# Patient Record
Sex: FEMALE | Race: WHITE | NOT HISPANIC OR LATINO | Employment: OTHER | URBAN - METROPOLITAN AREA
[De-identification: names, ages, dates, MRNs, and addresses within clinical notes are randomized per-mention and may not be internally consistent; named-entity substitution may affect disease eponyms.]

---

## 2022-08-12 ENCOUNTER — TELEPHONE (OUTPATIENT)
Dept: SCHEDULING | Facility: IMAGING CENTER | Age: 47
End: 2022-08-12

## 2022-08-12 ENCOUNTER — HOSPITAL ENCOUNTER (OUTPATIENT)
Facility: MEDICAL CENTER | Age: 47
End: 2022-08-12
Attending: COLON & RECTAL SURGERY | Admitting: COLON & RECTAL SURGERY
Payer: MEDICARE

## 2022-10-03 ENCOUNTER — OFFICE VISIT (OUTPATIENT)
Dept: MEDICAL GROUP | Facility: PHYSICIAN GROUP | Age: 47
End: 2022-10-03
Payer: MEDICARE

## 2022-10-03 VITALS
RESPIRATION RATE: 20 BRPM | BODY MASS INDEX: 34.85 KG/M2 | HEIGHT: 69 IN | DIASTOLIC BLOOD PRESSURE: 76 MMHG | HEART RATE: 90 BPM | OXYGEN SATURATION: 96 % | TEMPERATURE: 97.2 F | SYSTOLIC BLOOD PRESSURE: 124 MMHG | WEIGHT: 235.3 LBS

## 2022-10-03 DIAGNOSIS — M54.42 CHRONIC BILATERAL LOW BACK PAIN WITH BILATERAL SCIATICA: ICD-10-CM

## 2022-10-03 DIAGNOSIS — E11.29 TYPE 2 DIABETES MELLITUS WITH OTHER DIABETIC KIDNEY COMPLICATION, WITH LONG-TERM CURRENT USE OF INSULIN (HCC): ICD-10-CM

## 2022-10-03 DIAGNOSIS — M54.41 CHRONIC BILATERAL LOW BACK PAIN WITH BILATERAL SCIATICA: ICD-10-CM

## 2022-10-03 DIAGNOSIS — G89.29 CHRONIC BILATERAL LOW BACK PAIN WITH BILATERAL SCIATICA: ICD-10-CM

## 2022-10-03 DIAGNOSIS — Z12.31 ENCOUNTER FOR SCREENING MAMMOGRAM FOR MALIGNANT NEOPLASM OF BREAST: ICD-10-CM

## 2022-10-03 DIAGNOSIS — Z98.41 HISTORY OF RIGHT CATARACT EXTRACTION: ICD-10-CM

## 2022-10-03 DIAGNOSIS — Z79.4 TYPE 2 DIABETES MELLITUS WITH OTHER DIABETIC KIDNEY COMPLICATION, WITH LONG-TERM CURRENT USE OF INSULIN (HCC): ICD-10-CM

## 2022-10-03 DIAGNOSIS — Z85.820 HISTORY OF MELANOMA: ICD-10-CM

## 2022-10-03 DIAGNOSIS — M62.830 MUSCLE SPASM OF BACK: ICD-10-CM

## 2022-10-03 DIAGNOSIS — Z12.31 ENCOUNTER FOR SCREENING MAMMOGRAM FOR BREAST CANCER: ICD-10-CM

## 2022-10-03 DIAGNOSIS — F11.90 OPIOID USE: ICD-10-CM

## 2022-10-03 DIAGNOSIS — D22.9 ATYPICAL MOLE: ICD-10-CM

## 2022-10-03 PROBLEM — E11.9 TYPE 2 DIABETES MELLITUS (HCC): Status: ACTIVE | Noted: 2022-10-03

## 2022-10-03 PROBLEM — M54.50 CHRONIC BILATERAL LOW BACK PAIN: Status: ACTIVE | Noted: 2022-10-03

## 2022-10-03 PROCEDURE — 99214 OFFICE O/P EST MOD 30 MIN: CPT | Performed by: NURSE PRACTITIONER

## 2022-10-03 RX ORDER — METHOCARBAMOL 750 MG/1
TABLET, FILM COATED ORAL
Qty: 120 TABLET | Refills: 1 | Status: SHIPPED
Start: 2022-10-03 | End: 2023-06-05

## 2022-10-03 RX ORDER — INSULIN LISPRO 100 [IU]/ML
90 INJECTION, SOLUTION INTRAVENOUS; SUBCUTANEOUS
COMMUNITY
End: 2022-11-21

## 2022-10-03 RX ORDER — HYDROCODONE BITARTRATE AND ACETAMINOPHEN 10; 325 MG/1; MG/1
1 TABLET ORAL EVERY 6 HOURS PRN
COMMUNITY
Start: 2022-09-14

## 2022-10-03 RX ORDER — SYRINGE AND NEEDLE,INSULIN,1ML 28GX1/2"
SYRINGE, EMPTY DISPOSABLE MISCELLANEOUS
COMMUNITY
Start: 2022-09-23 | End: 2023-06-05 | Stop reason: SDUPTHER

## 2022-10-03 RX ORDER — HYDROCODONE BITARTRATE AND ACETAMINOPHEN 10; 325 MG/1; MG/1
1 TABLET ORAL EVERY 8 HOURS PRN
Qty: 90 TABLET | Refills: 0 | Status: SHIPPED | OUTPATIENT
Start: 2022-10-03 | End: 2022-11-02

## 2022-10-03 RX ORDER — GABAPENTIN 300 MG/1
CAPSULE ORAL
COMMUNITY
Start: 2022-08-16

## 2022-10-03 RX ORDER — BROMFENAC 0.76 MG/ML
SOLUTION/ DROPS OPHTHALMIC
COMMUNITY
Start: 2022-09-20 | End: 2023-06-05

## 2022-10-03 RX ORDER — PEN NEEDLE, DIABETIC 32 GX 1/4"
NEEDLE, DISPOSABLE MISCELLANEOUS
Qty: 100 EACH | Refills: 3 | Status: SHIPPED | OUTPATIENT
Start: 2022-10-03 | End: 2024-03-21 | Stop reason: SDUPTHER

## 2022-10-03 RX ORDER — HYDROCODONE BITARTRATE AND ACETAMINOPHEN 10; 325 MG/1; MG/1
1 TABLET ORAL
COMMUNITY
End: 2022-10-03 | Stop reason: SDUPTHER

## 2022-10-03 RX ORDER — CIPROFLOXACIN 500 MG/1
TABLET, FILM COATED ORAL
COMMUNITY
Start: 2022-09-06 | End: 2022-10-03

## 2022-10-03 RX ORDER — OMEPRAZOLE 40 MG/1
CAPSULE, DELAYED RELEASE ORAL
COMMUNITY
Start: 2022-08-11

## 2022-10-03 RX ORDER — CYCLOBENZAPRINE HCL 10 MG
10 TABLET ORAL
COMMUNITY
End: 2022-10-03

## 2022-10-03 RX ORDER — GABAPENTIN 300 MG/1
300 CAPSULE ORAL
COMMUNITY
End: 2022-10-03

## 2022-10-03 RX ORDER — MOXIFLOXACIN 5 MG/ML
SOLUTION/ DROPS OPHTHALMIC
COMMUNITY
Start: 2022-09-21 | End: 2023-06-05

## 2022-10-03 RX ORDER — INSULIN GLARGINE 100 [IU]/ML
INJECTION, SOLUTION SUBCUTANEOUS
COMMUNITY
Start: 2022-08-17 | End: 2022-10-03

## 2022-10-03 RX ORDER — CYCLOBENZAPRINE HCL 10 MG
TABLET ORAL
COMMUNITY
Start: 2022-09-13

## 2022-10-03 RX ORDER — LISINOPRIL 10 MG/1
TABLET ORAL
COMMUNITY
Start: 2022-08-11 | End: 2022-10-03

## 2022-10-03 ASSESSMENT — PATIENT HEALTH QUESTIONNAIRE - PHQ9: CLINICAL INTERPRETATION OF PHQ2 SCORE: 0

## 2022-10-03 NOTE — LETTER
[facility]  Cheryl M. Demucha, A.P.R.N.  [address] [place] [address]  Fax: [phone]   Authorization for Release/Disclosure of   Protected Health Information   Name: CALLIE FOWLER : 1975 SSN: [id number]   Address: [address]  [facility] [address] Phone:    There are no phone numbers on file.   I authorize the entity listed below to release/disclose the PHI below to:  [facility]/Cheryl M. Demucha, A.P.R.N. and Cheryl M. Demucha, A.P.R.N.   Provider or Entity Name:     Address   City, State, Zip   Phone:      Fax:     Reason for request: continuity of care   Information to be released:    [  ] LAST COLONOSCOPY,  including any PATH REPORT and follow-up  [  ] LAST FIT/COLOGUARD RESULT [  ] LAST DEXA  [  ] LAST MAMMOGRAM  [  ] LAST PAP  [  ] LAST LABS [  ] RETINA EXAM REPORT  [  ] IMMUNIZATION RECORDS  [  ] Release all info      [  ] Check here and initial the line next to each item to release ALL health information INCLUDING  _____ Care and treatment for drug and / or alcohol abuse  _____ HIV testing, infection status, or AIDS  _____ Genetic Testing    DATES OF SERVICE OR TIME PERIOD TO BE DISCLOSED: _____________  I understand and acknowledge that:  * This Authorization may be revoked at any time by you in writing, except if your health information has already been used or disclosed.  * Your health information that will be used or disclosed as a result of you signing this authorization could be re-disclosed by the recipient. If this occurs, your re-disclosed health information may no longer be protected by State or Federal laws.  * You may refuse to sign this Authorization. Your refusal will not affect your ability to obtain treatment.  * This Authorization becomes effective upon signing and will  on (date) __________.      If no date is indicated, this Authorization will  one (1) year from the signature date.    Name: Callie Fowler    Signature:   Date:     10/3/2022        PLEASE FAX REQUESTED RECORDS BACK TO: [phone]

## 2022-10-03 NOTE — ASSESSMENT & PLAN NOTE
Just had surgery done; is scheduled for left eye as well  Reports vision much better in right eye

## 2022-10-03 NOTE — PROGRESS NOTES
"Subjective:     CC:   Chief Complaint   Patient presents with    Establish Care    Referral Needed     Dermo Left arm mole        HPI:   Aviva presents today with    History of right cataract extraction  Just had surgery done; is scheduled for left eye as well  Reports vision much better in right eye     Atypical mole  Has a few moles she needs to have checked out  Hx of melanoma    Type 2 diabetes mellitus (HCC)  Was in the hospital from 8/31-9/4; was told she was having issues w/her kidneys  Not sure what her A1c  Most recent fasting bs was 215; forgot to take insulin w/it     Chronic bilateral low back pain  Was seeing provider in [place] before she moved here; is open to seeing pain mgmt  Has been taking opioids for quite a while; does help pt be able to perform her ADLs and improve her quality of life in general               ROS per HPI    Objective:     Exam:  /76   Pulse 90   Temp 36.2 °C (97.2 °F) (Temporal)   Resp 20   Ht 1.753 m (5' 9\")   Wt 107 kg (235 lb 4.8 oz)   SpO2 96%   BMI 34.75 kg/m²  Body mass index is 34.75 kg/m².    Physical Exam:  Constitutional: Well-developed and well-nourished female. Not diaphoretic. No distress.   Skin: warm, dry, intact, several moles on her left arm; no open wounds, erythema, papules or pustules   Head: Atraumatic without lesions.  Eyes: Conjunctivae are normal. Pupils are equal, round. No scleral icterus.   Ears:  External ears unremarkable.   Neck: Supple, trachea midline. No thyromegaly present. No cervical or supraclavicular lymphadenopathy.  Cardiovascular: Regular rate and rhythm without murmur.   Pulmonary: Clear to ausculation. Normal effort. No rales, ronchi, or wheezing.  Extremities: No cyanosis, clubbing, erythema, nor edema.   Neurological: Alert and oriented x 3.   Psychiatric:  Behavior, mood, and affect are appropriate.        Assessment & Plan:     47 y.o. female with the following -     1. Encounter for screening mammogram for breast " "cancer    2. Chronic bilateral low back pain with bilateral sciatica  - HYDROcodone/acetaminophen (NORCO)  MG Tab; Take 1 Tablet by mouth every 8 hours as needed for Severe Pain for up to 30 days.  Dispense: 90 Tablet; Refill: 0  - Referral to Pain Management    3. Muscle spasm of back  - methocarbamol (ROBAXIN) 750 MG Tab; 1 tab every 8-12 hours; prn muscle spasms; may increase to 2 tabs if needed  Dispense: 120 Tablet; Refill: 1  - Referral to Pain Management    4. Opioid use  - Referral to Pain Management    5. Type 2 diabetes mellitus with other diabetic kidney complication, with long-term current use of insulin (HCC)    6. Atypical mole  - Referral to Dermatology    7. History of melanoma  - Referral to Dermatology    8. History of right cataract extraction    9. Encounter for screening mammogram for malignant neoplasm of breast  - MA-SCREENING MAMMO BILAT W/TOMOSYNTHESIS W/CAD; Future    Other orders  - BROMSITE 0.075 % Solution  - HYDROcodone/acetaminophen (NORCO)  MG Tab  - cyclobenzaprine (FLEXERIL) 10 mg Tab; 3 times daily  - gabapentin (NEURONTIN) 300 MG Cap  - insulin glargine (LANTUS) 100 UNIT/ML Solution Pen-injector injection; Inject 25 Units under the skin.  - insulin lispro (HUMALOG,ADMELOG) 100 UNIT/ML Solution Pen-injector injection PEN; Inject 90 Units under the skin.  - BD INSULIN SYRINGE U/F 1/2UNIT 31G X 5/16\" 0.3 ML Misc  - moxifloxacin (VIGAMOX) 0.5 % Solution  - omeprazole (PRILOSEC) 40 MG delayed-release capsule  - Insulin Pen Needle (NOVOFINE PEN NEEDLE) 32G X 6 MM Misc; Use with insulin pen as directed before bed  Dispense: 100 Each; Refill: 3  - Consent for Opiate Prescription         Return in about 4 weeks (around 10/31/2022) for gen check in, chronic pain.    Please note that this dictation was created using voice recognition software. I have made every reasonable attempt to correct obvious errors, but I expect that there are errors of grammar and possibly content that I " did not discover before finalizing the note.

## 2022-10-03 NOTE — ASSESSMENT & PLAN NOTE
Was in the hospital from 8/31-9/4; was told she was having issues w/her kidneys  Not sure what her A1c  Most recent fasting bs was 215; forgot to take insulin w/it

## 2022-10-04 NOTE — ASSESSMENT & PLAN NOTE
Was seeing provider in [place] before she moved here; is open to seeing pain mgmt  Has been taking opioids for quite a while; does help pt be able to perform her ADLs and improve her quality of life in general

## 2022-10-11 ENCOUNTER — TELEPHONE (OUTPATIENT)
Dept: MEDICAL GROUP | Facility: PHYSICIAN GROUP | Age: 47
End: 2022-10-11
Payer: MEDICARE

## 2022-10-11 NOTE — TELEPHONE ENCOUNTER
MEDICATION PRIOR AUTHORIZATION NEEDED:    1. Name of Medication: METHOCARBAMOL     2. Requested By (Name of Pharmacy): [facility]     3. Is insurance on file current? YES    4. What is the name & phone number of the 3rd party payor?   [employer]  [id number]

## 2022-11-16 ENCOUNTER — HOSPITAL ENCOUNTER (OUTPATIENT)
Dept: LAB | Facility: MEDICAL CENTER | Age: 47
End: 2022-11-16
Attending: NURSE PRACTITIONER
Payer: MEDICARE

## 2022-11-16 ENCOUNTER — APPOINTMENT (OUTPATIENT)
Dept: RADIOLOGY | Facility: IMAGING CENTER | Age: 47
End: 2022-11-16
Attending: NURSE PRACTITIONER
Payer: MEDICARE

## 2022-11-16 ENCOUNTER — OFFICE VISIT (OUTPATIENT)
Dept: MEDICAL GROUP | Facility: PHYSICIAN GROUP | Age: 47
End: 2022-11-16
Payer: MEDICARE

## 2022-11-16 VITALS
RESPIRATION RATE: 16 BRPM | OXYGEN SATURATION: 98 % | WEIGHT: 245.8 LBS | DIASTOLIC BLOOD PRESSURE: 88 MMHG | BODY MASS INDEX: 36.4 KG/M2 | HEIGHT: 69 IN | SYSTOLIC BLOOD PRESSURE: 130 MMHG | HEART RATE: 92 BPM | TEMPERATURE: 96.8 F

## 2022-11-16 DIAGNOSIS — E66.01 MORBID OBESITY (HCC): ICD-10-CM

## 2022-11-16 DIAGNOSIS — E11.29 TYPE 2 DIABETES MELLITUS WITH OTHER DIABETIC KIDNEY COMPLICATION, WITH LONG-TERM CURRENT USE OF INSULIN (HCC): ICD-10-CM

## 2022-11-16 DIAGNOSIS — Z01.818 PREOP EXAMINATION: ICD-10-CM

## 2022-11-16 DIAGNOSIS — E87.5 HYPERKALEMIA: ICD-10-CM

## 2022-11-16 DIAGNOSIS — Z11.59 NEED FOR HEPATITIS C SCREENING TEST: ICD-10-CM

## 2022-11-16 DIAGNOSIS — R74.8 ABNORMAL LIVER ENZYMES: ICD-10-CM

## 2022-11-16 DIAGNOSIS — E08.9 DIABETES MELLITUS DUE TO UNDERLYING CONDITION WITHOUT COMPLICATION, WITHOUT LONG-TERM CURRENT USE OF INSULIN (HCC): ICD-10-CM

## 2022-11-16 DIAGNOSIS — R53.83 OTHER FATIGUE: ICD-10-CM

## 2022-11-16 DIAGNOSIS — E55.9 VITAMIN D DEFICIENCY: ICD-10-CM

## 2022-11-16 DIAGNOSIS — Z13.220 SCREENING FOR LIPID DISORDERS: ICD-10-CM

## 2022-11-16 DIAGNOSIS — Z79.4 TYPE 2 DIABETES MELLITUS WITH OTHER DIABETIC KIDNEY COMPLICATION, WITH LONG-TERM CURRENT USE OF INSULIN (HCC): ICD-10-CM

## 2022-11-16 DIAGNOSIS — Z86.16 HISTORY OF COVID-19: ICD-10-CM

## 2022-11-16 PROBLEM — N12 PYELONEPHRITIS: Status: ACTIVE | Noted: 2022-09-01

## 2022-11-16 PROBLEM — N17.9 AKI (ACUTE KIDNEY INJURY) (HCC): Status: ACTIVE | Noted: 2022-09-01

## 2022-11-16 LAB
25(OH)D3 SERPL-MCNC: 22 NG/ML (ref 30–100)
ALBUMIN SERPL BCP-MCNC: 3.9 G/DL (ref 3.2–4.9)
ALBUMIN/GLOB SERPL: 1.2 G/DL
ALP SERPL-CCNC: 238 U/L (ref 30–99)
ALT SERPL-CCNC: 37 U/L (ref 2–50)
ANION GAP SERPL CALC-SCNC: 10 MMOL/L (ref 7–16)
AST SERPL-CCNC: 37 U/L (ref 12–45)
BASOPHILS # BLD AUTO: 0.3 % (ref 0–1.8)
BASOPHILS # BLD: 0.02 K/UL (ref 0–0.12)
BILIRUB SERPL-MCNC: 0.4 MG/DL (ref 0.1–1.5)
BUN SERPL-MCNC: 18 MG/DL (ref 8–22)
CALCIUM SERPL-MCNC: 9.9 MG/DL (ref 8.5–10.5)
CHLORIDE SERPL-SCNC: 104 MMOL/L (ref 96–112)
CHOLEST SERPL-MCNC: 170 MG/DL (ref 100–199)
CO2 SERPL-SCNC: 28 MMOL/L (ref 20–33)
CREAT SERPL-MCNC: 1.06 MG/DL (ref 0.5–1.4)
EOSINOPHIL # BLD AUTO: 0.3 K/UL (ref 0–0.51)
EOSINOPHIL NFR BLD: 4.7 % (ref 0–6.9)
ERYTHROCYTE [DISTWIDTH] IN BLOOD BY AUTOMATED COUNT: 41.1 FL (ref 35.9–50)
EST. AVERAGE GLUCOSE BLD GHB EST-MCNC: 232 MG/DL
FASTING STATUS PATIENT QL REPORTED: NORMAL
GFR SERPLBLD CREATININE-BSD FMLA CKD-EPI: 66 ML/MIN/1.73 M 2
GLOBULIN SER CALC-MCNC: 3.3 G/DL (ref 1.9–3.5)
GLUCOSE SERPL-MCNC: 89 MG/DL (ref 65–99)
HBA1C MFR BLD: 9.7 % (ref 4–5.6)
HCT VFR BLD AUTO: 39.1 % (ref 37–47)
HCV AB SER QL: NORMAL
HDLC SERPL-MCNC: 39 MG/DL
HGB BLD-MCNC: 13.6 G/DL (ref 12–16)
IMM GRANULOCYTES # BLD AUTO: 0.02 K/UL (ref 0–0.11)
IMM GRANULOCYTES NFR BLD AUTO: 0.3 % (ref 0–0.9)
LDLC SERPL CALC-MCNC: 96 MG/DL
LYMPHOCYTES # BLD AUTO: 3.39 K/UL (ref 1–4.8)
LYMPHOCYTES NFR BLD: 53.3 % (ref 22–41)
MCH RBC QN AUTO: 31.2 PG (ref 27–33)
MCHC RBC AUTO-ENTMCNC: 34.8 G/DL (ref 33.6–35)
MCV RBC AUTO: 89.7 FL (ref 81.4–97.8)
MONOCYTES # BLD AUTO: 0.47 K/UL (ref 0–0.85)
MONOCYTES NFR BLD AUTO: 7.4 % (ref 0–13.4)
NEUTROPHILS # BLD AUTO: 2.16 K/UL (ref 2–7.15)
NEUTROPHILS NFR BLD: 34 % (ref 44–72)
NRBC # BLD AUTO: 0 K/UL
NRBC BLD-RTO: 0 /100 WBC
PLATELET # BLD AUTO: 318 K/UL (ref 164–446)
PMV BLD AUTO: 11.4 FL (ref 9–12.9)
POTASSIUM SERPL-SCNC: 4.2 MMOL/L (ref 3.6–5.5)
PROT SERPL-MCNC: 7.2 G/DL (ref 6–8.2)
RBC # BLD AUTO: 4.36 M/UL (ref 4.2–5.4)
SODIUM SERPL-SCNC: 142 MMOL/L (ref 135–145)
TRIGL SERPL-MCNC: 175 MG/DL (ref 0–149)
TSH SERPL DL<=0.005 MIU/L-ACNC: 2.14 UIU/ML (ref 0.38–5.33)
WBC # BLD AUTO: 6.4 K/UL (ref 4.8–10.8)

## 2022-11-16 PROCEDURE — 82570 ASSAY OF URINE CREATININE: CPT

## 2022-11-16 PROCEDURE — 80061 LIPID PANEL: CPT

## 2022-11-16 PROCEDURE — 36415 COLL VENOUS BLD VENIPUNCTURE: CPT | Mod: GA

## 2022-11-16 PROCEDURE — 80053 COMPREHEN METABOLIC PANEL: CPT

## 2022-11-16 PROCEDURE — 71046 X-RAY EXAM CHEST 2 VIEWS: CPT | Mod: TC,FY | Performed by: FAMILY MEDICINE

## 2022-11-16 PROCEDURE — 93000 ELECTROCARDIOGRAM COMPLETE: CPT | Performed by: NURSE PRACTITIONER

## 2022-11-16 PROCEDURE — 99214 OFFICE O/P EST MOD 30 MIN: CPT | Performed by: NURSE PRACTITIONER

## 2022-11-16 PROCEDURE — 84443 ASSAY THYROID STIM HORMONE: CPT

## 2022-11-16 PROCEDURE — 83036 HEMOGLOBIN GLYCOSYLATED A1C: CPT | Mod: GA

## 2022-11-16 PROCEDURE — 82306 VITAMIN D 25 HYDROXY: CPT

## 2022-11-16 PROCEDURE — G0472 HEP C SCREEN HIGH RISK/OTHER: HCPCS | Mod: GA

## 2022-11-16 PROCEDURE — 82043 UR ALBUMIN QUANTITATIVE: CPT

## 2022-11-16 PROCEDURE — 85025 COMPLETE CBC W/AUTO DIFF WBC: CPT

## 2022-11-16 RX ORDER — TIZANIDINE 4 MG/1
TABLET ORAL
COMMUNITY
Start: 2022-11-09 | End: 2022-12-13

## 2022-11-16 NOTE — ASSESSMENT & PLAN NOTE
Pt had a gastric sleeve done in 7/2018  Started at 290 and got down to 229  Patient has been referred to [facility] for bariatric surgery-will do a revision/bypass  She is here today to get started on the medical clearance for this procedure    She needs to have a scope done; once these are done, she'll be scheduled in the near future

## 2022-11-16 NOTE — PROGRESS NOTES
"Subjective:     CC:   Chief Complaint   Patient presents with    Paperwork       For gastric bypass    Requesting Labs       HPI:   Aviva presents today with    Morbid obesity (HCC)  Pt had a gastric sleeve done in 7/2018  Started at 290 and got down to 229  Patient has been referred to [facility] for bariatric surgery-will do a revision/bypass  She is here today to get started on the medical clearance for this procedure    She needs to have a scope done; once these are done, she'll be scheduled in the near future     History of COVID-19  Pos 10/16; doing much better               ROS per HPI    Objective:     Exam:  /88   Pulse 92   Temp 36 °C (96.8 °F) (Temporal)   Resp 16   Ht 1.753 m (5' 9\")   Wt 111 kg (245 lb 12.8 oz)   SpO2 98%   BMI 36.30 kg/m²  Body mass index is 36.3 kg/m².    Physical Exam:  Constitutional: Well-developed and well-nourished female  Not diaphoretic. No distress.   Skin: warm, dry, intact, no evidence of rash or concerning lesions  Head: Atraumatic without lesions.  Eyes: Conjunctivae are normal. Pupils are equal, round. No scleral icterus.   Ears:  External ears unremarkable.   Neck: Supple, trachea midline. No thyromegaly present. No cervical or supraclavicular lymphadenopathy.  Cardiovascular: Regular rate and rhythm without murmur.   Pulmonary: Clear to ausculation. Normal effort. No rales, ronchi, or wheezing.  Extremities: No cyanosis, clubbing, erythema, nor edema.   Neurological: Alert and oriented x 3.   Psychiatric:  Behavior, mood, and affect are appropriate.        Assessment & Plan:     47 y.o. female with the following -     1. Morbid obesity (HCC)  - TSH WITH REFLEX TO FT4; Future    2. Type 2 diabetes mellitus with other diabetic kidney complication, with long-term current use of insulin (HCC)  - HEMOGLOBIN A1C; Future    3. Abnormal liver enzymes  - Comp Metabolic Panel; Future    4. Hyperkalemia  - Comp Metabolic Panel; Future    5. Other " fatigue  - CBC WITH DIFFERENTIAL; Future  - TSH WITH REFLEX TO FT4; Future    6. Screening for lipid disorders  - Lipid Profile; Future    7. Vitamin D deficiency  - VITAMIN D,25 HYDROXY (DEFICIENCY); Future    8. Preop examination  - DX-CHEST-2 VIEWS; Future  - EKG - Clinic Performed wnl     9. Diabetes mellitus due to underlying condition without complication, without long-term current use of insulin (HCC)  - DX-CHEST-2 VIEWS; Future  - Diabetic Monofilament LE Exam wnl          Return in about 2 weeks (around 11/30/2022). For lab results and chest xray review     Please note that this dictation was created using voice recognition software. I have made every reasonable attempt to correct obvious errors, but I expect that there are errors of grammar and possibly content that I did not discover before finalizing the note.

## 2022-11-17 LAB
CREAT UR-MCNC: 112.61 MG/DL
MICROALBUMIN UR-MCNC: 85.7 MG/DL
MICROALBUMIN/CREAT UR: 761 MG/G (ref 0–30)

## 2022-11-21 ENCOUNTER — TELEMEDICINE (OUTPATIENT)
Dept: MEDICAL GROUP | Facility: PHYSICIAN GROUP | Age: 47
End: 2022-11-21
Payer: MEDICARE

## 2022-11-21 VITALS — HEIGHT: 69 IN | WEIGHT: 240 LBS | BODY MASS INDEX: 35.55 KG/M2

## 2022-11-21 DIAGNOSIS — E11.29 TYPE 2 DIABETES MELLITUS WITH OTHER DIABETIC KIDNEY COMPLICATION, WITH LONG-TERM CURRENT USE OF INSULIN (HCC): ICD-10-CM

## 2022-11-21 DIAGNOSIS — Z79.4 TYPE 2 DIABETES MELLITUS WITH OTHER DIABETIC KIDNEY COMPLICATION, WITH LONG-TERM CURRENT USE OF INSULIN (HCC): ICD-10-CM

## 2022-11-21 DIAGNOSIS — R80.9 POSITIVE FOR MICROALBUMINURIA: ICD-10-CM

## 2022-11-21 DIAGNOSIS — R74.8 ABNORMAL LIVER ENZYMES: ICD-10-CM

## 2022-11-21 DIAGNOSIS — E87.5 HYPERKALEMIA: ICD-10-CM

## 2022-11-21 PROCEDURE — 99214 OFFICE O/P EST MOD 30 MIN: CPT | Mod: 95 | Performed by: NURSE PRACTITIONER

## 2022-11-21 RX ORDER — INSULIN LISPRO 100 [IU]/ML
10 INJECTION, SOLUTION INTRAVENOUS; SUBCUTANEOUS
COMMUNITY
Start: 2022-11-21 | End: 2023-09-11 | Stop reason: SDUPTHER

## 2022-11-21 ASSESSMENT — FIBROSIS 4 INDEX: FIB4 SCORE: 0.86

## 2022-11-22 NOTE — ASSESSMENT & PLAN NOTE
Recent labs show A1c of 9.7, down from >14.1 two months ago  Pt taking 25 units lantus qhs and 4-10 units sliding scale tid premeals   fbs have been in the high 100s to low 200s   Hasn't been eating that well but does improve when she does    Advised pt to keep her sliding scale the same  Increase lantus by 3 units every 2 units until she gets to fbs of 140 (feels poorly lower than this)

## 2022-11-22 NOTE — PROGRESS NOTES
"Virtual Visit: Established Patient   This visit was conducted via Zoom using secure and encrypted videoconferencing technology.   The patient was in their home in the state of [place].    The patient's identity was confirmed and verbal consent was obtained for this virtual visit.    Subjective:   CC:   Chief Complaint   Patient presents with    Follow-Up     Labs      Aviva Kenney is a 47 y.o. female presenting for evaluation and management of:    Type 2 diabetes mellitus (HCC)  Recent labs show A1c of 9.7, down from >14.1 two months ago  Pt taking 25 units lantus qhs and 4-10 units sliding scale tid premeals   fbs have been in the high 100s to low 200s   Hasn't been eating that well but does improve when she does    Advised pt to keep her sliding scale the same  Increase lantus by 3 units every 2 units until she gets to fbs of 140 (feels poorly lower than this)    Positive for microalbuminuria  Creat/micro high  Pt was taking lisinopril at one time but d/c d/t elevated potassium       Hyperkalemia  Improved; most recent value 4.1     Abnormal liver enzymes  2/3 labs have normalized since covid  Alk phos 238; redo in 3 mos   ROS       Current medicines (including changes today)  Current Outpatient Medications   Medication Sig Dispense Refill    insulin lispro 100 UNIT/ML SC SOPN injection PEN Inject 10 Units under the skin 3 times a day before meals.      insulin glargine 100 UNIT/ML SC SOPN injection Inject 25 units SQ into abd qhs; increase by 3 units every 2 nights until raz=579; max 40 units per night 12 Each 3    tizanidine (ZANAFLEX) 4 MG Tab       BROMSITE 0.075 % Solution       HYDROcodone/acetaminophen (NORCO)  MG Tab       cyclobenzaprine (FLEXERIL) 10 mg Tab 3 times daily      gabapentin (NEURONTIN) 300 MG Cap       BD INSULIN SYRINGE U/F 1/2UNIT 31G X 5/16\" 0.3 ML Misc       moxifloxacin (VIGAMOX) 0.5 % Solution       omeprazole (PRILOSEC) 40 MG delayed-release capsule       " "methocarbamol (ROBAXIN) 750 MG Tab 1 tab every 8-12 hours; prn muscle spasms; may increase to 2 tabs if needed 120 Tablet 1    Insulin Pen Needle (NOVOFINE PEN NEEDLE) 32G X 6 MM Misc Use with insulin pen as directed before bed 100 Each 3     No current facility-administered medications for this visit.       Patient Active Problem List    Diagnosis Date Noted    Positive for microalbuminuria 11/21/2022    History of COVID-19 11/16/2022    Chronic bilateral low back pain 10/03/2022    Type 2 diabetes mellitus (HCC) 10/03/2022    Atypical mole 10/03/2022    History of melanoma 10/03/2022    History of right cataract extraction 10/03/2022    Abnormal liver enzymes 09/01/2022    BUTCH (acute kidney injury) ([facility]) 09/01/2022    Hyperkalemia 09/01/2022    Morbid obesity (HCC) 09/01/2022    Pyelonephritis 09/01/2022        Objective:   Ht 1.753 m (5' 9\") Comment: pt stated  Wt 109 kg (240 lb) Comment: pt stated  BMI 35.44 kg/m²     Physical Exam:  Constitutional: Alert, no distress, well-groomed.  Skin: No rashes in visible areas.  Eye: Round. Conjunctiva clear, lids normal. No icterus.   ENMT: Lips pink without lesions, good dentition, moist mucous membranes. Phonation normal.  Neck: No masses, no thyromegaly. Moves freely without pain.  Respiratory: Unlabored respiratory effort, no cough or audible wheeze  Psych: Alert and oriented x3, normal affect and mood.     Assessment and Plan:   The following treatment plan was discussed:     1. Type 2 diabetes mellitus with other diabetic kidney complication, with long-term current use of insulin ([facility])  - insulin glargine 100 UNIT/ML SC SOPN injection; Inject 25 units SQ into abd qhs; increase by 3 units every 2 nights until xly=968; max 40 units per night  Dispense: 12 Each; Refill: 3    2. Positive for microalbuminuria  Monitor-should improve w/better glycemic control    3. Hyperkalemia  Resolved w/recent labs    4. Abnormal liver enzymes  improving    Other orders  - insulin " lispro 100 UNIT/ML SC SOPN injection PEN; Inject 10 Units under the skin 3 times a day before meals.      Follow-up: Return in about 2 weeks (around 12/5/2022) for for fbs .

## 2022-11-23 ENCOUNTER — PRE-ADMISSION TESTING (OUTPATIENT)
Dept: ADMISSIONS | Facility: MEDICAL CENTER | Age: 47
End: 2022-11-23
Attending: COLON & RECTAL SURGERY
Payer: MEDICARE

## 2022-12-06 ENCOUNTER — APPOINTMENT (OUTPATIENT)
Dept: MEDICAL GROUP | Facility: PHYSICIAN GROUP | Age: 47
End: 2022-12-06
Payer: MEDICARE

## 2022-12-07 ENCOUNTER — ANESTHESIA EVENT (OUTPATIENT)
Dept: SURGERY | Facility: MEDICAL CENTER | Age: 47
End: 2022-12-07
Payer: MEDICARE

## 2022-12-07 ENCOUNTER — HOSPITAL ENCOUNTER (OUTPATIENT)
Facility: MEDICAL CENTER | Age: 47
End: 2022-12-07
Attending: COLON & RECTAL SURGERY | Admitting: COLON & RECTAL SURGERY
Payer: MEDICARE

## 2022-12-07 ENCOUNTER — ANESTHESIA (OUTPATIENT)
Dept: SURGERY | Facility: MEDICAL CENTER | Age: 47
End: 2022-12-07
Payer: MEDICARE

## 2022-12-07 VITALS
WEIGHT: 246.91 LBS | RESPIRATION RATE: 14 BRPM | OXYGEN SATURATION: 100 % | BODY MASS INDEX: 36.57 KG/M2 | TEMPERATURE: 97.4 F | HEART RATE: 69 BPM | DIASTOLIC BLOOD PRESSURE: 96 MMHG | HEIGHT: 69 IN | SYSTOLIC BLOOD PRESSURE: 156 MMHG

## 2022-12-07 LAB — GLUCOSE BLD STRIP.AUTO-MCNC: 151 MG/DL (ref 65–99)

## 2022-12-07 PROCEDURE — 160025 RECOVERY II MINUTES (STATS): Performed by: COLON & RECTAL SURGERY

## 2022-12-07 PROCEDURE — 700101 HCHG RX REV CODE 250: Performed by: ANESTHESIOLOGY

## 2022-12-07 PROCEDURE — 160048 HCHG OR STATISTICAL LEVEL 1-5: Performed by: COLON & RECTAL SURGERY

## 2022-12-07 PROCEDURE — 160009 HCHG ANES TIME/MIN: Performed by: COLON & RECTAL SURGERY

## 2022-12-07 PROCEDURE — 160036 HCHG PACU - EA ADDL 30 MINS PHASE I: Performed by: COLON & RECTAL SURGERY

## 2022-12-07 PROCEDURE — 700111 HCHG RX REV CODE 636 W/ 250 OVERRIDE (IP): Performed by: COLON & RECTAL SURGERY

## 2022-12-07 PROCEDURE — 160046 HCHG PACU - 1ST 60 MINS PHASE II: Performed by: COLON & RECTAL SURGERY

## 2022-12-07 PROCEDURE — 82962 GLUCOSE BLOOD TEST: CPT

## 2022-12-07 PROCEDURE — 160202 HCHG ENDO MINUTES - 1ST 30 MINS LEVEL 3: Performed by: COLON & RECTAL SURGERY

## 2022-12-07 PROCEDURE — 502240 HCHG MISC OR SUPPLY RC 0272: Performed by: COLON & RECTAL SURGERY

## 2022-12-07 PROCEDURE — 160002 HCHG RECOVERY MINUTES (STAT): Performed by: COLON & RECTAL SURGERY

## 2022-12-07 PROCEDURE — C1889 IMPLANT/INSERT DEVICE, NOC: HCPCS | Performed by: COLON & RECTAL SURGERY

## 2022-12-07 PROCEDURE — 160035 HCHG PACU - 1ST 60 MINS PHASE I: Performed by: COLON & RECTAL SURGERY

## 2022-12-07 PROCEDURE — C1726 CATH, BAL DIL, NON-VASCULAR: HCPCS | Performed by: COLON & RECTAL SURGERY

## 2022-12-07 PROCEDURE — 700111 HCHG RX REV CODE 636 W/ 250 OVERRIDE (IP): Performed by: ANESTHESIOLOGY

## 2022-12-07 PROCEDURE — 00731 ANES UPR GI NDSC PX NOS: CPT | Performed by: ANESTHESIOLOGY

## 2022-12-07 PROCEDURE — 160207 HCHG ENDO MINUTES - EA ADDL 1 MIN LEVEL 3: Performed by: COLON & RECTAL SURGERY

## 2022-12-07 DEVICE — CLIP SYSTEM SET 12/6T 220 CM: Type: IMPLANTABLE DEVICE | Site: ESOPHAGUS | Status: FUNCTIONAL

## 2022-12-07 RX ORDER — ROCURONIUM BROMIDE 10 MG/ML
INJECTION, SOLUTION INTRAVENOUS PRN
Status: DISCONTINUED | OUTPATIENT
Start: 2022-12-07 | End: 2022-12-07 | Stop reason: SURG

## 2022-12-07 RX ORDER — DIPHENHYDRAMINE HYDROCHLORIDE 50 MG/ML
12.5 INJECTION INTRAMUSCULAR; INTRAVENOUS
Status: DISCONTINUED | OUTPATIENT
Start: 2022-12-07 | End: 2022-12-07 | Stop reason: HOSPADM

## 2022-12-07 RX ORDER — ONDANSETRON 2 MG/ML
INJECTION INTRAMUSCULAR; INTRAVENOUS PRN
Status: DISCONTINUED | OUTPATIENT
Start: 2022-12-07 | End: 2022-12-07 | Stop reason: SURG

## 2022-12-07 RX ORDER — SODIUM CHLORIDE, SODIUM LACTATE, POTASSIUM CHLORIDE, CALCIUM CHLORIDE 600; 310; 30; 20 MG/100ML; MG/100ML; MG/100ML; MG/100ML
INJECTION, SOLUTION INTRAVENOUS CONTINUOUS
Status: DISCONTINUED | OUTPATIENT
Start: 2022-12-07 | End: 2022-12-07 | Stop reason: HOSPADM

## 2022-12-07 RX ORDER — LABETALOL HYDROCHLORIDE 5 MG/ML
5 INJECTION, SOLUTION INTRAVENOUS
Status: DISCONTINUED | OUTPATIENT
Start: 2022-12-07 | End: 2022-12-07 | Stop reason: HOSPADM

## 2022-12-07 RX ORDER — LIDOCAINE HYDROCHLORIDE 20 MG/ML
INJECTION, SOLUTION EPIDURAL; INFILTRATION; INTRACAUDAL; PERINEURAL PRN
Status: DISCONTINUED | OUTPATIENT
Start: 2022-12-07 | End: 2022-12-07 | Stop reason: SURG

## 2022-12-07 RX ORDER — HALOPERIDOL 5 MG/ML
1 INJECTION INTRAMUSCULAR
Status: DISCONTINUED | OUTPATIENT
Start: 2022-12-07 | End: 2022-12-07 | Stop reason: HOSPADM

## 2022-12-07 RX ORDER — METOPROLOL TARTRATE 1 MG/ML
INJECTION, SOLUTION INTRAVENOUS PRN
Status: DISCONTINUED | OUTPATIENT
Start: 2022-12-07 | End: 2022-12-07 | Stop reason: SURG

## 2022-12-07 RX ORDER — DEXAMETHASONE SODIUM PHOSPHATE 4 MG/ML
INJECTION, SOLUTION INTRA-ARTICULAR; INTRALESIONAL; INTRAMUSCULAR; INTRAVENOUS; SOFT TISSUE PRN
Status: DISCONTINUED | OUTPATIENT
Start: 2022-12-07 | End: 2022-12-07 | Stop reason: SURG

## 2022-12-07 RX ORDER — IPRATROPIUM BROMIDE AND ALBUTEROL SULFATE 2.5; .5 MG/3ML; MG/3ML
3 SOLUTION RESPIRATORY (INHALATION)
Status: DISCONTINUED | OUTPATIENT
Start: 2022-12-07 | End: 2022-12-07 | Stop reason: HOSPADM

## 2022-12-07 RX ORDER — TRIAMCINOLONE ACETONIDE 40 MG/ML
INJECTION, SUSPENSION INTRA-ARTICULAR; INTRAMUSCULAR
Status: DISCONTINUED | OUTPATIENT
Start: 2022-12-07 | End: 2022-12-07 | Stop reason: HOSPADM

## 2022-12-07 RX ORDER — ONDANSETRON 2 MG/ML
4 INJECTION INTRAMUSCULAR; INTRAVENOUS
Status: DISCONTINUED | OUTPATIENT
Start: 2022-12-07 | End: 2022-12-07 | Stop reason: HOSPADM

## 2022-12-07 RX ORDER — OXYCODONE HCL 5 MG/5 ML
10 SOLUTION, ORAL ORAL
Status: DISCONTINUED | OUTPATIENT
Start: 2022-12-07 | End: 2022-12-07 | Stop reason: HOSPADM

## 2022-12-07 RX ORDER — OXYCODONE HCL 5 MG/5 ML
5 SOLUTION, ORAL ORAL
Status: DISCONTINUED | OUTPATIENT
Start: 2022-12-07 | End: 2022-12-07 | Stop reason: HOSPADM

## 2022-12-07 RX ORDER — MIDAZOLAM HYDROCHLORIDE 1 MG/ML
1 INJECTION INTRAMUSCULAR; INTRAVENOUS
Status: DISCONTINUED | OUTPATIENT
Start: 2022-12-07 | End: 2022-12-07 | Stop reason: HOSPADM

## 2022-12-07 RX ADMIN — DEXAMETHASONE SODIUM PHOSPHATE 4 MG: 4 INJECTION, SOLUTION INTRA-ARTICULAR; INTRALESIONAL; INTRAMUSCULAR; INTRAVENOUS; SOFT TISSUE at 07:00

## 2022-12-07 RX ADMIN — PROPOFOL 120 MG: 10 INJECTION, EMULSION INTRAVENOUS at 07:00

## 2022-12-07 RX ADMIN — FENTANYL CITRATE 50 MCG: 50 INJECTION, SOLUTION INTRAMUSCULAR; INTRAVENOUS at 07:05

## 2022-12-07 RX ADMIN — LIDOCAINE HYDROCHLORIDE 100 MG: 20 INJECTION, SOLUTION EPIDURAL; INFILTRATION; INTRACAUDAL at 07:00

## 2022-12-07 RX ADMIN — FENTANYL CITRATE 50 MCG: 50 INJECTION, SOLUTION INTRAMUSCULAR; INTRAVENOUS at 07:00

## 2022-12-07 RX ADMIN — SUGAMMADEX 200 MG: 100 INJECTION, SOLUTION INTRAVENOUS at 07:26

## 2022-12-07 RX ADMIN — METOPROLOL TARTRATE 2.5 MG: 5 INJECTION, SOLUTION INTRAVENOUS at 07:05

## 2022-12-07 RX ADMIN — ROCURONIUM BROMIDE 50 MG: 10 INJECTION, SOLUTION INTRAVENOUS at 07:00

## 2022-12-07 RX ADMIN — ONDANSETRON 4 MG: 2 INJECTION INTRAMUSCULAR; INTRAVENOUS at 07:00

## 2022-12-07 ASSESSMENT — PAIN SCALES - GENERAL: PAIN_LEVEL: 0

## 2022-12-07 ASSESSMENT — FIBROSIS 4 INDEX: FIB4 SCORE: 0.86

## 2022-12-07 NOTE — ANESTHESIA PROCEDURE NOTES
Airway    Date/Time: 12/7/2022 7:01 AM  Performed by: Narciso Figueredo M.D.  Authorized by: Narciso Figueredo M.D.     Location:  OR  Urgency:  Elective  Indications for Airway Management:  Anesthesia      Spontaneous Ventilation: absent    Sedation Level:  Deep  Preoxygenated: Yes    Patient Position:  Sniffing  Mask Difficulty Assessment:  1 - vent by mask  Final Airway Type:  Endotracheal airway  Final Endotracheal Airway:  ETT  Cuffed: Yes    Technique Used for Successful ETT Placement:  Direct laryngoscopy    Insertion Site:  Oral  Blade Type:  Dunn  Laryngoscope Blade/Videolaryngoscope Blade Size:  2  ETT Size (mm):  7.0  Measured from:  Lips  ETT to Lips (cm):  21  Placement Verified by: capnometry    Cormack-Lehane Classification:  Grade IIa - partial view of glottis  Number of Attempts at Approach:  1

## 2022-12-07 NOTE — OP REPORT
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 female with a history of sleeve gastrectomy and GERD, dysphagia. She is taken to the operating room today for Esophagogastroduodenoscopy and dilatation and septotomy for suspected stenosis.       DETAILS OF PROCEDURE: After an extensive informed consent discussion and   process, the patient was brought to the operating room and was placed in a   supine position on the endoscopy table. The patient was then given general anesthesia and endotracheal tube intubation. During the course of the procedure, the patient was monitored continuously with pulse oximetry, telemetry, and intermittent blood pressure readings.     After placement of the oral bite block to protect the teeth, gums, and gastroscope, the gastroscope was slowly introduced and advanced into the esophagus. It was slowly advanced down to the gastroesophageal junction region. The GE junction at 37 cm exhibited mild esophagitis. 10mm tongues of salmon-colored mucosa noted. WATS brushings were obtained. The gastroscope passed without difficulty into the proximal body of the stomach, which appeared normal and consistent with sleeve resection.     A stenosis was present at the proximal sleeve and included a definitive web-like septum which also occurred at a site of slight angulation. The gastroscope was then advanced into the  duodenum without difficulty. The first, second, and third portions of the duodenum appeared normal with no evidence of duodenitis or ulcers. Slowly, the gastroscope was withdrawn, and the duodenum appeared normal. The antrum appeared normal. A retroflexion view was not possible to be safely performed.      The 35mm achalasia balloon was chosen The savory wire was advanced down into the duodenum and the scope withdrawn. Next the achalasia balloon was gently advanced over the guidewire and the gastroscope carefully reintroduced. The balloon was positioned so as to straddle the stenosis. It was inflated to 35mm or 1.4atm and held for 3 minutes, with visible blanching observed through the balloon. The balloon was then deflated and removed. Trace blood but no untoward events were observed. The gastroscope was withdrawn and the 12mm over the scope Ovesco clip was prepared. The gastroscope was carefully reintroduced and maneuvered to optimize purchase of the stenosis tissue. Using the tined grasper the stenosis tissue was brought into the device and the clip deployed. Reinspection demonstrated a very nice deployment which should serve as an effective septotomy over the coming weeks.    40mg Kenelog were injected into the stenosis in four 1 cc aliquots and quadrants.    The gastroscope was slowly withdrawn as air was aspirated and the area of the proximal pouch and gastroesophageal junction region were again carefully inspected, which all appeared normal. The gastroesophageal junction was carefully inspected as was the esophagus as we withdrew the gastroscope and these areas appeared normal and were photographed for documentation purposes. The gastroscope was then withdrawn.    IMPRESSION/PLAN: Web like septum proximal stenosis, treated with septotomy.  May need further endoscopic septotomy and dilation in coming months based upon symptoms. Continue PPI.    The patient tolerated the procedure well and there were no apparent  complications.  She was awakened and transferred to the recovery area in satisfactory condition.       ____________________________________   Bridger Gongora MD  DD: 11/25/2020  7:56 AM    CC:  [facility]

## 2022-12-07 NOTE — OR NURSING
Patient AAOx4, calm, denies pain and nausea post op. Tolerating water and juice. VSS, afebrile, room air 95%.  updated on status and plan of care.

## 2022-12-07 NOTE — OR NURSING
0846-Pt transferred to Phase 2 via rVilonia from Phase 1.  Assisted up to chair.  VS stable.  Denies pain or nausea.  States she has a slight sore throat.  0910-IV dc'd-catheter inatact.  0915-Pt discharged to  via wheelchair escort to car.  Discharge instructions given to pt.  Pt verbalized understanding.

## 2022-12-07 NOTE — ANESTHESIA PREPROCEDURE EVALUATION
Case: [id number] Date/Time: 12/07/22 0645    Procedures:       ESOPHAGOGASTRODUODENOSCOPY (EGD) WITH BRUSHINGS AND POSSIBLE DILATATION VS OVER THE SCOPE CLIP PLACEMENT      EGD, WITH CLIP PLACEMENT    Pre-op diagnosis: DYSPHAGIA    Location: [facility] OR 10 / SURGERY [facility]    Surgeons: Bridger Gongora M.D.        47yoF with PMHx of DMII, HTN,     Hx of morbid obesity s.p sleeve 2018    NKDA  NPO  No AC    Relevant Problems      (positive) BUTCH (acute kidney injury) (HCC)      ENDO   (positive) Type 2 diabetes mellitus (HCC)       Physical Exam    Airway   Mallampati: II       Cardiovascular - normal exam     Dental - normal exam           Pulmonary - normal exam     Abdominal   (+) obese     Neurological - normal exam                 Anesthesia Plan    ASA 2       Plan - general       Airway plan will be ETT          Induction: intravenous    Postoperative Plan: Postoperative administration of opioids is intended.    Pertinent diagnostic labs and testing reviewed    Informed Consent:    Anesthetic plan and risks discussed with patient.

## 2022-12-07 NOTE — DISCHARGE INSTRUCTIONS
HOME CARE INSTRUCTIONS    ACTIVITY: Rest and take it easy for the first 24 hours.  A responsible adult is recommended to remain with you during that time.  It is normal to feel sleepy.  We encourage you to not do anything that requires balance, judgment or coordination.    FOR 24 HOURS DO NOT:  Drive, operate machinery or run household appliances.  Drink beer or alcoholic beverages.  Make important decisions or sign legal documents.    Upper Endoscopy, Adult, Care After  This sheet gives you information about how to care for yourself after your procedure. Your health care provider may also give you more specific instructions. If you have problems or questions, contact your health care provider.  What can I expect after the procedure?  After the procedure, it is common to have:  A sore throat.  Mild stomach pain or discomfort.  Bloating.  Nausea.  Follow these instructions at home:    Follow instructions from your health care provider about what to eat or drink after your procedure.  Return to your normal activities as told by your health care provider. Ask your health care provider what activities are safe for you.  Take over-the-counter and prescription medicines only as told by your health care provider.  Do not drive for 24 hours if you were given a sedative during your procedure.  Keep all follow-up visits as told by your health care provider. This is important.  Contact a health care provider if you have:  A sore throat that lasts longer than one day.  Trouble swallowing.  Get help right away if:  You vomit blood or your vomit looks like coffee grounds.  You have:  A fever.  Bloody, black, or tarry stools.  A severe sore throat or you cannot swallow.  Difficulty breathing.  Severe pain in your chest or abdomen.  Summary  After the procedure, it is common to have a sore throat, mild stomach discomfort, bloating, and nausea.  Do not drive for 24 hours if you were given a sedative during the procedure.  Follow  instructions from your health care provider about what to eat or drink after your procedure.  Return to your normal activities as told by your health care provider.  This information is not intended to replace advice given to you by your health care provider. Make sure you discuss any questions you have with your health care provider.  Document Released: 06/18/2013 Document Revised: 06/11/2019 Document Reviewed: 05/20/2019  [employer] Patient Education © 2020 [employer]      DIET: To avoid nausea, slowly advance diet as tolerated, avoiding spicy or greasy foods for the first day.  Add more substantial food to your diet according to your physician's instructions.  Babies can be fed formula or breast milk as soon as they are hungry.  INCREASE FLUIDS AND FIBER TO AVOID CONSTIPATION.    MEDICATIONS: Resume taking daily medication.  Take prescribed pain medication with food.  If no medication is prescribed, you may take non-aspirin pain medication if needed.  PAIN MEDICATION CAN BE VERY CONSTIPATING.  Take a stool softener or laxative such as senokot, pericolace, or milk of magnesia if needed.    A follow-up appointment should be arranged with your doctor in 1-2 weeks; call to schedule.    You should CALL YOUR PHYSICIAN if you develop:  Fever greater than 101 degrees F.  Pain not relieved by medication, or persistent nausea or vomiting.  Excessive bleeding (blood soaking through dressing) or unexpected drainage from the wound.  Extreme redness or swelling around the incision site, drainage of pus or foul smelling drainage.  Inability to urinate or empty your bladder within 8 hours.  Problems with breathing or chest pain.    You should call 911 if you develop problems with breathing or chest pain.  If you are unable to contact your doctor or surgical center, you should go to the nearest emergency room or urgent care center.  Physician's telephone #: Dr. Gongora [phone]    MILD FLU-LIKE SYMPTOMS ARE NORMAL.  YOU MAY  EXPERIENCE GENERALIZED MUSCLE ACHES, THROAT IRRITATION, HEADACHE AND/OR SOME NAUSEA.    If any questions arise, call your doctor.  If your doctor is not available, please feel free to call the Surgical Center at [phone].  The Center is open Monday through Friday from 7AM to 7PM.      A registered nurse may call you a few days after your surgery to see how you are doing after your procedure.    You may also receive a survey in the mail within the next two weeks and we ask that you take a few moments to complete the survey and return it to us.  Our goal is to provide you with very good care and we value your comments.     Depression / Suicide Risk    As you are discharged from this [facility] facility, it is important to learn how to keep safe from harming yourself.    Recognize the warning signs:  Abrupt changes in personality, positive or negative- including increase in energy   Giving away possessions  Change in eating patterns- significant weight changes-  positive or negative  Change in sleeping patterns- unable to sleep or sleeping all the time   Unwillingness or inability to communicate  Depression  Unusual sadness, discouragement and loneliness  Talk of wanting to die  Neglect of personal appearance   Rebelliousness- reckless behavior  Withdrawal from people/activities they love  Confusion- inability to concentrate     If you or a loved one observes any of these behaviors or has concerns about self-harm, here's what you can do:  Talk about it- your feelings and reasons for harming yourself  Remove any means that you might use to hurt yourself (examples: pills, rope, extension cords, firearm)  Get professional help from the community (Mental Health, Substance Abuse, psychological counseling)  Do not be alone:Call your Safe Contact- someone whom you trust who will be there for you.  Call your local CRISIS HOTLINE 780-0197 or 536-102-2664  Call your local Children's Mobile Crisis Response Team [place] (673) 481-4503 or www.[employer]  Call the toll free National Suicide Prevention Hotlines   National Suicide Prevention Lifeline 997-381-JUNF (8537)  [facility] 800-SUICIDE (706-0721)    I acknowledge receipt and understanding of these Home Care instructions.

## 2022-12-07 NOTE — ANESTHESIA TIME REPORT
Anesthesia Start and Stop Event Times     Date Time Event    12/7/2022 0651 Ready for Procedure     0657 Anesthesia Start     0732 Anesthesia Stop        Responsible Staff  12/07/22    Name Role Begin End    Narciso Figueredo M.D. Anesth 0657 0732        Overtime Reason:  overtime    Comments:

## 2022-12-07 NOTE — ANESTHESIA POSTPROCEDURE EVALUATION
Patient: Aviva Kenney    Procedure Summary     Date: 12/07/22 Room / Location: [place] [address] / SURGERY [facility]    Anesthesia Start: 0657 Anesthesia Stop: 0732    Procedures:       ESOPHAGOGASTRODUODENOSCOPY (EGD) WITH BRUSHINGS AND POSSIBLE DILATATION VS OVER THE SCOPE CLIP PLACEMENT (Esophagus)      EGD, WITH CLIP PLACEMENT (Esophagus) Diagnosis: (DYSPHAGIA)    Surgeons: Bridger Gongora M.D. Responsible Provider: Narciso Figueredo M.D.    Anesthesia Type: general ASA Status: 2          Final Anesthesia Type: general  Last vitals  BP   Blood Pressure: (!) 167/92    Temp   36.1 °C (97 °F)    Pulse   63   Resp   15    SpO2   98 %      Anesthesia Post Evaluation    Patient location during evaluation: PACU  Patient participation: complete - patient participated  Level of consciousness: awake and alert  Pain score: 0    Airway patency: patent  Anesthetic complications: no  Cardiovascular status: adequate and hemodynamically stable  Respiratory status: acceptable  Hydration status: acceptable    PONV: none          No notable events documented.

## 2022-12-13 ENCOUNTER — OFFICE VISIT (OUTPATIENT)
Dept: MEDICAL GROUP | Facility: PHYSICIAN GROUP | Age: 47
End: 2022-12-13
Payer: MEDICARE

## 2022-12-13 VITALS
RESPIRATION RATE: 14 BRPM | WEIGHT: 244 LBS | HEIGHT: 69 IN | DIASTOLIC BLOOD PRESSURE: 90 MMHG | HEART RATE: 89 BPM | SYSTOLIC BLOOD PRESSURE: 138 MMHG | TEMPERATURE: 97.5 F | OXYGEN SATURATION: 95 % | BODY MASS INDEX: 36.14 KG/M2

## 2022-12-13 DIAGNOSIS — Z01.818 PRE-OP EVALUATION: ICD-10-CM

## 2022-12-13 PROCEDURE — 99213 OFFICE O/P EST LOW 20 MIN: CPT | Performed by: NURSE PRACTITIONER

## 2022-12-13 ASSESSMENT — FIBROSIS 4 INDEX: FIB4 SCORE: 0.86

## 2022-12-13 NOTE — ASSESSMENT & PLAN NOTE
EKG was performed in office at last visit   essentially normal   labs were discussed with patient as last visit   she continues to work on glucose control   I have cleared her from primary care standpoint for bariatric surgery with Dr. Gongora

## 2022-12-13 NOTE — LETTER
December 13, 2022        Aviva Kenney was seen in my office today. Her recent EKG and chest xray showed no abnormalities. Labs were discussed.     She is medically cleared for bariatric surgery at this time.       Sincerely,      Cheryl M. Demucha, A.P.R.N.

## 2022-12-13 NOTE — PROGRESS NOTES
"Subjective:     CC:   Chief Complaint   Patient presents with    Paperwork     Surgical clearance        HPI:   Aviva presents today with    Pre-op evaluation  EKG was performed in office at last visit   essentially normal   labs were discussed with patient as last visit   she continues to work on glucose control   I have cleared her from primary care standpoint for bariatric surgery with Dr. Gongora              ROS per HPI    Objective:     Exam:  BP (!) 138/90 (BP Location: Left arm, Patient Position: Sitting, BP Cuff Size: Adult long)   Pulse 89   Temp 36.4 °C (97.5 °F) (Temporal)   Resp 14   Ht 1.753 m (5' 9\")   Wt 111 kg (244 lb)   SpO2 95%   BMI 36.03 kg/m²  Body mass index is 36.03 kg/m².    Physical Exam:  Constitutional: Well-developed and well-nourished female Not diaphoretic. No distress.   Skin: warm, dry, intact, no evidence of rash or concerning lesions  Head: Atraumatic without lesions.  Eyes: Conjunctivae are normal. Pupils are equal, round. No scleral icterus.   Ears:  External ears unremarkable.   Neck: Supple, trachea midline. No thyromegaly present. No cervical or supraclavicular lymphadenopathy.  Cardiovascular: Regular rate and rhythm without murmur.   Pulmonary: Clear to ausculation. Normal effort. No rales, ronchi, or wheezing.  Extremities: No cyanosis, clubbing, erythema, nor edema.   Neurological: Alert and oriented x 3.   Psychiatric:  Behavior, mood, and affect are appropriate.        Assessment & Plan:     47 y.o. female with the following -     1. Pre-op evaluation   Medical clearance given for bariatric surgery; letter and progress notes faxed to Dr Gongora's office     Return in about 3 months (around 3/13/2023) for DM-A1c, gen check in.    Please note that this dictation was created using voice recognition software. I have made every reasonable attempt to correct obvious errors, but I expect that there are errors of grammar and possibly content that I did not discover before " finalizing the note.

## 2022-12-14 ENCOUNTER — NON-PROVIDER VISIT (OUTPATIENT)
Dept: MEDICAL GROUP | Facility: PHYSICIAN GROUP | Age: 47
End: 2022-12-14
Payer: MEDICARE

## 2022-12-14 DIAGNOSIS — Z23 NEED FOR VACCINATION: ICD-10-CM

## 2022-12-14 PROCEDURE — 90686 IIV4 VACC NO PRSV 0.5 ML IM: CPT | Performed by: NURSE PRACTITIONER

## 2022-12-14 PROCEDURE — G0008 ADMIN INFLUENZA VIRUS VAC: HCPCS | Performed by: NURSE PRACTITIONER

## 2022-12-14 NOTE — PROGRESS NOTES
"Aviva Kenney is a 47 y.o. female here for a non-provider visit for:   FLU    Reason for immunization: Annual Flu Vaccine  Immunization records indicate need for vaccine: Yes, confirmed with Epic  Minimum interval has been met for this vaccine: Yes  ABN completed: Not Indicated    VIS Dated  08/06/2021 was given to patient: Yes  All IAC Questionnaire questions were answered \"No.\"    Patient tolerated injection and no adverse effects were observed or reported: Yes    Pt scheduled for next dose in series: Not Indicated    "

## 2023-01-17 DIAGNOSIS — R60.0 LEG EDEMA: ICD-10-CM

## 2023-01-17 RX ORDER — HYDROCHLOROTHIAZIDE 25 MG/1
TABLET ORAL
Qty: 90 TABLET | Refills: 3 | Status: SHIPPED
Start: 2023-01-17 | End: 2023-06-05

## 2023-02-06 ENCOUNTER — OFFICE VISIT (OUTPATIENT)
Dept: MEDICAL GROUP | Facility: PHYSICIAN GROUP | Age: 48
End: 2023-02-06
Payer: MEDICARE

## 2023-02-06 VITALS
WEIGHT: 254 LBS | HEIGHT: 69 IN | SYSTOLIC BLOOD PRESSURE: 136 MMHG | BODY MASS INDEX: 37.62 KG/M2 | TEMPERATURE: 97 F | HEART RATE: 92 BPM | RESPIRATION RATE: 16 BRPM | DIASTOLIC BLOOD PRESSURE: 80 MMHG | OXYGEN SATURATION: 95 %

## 2023-02-06 DIAGNOSIS — I10 PRIMARY HYPERTENSION: ICD-10-CM

## 2023-02-06 DIAGNOSIS — M25.512 BILATERAL SHOULDER PAIN, UNSPECIFIED CHRONICITY: ICD-10-CM

## 2023-02-06 DIAGNOSIS — E11.29 TYPE 2 DIABETES MELLITUS WITH OTHER DIABETIC KIDNEY COMPLICATION, WITH LONG-TERM CURRENT USE OF INSULIN (HCC): ICD-10-CM

## 2023-02-06 DIAGNOSIS — Z79.4 TYPE 2 DIABETES MELLITUS WITH OTHER DIABETIC KIDNEY COMPLICATION, WITH LONG-TERM CURRENT USE OF INSULIN (HCC): ICD-10-CM

## 2023-02-06 DIAGNOSIS — M25.511 BILATERAL SHOULDER PAIN, UNSPECIFIED CHRONICITY: ICD-10-CM

## 2023-02-06 PROCEDURE — 99214 OFFICE O/P EST MOD 30 MIN: CPT | Performed by: NURSE PRACTITIONER

## 2023-02-06 RX ORDER — LISINOPRIL 10 MG/1
10 TABLET ORAL DAILY
Qty: 90 TABLET | Refills: 3 | Status: SHIPPED | OUTPATIENT
Start: 2023-02-06 | End: 2024-02-08

## 2023-02-06 ASSESSMENT — PATIENT HEALTH QUESTIONNAIRE - PHQ9
CLINICAL INTERPRETATION OF PHQ2 SCORE: 1
SUM OF ALL RESPONSES TO PHQ QUESTIONS 1-9: 6
5. POOR APPETITE OR OVEREATING: 1 - SEVERAL DAYS

## 2023-02-06 ASSESSMENT — FIBROSIS 4 INDEX: FIB4 SCORE: 0.86

## 2023-02-06 NOTE — ASSESSMENT & PLAN NOTE
At her last visit in November patient's A1c had decreased from low 14s to 9.7 she was taking 25 units Lantus at the time and 4 to 10 units novolog tid before meals  A1c due 2/16/2023

## 2023-02-06 NOTE — PROGRESS NOTES
"Subjective:     CC:   Chief Complaint   Patient presents with    Follow-Up     Gen check in        HPI:   Aviva presents today with    Type 2 diabetes mellitus (HCC)  At her last visit in November patient's A1c had decreased from low 14s to 9.7 she was taking 25 units Lantus at the time and 4 to 10 units novolog tid before meals  A1c due 2/16/2023, so too early to do     Bilateral shoulder pain  xrays ordered as requested per pain mgmt              ROS per HPI    Objective:     Exam:  /80   Pulse 92   Temp 36.1 °C (97 °F) (Temporal)   Resp 16   Ht 1.753 m (5' 9\")   Wt 115 kg (254 lb)   SpO2 95%   BMI 37.51 kg/m²  Body mass index is 37.51 kg/m².    Physical Exam:  Constitutional: Well-developed and well-nourished female . Not diaphoretic. No distress.   Skin: warm, dry, intact, no evidence of rash or concerning lesions  Head: Atraumatic without lesions.  Eyes: Conjunctivae are normal. Pupils are equal, round. No scleral icterus.   Ears:  External ears unremarkable.   Neck: Supple, trachea midline. No thyromegaly present. No cervical or supraclavicular lymphadenopathy.  Cardiovascular: Regular rate and rhythm without murmur.   Pulmonary: Clear to ausculation. Normal effort. No rales, ronchi, or wheezing.  Extremities: No cyanosis, clubbing, erythema, nor edema.   Neurological: Alert and oriented x 3.   Psychiatric:  Behavior, mood, and affect are appropriate.        Assessment & Plan:     47 y.o. female with the following -     1. Type 2 diabetes mellitus with other diabetic kidney complication, with long-term current use of insulin (HCC)  Redo A1c in near future; pt to cont to measure blood sugars daily as advised     2. Primary hypertension  - lisinopril (PRINIVIL) 10 MG Tab; Take 1 Tablet by mouth every day.  Dispense: 90 Tablet; Refill: 3    3. Bilateral shoulder pain, unspecified chronicity  - DX-SHOULDER 2+ LEFT; Future  - DX-SHOULDER 2+ RIGHT; Future         Return in about 3 weeks (around " 2/27/2023) for DM-A1c.    Please note that this dictation was created using voice recognition software. I have made every reasonable attempt to correct obvious errors, but I expect that there are errors of grammar and possibly content that I did not discover before finalizing the note.

## 2023-02-15 ENCOUNTER — APPOINTMENT (OUTPATIENT)
Dept: RADIOLOGY | Facility: IMAGING CENTER | Age: 48
End: 2023-02-15
Attending: NURSE PRACTITIONER
Payer: MEDICARE

## 2023-02-15 ENCOUNTER — NON-PROVIDER VISIT (OUTPATIENT)
Dept: URGENT CARE | Facility: PHYSICIAN GROUP | Age: 48
End: 2023-02-15
Payer: MEDICARE

## 2023-02-15 DIAGNOSIS — M25.511 BILATERAL SHOULDER PAIN, UNSPECIFIED CHRONICITY: ICD-10-CM

## 2023-02-15 DIAGNOSIS — M25.512 BILATERAL SHOULDER PAIN, UNSPECIFIED CHRONICITY: ICD-10-CM

## 2023-02-15 PROCEDURE — 73030 X-RAY EXAM OF SHOULDER: CPT | Mod: TC,FY,RT | Performed by: PHYSICIAN ASSISTANT

## 2023-02-15 PROCEDURE — 73030 X-RAY EXAM OF SHOULDER: CPT | Mod: TC,FY,LT | Performed by: PHYSICIAN ASSISTANT

## 2023-03-06 ENCOUNTER — OFFICE VISIT (OUTPATIENT)
Dept: MEDICAL GROUP | Facility: PHYSICIAN GROUP | Age: 48
End: 2023-03-06
Payer: MEDICARE

## 2023-03-06 VITALS
HEIGHT: 70 IN | WEIGHT: 255.6 LBS | SYSTOLIC BLOOD PRESSURE: 128 MMHG | OXYGEN SATURATION: 96 % | RESPIRATION RATE: 18 BRPM | TEMPERATURE: 97.6 F | HEART RATE: 84 BPM | BODY MASS INDEX: 36.59 KG/M2 | DIASTOLIC BLOOD PRESSURE: 80 MMHG

## 2023-03-06 DIAGNOSIS — N89.8 VAGINAL DRYNESS: ICD-10-CM

## 2023-03-06 DIAGNOSIS — Z79.4 TYPE 2 DIABETES MELLITUS WITH OTHER DIABETIC KIDNEY COMPLICATION, WITH LONG-TERM CURRENT USE OF INSULIN (HCC): ICD-10-CM

## 2023-03-06 DIAGNOSIS — E11.29 TYPE 2 DIABETES MELLITUS WITH OTHER DIABETIC KIDNEY COMPLICATION, WITH LONG-TERM CURRENT USE OF INSULIN (HCC): ICD-10-CM

## 2023-03-06 DIAGNOSIS — F43.9 STRESS AT HOME: ICD-10-CM

## 2023-03-06 DIAGNOSIS — E66.01 MORBID OBESITY (HCC): ICD-10-CM

## 2023-03-06 LAB
HBA1C MFR BLD: 11.1 % (ref ?–5.8)
POCT INT CON NEG: NEGATIVE
POCT INT CON POS: POSITIVE

## 2023-03-06 PROCEDURE — 99214 OFFICE O/P EST MOD 30 MIN: CPT | Performed by: NURSE PRACTITIONER

## 2023-03-06 PROCEDURE — 83036 HEMOGLOBIN GLYCOSYLATED A1C: CPT | Performed by: NURSE PRACTITIONER

## 2023-03-06 RX ORDER — ESTRADIOL 0.1 MG/G
CREAM VAGINAL
Qty: 42.5 G | Refills: 3 | Status: SHIPPED | OUTPATIENT
Start: 2023-03-06

## 2023-03-06 ASSESSMENT — FIBROSIS 4 INDEX: FIB4 SCORE: 0.88

## 2023-03-06 NOTE — ASSESSMENT & PLAN NOTE
"Pt is scheduled w/Dr Gongora for a bariatric revision/bypass 3/27/23  Weight has remained stable since her last visit   Goal weight is 175 for her at 5'9\" which is healthy  She hasn't been below 219  "

## 2023-03-06 NOTE — ASSESSMENT & PLAN NOTE
In Nov, pt's A1c had decreased from the 14s to 9.7  Today in office  11.1  Pt currently taking lispro 10 u tid before meals and glargine at night 25 units before bed   Pt reports fbs high 100s-200s  She likes starchy carbs and sweets  Pt can increase her insulin by 3 units every 2 nights until her yzf=536  Scheduled for bariatric surgery on 3/27  Advised her to call Dr Gongora's office to see what the cutoff is for elevated fbs prior to surgery to make sure the newer higher level won't cause them to r/s

## 2023-03-06 NOTE — PROGRESS NOTES
"Subjective:     CC:   Chief Complaint   Patient presents with    Follow-Up     a1c       HPI:   Aviva presents today with    Type 2 diabetes mellitus (HCC)  In Nov, pt's A1c had decreased from the 14s to 9.7  Today in office  11.1  Pt currently taking lispro 10 u tid before meals and glargine at night 25 units before bed   Pt reports fbs high 100s-200s  She likes starchy carbs and sweets  Pt can increase her insulin by 3 units every 2 nights until her wes=524  Scheduled for bariatric surgery on 3/27  Advised her to call Dr Gongora's office to see what the cutoff is for elevated fbs prior to surgery to make sure the newer higher level won't cause them to r/s        Morbid obesity (HCC)  Pt is scheduled w/Dr Gongora for a bariatric revision/bypass 3/27/23  Weight has remained stable since her last visit   Goal weight is 175 for her at 5'9\" which is healthy  She hasn't been below 219    Vaginal dryness  Pt reports vaginal dryness; would like to try estrogen cream    Stress at home  Pt's  is having some health issues; he has trouble being motivated at times and has low libido, which has been affecting her lately  She would like to see a counselor              ROS per HPI    Objective:     Exam:  /80   Pulse 84   Temp 36.4 °C (97.6 °F) (Temporal)   Resp 18   Ht 1.765 m (5' 9.5\") Comment: pt stated  Wt 116 kg (255 lb 9.6 oz)   SpO2 96%   BMI 37.20 kg/m²  Body mass index is 37.2 kg/m².    Physical Exam:  Constitutional: Well-developed and well-nourished female . Not diaphoretic. No distress.   Skin: warm, dry, intact, no evidence of rash or concerning lesions  Head: Atraumatic without lesions.  Eyes: Conjunctivae are normal. Pupils are equal, round. No scleral icterus.   Ears:  External ears unremarkable.   Neck: Supple, trachea midline. No thyromegaly present. No cervical or supraclavicular lymphadenopathy.  Cardiovascular: Regular rate   Pulmonary: . Normal effort.   Extremities: No cyanosis, clubbing, " erythema, nor edema.   Neurological: Alert and oriented x 3.   Psychiatric:  Behavior, mood, and affect are appropriate.        Assessment & Plan:     48 y.o. female with the following -     1. Type 2 diabetes mellitus with other diabetic kidney complication, with long-term current use of insulin ([facility])  - POCT Hemoglobin A1C    2. Morbid obesity ([facility])    3. Vaginal dryness  - estradiol (ESTRACE) 0.1 MG/GM vaginal cream; Apply applicatorful into vagina M, W and Friday  Dispense: 42.5 g; Refill: 3    4. Stress at home  - Referral to Psychology         Return in about 3 months (around 6/6/2023) for gen check in.    Please note that this dictation was created using voice recognition software. I have made every reasonable attempt to correct obvious errors, but I expect that there are errors of grammar and possibly content that I did not discover before finalizing the note.

## 2023-03-07 NOTE — ASSESSMENT & PLAN NOTE
Pt's  is having some health issues; he has trouble being motivated at times and has low libido, which has been affecting her lately  She would like to see a counselor

## 2023-06-05 ENCOUNTER — OFFICE VISIT (OUTPATIENT)
Dept: MEDICAL GROUP | Facility: PHYSICIAN GROUP | Age: 48
End: 2023-06-05
Payer: MEDICARE

## 2023-06-05 VITALS
WEIGHT: 244 LBS | BODY MASS INDEX: 34.93 KG/M2 | TEMPERATURE: 96.8 F | HEIGHT: 70 IN | DIASTOLIC BLOOD PRESSURE: 94 MMHG | RESPIRATION RATE: 15 BRPM | OXYGEN SATURATION: 97 % | HEART RATE: 83 BPM | SYSTOLIC BLOOD PRESSURE: 142 MMHG

## 2023-06-05 DIAGNOSIS — L29.9 ITCHY SKIN: ICD-10-CM

## 2023-06-05 DIAGNOSIS — R25.2 LEG CRAMPS: ICD-10-CM

## 2023-06-05 DIAGNOSIS — Z98.84 S/P BARIATRIC SURGERY: ICD-10-CM

## 2023-06-05 DIAGNOSIS — R60.0 BILATERAL LOWER EXTREMITY EDEMA: ICD-10-CM

## 2023-06-05 DIAGNOSIS — Z82.69 FAMILY HISTORY OF GOUT: ICD-10-CM

## 2023-06-05 DIAGNOSIS — Z79.4 TYPE 2 DIABETES MELLITUS WITH OTHER DIABETIC KIDNEY COMPLICATION, WITH LONG-TERM CURRENT USE OF INSULIN (HCC): ICD-10-CM

## 2023-06-05 DIAGNOSIS — E11.29 TYPE 2 DIABETES MELLITUS WITH OTHER DIABETIC KIDNEY COMPLICATION, WITH LONG-TERM CURRENT USE OF INSULIN (HCC): ICD-10-CM

## 2023-06-05 PROBLEM — Z01.818 PRE-OP EVALUATION: Status: RESOLVED | Noted: 2022-12-13 | Resolved: 2023-06-05

## 2023-06-05 PROCEDURE — 99214 OFFICE O/P EST MOD 30 MIN: CPT | Performed by: NURSE PRACTITIONER

## 2023-06-05 PROCEDURE — 1125F AMNT PAIN NOTED PAIN PRSNT: CPT | Performed by: NURSE PRACTITIONER

## 2023-06-05 PROCEDURE — 3080F DIAST BP >= 90 MM HG: CPT | Performed by: NURSE PRACTITIONER

## 2023-06-05 PROCEDURE — 3077F SYST BP >= 140 MM HG: CPT | Performed by: NURSE PRACTITIONER

## 2023-06-05 RX ORDER — ENOXAPARIN SODIUM 100 MG/ML
INJECTION SUBCUTANEOUS
COMMUNITY
Start: 2023-03-21 | End: 2023-06-05

## 2023-06-05 RX ORDER — POTASSIUM CHLORIDE 750 MG/1
TABLET, FILM COATED, EXTENDED RELEASE ORAL
Qty: 90 TABLET | Refills: 3 | Status: SHIPPED | OUTPATIENT
Start: 2023-06-05

## 2023-06-05 RX ORDER — KETOCONAZOLE 20 MG/ML
SHAMPOO TOPICAL
Qty: 120 ML | Refills: 6 | Status: SHIPPED | OUTPATIENT
Start: 2023-06-05

## 2023-06-05 RX ORDER — SYRINGE AND NEEDLE,INSULIN,1ML 28GX1/2"
SYRINGE, EMPTY DISPOSABLE MISCELLANEOUS
Qty: 300 EACH | Refills: 3 | Status: SHIPPED | OUTPATIENT
Start: 2023-06-05

## 2023-06-05 RX ORDER — SCOLOPAMINE TRANSDERMAL SYSTEM 1 MG/1
PATCH, EXTENDED RELEASE TRANSDERMAL
COMMUNITY
Start: 2023-03-21 | End: 2023-06-05

## 2023-06-05 RX ORDER — HYDROXYZINE HYDROCHLORIDE 25 MG/1
TABLET, FILM COATED ORAL
Qty: 90 TABLET | Refills: 3 | Status: SHIPPED | OUTPATIENT
Start: 2023-06-05 | End: 2024-01-12 | Stop reason: SDUPTHER

## 2023-06-05 RX ORDER — ONDANSETRON 4 MG/1
TABLET, ORALLY DISINTEGRATING ORAL
COMMUNITY
Start: 2023-03-21 | End: 2023-06-05

## 2023-06-05 RX ORDER — FUROSEMIDE 20 MG/1
TABLET ORAL
Qty: 60 TABLET | Refills: 6 | Status: SHIPPED | OUTPATIENT
Start: 2023-06-05 | End: 2024-01-03 | Stop reason: SDUPTHER

## 2023-06-05 RX ORDER — OMEPRAZOLE 20 MG/1
CAPSULE, DELAYED RELEASE ORAL
COMMUNITY
Start: 2023-05-16

## 2023-06-05 ASSESSMENT — PAIN SCALES - GENERAL: PAINLEVEL: 7=MODERATE-SEVERE PAIN

## 2023-06-05 ASSESSMENT — FIBROSIS 4 INDEX: FIB4 SCORE: 0.88

## 2023-06-05 NOTE — PROGRESS NOTES
"Subjective:     CC:   Chief Complaint   Patient presents with    Leg Swelling     Both legs painful by the end day        HPI:   Aviva presents today with    S/P bariatric surgery  Underwent bypass revision surgery w/Dr Gongora 3/27  She is a little frustrated because she has only lost 11 pounds  Felt energetic the 1st week but then more fatigue  She has more energy and knows she needs to exercise   Drinks quite a bit of water-at least 5-6 16 oz water bottles  No salt added to meals  Was eased back into foods slowly (liquid, soft)  Watching portion sizes  Has f/up in near future      Bilateral lower extremity edema  Reports swelling of legs bilat over the last 5-6 weeks; has hctz on hand but not helpful  Not being active currently  Just bought some compression socks; seemed to help a bit   Doesn't add salt to foods   Swollen quite a bit by end of the day after being on her feet    Type 2 diabetes mellitus (HCC)  Long acting 20 units per night  4-8 units sliding scale before meals  Most recent A1c was 11.1 on 2.5.2023  Next can be done tomorrow               ROS per HPI    Objective:     Exam:  BP (!) 142/94   Pulse 83   Temp 36 °C (96.8 °F) (Temporal)   Resp 15   Ht 1.765 m (5' 9.5\")   Wt 111 kg (244 lb)   SpO2 97%   BMI 35.52 kg/m²  Body mass index is 35.52 kg/m².    Physical Exam:  Constitutional: Well-developed and well-nourished female Not diaphoretic. No distress.   Skin: warm, dry, intact, no evidence of rash or concerning lesions; mild redness at occiput; some scabbing noted at top of head, no open wounds, scalp looks fairly healthy  Head: Atraumatic without lesions.  Eyes: Conjunctivae are normal. Pupils are equal, round. No scleral icterus.   Ears:  External ears unremarkable.   Neck: Supple, trachea midline. No thyromegaly present. No cervical or supraclavicular lymphadenopathy.  Cardiovascular: Regular rate and rhythm without murmur.   Pulmonary: Clear to ausculation. Normal effort. No rales, " "ronchi, or wheezing.  Extremities: No cyanosis, clubbing, erythema, mild edema bilat  Neurological: Alert and oriented x 3.   Psychiatric:  Behavior, mood, and affect are appropriate.        Assessment & Plan:     48 y.o. female with the following -     1. Bilateral lower extremity edema  - Referral to Vascular Surgery  - URIC ACID; Future  - Comp Metabolic Panel; Future  - furosemide (LASIX) 20 MG Tab; 1 tab PO once daily prn edema; may increase to 2 tabs if needed to manage symptoms  Dispense: 60 Tablet; Refill: 6  - potassium chloride ER (KLOR-CON) 10 MEQ tablet; 1 tab PO once daily only on the days you take 2  tabs lasix  Dispense: 90 Tablet; Refill: 3    2. S/P bariatric surgery    3. Type 2 diabetes mellitus with other diabetic kidney complication, with long-term current use of insulin (HCC)  - HEMOGLOBIN A1C; Future  - BD INSULIN SYRINGE U/F 1/2UNIT 31G X 5/16\" 0.3 ML Misc; Use to inject insulin tid before meals as directed  Dispense: 300 Each; Refill: 3    4. Family history of gout    5. Leg cramps  - Referral to Vascular Surgery  - URIC ACID; Future  - Comp Metabolic Panel; Future    6. Itchy skin  - hydrOXYzine HCl (ATARAX) 25 MG Tab; 1/2-1 tab up to tid prn itching  Dispense: 90 Tablet; Refill: 3  - ketoconazole (NIZORAL) 2 % shampoo; Apply to scalp, lather, wait 3 to 5 minutes; use 3 times weekly for up to 8 weeks  Dispense: 120 mL; Refill: 6  Consider derm referral if not effective          Return in about 4 weeks (around 7/3/2023) for gen check in, lab results.    Please note that this dictation was created using voice recognition software. I have made every reasonable attempt to correct obvious errors, but I expect that there are errors of grammar and possibly content that I did not discover before finalizing the note.        "

## 2023-06-05 NOTE — ASSESSMENT & PLAN NOTE
Reports swelling of legs bilat over the last 5-6 weeks; has hctz on hand but not helpful  Not being active currently  Just bought some compression socks; seemed to help a bit   Doesn't add salt to foods   Swollen quite a bit by end of the day after being on her feet

## 2023-06-05 NOTE — ASSESSMENT & PLAN NOTE
Underwent bypass revision surgery w/Dr Gongora 3/27  [facility] energetic the 1st week but then more fatigue  She has more energy and knows she needs to exercise   Drinks quite a bit of water-at least 5-6 16 oz water bottles  No salt added to meals  Was eased back into foods slowly (liquid, soft)  Watching portion sizes

## 2023-06-05 NOTE — ASSESSMENT & PLAN NOTE
Long acting 20 units per night  4-8 units sliding scale before meals  Most recent A1c was 11.1 on 2.5.2023

## 2023-08-11 NOTE — TELEPHONE ENCOUNTER
Received request via: Pharmacy     Was the patient seen in the last year in this department? Yes    Does the patient have an active prescription (recently filled or refills available) for medication(s) requested? No    Does the patient have [facility] and need 100 day supply (blood pressure, diabetes and cholesterol meds only)? Patient does not have [facility]      [id number]  last OV

## 2023-08-14 RX ORDER — GABAPENTIN 300 MG/1
CAPSULE ORAL
Qty: 90 CAPSULE | Status: CANCELLED | OUTPATIENT
Start: 2023-08-14

## 2023-09-11 DIAGNOSIS — Z79.4 TYPE 2 DIABETES MELLITUS WITH OTHER DIABETIC KIDNEY COMPLICATION, WITH LONG-TERM CURRENT USE OF INSULIN (HCC): ICD-10-CM

## 2023-09-11 DIAGNOSIS — E11.29 TYPE 2 DIABETES MELLITUS WITH OTHER DIABETIC KIDNEY COMPLICATION, WITH LONG-TERM CURRENT USE OF INSULIN (HCC): ICD-10-CM

## 2023-09-11 RX ORDER — INSULIN LISPRO 100 [IU]/ML
10 INJECTION, SOLUTION INTRAVENOUS; SUBCUTANEOUS
Qty: 9 EACH | Refills: 3 | Status: SHIPPED
Start: 2023-09-11 | End: 2024-02-07

## 2024-01-03 ENCOUNTER — HOSPITAL ENCOUNTER (OUTPATIENT)
Facility: MEDICAL CENTER | Age: 49
End: 2024-01-03
Attending: NURSE PRACTITIONER
Payer: MEDICARE

## 2024-01-03 ENCOUNTER — OFFICE VISIT (OUTPATIENT)
Dept: MEDICAL GROUP | Facility: PHYSICIAN GROUP | Age: 49
End: 2024-01-03
Payer: MEDICARE

## 2024-01-03 DIAGNOSIS — R60.0 BILATERAL LOWER EXTREMITY EDEMA: ICD-10-CM

## 2024-01-03 DIAGNOSIS — E87.5 HYPERKALEMIA: ICD-10-CM

## 2024-01-03 DIAGNOSIS — M79.662 PAIN OF LEFT CALF: ICD-10-CM

## 2024-01-03 DIAGNOSIS — R74.8 ABNORMAL LIVER ENZYMES: ICD-10-CM

## 2024-01-03 DIAGNOSIS — Z13.29 SCREENING FOR ENDOCRINE DISORDER: ICD-10-CM

## 2024-01-03 DIAGNOSIS — E78.5 HYPERLIPIDEMIA, UNSPECIFIED HYPERLIPIDEMIA TYPE: ICD-10-CM

## 2024-01-03 DIAGNOSIS — Z12.11 SCREENING FOR COLORECTAL CANCER: ICD-10-CM

## 2024-01-03 DIAGNOSIS — Z12.12 SCREENING FOR COLORECTAL CANCER: ICD-10-CM

## 2024-01-03 DIAGNOSIS — Z12.31 ENCOUNTER FOR SCREENING MAMMOGRAM FOR BREAST CANCER: ICD-10-CM

## 2024-01-03 DIAGNOSIS — E55.9 VITAMIN D DEFICIENCY: ICD-10-CM

## 2024-01-03 DIAGNOSIS — R53.83 OTHER FATIGUE: ICD-10-CM

## 2024-01-03 DIAGNOSIS — Z23 NEED FOR VACCINATION: ICD-10-CM

## 2024-01-03 DIAGNOSIS — E11.9 TYPE 2 DIABETES MELLITUS (HCC): ICD-10-CM

## 2024-01-03 LAB
HBA1C MFR BLD: 8.7 % (ref ?–5.8)
POCT INT CON NEG: NEGATIVE
POCT INT CON POS: POSITIVE

## 2024-01-03 PROCEDURE — G0008 ADMIN INFLUENZA VIRUS VAC: HCPCS | Performed by: NURSE PRACTITIONER

## 2024-01-03 PROCEDURE — 82570 ASSAY OF URINE CREATININE: CPT

## 2024-01-03 PROCEDURE — 99214 OFFICE O/P EST MOD 30 MIN: CPT | Mod: 25 | Performed by: NURSE PRACTITIONER

## 2024-01-03 PROCEDURE — 83036 HEMOGLOBIN GLYCOSYLATED A1C: CPT | Performed by: NURSE PRACTITIONER

## 2024-01-03 PROCEDURE — 90686 IIV4 VACC NO PRSV 0.5 ML IM: CPT | Performed by: NURSE PRACTITIONER

## 2024-01-03 PROCEDURE — 82043 UR ALBUMIN QUANTITATIVE: CPT

## 2024-01-03 RX ORDER — SEMAGLUTIDE 0.68 MG/ML
0.5 INJECTION, SOLUTION SUBCUTANEOUS
Qty: 6 ML | Refills: 3 | Status: SHIPPED | OUTPATIENT
Start: 2024-01-03

## 2024-01-03 RX ORDER — FUROSEMIDE 20 MG/1
TABLET ORAL
Qty: 90 TABLET | Refills: 3 | Status: SHIPPED | OUTPATIENT
Start: 2024-01-03

## 2024-01-03 ASSESSMENT — PATIENT HEALTH QUESTIONNAIRE - PHQ9: CLINICAL INTERPRETATION OF PHQ2 SCORE: 0

## 2024-01-03 NOTE — ASSESSMENT & PLAN NOTE
Underwent gastric bypass in March 2023  Swelling started soon after  She was started on Lasix 10 mg a day but reports it's not helpful  Still has mild pitting edema moreso on the left side   She reports pain in the left upper calf, no erythema

## 2024-01-04 ENCOUNTER — HOSPITAL ENCOUNTER (OUTPATIENT)
Dept: RADIOLOGY | Facility: MEDICAL CENTER | Age: 49
End: 2024-01-04
Attending: NURSE PRACTITIONER
Payer: MEDICARE

## 2024-01-04 DIAGNOSIS — E11.9 TYPE 2 DIABETES MELLITUS (HCC): ICD-10-CM

## 2024-01-04 DIAGNOSIS — Z12.31 ENCOUNTER FOR SCREENING MAMMOGRAM FOR BREAST CANCER: ICD-10-CM

## 2024-01-04 DIAGNOSIS — M79.662 PAIN OF LEFT CALF: ICD-10-CM

## 2024-01-04 PROCEDURE — 93971 EXTREMITY STUDY: CPT | Mod: LT

## 2024-01-04 PROCEDURE — 77067 SCR MAMMO BI INCL CAD: CPT

## 2024-01-04 NOTE — PROGRESS NOTES
Subjective:         HPI:   Aviva presents today with    Bilateral lower extremity edema  Underwent gastric bypass in March 2023  Swelling started soon after  She was started on Lasix 10 mg a day but reports it's not helpful  Still has mild pitting edema moreso on the left side   She reports pain in the left upper calf, no erythema               ROS per HPI    Objective:     Exam:  There were no vitals taken for this visit. There is no height or weight on file to calculate BMI.    Physical Exam:  Constitutional: Well-developed and well-nourished female. Not diaphoretic. No distress.   Skin: warm, dry, intact, no evidence of rash or concerning lesions  Head: Atraumatic without lesions.  Eyes: Conjunctivae are normal. Pupils are equal, round. No scleral icterus.   Ears:  External ears unremarkable.   Neck: Supple, trachea midline. No thyromegaly present. No cervical or supraclavicular lymphadenopathy.  Cardiovascular: Regular rate and rhythm without murmur.   Pulmonary: Clear to ausculation. Normal effort. No rales, ronchi, or wheezing.  Extremities: No cyanosis, clubbing, erythema, mod 1+ pitting edema on LLE; mild nonpitting on RLE   Neurological: Alert and oriented x 3.   Psychiatric:  Behavior, mood, and affect are appropriate.        Assessment & Plan:     48 y.o. female with the following -     1. Bilateral lower extremity edema  - Referral to Vascular Medicine  - furosemide (LASIX) 20 MG Tab; 2 tabs PO every am; add 1 tab PO at dinner time if needed to manage symptoms  Dispense: 90 Tablet; Refill: 3  - D-DIMER; Future    2. Pain of left calf  - Referral to Vascular Medicine  - -EXTREMITY VENOUS LOWER UNILAT LEFT; Future  - D-DIMER; Future    3. Type 2 diabetes mellitus (HCC)  - POCT  A1C  - Comp Metabolic Panel; Future  - MICROALBUMIN CREAT RATIO URINE; Future  - Semaglutide,0.25 or 0.5MG/DOS, (OZEMPIC, 0.25 OR 0.5 MG/DOSE,) 2 MG/3ML Solution Pen-injector; Inject 0.5 mg under the skin every 7 days.  Dispense:  6 mL; Refill: 3    4. Need for vaccination  - INFLUENZA VACCINE QUAD INJ (PF)    5. Screening for colorectal cancer    6. Encounter for screening mammogram for breast cancer  - MA-SCREENING MAMMO BILAT W/TOMOSYNTHESIS W/CAD; Future    7. Other fatigue  - CBC WITH DIFFERENTIAL; Future    8. Screening for endocrine disorder  - TSH WITH REFLEX TO FT4; Future    9. Vitamin D deficiency  - VITAMIN D,25 HYDROXY (DEFICIENCY); Future    10. Hyperkalemia    11. Abnormal liver enzymes    12. Hyperlipidemia, unspecified hyperlipidemia type  - Lipid Profile; Future         Return in about 3 weeks (around 1/24/2024) for lab results.  Message pt w/u/s results-ordered STAT d/t pain in calf and more edema on LLE than RLE    Please note that this dictation was created using voice recognition software. I have made every reasonable attempt to correct obvious errors, but I expect that there are errors of grammar and possibly content that I did not discover before finalizing the note.

## 2024-01-05 ENCOUNTER — HOSPITAL ENCOUNTER (OUTPATIENT)
Dept: LAB | Facility: MEDICAL CENTER | Age: 49
End: 2024-01-05
Attending: NURSE PRACTITIONER
Payer: MEDICARE

## 2024-01-05 DIAGNOSIS — Z13.29 SCREENING FOR ENDOCRINE DISORDER: ICD-10-CM

## 2024-01-05 DIAGNOSIS — M79.662 PAIN OF LEFT CALF: ICD-10-CM

## 2024-01-05 DIAGNOSIS — R60.0 BILATERAL LOWER EXTREMITY EDEMA: ICD-10-CM

## 2024-01-05 DIAGNOSIS — E78.5 HYPERLIPIDEMIA, UNSPECIFIED HYPERLIPIDEMIA TYPE: ICD-10-CM

## 2024-01-05 DIAGNOSIS — E11.9 TYPE 2 DIABETES MELLITUS (HCC): ICD-10-CM

## 2024-01-05 DIAGNOSIS — R25.2 LEG CRAMPS: ICD-10-CM

## 2024-01-05 DIAGNOSIS — R53.83 OTHER FATIGUE: ICD-10-CM

## 2024-01-05 DIAGNOSIS — E55.9 VITAMIN D DEFICIENCY: ICD-10-CM

## 2024-01-05 PROBLEM — Z12.31 ENCOUNTER FOR SCREENING MAMMOGRAM FOR BREAST CANCER: Status: ACTIVE | Noted: 2024-01-05

## 2024-01-05 PROBLEM — Z12.11 SCREENING FOR COLORECTAL CANCER: Status: ACTIVE | Noted: 2024-01-05

## 2024-01-05 PROBLEM — Z12.12 SCREENING FOR COLORECTAL CANCER: Status: ACTIVE | Noted: 2024-01-05

## 2024-01-05 LAB
ALBUMIN SERPL BCP-MCNC: 3 G/DL (ref 3.2–4.9)
ALBUMIN/GLOB SERPL: 0.9 G/DL
ALP SERPL-CCNC: 579 U/L (ref 30–99)
ALT SERPL-CCNC: 27 U/L (ref 2–50)
ANION GAP SERPL CALC-SCNC: 10 MMOL/L (ref 7–16)
AST SERPL-CCNC: 33 U/L (ref 12–45)
BASOPHILS # BLD AUTO: 0.6 % (ref 0–1.8)
BASOPHILS # BLD: 0.03 K/UL (ref 0–0.12)
BILIRUB SERPL-MCNC: 0.3 MG/DL (ref 0.1–1.5)
BUN SERPL-MCNC: 29 MG/DL (ref 8–22)
CALCIUM ALBUM COR SERPL-MCNC: 9.4 MG/DL (ref 8.5–10.5)
CALCIUM SERPL-MCNC: 8.6 MG/DL (ref 8.5–10.5)
CHLORIDE SERPL-SCNC: 107 MMOL/L (ref 96–112)
CHOLEST SERPL-MCNC: 155 MG/DL (ref 100–199)
CO2 SERPL-SCNC: 24 MMOL/L (ref 20–33)
CREAT SERPL-MCNC: 1.69 MG/DL (ref 0.5–1.4)
CREAT UR-MCNC: 60.2 MG/DL
D DIMER PPP IA.FEU-MCNC: 0.55 UG/ML (FEU) (ref 0–0.5)
EOSINOPHIL # BLD AUTO: 0.25 K/UL (ref 0–0.51)
EOSINOPHIL NFR BLD: 4.7 % (ref 0–6.9)
ERYTHROCYTE [DISTWIDTH] IN BLOOD BY AUTOMATED COUNT: 41.1 FL (ref 35.9–50)
FASTING STATUS PATIENT QL REPORTED: NORMAL
GFR SERPLBLD CREATININE-BSD FMLA CKD-EPI: 37 ML/MIN/1.73 M 2
GLOBULIN SER CALC-MCNC: 3.3 G/DL (ref 1.9–3.5)
GLUCOSE SERPL-MCNC: 152 MG/DL (ref 65–99)
HCT VFR BLD AUTO: 35.8 % (ref 37–47)
HDLC SERPL-MCNC: 38 MG/DL
HGB BLD-MCNC: 12.2 G/DL (ref 12–16)
IMM GRANULOCYTES # BLD AUTO: 0.02 K/UL (ref 0–0.11)
IMM GRANULOCYTES NFR BLD AUTO: 0.4 % (ref 0–0.9)
LDLC SERPL CALC-MCNC: 91 MG/DL
LYMPHOCYTES # BLD AUTO: 2.33 K/UL (ref 1–4.8)
LYMPHOCYTES NFR BLD: 43.6 % (ref 22–41)
MCH RBC QN AUTO: 30.7 PG (ref 27–33)
MCHC RBC AUTO-ENTMCNC: 34.1 G/DL (ref 32.2–35.5)
MCV RBC AUTO: 89.9 FL (ref 81.4–97.8)
MICROALBUMIN UR-MCNC: 273 MG/DL
MICROALBUMIN/CREAT UR: 4535 MG/G (ref 0–30)
MONOCYTES # BLD AUTO: 0.38 K/UL (ref 0–0.85)
MONOCYTES NFR BLD AUTO: 7.1 % (ref 0–13.4)
NEUTROPHILS # BLD AUTO: 2.33 K/UL (ref 1.82–7.42)
NEUTROPHILS NFR BLD: 43.6 % (ref 44–72)
NRBC # BLD AUTO: 0 K/UL
NRBC BLD-RTO: 0 /100 WBC (ref 0–0.2)
PLATELET # BLD AUTO: 284 K/UL (ref 164–446)
PMV BLD AUTO: 11.6 FL (ref 9–12.9)
POTASSIUM SERPL-SCNC: 4.8 MMOL/L (ref 3.6–5.5)
PROT SERPL-MCNC: 6.3 G/DL (ref 6–8.2)
RBC # BLD AUTO: 3.98 M/UL (ref 4.2–5.4)
SODIUM SERPL-SCNC: 141 MMOL/L (ref 135–145)
TRIGL SERPL-MCNC: 128 MG/DL (ref 0–149)
URATE SERPL-MCNC: 7.2 MG/DL (ref 1.9–8.2)
WBC # BLD AUTO: 5.3 K/UL (ref 4.8–10.8)

## 2024-01-05 PROCEDURE — 80053 COMPREHEN METABOLIC PANEL: CPT

## 2024-01-05 PROCEDURE — 85025 COMPLETE CBC W/AUTO DIFF WBC: CPT

## 2024-01-05 PROCEDURE — 82306 VITAMIN D 25 HYDROXY: CPT

## 2024-01-05 PROCEDURE — 84443 ASSAY THYROID STIM HORMONE: CPT

## 2024-01-05 PROCEDURE — 36415 COLL VENOUS BLD VENIPUNCTURE: CPT

## 2024-01-05 PROCEDURE — 85379 FIBRIN DEGRADATION QUANT: CPT

## 2024-01-05 PROCEDURE — 84550 ASSAY OF BLOOD/URIC ACID: CPT

## 2024-01-05 PROCEDURE — 80061 LIPID PANEL: CPT

## 2024-01-06 LAB
25(OH)D3 SERPL-MCNC: 7 NG/ML (ref 30–100)
TSH SERPL DL<=0.005 MIU/L-ACNC: 1.43 UIU/ML (ref 0.38–5.33)

## 2024-01-10 ENCOUNTER — HOSPITAL ENCOUNTER (OUTPATIENT)
Dept: RADIOLOGY | Facility: MEDICAL CENTER | Age: 49
End: 2024-01-10
Payer: MEDICARE

## 2024-01-12 DIAGNOSIS — L29.9 ITCHY SKIN: ICD-10-CM

## 2024-01-12 NOTE — TELEPHONE ENCOUNTER
Requested Prescriptions     Pending Prescriptions Disp Refills    hydrOXYzine HCl (ATARAX) 25 MG Tab 90 Tablet 3     Si/2-1 tab up to tid prn itching     Last office visit: 1/3/24  Last lab: 24

## 2024-01-15 RX ORDER — HYDROXYZINE HYDROCHLORIDE 25 MG/1
TABLET, FILM COATED ORAL
Qty: 90 TABLET | Refills: 3 | Status: SHIPPED | OUTPATIENT
Start: 2024-01-15

## 2024-01-31 DIAGNOSIS — R74.8 ELEVATED ALKALINE PHOSPHATASE LEVEL: ICD-10-CM

## 2024-02-01 DIAGNOSIS — R94.4 ABNORMAL KIDNEY FUNCTION STUDY: ICD-10-CM

## 2024-02-07 ENCOUNTER — TELEPHONE (OUTPATIENT)
Dept: HEALTH INFORMATION MANAGEMENT | Facility: OTHER | Age: 49
End: 2024-02-07
Payer: MEDICARE

## 2024-02-07 DIAGNOSIS — E11.29 TYPE 2 DIABETES MELLITUS WITH OTHER DIABETIC KIDNEY COMPLICATION, WITH LONG-TERM CURRENT USE OF INSULIN (HCC): ICD-10-CM

## 2024-02-07 DIAGNOSIS — Z79.4 TYPE 2 DIABETES MELLITUS WITH OTHER DIABETIC KIDNEY COMPLICATION, WITH LONG-TERM CURRENT USE OF INSULIN (HCC): ICD-10-CM

## 2024-02-07 DIAGNOSIS — I10 PRIMARY HYPERTENSION: ICD-10-CM

## 2024-02-07 RX ORDER — INSULIN LISPRO 100 [IU]/ML
INJECTION, SOLUTION INTRAVENOUS; SUBCUTANEOUS
Qty: 30 ML | Refills: 3 | Status: SHIPPED | OUTPATIENT
Start: 2024-02-07

## 2024-02-08 ENCOUNTER — OFFICE VISIT (OUTPATIENT)
Dept: NEPHROLOGY | Facility: MEDICAL CENTER | Age: 49
End: 2024-02-08
Payer: MEDICARE

## 2024-02-08 VITALS
HEART RATE: 86 BPM | BODY MASS INDEX: 34.51 KG/M2 | SYSTOLIC BLOOD PRESSURE: 122 MMHG | RESPIRATION RATE: 18 BRPM | HEIGHT: 69 IN | OXYGEN SATURATION: 96 % | TEMPERATURE: 97 F | WEIGHT: 233 LBS | DIASTOLIC BLOOD PRESSURE: 72 MMHG

## 2024-02-08 DIAGNOSIS — I10 PRIMARY HYPERTENSION: ICD-10-CM

## 2024-02-08 DIAGNOSIS — N17.9 AKI (ACUTE KIDNEY INJURY) (HCC): ICD-10-CM

## 2024-02-08 DIAGNOSIS — E55.9 VITAMIN D DEFICIENCY: ICD-10-CM

## 2024-02-08 DIAGNOSIS — E11.21 DIABETIC NEPHROPATHY ASSOCIATED WITH TYPE 2 DIABETES MELLITUS (HCC): ICD-10-CM

## 2024-02-08 DIAGNOSIS — D64.9 ANEMIA, UNSPECIFIED TYPE: ICD-10-CM

## 2024-02-08 PROCEDURE — 99204 OFFICE O/P NEW MOD 45 MIN: CPT | Performed by: INTERNAL MEDICINE

## 2024-02-08 PROCEDURE — 3078F DIAST BP <80 MM HG: CPT | Performed by: INTERNAL MEDICINE

## 2024-02-08 PROCEDURE — 3074F SYST BP LT 130 MM HG: CPT | Performed by: INTERNAL MEDICINE

## 2024-02-08 RX ORDER — LISINOPRIL 10 MG/1
10 TABLET ORAL
Qty: 90 TABLET | Refills: 3 | Status: SHIPPED | OUTPATIENT
Start: 2024-02-08

## 2024-02-08 ASSESSMENT — ENCOUNTER SYMPTOMS
HEMOPTYSIS: 0
FLANK PAIN: 0
WHEEZING: 0
ABDOMINAL PAIN: 0
FEVER: 0
COUGH: 0
ORTHOPNEA: 0
NAUSEA: 0
DIARRHEA: 0
WEIGHT LOSS: 0
CHILLS: 0
EYES NEGATIVE: 1
PALPITATIONS: 0
SINUS PAIN: 0
SHORTNESS OF BREATH: 0
VOMITING: 0

## 2024-02-08 ASSESSMENT — FIBROSIS 4 INDEX: FIB4 SCORE: 1.03

## 2024-02-08 NOTE — TELEPHONE ENCOUNTER
Last ov 1/3/24    Received request via: Pharmacy    Was the patient seen in the last year in this department? Yes    Does the patient have an active prescription (recently filled or refills available) for medication(s) requested? No    [facility]    Does the patient have [facility] and need 100 day supply (blood pressure, diabetes and cholesterol meds only)? Patient does not have SCP

## 2024-02-09 ENCOUNTER — APPOINTMENT (OUTPATIENT)
Dept: VASCULAR LAB | Facility: MEDICAL CENTER | Age: 49
End: 2024-02-09
Payer: MEDICARE

## 2024-02-09 NOTE — PATIENT INSTRUCTIONS
Continue current treatment  Monitor BP and call clinic if BP > 135/85  Keep well hydrated  Low salt diet  Avoid NSAID's

## 2024-02-09 NOTE — PROGRESS NOTES
Subjective     Aviva Kenney is a 48 y.o. female who presents with New Patient and Chronic Kidney Disease (Abnormal kidney function studies )            HPI  Aviva is coming today for initial evaluation of BUTCH  Has long term hx.of HTN, DM II  In Aug/Sept 2022 hospitalized with BUTCH, dehydration -recovered from creat 3.3 to 1.06  Has not checked lab tests till Jan 2024 -noticed significantly worsen creat level to 1.69 -  GFR 37  DMN -albuminuria in Nov 2022 at 761 -now at nephrotic range 4535  HTN;BP well controlled at goal  Doing better, no new complaints  Pedal edema improved  No dysuria/hematuria/flank pain  Anemia: low Hb level improved -stable    Review of Systems   Constitutional:  Negative for chills, fever, malaise/fatigue and weight loss.   HENT:  Negative for congestion, hearing loss and sinus pain.    Eyes: Negative.    Respiratory:  Negative for cough, hemoptysis, shortness of breath and wheezing.    Cardiovascular:  Negative for chest pain, palpitations, orthopnea and leg swelling.   Gastrointestinal:  Negative for abdominal pain, diarrhea, nausea and vomiting.   Genitourinary:  Negative for dysuria, flank pain, frequency, hematuria and urgency.   Skin: Negative.    All other systems reviewed and are negative.         Past Medical History:   Diagnosis Date    Cataract     Diabetes (HCC)     Type 2    Infectious disease 10/2022    Covid    Pre-op evaluation 12/13/2022       Family History   Problem Relation Age of Onset    Cancer Mother     Diabetes Mother     Heart Disease Mother     Hypertension Mother     Hypertension Father     Heart Disease Father     Diabetes Father     Diabetes Brother        Social History     Socioeconomic History    Marital status:    Tobacco Use    Smoking status: Never    Smokeless tobacco: Never   Vaping Use    Vaping Use: Never used   Substance and Sexual Activity    Alcohol use: Not Currently     Comment: occ    Drug use: Yes     Types: Marijuana, Oral  "    Comment: occ         Objective     /72 (BP Location: Right arm, Patient Position: Sitting, BP Cuff Size: Adult)   Pulse 86   Temp 36.1 °C (97 °F) (Temporal)   Resp 18   Ht 1.753 m (5' 9\")   Wt 106 kg (233 lb)   SpO2 96%   BMI 34.41 kg/m²      Physical Exam  Vitals reviewed.   Constitutional:       General: She is not in acute distress.     Appearance: Normal appearance. She is well-developed. She is obese. She is not diaphoretic.   HENT:      Head: Normocephalic and atraumatic.      Nose: Nose normal.      Mouth/Throat:      Mouth: Mucous membranes are moist.      Pharynx: Oropharynx is clear.   Eyes:      Extraocular Movements: Extraocular movements intact.      Conjunctiva/sclera: Conjunctivae normal.      Pupils: Pupils are equal, round, and reactive to light.   Cardiovascular:      Rate and Rhythm: Normal rate and regular rhythm.      Pulses: Normal pulses.      Heart sounds: Normal heart sounds.   Pulmonary:      Effort: Pulmonary effort is normal. No respiratory distress.      Breath sounds: Normal breath sounds. No wheezing or rales.   Abdominal:      General: Bowel sounds are normal. There is no distension.      Palpations: Abdomen is soft. There is no mass.      Tenderness: There is no abdominal tenderness. There is no right CVA tenderness or left CVA tenderness.   Musculoskeletal:      Cervical back: Normal range of motion and neck supple.      Right lower leg: No edema.      Left lower leg: No edema.   Skin:     General: Skin is warm.      Coloration: Skin is not pale.      Findings: No erythema or rash.   Neurological:      General: No focal deficit present.      Mental Status: She is alert and oriented to person, place, and time.      Cranial Nerves: No cranial nerve deficit.      Coordination: Coordination normal.   Psychiatric:         Mood and Affect: Mood normal.         Behavior: Behavior normal.         Thought Content: Thought content normal.         Judgment: Judgment normal. "               Laboratory results reviewed: d/w Pt     Latest Reference Range & Units 11/16/22 10:58 12/07/22 06:33 03/06/23 15:39 01/03/24 12:20 01/03/24 12:25 01/05/24 11:31   WBC 4.8 - 10.8 K/uL 6.4     5.3   RBC 4.20 - 5.40 M/uL 4.36     3.98 (L)   Hemoglobin 12.0 - 16.0 g/dL 13.6     12.2   Hematocrit 37.0 - 47.0 % 39.1     35.8 (L)   MCV 81.4 - 97.8 fL 89.7     89.9   MCH 27.0 - 33.0 pg 31.2     30.7   MCHC 32.2 - 35.5 g/dL 34.8     34.1   RDW 35.9 - 50.0 fL 41.1     41.1   Platelet Count 164 - 446 K/uL 318     284   MPV 9.0 - 12.9 fL 11.4     11.6   Neutrophils-Polys 44.00 - 72.00 % 34.00 (L)     43.60 (L)   Neutrophils (Absolute) 1.82 - 7.42 K/uL 2.16     2.33   Lymphocytes 22.00 - 41.00 % 53.30 (H)     43.60 (H)   Lymphs (Absolute) 1.00 - 4.80 K/uL 3.39     2.33   Monocytes 0.00 - 13.40 % 7.40     7.10   Monos (Absolute) 0.00 - 0.85 K/uL 0.47     0.38   Eosinophils 0.00 - 6.90 % 4.70     4.70   Eos (Absolute) 0.00 - 0.51 K/uL 0.30     0.25   Basophils 0.00 - 1.80 % 0.30     0.60   Baso (Absolute) 0.00 - 0.12 K/uL 0.02     0.03   Immature Granulocytes 0.00 - 0.90 % 0.30     0.40   Immature Granulocytes (abs) 0.00 - 0.11 K/uL 0.02     0.02   Nucleated RBC 0.00 - 0.20 /100 WBC 0.00     0.00   NRBC (Absolute) K/uL 0.00     0.00   Sodium 135 - 145 mmol/L 142     141   Potassium 3.6 - 5.5 mmol/L 4.2     4.8   Chloride 96 - 112 mmol/L 104     107   Co2 20 - 33 mmol/L 28     24   Anion Gap 7.0 - 16.0  10.0     10.0   Glucose 65 - 99 mg/dL 89     152 (H)   Bun 8 - 22 mg/dL 18     29 (H)   Creatinine 0.50 - 1.40 mg/dL 1.06     1.69 (H)   GFR (CKD-EPI) >60 mL/min/1.73 m 2 66     37 !   Calcium 8.5 - 10.5 mg/dL 9.9     8.6   Correct Calcium 8.5 - 10.5 mg/dL      9.4   AST(SGOT) 12 - 45 U/L 37     33   ALT(SGPT) 2 - 50 U/L 37     27   Alkaline Phosphatase 30 - 99 U/L 238 (H)     579 (H)   Total Bilirubin 0.1 - 1.5 mg/dL 0.4     0.3   Albumin 3.2 - 4.9 g/dL 3.9     3.0 (L)   Total Protein 6.0 - 8.2 g/dL 7.2     6.3    Globulin 1.9 - 3.5 g/dL 3.3     3.3   A-G Ratio g/dL 1.2     0.9   Uric Acid 1.9 - 8.2 mg/dL      7.2   Glycohemoglobin 5.8 % 9.7 (H)  11.1 ! 8.7 !     Estim. Avg Glu mg/dL 232        Fasting Status       Fasting   Cholesterol,Tot 100 - 199 mg/dL 170     155   Triglycerides 0 - 149 mg/dL 175 (H)     128   HDL >=40 mg/dL 39 !     38 !   LDL <100 mg/dL 96     91   Micro Alb Creat Ratio 0 - 30 mg/g 761 (H)    4535 (H)    Creatinine, Urine mg/dL 112.61    60.20    Microalbumin, Urine Random mg/dL 85.7    273.0    POC Glucose, Blood 65 - 99 mg/dL  151 (H)       D-Dimer 0.00 - 0.50 ug/mL (FEU)      0.55 (H)   25-Hydroxy   Vitamin D 25 30 - 100 ng/mL 22 (L)     7 (L)   TSH 0.380 - 5.330 uIU/mL 2.140     1.430   Hepatitis C Antibody Non-Reactive  Non-Reactive        (L): Data is abnormally low  (H): Data is abnormally high  !: Data is abnormal           Assessment & Plan        1. BUTCH (acute kidney injury) (HCC)      Of unclear etiology -to monitor closely      Repeat renal panel      Complete renal US  - Basic Metabolic Panel; Future  - PHOSPHORUS; Future  - URINALYSIS; Future    2. Primary hypertension      BP well controlled at goal -to monitor at home    3. Diabetic nephropathy associated with type 2 diabetes mellitus (HCC)      With nephrotic range albuminuria -on ACEi  - URINE CREATININE RANDOM; Future  - URINE PROTEIN; Future    4. Anemia, unspecified type      Hb stable -to monitor    5. Vitamin D deficiency      To monitor  - PTH INTACT (PTH ONLY); Future  - VITAMIN D 25-HYDROXY    Recs:      Continue current treatment  Monitor BP and call clinic if BP > 135/85  Keep well hydrated  Low salt diet  Avoid NSAID's  F/u in 2 weeks  Thank you for the consult

## 2024-02-14 ENCOUNTER — HOSPITAL ENCOUNTER (OUTPATIENT)
Dept: RADIOLOGY | Facility: MEDICAL CENTER | Age: 49
End: 2024-02-14
Attending: NURSE PRACTITIONER
Payer: MEDICARE

## 2024-02-14 DIAGNOSIS — R74.8 ELEVATED ALKALINE PHOSPHATASE LEVEL: ICD-10-CM

## 2024-02-14 PROCEDURE — 76700 US EXAM ABDOM COMPLETE: CPT

## 2024-02-16 ENCOUNTER — PATIENT MESSAGE (OUTPATIENT)
Dept: HEALTH INFORMATION MANAGEMENT | Facility: OTHER | Age: 49
End: 2024-02-16

## 2024-02-20 ENCOUNTER — HOSPITAL ENCOUNTER (OUTPATIENT)
Dept: LAB | Facility: MEDICAL CENTER | Age: 49
End: 2024-02-20
Attending: INTERNAL MEDICINE
Payer: MEDICARE

## 2024-02-20 DIAGNOSIS — E11.21 DIABETIC NEPHROPATHY ASSOCIATED WITH TYPE 2 DIABETES MELLITUS (HCC): ICD-10-CM

## 2024-02-20 DIAGNOSIS — N17.9 AKI (ACUTE KIDNEY INJURY) (HCC): ICD-10-CM

## 2024-02-20 DIAGNOSIS — E55.9 VITAMIN D DEFICIENCY: ICD-10-CM

## 2024-02-20 LAB
25(OH)D3 SERPL-MCNC: 8 NG/ML (ref 30–100)
ANION GAP SERPL CALC-SCNC: 10 MMOL/L (ref 7–16)
APPEARANCE UR: ABNORMAL
BACTERIA #/AREA URNS HPF: NEGATIVE /HPF
BILIRUB UR QL STRIP.AUTO: NEGATIVE
BUN SERPL-MCNC: 27 MG/DL (ref 8–22)
CALCIUM SERPL-MCNC: 9 MG/DL (ref 8.5–10.5)
CHLORIDE SERPL-SCNC: 105 MMOL/L (ref 96–112)
CO2 SERPL-SCNC: 23 MMOL/L (ref 20–33)
COLOR UR: YELLOW
CREAT SERPL-MCNC: 1.61 MG/DL (ref 0.5–1.4)
CREAT UR-MCNC: 120.49 MG/DL
EPI CELLS #/AREA URNS HPF: ABNORMAL /HPF
GFR SERPLBLD CREATININE-BSD FMLA CKD-EPI: 39 ML/MIN/1.73 M 2
GLUCOSE SERPL-MCNC: 162 MG/DL (ref 65–99)
GLUCOSE UR STRIP.AUTO-MCNC: NEGATIVE MG/DL
HYALINE CASTS #/AREA URNS LPF: ABNORMAL /LPF
KETONES UR STRIP.AUTO-MCNC: NEGATIVE MG/DL
LEUKOCYTE ESTERASE UR QL STRIP.AUTO: ABNORMAL
MICRO URNS: ABNORMAL
NITRITE UR QL STRIP.AUTO: NEGATIVE
PH UR STRIP.AUTO: 5.5 [PH] (ref 5–8)
PHOSPHATE SERPL-MCNC: 3.4 MG/DL (ref 2.5–4.5)
POTASSIUM SERPL-SCNC: 5.1 MMOL/L (ref 3.6–5.5)
PROT UR QL STRIP: 100 MG/DL
PROT UR-MCNC: 50 MG/DL (ref 0–15)
PTH-INTACT SERPL-MCNC: 117 PG/ML (ref 14–72)
RBC # URNS HPF: ABNORMAL /HPF
RBC UR QL AUTO: NEGATIVE
SODIUM SERPL-SCNC: 138 MMOL/L (ref 135–145)
SP GR UR STRIP.AUTO: 1.01
UROBILINOGEN UR STRIP.AUTO-MCNC: 1 MG/DL
WBC #/AREA URNS HPF: ABNORMAL /HPF

## 2024-02-20 PROCEDURE — 84100 ASSAY OF PHOSPHORUS: CPT

## 2024-02-20 PROCEDURE — 82570 ASSAY OF URINE CREATININE: CPT

## 2024-02-20 PROCEDURE — 80048 BASIC METABOLIC PNL TOTAL CA: CPT

## 2024-02-20 PROCEDURE — 36415 COLL VENOUS BLD VENIPUNCTURE: CPT

## 2024-02-20 PROCEDURE — 81001 URINALYSIS AUTO W/SCOPE: CPT

## 2024-02-20 PROCEDURE — 84156 ASSAY OF PROTEIN URINE: CPT

## 2024-02-20 PROCEDURE — 83970 ASSAY OF PARATHORMONE: CPT

## 2024-02-20 PROCEDURE — 82306 VITAMIN D 25 HYDROXY: CPT

## 2024-02-22 ENCOUNTER — OFFICE VISIT (OUTPATIENT)
Dept: NEPHROLOGY | Facility: MEDICAL CENTER | Age: 49
End: 2024-02-22
Payer: MEDICARE

## 2024-02-22 VITALS
HEART RATE: 82 BPM | DIASTOLIC BLOOD PRESSURE: 72 MMHG | SYSTOLIC BLOOD PRESSURE: 122 MMHG | BODY MASS INDEX: 33.77 KG/M2 | RESPIRATION RATE: 18 BRPM | OXYGEN SATURATION: 94 % | TEMPERATURE: 97 F | WEIGHT: 228 LBS | HEIGHT: 69 IN

## 2024-02-22 DIAGNOSIS — I10 PRIMARY HYPERTENSION: ICD-10-CM

## 2024-02-22 DIAGNOSIS — N18.32 STAGE 3B CHRONIC KIDNEY DISEASE: ICD-10-CM

## 2024-02-22 DIAGNOSIS — E21.1 HYPERPARATHYROIDISM DUE TO VITAMIN D DEFICIENCY (HCC): ICD-10-CM

## 2024-02-22 DIAGNOSIS — E11.21 DIABETIC NEPHROPATHY ASSOCIATED WITH TYPE 2 DIABETES MELLITUS (HCC): ICD-10-CM

## 2024-02-22 DIAGNOSIS — E55.9 VITAMIN D DEFICIENCY: ICD-10-CM

## 2024-02-22 DIAGNOSIS — D64.9 ANEMIA, UNSPECIFIED TYPE: ICD-10-CM

## 2024-02-22 PROCEDURE — 3078F DIAST BP <80 MM HG: CPT | Performed by: INTERNAL MEDICINE

## 2024-02-22 PROCEDURE — 3074F SYST BP LT 130 MM HG: CPT | Performed by: INTERNAL MEDICINE

## 2024-02-22 PROCEDURE — 99214 OFFICE O/P EST MOD 30 MIN: CPT | Performed by: INTERNAL MEDICINE

## 2024-02-22 ASSESSMENT — ENCOUNTER SYMPTOMS
FEVER: 0
PALPITATIONS: 0
EYES NEGATIVE: 1
HEMOPTYSIS: 0
VOMITING: 0
FLANK PAIN: 0
SHORTNESS OF BREATH: 0
NAUSEA: 0
WEIGHT LOSS: 1
WHEEZING: 0
ORTHOPNEA: 0
SINUS PAIN: 0
ABDOMINAL PAIN: 0
CHILLS: 0
COUGH: 0

## 2024-02-22 ASSESSMENT — FIBROSIS 4 INDEX: FIB4 SCORE: 1.05

## 2024-02-22 NOTE — PROGRESS NOTES
Subjective     Aviva Kenney is a 49 y.o. female who presents with Follow-Up and Chronic Kidney Disease            HPI  Aviva is coming today for f/u of CKD IIIb  Has long term hx.of HTN, DM II  In Aug/Sept 2022 hospitalized with BUTCH, dehydration -recovered from creat 3.3 to 1.06  Has not checked lab tests till Jan 2024 -noticed significantly worsen creat level to 1.69 -  GFR 37 -improved to 1.61 -likely baseline  DMN -albuminuria in Nov 2022 at 761 -now at nephrotic range 4535 - improving -continue monitor  HTN;BP well controlled at goal  Doing better, no new complaints  Pedal edema improved  No dysuria/hematuria/flank pain  Anemia: low Hb level improved -stable    Review of Systems   Constitutional:  Positive for weight loss. Negative for chills, fever and malaise/fatigue.        Intentional   HENT:  Negative for congestion, hearing loss and sinus pain.    Eyes: Negative.    Respiratory:  Negative for cough, hemoptysis, shortness of breath and wheezing.    Cardiovascular:  Negative for chest pain, palpitations, orthopnea and leg swelling.   Gastrointestinal:  Negative for abdominal pain, nausea and vomiting.   Genitourinary:  Negative for dysuria, flank pain, frequency, hematuria and urgency.   Skin: Negative.    All other systems reviewed and are negative.         Past Medical History:   Diagnosis Date    Cataract     Diabetes (HCC)     Type 2    Infectious disease 10/2022    Covid    Pre-op evaluation 12/13/2022       Family History   Problem Relation Age of Onset    Cancer Mother     Diabetes Mother     Heart Disease Mother     Hypertension Mother     Hypertension Father     Heart Disease Father     Diabetes Father     Diabetes Brother        Social History     Socioeconomic History    Marital status:    Tobacco Use    Smoking status: Never    Smokeless tobacco: Never   Vaping Use    Vaping Use: Never used   Substance and Sexual Activity    Alcohol use: Not Currently     Comment: occ     "Drug use: Yes     Types: Marijuana, Oral     Comment: occ         Objective     /72 (BP Location: Right arm, Patient Position: Sitting, BP Cuff Size: Adult)   Pulse 82   Temp 36.1 °C (97 °F) (Temporal)   Resp 18   Ht 1.753 m (5' 9\")   Wt 103 kg (228 lb)   SpO2 94%   BMI 33.67 kg/m²      Physical Exam  Vitals reviewed.   Constitutional:       General: She is not in acute distress.     Appearance: Normal appearance. She is well-developed. She is not diaphoretic.   HENT:      Head: Normocephalic and atraumatic.      Nose: Nose normal.      Mouth/Throat:      Mouth: Mucous membranes are moist.      Pharynx: Oropharynx is clear.   Eyes:      Extraocular Movements: Extraocular movements intact.      Conjunctiva/sclera: Conjunctivae normal.      Pupils: Pupils are equal, round, and reactive to light.   Cardiovascular:      Rate and Rhythm: Normal rate and regular rhythm.      Pulses: Normal pulses.      Heart sounds: Normal heart sounds.   Pulmonary:      Effort: Pulmonary effort is normal. No respiratory distress.      Breath sounds: Normal breath sounds. No wheezing or rales.   Abdominal:      General: Bowel sounds are normal. There is no distension.      Palpations: Abdomen is soft. There is no mass.      Tenderness: There is no abdominal tenderness. There is no right CVA tenderness or left CVA tenderness.   Musculoskeletal:      Cervical back: Normal range of motion and neck supple.      Right lower leg: No edema.      Left lower leg: No edema.   Skin:     General: Skin is warm.      Coloration: Skin is not pale.      Findings: No erythema or rash.   Neurological:      General: No focal deficit present.      Mental Status: She is alert and oriented to person, place, and time.      Cranial Nerves: No cranial nerve deficit.      Coordination: Coordination normal.   Psychiatric:         Mood and Affect: Mood normal.         Behavior: Behavior normal.         Thought Content: Thought content normal.         " Judgment: Judgment normal.               Laboratory results reviewed: d/w Pt  Hb 12.2  Creat 1.61  Phos 3.1   UPC 0.4  Vit D 8    Renal US unremarkable    Assessment & Plan        1. CKD IIIb      Creat stable likely at baseline      To monitor        2. Primary hypertension      BP well controlled at goal -to monitor at home    3. Diabetic nephropathy associated with type 2 diabetes mellitus (HCC)      Improving -to monitor    4. Anemia, unspecified type      Hb stable -to monitor    5. Vitamin D deficiency     Low vit D levl -continue supplementation     Recs:   Continue current treatment  Low salt diet  Vit D 5000 units daily  Keep well hydrated  Avoid NSAID's   F/u in 4 months

## 2024-02-22 NOTE — PATIENT INSTRUCTIONS
Vit D 5000 units daily  Low salt diet  Continue current treatment  Monitor BP and call clinic if BP > 135/85  Keep well hydrated

## 2024-03-11 ENCOUNTER — TELEPHONE (OUTPATIENT)
Dept: HEALTH INFORMATION MANAGEMENT | Facility: OTHER | Age: 49
End: 2024-03-11
Payer: MEDICARE

## 2024-03-13 ENCOUNTER — APPOINTMENT (OUTPATIENT)
Dept: VASCULAR LAB | Facility: MEDICAL CENTER | Age: 49
End: 2024-03-13
Payer: MEDICARE

## 2024-03-22 NOTE — TELEPHONE ENCOUNTER
[id number]  last OV      Received request via: Pharmacy    Was the patient seen in the last year in this department? Yes    Does the patient have an active prescription (recently filled or refills available) for medication(s) requested? No    [facility]     Does the patient have [facility] home [facility] and need 100 day supply (blood pressure, diabetes and cholesterol meds only)? Patient does not have SCP

## 2024-03-24 RX ORDER — PEN NEEDLE, DIABETIC 32 GX 1/4"
NEEDLE, DISPOSABLE MISCELLANEOUS
Qty: 100 EACH | Refills: 3 | Status: SHIPPED | OUTPATIENT
Start: 2024-03-24

## 2024-04-24 ENCOUNTER — APPOINTMENT (OUTPATIENT)
Dept: MEDICAL GROUP | Facility: PHYSICIAN GROUP | Age: 49
End: 2024-04-24
Payer: MEDICARE

## 2024-05-22 ENCOUNTER — APPOINTMENT (OUTPATIENT)
Dept: MEDICAL GROUP | Facility: PHYSICIAN GROUP | Age: 49
End: 2024-05-22
Payer: MEDICARE

## 2024-05-22 DIAGNOSIS — Z79.4 TYPE 2 DIABETES MELLITUS WITH OTHER DIABETIC KIDNEY COMPLICATION, WITH LONG-TERM CURRENT USE OF INSULIN (HCC): ICD-10-CM

## 2024-05-22 DIAGNOSIS — E11.29 TYPE 2 DIABETES MELLITUS WITH OTHER DIABETIC KIDNEY COMPLICATION, WITH LONG-TERM CURRENT USE OF INSULIN (HCC): ICD-10-CM

## 2024-05-22 DIAGNOSIS — R21 RASH: ICD-10-CM

## 2024-05-22 PROCEDURE — 99214 OFFICE O/P EST MOD 30 MIN: CPT | Mod: 95 | Performed by: NURSE PRACTITIONER

## 2024-05-22 NOTE — PROGRESS NOTES
"Virtual Visit: Established Patient   This visit was conducted via Zoom using secure and encrypted videoconferencing technology.   The patient was in their home in the state of [place].    The patient's identity was confirmed and verbal consent was obtained for this virtual visit.    Subjective:   CC:   Chief Complaint   Patient presents with    Diabetes     Aviva Kenney is a 49 y.o. female presenting for evaluation and management of:    Type 2 diabetes mellitus (HCC)  Last A1c in Jan was 8.7; pt reports since taking Ozempic she's lost 40 lbs; her fbs this am was 110  Needs another A1c  Will do w/labs for nephrology Dr Malik Peralta  Reports itchy bumps all over her body; she scratches and they scab up  Would like to see derm     ROS       Current medicines (including changes today)  Current Outpatient Medications   Medication Sig Dispense Refill    Insulin Pen Needle (NOVOFINE PEN NEEDLE) 32G X 6 MM Misc Use with insulin pen as directed before bed 100 Each 3    lisinopril (PRINIVIL) 10 MG Tab TAKE ONE TABLET BY MOUTH EVERY DAY 90 Tablet 3    insulin lispro (HUMALOG) 100 UNIT/ML INJ Inject 10 units under skin 3 times a day before meals 30 mL 3    hydrOXYzine HCl (ATARAX) 25 MG Tab 1/2-1 tab up to tid prn itching 90 Tablet 3    furosemide (LASIX) 20 MG Tab 2 tabs PO every am; add 1 tab PO at dinner time if needed to manage symptoms 90 Tablet 3    Semaglutide,0.25 or 0.5MG/DOS, (OZEMPIC, 0.25 OR 0.5 MG/DOSE,) 2 MG/3ML Solution Pen-injector Inject 0.5 mg under the skin every 7 days. 6 mL 3    omeprazole (PRILOSEC) 20 MG delayed-release capsule       potassium chloride ER (KLOR-CON) 10 MEQ tablet 1 tab PO once daily only on the days you take 2  tabs lasix 90 Tablet 3    ketoconazole (NIZORAL) 2 % shampoo Apply to scalp, lather, wait 3 to 5 minutes; use 3 times weekly for up to 8 weeks 120 mL 6    BD INSULIN SYRINGE U/F 1/2UNIT 31G X 5/16\" 0.3 ML Misc Use to inject insulin tid before meals as directed 300 Each " 3    estradiol (ESTRACE) 0.1 MG/GM vaginal cream Apply applicatorful into vagina M, W and Friday 42.5 g 3    insulin glargine 100 UNIT/ML SC SOPN injection Inject 25 units SQ into abd qhs; increase by 3 units every 2 nights until mrg=162; max 40 units per night 12 Each 3    HYDROcodone/acetaminophen (NORCO)  MG Tab 1 Tablet every 6 hours as needed.      cyclobenzaprine (FLEXERIL) 10 mg Tab 3 times daily      gabapentin (NEURONTIN) 300 MG Cap       omeprazole (PRILOSEC) 40 MG delayed-release capsule        No current facility-administered medications for this visit.       Patient Active Problem List    Diagnosis Date Noted    Rash 05/22/2024    Hyperlipidemia 01/05/2024    Vitamin D deficiency 01/05/2024    Screening for colorectal cancer 01/05/2024    Other fatigue 01/05/2024    Encounter for screening mammogram for breast cancer 01/05/2024    Pain of left calf 01/05/2024    Bilateral lower extremity edema 06/05/2023    S/P bariatric surgery 06/05/2023    Itchy skin 06/05/2023    Family history of gout 06/05/2023    Leg cramps 06/05/2023    Vaginal dryness 03/06/2023    Stress at home 03/06/2023    Primary hypertension 02/06/2023    Bilateral shoulder pain 02/06/2023    Positive for microalbuminuria 11/21/2022    History of COVID-19 11/16/2022    Chronic bilateral low back pain 10/03/2022    Type 2 diabetes mellitus (HCC) 10/03/2022    Atypical mole 10/03/2022    History of melanoma 10/03/2022    History of right cataract extraction 10/03/2022    Abnormal liver enzymes 09/01/2022    BUTCH (acute kidney injury) (HCC) 09/01/2022    Hyperkalemia 09/01/2022    Morbid obesity (HCC) 09/01/2022    Pyelonephritis 09/01/2022        Objective:   There were no vitals taken for this visit.    Physical Exam:  Constitutional: Alert, no distress, well-groomed.  Skin: No rashes in visible areas.  Eye: Round. Conjunctiva clear, lids normal. No icterus.   ENMT: Lips pink without lesions, good dentition, moist mucous membranes.  Phonation normal.  Neck: No masses, no thyromegaly. Moves freely without pain.  Respiratory: Unlabored respiratory effort, no cough or audible wheeze  Psych: Alert and oriented x3, normal affect and mood.     Assessment and Plan:   The following treatment plan was discussed:     1. Type 2 diabetes mellitus with other diabetic kidney complication, with long-term current use of insulin (HCC)  - HEMOGLOBIN A1C; Future    2. Rash  - Referral to Dermatology      Follow-up: Return in about 6 months (around 11/22/2024) for labs and est care w/new pcp as my last day will be 5/30.

## 2024-05-22 NOTE — ASSESSMENT & PLAN NOTE
Last A1c in Jan was 8.7; pt reports since taking Ozempic she's lost 40 lbs; her fbs this am was 110  Needs another A1c  Will do w/labs for nephrology Dr Gan

## 2024-06-04 DIAGNOSIS — L29.9 ITCHY SKIN: ICD-10-CM

## 2024-06-04 RX ORDER — HYDROXYZINE HYDROCHLORIDE 25 MG/1
TABLET, FILM COATED ORAL
Qty: 90 TABLET | Refills: 3 | Status: SHIPPED | OUTPATIENT
Start: 2024-06-04

## 2024-06-04 NOTE — TELEPHONE ENCOUNTER
Requested Prescriptions     Pending Prescriptions Disp Refills    hydrOXYzine HCl (ATARAX) 25 MG Tab 90 Tablet 3     Si/2-1 tab up to tid prn itching      Last office visit: 24  Last lab: 24

## 2024-06-14 ENCOUNTER — DOCUMENTATION (OUTPATIENT)
Dept: HEALTH INFORMATION MANAGEMENT | Facility: OTHER | Age: 49
End: 2024-06-14
Payer: MEDICARE

## 2024-06-18 ENCOUNTER — HOSPITAL ENCOUNTER (OUTPATIENT)
Dept: LAB | Facility: MEDICAL CENTER | Age: 49
End: 2024-06-18
Attending: INTERNAL MEDICINE
Payer: MEDICARE

## 2024-06-18 DIAGNOSIS — D64.9 ANEMIA, UNSPECIFIED TYPE: ICD-10-CM

## 2024-06-18 DIAGNOSIS — N18.32 STAGE 3B CHRONIC KIDNEY DISEASE: ICD-10-CM

## 2024-06-18 DIAGNOSIS — E11.21 DIABETIC NEPHROPATHY ASSOCIATED WITH TYPE 2 DIABETES MELLITUS (HCC): ICD-10-CM

## 2024-06-18 DIAGNOSIS — E21.1 HYPERPARATHYROIDISM DUE TO VITAMIN D DEFICIENCY (HCC): ICD-10-CM

## 2024-06-18 LAB
ANION GAP SERPL CALC-SCNC: 8 MMOL/L (ref 7–16)
APPEARANCE UR: CLEAR
BACTERIA #/AREA URNS HPF: ABNORMAL /HPF
BILIRUB UR QL STRIP.AUTO: NEGATIVE
BUN SERPL-MCNC: 29 MG/DL (ref 8–22)
CALCIUM SERPL-MCNC: 9.2 MG/DL (ref 8.5–10.5)
CHLORIDE SERPL-SCNC: 106 MMOL/L (ref 96–112)
CO2 SERPL-SCNC: 22 MMOL/L (ref 20–33)
COLOR UR: YELLOW
CREAT SERPL-MCNC: 1.37 MG/DL (ref 0.5–1.4)
CREAT UR-MCNC: 103.72 MG/DL
EPI CELLS #/AREA URNS HPF: ABNORMAL /HPF
ERYTHROCYTE [DISTWIDTH] IN BLOOD BY AUTOMATED COUNT: 46.4 FL (ref 35.9–50)
FASTING STATUS PATIENT QL REPORTED: NORMAL
GFR SERPLBLD CREATININE-BSD FMLA CKD-EPI: 48 ML/MIN/1.73 M 2
GLUCOSE SERPL-MCNC: 192 MG/DL (ref 65–99)
GLUCOSE UR STRIP.AUTO-MCNC: NEGATIVE MG/DL
HCT VFR BLD AUTO: 36.8 % (ref 37–47)
HGB BLD-MCNC: 11.8 G/DL (ref 12–16)
HYALINE CASTS #/AREA URNS LPF: ABNORMAL /LPF
KETONES UR STRIP.AUTO-MCNC: NEGATIVE MG/DL
LEUKOCYTE ESTERASE UR QL STRIP.AUTO: NEGATIVE
MCH RBC QN AUTO: 30.6 PG (ref 27–33)
MCHC RBC AUTO-ENTMCNC: 32.1 G/DL (ref 32.2–35.5)
MCV RBC AUTO: 95.3 FL (ref 81.4–97.8)
MICRO URNS: ABNORMAL
MICROALBUMIN UR-MCNC: 47.3 MG/DL
MICROALBUMIN/CREAT UR: 456 MG/G (ref 0–30)
NITRITE UR QL STRIP.AUTO: NEGATIVE
PH UR STRIP.AUTO: 5.5 [PH] (ref 5–8)
PLATELET # BLD AUTO: 254 K/UL (ref 164–446)
PMV BLD AUTO: 12 FL (ref 9–12.9)
POTASSIUM SERPL-SCNC: 5.4 MMOL/L (ref 3.6–5.5)
PROT UR QL STRIP: 100 MG/DL
PTH-INTACT SERPL-MCNC: 88.4 PG/ML (ref 14–72)
RBC # BLD AUTO: 3.86 M/UL (ref 4.2–5.4)
RBC # URNS HPF: ABNORMAL /HPF
RBC UR QL AUTO: NEGATIVE
SODIUM SERPL-SCNC: 136 MMOL/L (ref 135–145)
SP GR UR STRIP.AUTO: 1.02
UROBILINOGEN UR STRIP.AUTO-MCNC: 1 MG/DL
WBC # BLD AUTO: 4.6 K/UL (ref 4.8–10.8)
WBC #/AREA URNS HPF: ABNORMAL /HPF

## 2024-06-18 PROCEDURE — 82043 UR ALBUMIN QUANTITATIVE: CPT

## 2024-06-18 PROCEDURE — 80048 BASIC METABOLIC PNL TOTAL CA: CPT

## 2024-06-18 PROCEDURE — 81001 URINALYSIS AUTO W/SCOPE: CPT

## 2024-06-18 PROCEDURE — 36415 COLL VENOUS BLD VENIPUNCTURE: CPT

## 2024-06-18 PROCEDURE — 85027 COMPLETE CBC AUTOMATED: CPT

## 2024-06-18 PROCEDURE — 82570 ASSAY OF URINE CREATININE: CPT

## 2024-06-18 PROCEDURE — 83970 ASSAY OF PARATHORMONE: CPT

## 2024-06-20 ENCOUNTER — TELEMEDICINE (OUTPATIENT)
Dept: NEPHROLOGY | Facility: MEDICAL CENTER | Age: 49
End: 2024-06-20
Payer: MEDICARE

## 2024-06-20 VITALS
BODY MASS INDEX: 31.55 KG/M2 | WEIGHT: 213 LBS | SYSTOLIC BLOOD PRESSURE: 127 MMHG | DIASTOLIC BLOOD PRESSURE: 80 MMHG | HEIGHT: 69 IN

## 2024-06-20 DIAGNOSIS — D64.9 ANEMIA, UNSPECIFIED TYPE: ICD-10-CM

## 2024-06-20 DIAGNOSIS — I10 PRIMARY HYPERTENSION: ICD-10-CM

## 2024-06-20 DIAGNOSIS — N18.31 STAGE 3A CHRONIC KIDNEY DISEASE: ICD-10-CM

## 2024-06-20 DIAGNOSIS — E21.1 HYPERPARATHYROIDISM DUE TO VITAMIN D DEFICIENCY (HCC): ICD-10-CM

## 2024-06-20 DIAGNOSIS — E11.21 DIABETIC NEPHROPATHY ASSOCIATED WITH TYPE 2 DIABETES MELLITUS (HCC): ICD-10-CM

## 2024-06-20 DIAGNOSIS — E55.9 VITAMIN D DEFICIENCY: ICD-10-CM

## 2024-06-20 PROCEDURE — 99214 OFFICE O/P EST MOD 30 MIN: CPT | Mod: 95 | Performed by: INTERNAL MEDICINE

## 2024-06-20 PROCEDURE — G2211 COMPLEX E/M VISIT ADD ON: HCPCS | Mod: 95 | Performed by: INTERNAL MEDICINE

## 2024-06-20 ASSESSMENT — ENCOUNTER SYMPTOMS
WEIGHT LOSS: 0
VOMITING: 0
ORTHOPNEA: 0
DIARRHEA: 0
COUGH: 0
EYES NEGATIVE: 1
SINUS PAIN: 0
NAUSEA: 0
ABDOMINAL PAIN: 0
HEMOPTYSIS: 0
FEVER: 0
FLANK PAIN: 0
SHORTNESS OF BREATH: 0
PALPITATIONS: 0
WHEEZING: 0
CHILLS: 0

## 2024-06-20 ASSESSMENT — FIBROSIS 4 INDEX: FIB4 SCORE: 1.18

## 2024-06-20 NOTE — PATIENT INSTRUCTIONS
Continue current treatment  Start iron tablets -FeSO4 325 mg daily  Low salt diet  Keep well hydrated  Monitor BP

## 2024-06-20 NOTE — PROGRESS NOTES
Telemedicine: Established Patient   This evaluation was conducted via Zoom using secure and encrypted videoconferencing technology. The patient was in their home in the [facility].    The patient's identity was confirmed and verbal consent was obtained for this virtual visit.    Subjective:   CC:   Chief Complaint   Patient presents with    Chronic Kidney Disease       Aviva Kenney is a 49 y.o. female presenting for evaluation and management of:  CKD IIIb  Has long term hx.of HTN, DM II  In Aug/Sept 2022 hospitalized with BUTCH, dehydration -recovered from creat 3.3 to 1.06  Has not checked lab tests till Jan 2024 -noticed significantly worsen creat level to 1.69 -  GFR 37 -improved to 1.61 - now  better to 1.37 -GFR 48 -CKD IIIa  DMN -albuminuria in Nov 2022 at 761 - worse to 4535 - improved to 450 -continue monitor  HTN;BP well controlled at goal  Doing better, no new complaints  Pedal edema improved  Significant weight loss since started Ozempic  No dysuria/hematuria/flank pain  Anemia:drop in Hb level  Low vit D level continue ergocalciferol 50 000 units weekly -  Elevated PTH level improving  Review of Systems   Constitutional:  Negative for chills, fever, malaise/fatigue and weight loss.   HENT:  Negative for congestion, hearing loss and sinus pain.    Eyes: Negative.    Respiratory:  Negative for cough, hemoptysis, shortness of breath and wheezing.    Cardiovascular:  Negative for chest pain, palpitations, orthopnea and leg swelling.   Gastrointestinal:  Negative for abdominal pain, diarrhea, nausea and vomiting.   Genitourinary:  Negative for dysuria, flank pain, frequency, hematuria and urgency.   Skin: Negative.    All other systems reviewed and are negative.        No Known Allergies    Current medicines (including changes today)  Current Outpatient Medications   Medication Sig Dispense Refill    hydrOXYzine HCl (ATARAX) 25 MG Tab 1/2-1 tab up to tid prn itching 90 Tablet 3    Insulin Pen  "Needle (NOVOFINE PEN NEEDLE) 32G X 6 MM Misc Use with insulin pen as directed before bed 100 Each 3    lisinopril (PRINIVIL) 10 MG Tab TAKE ONE TABLET BY MOUTH EVERY DAY 90 Tablet 3    insulin lispro (HUMALOG) 100 UNIT/ML INJ Inject 10 units under skin 3 times a day before meals 30 mL 3    furosemide (LASIX) 20 MG Tab 2 tabs PO every am; add 1 tab PO at dinner time if needed to manage symptoms 90 Tablet 3    Semaglutide,0.25 or 0.5MG/DOS, (OZEMPIC, 0.25 OR 0.5 MG/DOSE,) 2 MG/3ML Solution Pen-injector Inject 0.5 mg under the skin every 7 days. 6 mL 3    omeprazole (PRILOSEC) 20 MG delayed-release capsule       potassium chloride ER (KLOR-CON) 10 MEQ tablet 1 tab PO once daily only on the days you take 2  tabs lasix 90 Tablet 3    ketoconazole (NIZORAL) 2 % shampoo Apply to scalp, lather, wait 3 to 5 minutes; use 3 times weekly for up to 8 weeks 120 mL 6    BD INSULIN SYRINGE U/F 1/2UNIT 31G X 5/16\" 0.3 ML Misc Use to inject insulin tid before meals as directed 300 Each 3    estradiol (ESTRACE) 0.1 MG/GM vaginal cream Apply applicatorful into vagina M, W and Friday 42.5 g 3    insulin glargine 100 UNIT/ML SC SOPN injection Inject 25 units SQ into abd qhs; increase by 3 units every 2 nights until cko=818; max 40 units per night 12 Each 3    HYDROcodone/acetaminophen (NORCO)  MG Tab 1 Tablet every 6 hours as needed.      cyclobenzaprine (FLEXERIL) 10 mg Tab 3 times daily      gabapentin (NEURONTIN) 300 MG Cap       omeprazole (PRILOSEC) 40 MG delayed-release capsule        No current facility-administered medications for this visit.       Patient Active Problem List    Diagnosis Date Noted    Rash 05/22/2024    Hyperlipidemia 01/05/2024    Vitamin D deficiency 01/05/2024    Screening for colorectal cancer 01/05/2024    Other fatigue 01/05/2024    Encounter for screening mammogram for breast cancer 01/05/2024    Pain of left calf 01/05/2024    Bilateral lower extremity edema 06/05/2023    S/P bariatric surgery " "06/05/2023    Itchy skin 06/05/2023    Family history of gout 06/05/2023    Leg cramps 06/05/2023    Vaginal dryness 03/06/2023    Stress at home 03/06/2023    Primary hypertension 02/06/2023    Bilateral shoulder pain 02/06/2023    Positive for microalbuminuria 11/21/2022    History of COVID-19 11/16/2022    Chronic bilateral low back pain 10/03/2022    Type 2 diabetes mellitus (HCC) 10/03/2022    Atypical mole 10/03/2022    History of melanoma 10/03/2022    History of right cataract extraction 10/03/2022    Abnormal liver enzymes 09/01/2022    BUTCH (acute kidney injury) (HCC) 09/01/2022    Hyperkalemia 09/01/2022    Morbid obesity (HCC) 09/01/2022    Pyelonephritis 09/01/2022       Family History   Problem Relation Age of Onset    Cancer Mother     Diabetes Mother     Heart Disease Mother     Hypertension Mother     Hypertension Father     Heart Disease Father     Diabetes Father     Diabetes Brother        She  has a past medical history of Cataract, Diabetes (HCC), Infectious disease (10/2022), and Pre-op evaluation (12/13/2022).  She  has a past surgical history that includes cataract extraction with iol (Left); tonsillectomy; hysterectomy, total abdominal; gastric resection; cholecystectomy; primary c section; ovarian cystectomy (Bilateral); pr upper gi endoscopy,diagnosis (N/A, 12/7/2022); and pr upper gi endoscopy,ctrl bleed (N/A, 12/7/2022).       Objective:   /80 (BP Location: Left arm, Patient Position: Sitting, BP Cuff Size: Adult)   Ht 1.753 m (5' 9\")   Wt 96.6 kg (213 lb)   BMI 31.45 kg/m²     Physical Exam  Vitals reviewed.   Constitutional:       Appearance: Normal appearance.   HENT:      Nose: Nose normal.   Pulmonary:      Effort: Pulmonary effort is normal. No respiratory distress.   Musculoskeletal:      Cervical back: Normal range of motion and neck supple.   Neurological:      Mental Status: She is alert and oriented to person, place, and time.   Psychiatric:         Mood and Affect: " Mood normal.         Behavior: Behavior normal.         Thought Content: Thought content normal.         Judgment: Judgment normal.     Laboratory results reviewed: d/w Pt   Latest Reference Range & Units 03/06/23 15:39 01/03/24 12:20 01/03/24 12:25 01/05/24 11:31 02/20/24 11:37 02/20/24 11:38 06/18/24 13:06   WBC 4.8 - 10.8 K/uL    5.3   4.6 (L)   RBC 4.20 - 5.40 M/uL    3.98 (L)   3.86 (L)   Hemoglobin 12.0 - 16.0 g/dL    12.2   11.8 (L)   Hematocrit 37.0 - 47.0 %    35.8 (L)   36.8 (L)   MCV 81.4 - 97.8 fL    89.9   95.3   MCH 27.0 - 33.0 pg    30.7   30.6   MCHC 32.2 - 35.5 g/dL    34.1   32.1 (L)   RDW 35.9 - 50.0 fL    41.1   46.4   Platelet Count 164 - 446 K/uL    284   254   MPV 9.0 - 12.9 fL    11.6   12.0   Neutrophils-Polys 44.00 - 72.00 %    43.60 (L)      Neutrophils (Absolute) 1.82 - 7.42 K/uL    2.33      Lymphocytes 22.00 - 41.00 %    43.60 (H)      Lymphs (Absolute) 1.00 - 4.80 K/uL    2.33      Monocytes 0.00 - 13.40 %    7.10      Monos (Absolute) 0.00 - 0.85 K/uL    0.38      Eosinophils 0.00 - 6.90 %    4.70      Eos (Absolute) 0.00 - 0.51 K/uL    0.25      Basophils 0.00 - 1.80 %    0.60      Baso (Absolute) 0.00 - 0.12 K/uL    0.03      Immature Granulocytes 0.00 - 0.90 %    0.40      Immature Granulocytes (abs) 0.00 - 0.11 K/uL    0.02      Nucleated RBC 0.00 - 0.20 /100 WBC    0.00      NRBC (Absolute) K/uL    0.00      Sodium 135 - 145 mmol/L    141  138 136   Potassium 3.6 - 5.5 mmol/L    4.8  5.1 5.4   Chloride 96 - 112 mmol/L    107  105 106   Co2 20 - 33 mmol/L    24  23 22   Anion Gap 7.0 - 16.0     10.0  10.0 8.0   Glucose 65 - 99 mg/dL    152 (H)  162 (H) 192 (H)   Bun 8 - 22 mg/dL    29 (H)  27 (H) 29 (H)   Creatinine 0.50 - 1.40 mg/dL    1.69 (H)  1.61 (H) 1.37   GFR (CKD-EPI) >60 mL/min/1.73 m 2    37 !  39 ! 48 !   Calcium 8.5 - 10.5 mg/dL    8.6  9.0 9.2   Correct Calcium 8.5 - 10.5 mg/dL    9.4      AST(SGOT) 12 - 45 U/L    33      ALT(SGPT) 2 - 50 U/L    27      Alkaline  Phosphatase 30 - 99 U/L    579 (H)      Total Bilirubin 0.1 - 1.5 mg/dL    0.3      Albumin 3.2 - 4.9 g/dL    3.0 (L)      Total Protein 6.0 - 8.2 g/dL    6.3      Globulin 1.9 - 3.5 g/dL    3.3      A-G Ratio g/dL    0.9      Phosphorus 2.5 - 4.5 mg/dL      3.4    Uric Acid 1.9 - 8.2 mg/dL    7.2      Glycohemoglobin 5.8 % 11.1 ! 8.7 !        Fasting Status     Fasting   Non-Fasting   Cholesterol,Tot 100 - 199 mg/dL    155      Triglycerides 0 - 149 mg/dL    128      HDL >=40 mg/dL    38 !      LDL <100 mg/dL    91      Creatinine, Random Urine mg/dL     120.49     Total Protein, Urine 0.0 - 15.0 mg/dL     50.0 (H)     Micro Alb Creat Ratio 0 - 30 mg/g   4535 (H)    456 (H)   Creatinine, Urine mg/dL   60.20    103.72   Microalbumin, Urine Random mg/dL   273.0    47.3   Urobilinogen, Urine Negative      1.0  1.0   D-Dimer 0.00 - 0.50 ug/mL (FEU)    0.55 (H)      Color      Yellow  Yellow   Character      Cloudy !  Clear   Specific Gravity <1.035      1.014  1.016   Ph 5.0 - 8.0      5.5  5.5   Glucose Negative mg/dL     Negative  Negative   Ketones Negative mg/dL     Negative  Negative   Bilirubin Negative      Negative  Negative   Occult Blood Negative      Negative  Negative   Protein Negative mg/dL     100 !  100 !   Nitrite Negative      Negative  Negative   Leukocyte Esterase Negative      Trace !  Negative   Micro Urine Req      Microscopic  Microscopic   WBC /hpf     2-5  0-2   RBC /hpf     Rare  0-2   Epithelial Cells /hpf     Moderate !  Few   Bacteria None /hpf     Negative  Few !   Hyaline Cast /lpf     3-5 !  6-10 !   25-Hydroxy   Vitamin D 25 30 - 100 ng/mL    7 (L)  8 (L)    TSH 0.380 - 5.330 uIU/mL    1.430      Pth, Intact 14.0 - 72.0 pg/mL      117.0 (H) 88.4 (H)   (L): Data is abnormally low  (H): Data is abnormally high  !: Data is abnormal    Assessment and Plan:   The following treatment plan was discussed:     1. Stage 3a chronic kidney disease  - Basic Metabolic Panel; Future    2. Primary  hypertension    3. Diabetic nephropathy associated with type 2 diabetes mellitus (HCC)  - MICROALBUMIN CREAT RATIO URINE; Future    4. Anemia, unspecified type  - CBC WITHOUT DIFFERENTIAL; Future  - IRON/TOTAL IRON BIND; Future    5. Hyperparathyroidism due to vitamin D deficiency (HCC)  - PTH INTACT (PTH ONLY); Future    6. Vitamin D deficiency  - VITAMIN D 25-HYDROXY  - PTH INTACT (PTH ONLY); Future    Recs:  Continue current treatment  Start iron tablets -FeSO4 325 mg daily  Low salt diet  Keep well hydrated  Monitor BP    Follow-up: Return in about 4 months (around 10/20/2024).

## 2024-06-23 SDOH — HEALTH STABILITY: PHYSICAL HEALTH: ON AVERAGE, HOW MANY DAYS PER WEEK DO YOU ENGAGE IN MODERATE TO STRENUOUS EXERCISE (LIKE A BRISK WALK)?: 3 DAYS

## 2024-06-23 SDOH — ECONOMIC STABILITY: FOOD INSECURITY: WITHIN THE PAST 12 MONTHS, YOU WORRIED THAT YOUR FOOD WOULD RUN OUT BEFORE YOU GOT MONEY TO BUY MORE.: SOMETIMES TRUE

## 2024-06-23 SDOH — ECONOMIC STABILITY: TRANSPORTATION INSECURITY
IN THE PAST 12 MONTHS, HAS THE LACK OF TRANSPORTATION KEPT YOU FROM MEDICAL APPOINTMENTS OR FROM GETTING MEDICATIONS?: NO

## 2024-06-23 SDOH — ECONOMIC STABILITY: INCOME INSECURITY: IN THE LAST 12 MONTHS, WAS THERE A TIME WHEN YOU WERE NOT ABLE TO PAY THE MORTGAGE OR RENT ON TIME?: NO

## 2024-06-23 SDOH — ECONOMIC STABILITY: INCOME INSECURITY: HOW HARD IS IT FOR YOU TO PAY FOR THE VERY BASICS LIKE FOOD, HOUSING, MEDICAL CARE, AND HEATING?: SOMEWHAT HARD

## 2024-06-23 SDOH — ECONOMIC STABILITY: HOUSING INSECURITY: IN THE LAST 12 MONTHS, HOW MANY PLACES HAVE YOU LIVED?: 1

## 2024-06-23 SDOH — ECONOMIC STABILITY: HOUSING INSECURITY
IN THE LAST 12 MONTHS, WAS THERE A TIME WHEN YOU DID NOT HAVE A STEADY PLACE TO SLEEP OR SLEPT IN A SHELTER (INCLUDING NOW)?: NO

## 2024-06-23 SDOH — ECONOMIC STABILITY: FOOD INSECURITY: WITHIN THE PAST 12 MONTHS, THE FOOD YOU BOUGHT JUST DIDN'T LAST AND YOU DIDN'T HAVE MONEY TO GET MORE.: NEVER TRUE

## 2024-06-23 SDOH — HEALTH STABILITY: PHYSICAL HEALTH: ON AVERAGE, HOW MANY MINUTES DO YOU ENGAGE IN EXERCISE AT THIS LEVEL?: 50 MIN

## 2024-06-23 SDOH — HEALTH STABILITY: MENTAL HEALTH
STRESS IS WHEN SOMEONE FEELS TENSE, NERVOUS, ANXIOUS, OR CAN'T SLEEP AT NIGHT BECAUSE THEIR MIND IS TROUBLED. HOW STRESSED ARE YOU?: TO SOME EXTENT

## 2024-06-23 SDOH — ECONOMIC STABILITY: TRANSPORTATION INSECURITY
IN THE PAST 12 MONTHS, HAS LACK OF RELIABLE TRANSPORTATION KEPT YOU FROM MEDICAL APPOINTMENTS, MEETINGS, WORK OR FROM GETTING THINGS NEEDED FOR DAILY LIVING?: NO

## 2024-06-23 SDOH — ECONOMIC STABILITY: TRANSPORTATION INSECURITY
IN THE PAST 12 MONTHS, HAS LACK OF TRANSPORTATION KEPT YOU FROM MEETINGS, WORK, OR FROM GETTING THINGS NEEDED FOR DAILY LIVING?: NO

## 2024-06-23 ASSESSMENT — SOCIAL DETERMINANTS OF HEALTH (SDOH)
IN THE PAST 12 MONTHS, HAS THE ELECTRIC, GAS, OIL, OR WATER COMPANY THREATENED TO SHUT OFF SERVICE IN YOUR HOME?: YES
DO YOU BELONG TO ANY CLUBS OR ORGANIZATIONS SUCH AS CHURCH GROUPS UNIONS, FRATERNAL OR ATHLETIC GROUPS, OR SCHOOL GROUPS?: NO
WITHIN THE PAST 12 MONTHS, YOU WORRIED THAT YOUR FOOD WOULD RUN OUT BEFORE YOU GOT THE MONEY TO BUY MORE: SOMETIMES TRUE
HOW OFTEN DO YOU HAVE A DRINK CONTAINING ALCOHOL: MONTHLY OR LESS
IN A TYPICAL WEEK, HOW MANY TIMES DO YOU TALK ON THE PHONE WITH FAMILY, FRIENDS, OR NEIGHBORS?: MORE THAN THREE TIMES A WEEK
IN A TYPICAL WEEK, HOW MANY TIMES DO YOU TALK ON THE PHONE WITH FAMILY, FRIENDS, OR NEIGHBORS?: MORE THAN THREE TIMES A WEEK
HOW OFTEN DO YOU HAVE SIX OR MORE DRINKS ON ONE OCCASION: NEVER
HOW OFTEN DO YOU ATTEND CHURCH OR RELIGIOUS SERVICES?: NEVER
HOW MANY DRINKS CONTAINING ALCOHOL DO YOU HAVE ON A TYPICAL DAY WHEN YOU ARE DRINKING: 1 OR 2
HOW OFTEN DO YOU ATTEND CHURCH OR RELIGIOUS SERVICES?: NEVER
DO YOU BELONG TO ANY CLUBS OR ORGANIZATIONS SUCH AS CHURCH GROUPS UNIONS, FRATERNAL OR ATHLETIC GROUPS, OR SCHOOL GROUPS?: NO
HOW OFTEN DO YOU GET TOGETHER WITH FRIENDS OR RELATIVES?: MORE THAN THREE TIMES A WEEK
HOW OFTEN DO YOU ATTENT MEETINGS OF THE CLUB OR ORGANIZATION YOU BELONG TO?: PATIENT DECLINED
HOW OFTEN DO YOU ATTENT MEETINGS OF THE CLUB OR ORGANIZATION YOU BELONG TO?: PATIENT DECLINED
HOW HARD IS IT FOR YOU TO PAY FOR THE VERY BASICS LIKE FOOD, HOUSING, MEDICAL CARE, AND HEATING?: SOMEWHAT HARD
HOW OFTEN DO YOU GET TOGETHER WITH FRIENDS OR RELATIVES?: MORE THAN THREE TIMES A WEEK

## 2024-06-23 ASSESSMENT — LIFESTYLE VARIABLES
HOW OFTEN DO YOU HAVE SIX OR MORE DRINKS ON ONE OCCASION: NEVER
SKIP TO QUESTIONS 9-10: 1
HOW OFTEN DO YOU HAVE A DRINK CONTAINING ALCOHOL: MONTHLY OR LESS
HOW MANY STANDARD DRINKS CONTAINING ALCOHOL DO YOU HAVE ON A TYPICAL DAY: 1 OR 2
AUDIT-C TOTAL SCORE: 1

## 2024-06-24 ENCOUNTER — OFFICE VISIT (OUTPATIENT)
Dept: MEDICAL GROUP | Facility: PHYSICIAN GROUP | Age: 49
End: 2024-06-24
Payer: MEDICARE

## 2024-06-24 VITALS
TEMPERATURE: 97 F | WEIGHT: 211 LBS | RESPIRATION RATE: 12 BRPM | HEIGHT: 69 IN | SYSTOLIC BLOOD PRESSURE: 124 MMHG | HEART RATE: 94 BPM | BODY MASS INDEX: 31.25 KG/M2 | OXYGEN SATURATION: 99 % | DIASTOLIC BLOOD PRESSURE: 88 MMHG

## 2024-06-24 DIAGNOSIS — Z12.11 SCREENING FOR COLON CANCER: ICD-10-CM

## 2024-06-24 DIAGNOSIS — Z79.4 TYPE 2 DIABETES MELLITUS WITH OTHER DIABETIC KIDNEY COMPLICATION, WITH LONG-TERM CURRENT USE OF INSULIN (HCC): ICD-10-CM

## 2024-06-24 DIAGNOSIS — E11.29 TYPE 2 DIABETES MELLITUS WITH OTHER DIABETIC KIDNEY COMPLICATION, WITH LONG-TERM CURRENT USE OF INSULIN (HCC): ICD-10-CM

## 2024-06-24 LAB
HBA1C MFR BLD: 6.8 % (ref ?–5.8)
POCT INT CON NEG: NEGATIVE
POCT INT CON POS: POSITIVE

## 2024-06-24 PROCEDURE — 3074F SYST BP LT 130 MM HG: CPT | Performed by: PHYSICIAN ASSISTANT

## 2024-06-24 PROCEDURE — 3079F DIAST BP 80-89 MM HG: CPT | Performed by: PHYSICIAN ASSISTANT

## 2024-06-24 PROCEDURE — 83036 HEMOGLOBIN GLYCOSYLATED A1C: CPT | Performed by: PHYSICIAN ASSISTANT

## 2024-06-24 PROCEDURE — 99214 OFFICE O/P EST MOD 30 MIN: CPT | Performed by: PHYSICIAN ASSISTANT

## 2024-06-24 RX ORDER — SEMAGLUTIDE 0.68 MG/ML
0.5 INJECTION, SOLUTION SUBCUTANEOUS
Qty: 6 ML | Refills: 3 | Status: SHIPPED | OUTPATIENT
Start: 2024-06-24

## 2024-06-24 ASSESSMENT — FIBROSIS 4 INDEX: FIB4 SCORE: 1.18

## 2024-06-25 NOTE — PROGRESS NOTES
CC:    Chief Complaint   Patient presents with    Establish Care    Medication Refill     Semaglutide,0.25 or 0.5MG/DOS,       HISTORY OF THE PRESENT ILLNESS: Patient is a 49 y.o. female presenting to establish primary care     Pt's A1C today at 6.8%. Taking Lantus 20U every evening, humalog sliding scale, and ozempic 0.5mg.   Taking flexeril, gabapentin, and Norco for lower back pain and neck pain.   Taking hydroxyzine for chronic itching. Has upcoming appt with dermatology.   Take prilosec PRN. Has hx of gastric sleeve and revision. Has lost 50 lbs in last year.   Pt taking Lasix PRN for lower extremity edema. Usually takes about once per day.   Pt has hx of CKD 3B. Followed by nephrologist Dr Gan.   Pt's mother had breast CA at age 61.      No problem-specific Assessment & Plan notes found for this encounter.    Allergies: Patient has no known allergies.    Current Outpatient Medications Ordered in Epic   Medication Sig Dispense Refill    hydrOXYzine HCl (ATARAX) 25 MG Tab 1/2-1 tab up to tid prn itching 90 Tablet 3    Insulin Pen Needle (NOVOFINE PEN NEEDLE) 32G X 6 MM Misc Use with insulin pen as directed before bed 100 Each 3    lisinopril (PRINIVIL) 10 MG Tab TAKE ONE TABLET BY MOUTH EVERY DAY 90 Tablet 3    insulin lispro (HUMALOG) 100 UNIT/ML INJ Inject 10 units under skin 3 times a day before meals 30 mL 3    furosemide (LASIX) 20 MG Tab 2 tabs PO every am; add 1 tab PO at dinner time if needed to manage symptoms 90 Tablet 3    Semaglutide,0.25 or 0.5MG/DOS, (OZEMPIC, 0.25 OR 0.5 MG/DOSE,) 2 MG/3ML Solution Pen-injector Inject 0.5 mg under the skin every 7 days. 6 mL 3    omeprazole (PRILOSEC) 20 MG delayed-release capsule       potassium chloride ER (KLOR-CON) 10 MEQ tablet 1 tab PO once daily only on the days you take 2  tabs lasix 90 Tablet 3    ketoconazole (NIZORAL) 2 % shampoo Apply to scalp, lather, wait 3 to 5 minutes; use 3 times weekly for up to 8 weeks 120 mL 6    BD INSULIN SYRINGE U/F 1/2UNIT  "31G X 5/16\" 0.3 ML Misc Use to inject insulin tid before meals as directed 300 Each 3    estradiol (ESTRACE) 0.1 MG/GM vaginal cream Apply applicatorful into vagina M, W and Friday 42.5 g 3    insulin glargine 100 UNIT/ML SC SOPN injection Inject 25 units SQ into abd qhs; increase by 3 units every 2 nights until ptd=688; max 40 units per night 12 Each 3    cyclobenzaprine (FLEXERIL) 10 mg Tab 3 times daily      gabapentin (NEURONTIN) 300 MG Cap       HYDROcodone/acetaminophen (NORCO)  MG Tab 1 Tablet every 6 hours as needed.      omeprazole (PRILOSEC) 40 MG delayed-release capsule  (Patient not taking: Reported on 6/24/2024)       No current [facility]-ordered facility-administered medications on file.       Past Medical History:   Diagnosis Date    Cataract     Diabetes (HCC)     Type 2    Infectious disease 10/2022    Covid    Pre-op evaluation 12/13/2022       Past Surgical History:   Procedure Laterality Date    VT UPPER GI ENDOSCOPY,DIAGNOSIS N/A 12/7/2022    Procedure: ESOPHAGOGASTRODUODENOSCOPY (EGD) WITH BRUSHINGS AND DILATATION VS OVER THE SCOPE CLIP PLACEMENT;  Surgeon: Bridger Gongora M.D.;  Location: SURGERY [facility];  Service: General    VT UPPER GI ENDOSCOPY,CTRL BLEED N/A 12/7/2022    Procedure: EGD, WITH CLIP PLACEMENT;  Surgeon: Bridger Gongora M.D.;  Location: SURGERY [facility];  Service: General    CATARACT EXTRACTION WITH IOL Left     CHOLECYSTECTOMY      GASTRIC RESECTION      HYSTERECTOMY, TOTAL ABDOMINAL      OVARIAN CYSTECTOMY Bilateral     PRIMARY C SECTION      x 2    TONSILLECTOMY         Social History     Tobacco Use    Smoking status: Never    Smokeless tobacco: Never   Vaping Use    Vaping status: Never Used   Substance Use Topics    Alcohol use: Not Currently     Comment: occ    Drug use: Yes     Types: Marijuana, Oral     Comment: occ       Social History     Social History Narrative    Not on file       Family History   Problem Relation Age of Onset    Cancer Mother     Diabetes " "Mother     Heart Disease Mother     Hypertension Mother     Hypertension Father     Heart Disease Father     Diabetes Father     Diabetes Brother        ROS:     - Constitutional: Negative for fever, chills, unexpected weight change, and fatigue/generalized weakness.     - HEENT: Negative for headaches, vision changes, hearing changes, ear pain, ear discharge, rhinorrhea, sinus congestion, sore throat, and neck pain.      - Respiratory: Negative for cough, sputum production, chest congestion, dyspnea, wheezing, and crackles.      - Cardiovascular: Negative for chest pain, palpitations, orthopnea, and bilateral lower extremity edema.     - Gastrointestinal: Negative for heartburn, nausea, vomiting, abdominal pain, hematochezia, melena, diarrhea, constipation, and greasy/foul-smelling stools.     - Genitourinary: Negative for dysuria, polyuria, hematuria, pyuria, urinary urgency, and urinary incontinence.     - Musculoskeletal:Positive for lower back pain.  Negative for myalgias,  and joint pain.     - Skin:Positive for itching.  Negative for itching, cyanotic skin color change.     - Neurological: Positive for numbness on dorsal feet. Negative for dizziness, tingling, tremors, focal weakness and headaches.     - Endo/Heme/Allergies: Does not bruise/bleed easily.     - Psychiatric/Behavioral: Positive for anxiety. Negative for depression, suicidal/homicidal ideation and memory loss.            .      Exam: /88   Pulse 94   Temp 36.1 °C (97 °F) (Temporal)   Resp 12   Ht 1.753 m (5' 9\")   Wt 95.7 kg (211 lb)   SpO2 99%  Body mass index is 31.16 kg/m².    General: Normal appearing. No acute distress.  Skin: Warm and dry.  No obvious lesions.  HEENT: Normocephalic. Eyes conjunctiva clear lids without ptosis, ears normal shape and contour  Cardiovascular: Regular rate and rhythm without murmur.   Respiratory: Clear to auscultation bilaterally, no rhonchi wheezing or rales.  Neurologic: Grossly nonfocal, A&O x3, " gait normal,  Musculoskeletal: No deformity or swelling.   Extremities: No extremity cyanosis, clubbing, or edema.  Psych: Normal mood and affect. Judgment and insight is normal.    Please note that this dictation was created using voice recognition software. I have made every reasonable attempt to correct obvious errors, but I expect that there are errors of grammar and possibly content that I did not discover before finalizing the note.      Assessment/Plan    1. Type 2 diabetes mellitus with other diabetic kidney complication, with long-term current use of insulin (HCC)  Diabetes is well-controlled.  Continue current medications.  - POCT Hemoglobin A1C  - Diabetic Monofilament LE Exam    2. Screening for colon cancer  Referral to GI placed  - Referral to GI for Colonoscopy    3. Type 2 diabetes mellitus (HCC)    - Semaglutide,0.25 or 0.5MG/DOS, (OZEMPIC, 0.25 OR 0.5 MG/DOSE,) 2 MG/3ML Solution Pen-injector; Inject 0.5 mg under the skin every 7 days.  Dispense: 6 mL; Refill: 3

## 2024-07-25 ENCOUNTER — APPOINTMENT (RX ONLY)
Dept: URBAN - NONMETROPOLITAN AREA CLINIC 15 | Facility: CLINIC | Age: 49
Setting detail: DERMATOLOGY
End: 2024-07-25

## 2024-07-25 DIAGNOSIS — L28.1 PRURIGO NODULARIS: ICD-10-CM

## 2024-07-25 DIAGNOSIS — L29.89 OTHER PRURITUS: ICD-10-CM

## 2024-07-25 DIAGNOSIS — L65.0 TELOGEN EFFLUVIUM: ICD-10-CM

## 2024-07-25 DIAGNOSIS — L29.8 OTHER PRURITUS: ICD-10-CM

## 2024-07-25 DIAGNOSIS — R60.0 BILATERAL LOWER EXTREMITY EDEMA: ICD-10-CM

## 2024-07-25 DIAGNOSIS — L65.8 OTHER SPECIFIED NONSCARRING HAIR LOSS: ICD-10-CM

## 2024-07-25 PROCEDURE — ? PRESCRIPTION

## 2024-07-25 PROCEDURE — ? COUNSELING

## 2024-07-25 PROCEDURE — 99203 OFFICE O/P NEW LOW 30 MIN: CPT

## 2024-07-25 RX ORDER — MUPIROCIN 20 MG/G
OINTMENT TOPICAL DAILY
Qty: 22 | Refills: 3 | Status: ERX | COMMUNITY
Start: 2024-07-25

## 2024-07-25 RX ORDER — TRIAMCINOLONE ACETONIDE 1 MG/G
CREAM TOPICAL BID
Qty: 80 | Refills: 2 | Status: ERX | COMMUNITY
Start: 2024-07-25

## 2024-07-25 RX ORDER — FUROSEMIDE 20 MG/1
TABLET ORAL
Qty: 90 TABLET | Refills: 3 | Status: SHIPPED | OUTPATIENT
Start: 2024-07-25

## 2024-07-25 RX ADMIN — MUPIROCIN: 20 OINTMENT TOPICAL at 00:00

## 2024-07-25 RX ADMIN — TRIAMCINOLONE ACETONIDE: 1 CREAM TOPICAL at 00:00

## 2024-07-25 ASSESSMENT — LOCATION DETAILED DESCRIPTION DERM
LOCATION DETAILED: LEFT SUPERIOR MEDIAL UPPER BACK
LOCATION DETAILED: LEFT PROXIMAL DORSAL FOREARM
LOCATION DETAILED: LEFT PROXIMAL POSTERIOR UPPER ARM
LOCATION DETAILED: RIGHT DISTAL POSTERIOR UPPER ARM
LOCATION DETAILED: RIGHT PROXIMAL CALF
LOCATION DETAILED: LEFT MEDIAL FRONTAL SCALP
LOCATION DETAILED: RIGHT MEDIAL SUPERIOR CHEST
LOCATION DETAILED: RIGHT PROXIMAL PRETIBIAL REGION
LOCATION DETAILED: LEFT DISTAL CALF
LOCATION DETAILED: RIGHT PROXIMAL RADIAL DORSAL FOREARM
LOCATION DETAILED: RIGHT MEDIAL FRONTAL SCALP
LOCATION DETAILED: LEFT PROXIMAL PRETIBIAL REGION

## 2024-07-25 ASSESSMENT — LOCATION SIMPLE DESCRIPTION DERM
LOCATION SIMPLE: LEFT POSTERIOR UPPER ARM
LOCATION SIMPLE: LEFT CALF
LOCATION SIMPLE: RIGHT POSTERIOR UPPER ARM
LOCATION SIMPLE: RIGHT CALF
LOCATION SIMPLE: LEFT PRETIBIAL REGION
LOCATION SIMPLE: RIGHT FOREARM
LOCATION SIMPLE: LEFT SCALP
LOCATION SIMPLE: CHEST
LOCATION SIMPLE: RIGHT PRETIBIAL REGION
LOCATION SIMPLE: RIGHT SCALP
LOCATION SIMPLE: LEFT FOREARM
LOCATION SIMPLE: LEFT UPPER BACK

## 2024-07-25 ASSESSMENT — LOCATION ZONE DERM
LOCATION ZONE: SCALP
LOCATION ZONE: LEG
LOCATION ZONE: ARM
LOCATION ZONE: TRUNK

## 2024-10-28 ENCOUNTER — HOSPITAL ENCOUNTER (OUTPATIENT)
Dept: LAB | Facility: MEDICAL CENTER | Age: 49
End: 2024-10-28
Attending: INTERNAL MEDICINE
Payer: MEDICARE

## 2024-10-28 DIAGNOSIS — E21.1 HYPERPARATHYROIDISM DUE TO VITAMIN D DEFICIENCY (HCC): ICD-10-CM

## 2024-10-28 DIAGNOSIS — E11.21 DIABETIC NEPHROPATHY ASSOCIATED WITH TYPE 2 DIABETES MELLITUS (HCC): ICD-10-CM

## 2024-10-28 DIAGNOSIS — N18.31 STAGE 3A CHRONIC KIDNEY DISEASE: ICD-10-CM

## 2024-10-28 DIAGNOSIS — E55.9 VITAMIN D DEFICIENCY: ICD-10-CM

## 2024-10-28 DIAGNOSIS — D64.9 ANEMIA, UNSPECIFIED TYPE: ICD-10-CM

## 2024-10-28 LAB
ANION GAP SERPL CALC-SCNC: 8 MMOL/L (ref 7–16)
BUN SERPL-MCNC: 27 MG/DL (ref 8–22)
CALCIUM SERPL-MCNC: 9.7 MG/DL (ref 8.5–10.5)
CHLORIDE SERPL-SCNC: 108 MMOL/L (ref 96–112)
CO2 SERPL-SCNC: 23 MMOL/L (ref 20–33)
CREAT SERPL-MCNC: 1.49 MG/DL (ref 0.5–1.4)
CREAT UR-MCNC: 131 MG/DL
ERYTHROCYTE [DISTWIDTH] IN BLOOD BY AUTOMATED COUNT: 43.8 FL (ref 35.9–50)
GFR SERPLBLD CREATININE-BSD FMLA CKD-EPI: 43 ML/MIN/1.73 M 2
GLUCOSE SERPL-MCNC: 194 MG/DL (ref 65–99)
HCT VFR BLD AUTO: 35.3 % (ref 37–47)
HGB BLD-MCNC: 11.7 G/DL (ref 12–16)
IRON SATN MFR SERPL: 62 % (ref 15–55)
IRON SERPL-MCNC: 120 UG/DL (ref 40–170)
MCH RBC QN AUTO: 31 PG (ref 27–33)
MCHC RBC AUTO-ENTMCNC: 33.1 G/DL (ref 32.2–35.5)
MCV RBC AUTO: 93.6 FL (ref 81.4–97.8)
MICROALBUMIN UR-MCNC: 60.6 MG/DL
MICROALBUMIN/CREAT UR: 463 MG/G (ref 0–30)
PLATELET # BLD AUTO: 260 K/UL (ref 164–446)
PMV BLD AUTO: 11.2 FL (ref 9–12.9)
POTASSIUM SERPL-SCNC: 5.7 MMOL/L (ref 3.6–5.5)
PTH-INTACT SERPL-MCNC: 72.5 PG/ML (ref 14–72)
RBC # BLD AUTO: 3.77 M/UL (ref 4.2–5.4)
SODIUM SERPL-SCNC: 139 MMOL/L (ref 135–145)
TIBC SERPL-MCNC: 193 UG/DL (ref 250–450)
UIBC SERPL-MCNC: 73 UG/DL (ref 110–370)
WBC # BLD AUTO: 7.8 K/UL (ref 4.8–10.8)

## 2024-10-28 PROCEDURE — 36415 COLL VENOUS BLD VENIPUNCTURE: CPT

## 2024-10-28 PROCEDURE — 82570 ASSAY OF URINE CREATININE: CPT

## 2024-10-28 PROCEDURE — 80048 BASIC METABOLIC PNL TOTAL CA: CPT

## 2024-10-28 PROCEDURE — 82043 UR ALBUMIN QUANTITATIVE: CPT

## 2024-10-28 PROCEDURE — 85027 COMPLETE CBC AUTOMATED: CPT

## 2024-10-28 PROCEDURE — 83970 ASSAY OF PARATHORMONE: CPT

## 2024-10-28 PROCEDURE — 83550 IRON BINDING TEST: CPT

## 2024-10-28 PROCEDURE — 83540 ASSAY OF IRON: CPT

## 2024-10-30 ENCOUNTER — TELEMEDICINE (OUTPATIENT)
Dept: NEPHROLOGY | Facility: MEDICAL CENTER | Age: 49
End: 2024-10-30
Payer: MEDICARE

## 2024-10-30 VITALS — WEIGHT: 200 LBS | BODY MASS INDEX: 29.62 KG/M2 | HEIGHT: 69 IN

## 2024-10-30 DIAGNOSIS — D64.9 ANEMIA, UNSPECIFIED TYPE: ICD-10-CM

## 2024-10-30 DIAGNOSIS — E11.21 DIABETIC NEPHROPATHY ASSOCIATED WITH TYPE 2 DIABETES MELLITUS (HCC): ICD-10-CM

## 2024-10-30 DIAGNOSIS — N18.32 STAGE 3B CHRONIC KIDNEY DISEASE: ICD-10-CM

## 2024-10-30 DIAGNOSIS — E55.9 VITAMIN D DEFICIENCY: ICD-10-CM

## 2024-10-30 DIAGNOSIS — E87.5 HYPERKALEMIA: ICD-10-CM

## 2024-10-30 DIAGNOSIS — I10 PRIMARY HYPERTENSION: ICD-10-CM

## 2024-10-30 ASSESSMENT — ENCOUNTER SYMPTOMS
NAUSEA: 0
ABDOMINAL PAIN: 0
COUGH: 0
EYES NEGATIVE: 1
CHILLS: 0
WHEEZING: 0
ORTHOPNEA: 0
FLANK PAIN: 0
SINUS PAIN: 0
HEMOPTYSIS: 0
SHORTNESS OF BREATH: 0
WEIGHT LOSS: 0
FEVER: 0
VOMITING: 0
PALPITATIONS: 0
DIARRHEA: 0

## 2024-10-30 ASSESSMENT — FIBROSIS 4 INDEX: FIB4 SCORE: 1.15

## 2024-11-27 ENCOUNTER — APPOINTMENT (RX ONLY)
Dept: URBAN - NONMETROPOLITAN AREA CLINIC 15 | Facility: CLINIC | Age: 49
Setting detail: DERMATOLOGY
End: 2024-11-27

## 2024-11-27 DIAGNOSIS — L28.1 PRURIGO NODULARIS: ICD-10-CM | Status: IMPROVED

## 2024-11-27 DIAGNOSIS — L29.89 OTHER PRURITUS: ICD-10-CM

## 2024-11-27 DIAGNOSIS — L65.0 TELOGEN EFFLUVIUM: ICD-10-CM

## 2024-11-27 PROCEDURE — ? ADDITIONAL NOTES

## 2024-11-27 PROCEDURE — 99213 OFFICE O/P EST LOW 20 MIN: CPT

## 2024-11-27 PROCEDURE — ? PRESCRIPTION

## 2024-11-27 PROCEDURE — ? COUNSELING

## 2024-11-27 RX ORDER — CLOBETASOL PROPIONATE 0.5 MG/ML
SOLUTION TOPICAL BID
Qty: 50 | Refills: 3 | Status: ERX | COMMUNITY
Start: 2024-11-27

## 2024-11-27 RX ADMIN — CLOBETASOL PROPIONATE: 0.5 SOLUTION TOPICAL at 00:00

## 2024-11-27 ASSESSMENT — LOCATION DETAILED DESCRIPTION DERM
LOCATION DETAILED: LEFT MEDIAL FRONTAL SCALP
LOCATION DETAILED: LEFT DISTAL CALF
LOCATION DETAILED: LEFT PROXIMAL PRETIBIAL REGION
LOCATION DETAILED: LEFT PROXIMAL POSTERIOR UPPER ARM
LOCATION DETAILED: RIGHT MEDIAL SUPERIOR CHEST
LOCATION DETAILED: RIGHT PROXIMAL CALF
LOCATION DETAILED: LEFT SUPERIOR MEDIAL UPPER BACK
LOCATION DETAILED: RIGHT PROXIMAL PRETIBIAL REGION
LOCATION DETAILED: RIGHT PROXIMAL RADIAL DORSAL FOREARM
LOCATION DETAILED: LEFT PROXIMAL DORSAL FOREARM
LOCATION DETAILED: LEFT SUPERIOR PARIETAL SCALP
LOCATION DETAILED: RIGHT DISTAL POSTERIOR UPPER ARM

## 2024-11-27 ASSESSMENT — LOCATION SIMPLE DESCRIPTION DERM
LOCATION SIMPLE: LEFT POSTERIOR UPPER ARM
LOCATION SIMPLE: RIGHT PRETIBIAL REGION
LOCATION SIMPLE: LEFT SCALP
LOCATION SIMPLE: LEFT FOREARM
LOCATION SIMPLE: LEFT UPPER BACK
LOCATION SIMPLE: RIGHT FOREARM
LOCATION SIMPLE: RIGHT CALF
LOCATION SIMPLE: RIGHT POSTERIOR UPPER ARM
LOCATION SIMPLE: LEFT CALF
LOCATION SIMPLE: CHEST
LOCATION SIMPLE: SCALP
LOCATION SIMPLE: LEFT PRETIBIAL REGION

## 2024-11-27 ASSESSMENT — LOCATION ZONE DERM
LOCATION ZONE: SCALP
LOCATION ZONE: LEG
LOCATION ZONE: ARM
LOCATION ZONE: TRUNK

## 2024-11-27 NOTE — PROCEDURE: ADDITIONAL NOTES
Additional Notes: Pt states she had surgery on eyes, cataract surgery where she went temporarily blind, diabetic since she was 22, and had COVID 5 times. Discussed that any stressors can be contributing factors.\\nRecommended to apply Rogaine in one or two areas in hairline, patient is to call office if foam is too irritating.
Render Risk Assessment In Note?: no
Detail Level: Zone
Additional Notes: Recommended ice packs for itching.
Additional Notes: Discussed if she stops responding to tx, biopsy is needed.

## 2024-11-27 NOTE — PROCEDURE: MIPS QUALITY
Quality 486: Dermatitis - Improvement In Patient-Reported Itch Severity: Itch severity assessment score is reduced by 3 or more points from the initial (index) assessment score to the follow-up visit score
Detail Level: Detailed
Quality 130: Documentation Of Current Medications In The Medical Record: Current Medications Documented
Quality 397: Melanoma: Reporting: Pathology report includes the pT Category, thickness, ulceration and mitotic rate, peripheral and deep margin status and presence or absence of microsatellitosis for invasive tumors.
Quality 431: Preventive Care And Screening: Unhealthy Alcohol Use - Screening: Patient not identified as an unhealthy alcohol user when screened for unhealthy alcohol use using a systematic screening method
Quality 226: Preventive Care And Screening: Tobacco Use: Screening And Cessation Intervention: Patient screened for tobacco use and is an ex/non-smoker
Quality 137: Melanoma: Continuity Of Care - Recall System: Patient information entered into a recall system that includes: target date for the next exam specified AND a process to follow up with patients regarding missed or unscheduled appointments

## 2025-01-04 DIAGNOSIS — Z79.4 TYPE 2 DIABETES MELLITUS WITH OTHER DIABETIC KIDNEY COMPLICATION, WITH LONG-TERM CURRENT USE OF INSULIN (HCC): ICD-10-CM

## 2025-01-04 DIAGNOSIS — E11.29 TYPE 2 DIABETES MELLITUS WITH OTHER DIABETIC KIDNEY COMPLICATION, WITH LONG-TERM CURRENT USE OF INSULIN (HCC): ICD-10-CM

## 2025-01-06 RX ORDER — SEMAGLUTIDE 0.68 MG/ML
0.5 INJECTION, SOLUTION SUBCUTANEOUS
Qty: 6 ML | Refills: 3 | Status: SHIPPED | OUTPATIENT
Start: 2025-01-06 | End: 2025-01-09 | Stop reason: SDUPTHER

## 2025-01-06 NOTE — TELEPHONE ENCOUNTER
Received request via: Patient    Was the patient seen in the last year in this department? Yes    Does the patient have an active prescription (recently filled or refills available) for medication(s) requested? No    [facility]    Does the patient have group home Plus and need 100-day supply? (This applies to ALL medications) Patient does not have SCP

## 2025-01-09 ENCOUNTER — TELEMEDICINE (OUTPATIENT)
Dept: MEDICAL GROUP | Facility: PHYSICIAN GROUP | Age: 50
End: 2025-01-09
Payer: MEDICARE

## 2025-01-09 VITALS
BODY MASS INDEX: 29.92 KG/M2 | SYSTOLIC BLOOD PRESSURE: 132 MMHG | HEIGHT: 69 IN | DIASTOLIC BLOOD PRESSURE: 78 MMHG | WEIGHT: 202 LBS

## 2025-01-09 DIAGNOSIS — I10 PRIMARY HYPERTENSION: ICD-10-CM

## 2025-01-09 DIAGNOSIS — E11.29 TYPE 2 DIABETES MELLITUS WITH OTHER DIABETIC KIDNEY COMPLICATION, WITH LONG-TERM CURRENT USE OF INSULIN (HCC): ICD-10-CM

## 2025-01-09 DIAGNOSIS — R60.0 BILATERAL LOWER EXTREMITY EDEMA: ICD-10-CM

## 2025-01-09 DIAGNOSIS — Z79.4 TYPE 2 DIABETES MELLITUS WITH OTHER DIABETIC KIDNEY COMPLICATION, WITH LONG-TERM CURRENT USE OF INSULIN (HCC): ICD-10-CM

## 2025-01-09 PROCEDURE — 99214 OFFICE O/P EST MOD 30 MIN: CPT | Mod: 95 | Performed by: PHYSICIAN ASSISTANT

## 2025-01-09 RX ORDER — SEMAGLUTIDE 0.68 MG/ML
0.5 INJECTION, SOLUTION SUBCUTANEOUS
Qty: 6 ML | Refills: 3 | Status: SHIPPED
Start: 2025-01-09 | End: 2025-01-22

## 2025-01-09 RX ORDER — CLOBETASOL PROPIONATE 0.5 MG/ML
SOLUTION TOPICAL
COMMUNITY
Start: 2024-11-27

## 2025-01-09 RX ORDER — FUROSEMIDE 20 MG/1
20 TABLET ORAL 2 TIMES DAILY
Qty: 180 TABLET | Refills: 3 | Status: SHIPPED | OUTPATIENT
Start: 2025-01-09

## 2025-01-09 RX ORDER — LISINOPRIL 10 MG/1
10 TABLET ORAL
Qty: 90 TABLET | Refills: 3 | Status: SHIPPED | OUTPATIENT
Start: 2025-01-09

## 2025-01-09 ASSESSMENT — FIBROSIS 4 INDEX: FIB4 SCORE: 1.15

## 2025-01-09 NOTE — PROGRESS NOTES
Telemedicine Visit: Established Patient     This encounter was conducted via Zoom using encrypted video.  Verbal consent was obtained. Patient's identity was verified. The patient was home in [place].     CC:  Chief Complaint   Patient presents with    Medication Refill     Lisinopril, furosemide, (OZEMPIC    Referral Needed     Podiatrist        HISTORY OF PRESENT ILLNESS: Patient is a 49 y.o. female established patient presenting for recheck  Pt requesting podiatry referral. Started getting a sore from rubbing between her toes.   Pt has not had A1C drawn in about six months.     Patient Active Problem List    Diagnosis Date Noted    Rash 05/22/2024    Hyperlipidemia 01/05/2024    Vitamin D deficiency 01/05/2024    Screening for colorectal cancer 01/05/2024    Other fatigue 01/05/2024    Encounter for screening mammogram for breast cancer 01/05/2024    Pain of left calf 01/05/2024    Bilateral lower extremity edema 06/05/2023    S/P bariatric surgery 06/05/2023    Itchy skin 06/05/2023    Family history of gout 06/05/2023    Leg cramps 06/05/2023    Vaginal dryness 03/06/2023    Stress at home 03/06/2023    Primary hypertension 02/06/2023    Bilateral shoulder pain 02/06/2023    Positive for microalbuminuria 11/21/2022    History of COVID-19 11/16/2022    Chronic bilateral low back pain 10/03/2022    Type 2 diabetes mellitus (HCC) 10/03/2022    Atypical mole 10/03/2022    History of melanoma 10/03/2022    History of right cataract extraction 10/03/2022    Abnormal liver enzymes 09/01/2022    BUTCH (acute kidney injury) (HCC) 09/01/2022    Hyperkalemia 09/01/2022    Morbid obesity (HCC) 09/01/2022    Pyelonephritis 09/01/2022      Allergies:Patient has no known allergies.    Current Outpatient Medications   Medication Sig Dispense Refill    clobetasol (TEMOVATE) 0.05 % external solution       Semaglutide,0.25 or 0.5MG/DOS, (OZEMPIC, 0.25 OR 0.5 MG/DOSE,) 2 MG/3ML Solution Pen-injector Inject 0.5 mg under the skin every  "7 days. 6 mL 3    furosemide (LASIX) 20 MG Tab 2 tabs PO every am; add 1 tab PO at dinner time if needed to manage symptoms 90 Tablet 3    hydrOXYzine HCl (ATARAX) 25 MG Tab 1/2-1 tab up to tid prn itching 90 Tablet 3    Insulin Pen Needle (NOVOFINE PEN NEEDLE) 32G X 6 MM Misc Use with insulin pen as directed before bed 100 Each 3    lisinopril (PRINIVIL) 10 MG Tab TAKE ONE TABLET BY MOUTH EVERY DAY 90 Tablet 3    insulin lispro (HUMALOG) 100 UNIT/ML INJ Inject 10 units under skin 3 times a day before meals 30 mL 3    omeprazole (PRILOSEC) 20 MG delayed-release capsule       potassium chloride ER (KLOR-CON) 10 MEQ tablet 1 tab PO once daily only on the days you take 2  tabs lasix 90 Tablet 3    ketoconazole (NIZORAL) 2 % shampoo Apply to scalp, lather, wait 3 to 5 minutes; use 3 times weekly for up to 8 weeks 120 mL 6    BD INSULIN SYRINGE U/F 1/2UNIT 31G X 5/16\" 0.3 ML Misc Use to inject insulin tid before meals as directed 300 Each 3    estradiol (ESTRACE) 0.1 MG/GM vaginal cream Apply applicatorful into vagina M, W and Friday 42.5 g 3    insulin glargine 100 UNIT/ML SC SOPN injection Inject 25 units SQ into abd qhs; increase by 3 units every 2 nights until vhp=996; max 40 units per night 12 Each 3    HYDROcodone/acetaminophen (NORCO)  MG Tab 1 Tablet every 6 hours as needed.      cyclobenzaprine (FLEXERIL) 10 mg Tab 3 times daily      gabapentin (NEURONTIN) 300 MG Cap       omeprazole (PRILOSEC) 40 MG delayed-release capsule  (Patient not taking: Reported on 1/9/2025)       No current facility-administered medications for this visit.       Social History     Tobacco Use    Smoking status: Never    Smokeless tobacco: Never   Vaping Use    Vaping status: Never Used   Substance Use Topics    Alcohol use: Not Currently     Comment: occ    Drug use: Yes     Types: Marijuana, Oral     Comment: occ     Social History     Social History Narrative    Not on file       Family History   Problem Relation Age of Onset " "   Breast Cancer Mother     Diabetes Mother     Heart Disease Mother     Hypertension Mother     Hypertension Father     Heart Disease Father     Diabetes Father     Diabetes Brother        ROS    Exam:    /78   Ht 1.753 m (5' 9\")   Wt 91.6 kg (202 lb)  Body mass index is 29.83 kg/m².    General:  Well nourished, well developed female in NAD  Head is grossly normal.  Neck: Supple.   Pulmonary: No accessory muscle use  Skin: No apparent lesions  Neuro: Alert and oriented  Psych: normal mood and affect    Please note that this dictation was created using voice recognition software. I have made every reasonable attempt to correct obvious errors, but I expect that there are errors of grammar and possibly content that I did not discover before finalizing the note.      Assessment/Plan:    1. Primary hypertension  Refill lisinopril 10 mg sent in  - lisinopril (PRINIVIL) 10 MG Tab; Take 1 Tablet by mouth every day.  Dispense: 90 Tablet; Refill: 3    2. Bilateral lower extremity edema  Continue Lasix 20 mg  - furosemide (LASIX) 20 MG Tab; Take 1 Tablet by mouth 2 times a day.  Dispense: 180 Tablet; Refill: 3    3. Type 2 diabetes mellitus with other diabetic kidney complication, with long-term current use of insulin ([facility])  Will get updated A1c as well as lipid profile.  Referral to podiatry sent   - Semaglutide,0.25 or 0.5MG/DOS, (OZEMPIC, 0.25 OR 0.5 MG/DOSE,) 2 MG/3ML Solution Pen-injector; Inject 0.5 mg under the skin every 7 days.  Dispense: 6 mL; Refill: 3  - HEMOGLOBIN A1C; Future  - Lipid Profile; Future  - Referral to Podiatry    "

## 2025-01-18 ENCOUNTER — OFFICE VISIT (OUTPATIENT)
Dept: URGENT CARE | Facility: PHYSICIAN GROUP | Age: 50
End: 2025-01-18
Payer: MEDICARE

## 2025-01-18 VITALS
WEIGHT: 203.8 LBS | HEIGHT: 70 IN | DIASTOLIC BLOOD PRESSURE: 78 MMHG | TEMPERATURE: 96.3 F | OXYGEN SATURATION: 98 % | RESPIRATION RATE: 20 BRPM | SYSTOLIC BLOOD PRESSURE: 136 MMHG | HEART RATE: 80 BPM | BODY MASS INDEX: 29.18 KG/M2

## 2025-01-18 DIAGNOSIS — S81.802A WOUND OF LEFT LOWER EXTREMITY, INITIAL ENCOUNTER: ICD-10-CM

## 2025-01-18 PROCEDURE — 99213 OFFICE O/P EST LOW 20 MIN: CPT

## 2025-01-18 PROCEDURE — 3075F SYST BP GE 130 - 139MM HG: CPT

## 2025-01-18 PROCEDURE — 3078F DIAST BP <80 MM HG: CPT

## 2025-01-18 RX ORDER — CEPHALEXIN 500 MG/1
500 CAPSULE ORAL 4 TIMES DAILY
Qty: 20 CAPSULE | Refills: 0 | Status: SHIPPED | OUTPATIENT
Start: 2025-01-18 | End: 2025-01-23

## 2025-01-18 RX ORDER — SULFAMETHOXAZOLE AND TRIMETHOPRIM 800; 160 MG/1; MG/1
1 TABLET ORAL 2 TIMES DAILY
Qty: 10 TABLET | Refills: 0 | Status: SHIPPED | OUTPATIENT
Start: 2025-01-18 | End: 2025-01-23

## 2025-01-18 ASSESSMENT — PAIN SCALES - GENERAL: PAINLEVEL_OUTOF10: 7=MODERATE-SEVERE PAIN

## 2025-01-18 ASSESSMENT — FIBROSIS 4 INDEX: FIB4 SCORE: 1.15

## 2025-01-18 ASSESSMENT — ENCOUNTER SYMPTOMS
TINGLING: 0
NUMBNESS: 0
MUSCLE WEAKNESS: 0

## 2025-01-19 ASSESSMENT — ENCOUNTER SYMPTOMS
HEADACHES: 0
WEAKNESS: 0
CHILLS: 0
SHORTNESS OF BREATH: 0
DIZZINESS: 0
ABDOMINAL PAIN: 0
NAUSEA: 0
DIARRHEA: 0
FEVER: 0
VOMITING: 0
WHEEZING: 0
COUGH: 0
PALPITATIONS: 0

## 2025-01-19 NOTE — PROGRESS NOTES
"Subjective     Aviva Kenney is a 49 y.o. female who presents with Leg Injury (Per patient, Left Leg, Sores, redness, discharging for 1.5 weeks and moved to both left shoulder and left eye the last 3-days.)            Leg Injury   The incident occurred more than 1 week ago (1.5 weeks ago). The incident occurred at work. The injury mechanism is unknown (possible animal scratch or patient hit leg on something). The pain is present in the left leg. The quality of the pain is described as aching. The pain is moderate. Pertinent negatives include no muscle weakness, numbness or tingling. She reports no foreign bodies present. She has tried nothing for the symptoms.       Review of Systems   Constitutional:  Negative for chills and fever.   Respiratory:  Negative for cough, shortness of breath and wheezing.    Cardiovascular:  Negative for chest pain and palpitations.   Gastrointestinal:  Negative for abdominal pain, diarrhea, nausea and vomiting.   Neurological:  Negative for dizziness, tingling, weakness, numbness and headaches.              Objective     /78 (BP Location: Right arm, Patient Position: Sitting, BP Cuff Size: Large adult)   Pulse 80   Temp (!) 35.7 °C (96.3 °F) (Temporal)   Resp 20   Ht 1.765 m (5' 9.5\")   Wt 92.4 kg (203 lb 12.8 oz)   SpO2 98%   BMI 29.66 kg/m²      Physical Exam  Constitutional:       General: She is not in acute distress.     Appearance: Normal appearance. She is not ill-appearing.   HENT:      Head: Normocephalic and atraumatic.      Right Ear: Tympanic membrane is not erythematous.      Left Ear: Tympanic membrane is not erythematous.      Nose: No congestion.      Mouth/Throat:      Mouth: Mucous membranes are moist.   Eyes:      Extraocular Movements: Extraocular movements intact.      Conjunctiva/sclera: Conjunctivae normal.      Pupils: Pupils are equal, round, and reactive to light.   Cardiovascular:      Rate and Rhythm: Normal rate and regular rhythm. "   Pulmonary:      Effort: Pulmonary effort is normal. No respiratory distress.      Breath sounds: No stridor. No wheezing.   Musculoskeletal:         General: Normal range of motion.   Skin:     General: Skin is warm and dry.      Findings: Erythema and wound present.             Comments: Left medial lower leg wound, open 2.5 cm x1 cm wound, 2 sites serous drainage with purulent wound bed.  (See media)   Neurological:      General: No focal deficit present.      Mental Status: She is alert and oriented to person, place, and time. Mental status is at baseline.      Motor: No weakness.                             Assessment & Plan        Assessment & Plan  Wound of left lower extremity, initial encounter    Orders:    cephALEXin (KEFLEX) 500 MG Cap; Take 1 Capsule by mouth 4 times a day for 5 days.    sulfamethoxazole-trimethoprim (BACTRIM DS) 800-160 MG tablet; Take 1 Tablet by mouth 2 times a day for 5 days.       This patient presents with initial presentation of localized LLE wound.  Wound is open, draining serous fluid, with surround erythema, warmth, swelling concerning for cellulitis.  Denies mammal/insect bite, trauma, or obvious injury.  Denies recreational drug use.    Sensitivity/pain to light touch around the erythematous area.  No lymphangitic spread visible and no fluid pockets or fluctuance c/f abscess noted.  Low c/f osteomyelitis or DVT.     No evidence of serious bacterial illness requiring admission for higher level of care. Nontoxic appearing, VSS. Low risk for treatment failure based on history.   Given history of DMII and draining wound, will prescribe dual coverage of Cephalexin and Bactrim.   Wound cleaned with Hibiclens, and covered with polysporin and bandaid.   Patient to keep wound covered and clean while open.   Close monitoring for worsening pain, redness, swelling, and drainage.  Return to urgent care within 3-5 days for wound check/follow up.  Strict ER precautions for severe  symptoms.  Patient understands and is agreeable to treatment plan.  Denies further questions.

## 2025-01-21 ENCOUNTER — HOSPITAL ENCOUNTER (OUTPATIENT)
Dept: LAB | Facility: MEDICAL CENTER | Age: 50
End: 2025-01-21
Attending: PHYSICIAN ASSISTANT
Payer: MEDICARE

## 2025-01-21 ENCOUNTER — HOSPITAL ENCOUNTER (OUTPATIENT)
Dept: LAB | Facility: MEDICAL CENTER | Age: 50
End: 2025-01-21
Attending: INTERNAL MEDICINE
Payer: MEDICARE

## 2025-01-21 DIAGNOSIS — D64.9 ANEMIA, UNSPECIFIED TYPE: ICD-10-CM

## 2025-01-21 DIAGNOSIS — E11.29 TYPE 2 DIABETES MELLITUS WITH OTHER DIABETIC KIDNEY COMPLICATION, WITH LONG-TERM CURRENT USE OF INSULIN (HCC): ICD-10-CM

## 2025-01-21 DIAGNOSIS — Z79.4 TYPE 2 DIABETES MELLITUS WITH OTHER DIABETIC KIDNEY COMPLICATION, WITH LONG-TERM CURRENT USE OF INSULIN (HCC): ICD-10-CM

## 2025-01-21 DIAGNOSIS — N18.32 STAGE 3B CHRONIC KIDNEY DISEASE: ICD-10-CM

## 2025-01-21 DIAGNOSIS — I10 PRIMARY HYPERTENSION: ICD-10-CM

## 2025-01-21 LAB
25(OH)D3 SERPL-MCNC: 33 NG/ML (ref 30–100)
ANION GAP SERPL CALC-SCNC: 9 MMOL/L (ref 7–16)
BUN SERPL-MCNC: 30 MG/DL (ref 8–22)
CALCIUM SERPL-MCNC: 9.2 MG/DL (ref 8.5–10.5)
CHLORIDE SERPL-SCNC: 108 MMOL/L (ref 96–112)
CHOLEST SERPL-MCNC: 126 MG/DL (ref 100–199)
CO2 SERPL-SCNC: 22 MMOL/L (ref 20–33)
CREAT SERPL-MCNC: 1.73 MG/DL (ref 0.5–1.4)
ERYTHROCYTE [DISTWIDTH] IN BLOOD BY AUTOMATED COUNT: 45.1 FL (ref 35.9–50)
EST. AVERAGE GLUCOSE BLD GHB EST-MCNC: 200 MG/DL
FASTING STATUS PATIENT QL REPORTED: NORMAL
FASTING STATUS PATIENT QL REPORTED: NORMAL
GFR SERPLBLD CREATININE-BSD FMLA CKD-EPI: 36 ML/MIN/1.73 M 2
GLUCOSE SERPL-MCNC: 149 MG/DL (ref 65–99)
HBA1C MFR BLD: 8.6 % (ref 4–5.6)
HCT VFR BLD AUTO: 32 % (ref 37–47)
HDLC SERPL-MCNC: 35 MG/DL
HGB BLD-MCNC: 10.4 G/DL (ref 12–16)
LDLC SERPL CALC-MCNC: 70 MG/DL
MCH RBC QN AUTO: 31.6 PG (ref 27–33)
MCHC RBC AUTO-ENTMCNC: 32.5 G/DL (ref 32.2–35.5)
MCV RBC AUTO: 97.3 FL (ref 81.4–97.8)
PLATELET # BLD AUTO: 245 K/UL (ref 164–446)
PMV BLD AUTO: 11.9 FL (ref 9–12.9)
POTASSIUM SERPL-SCNC: 5.8 MMOL/L (ref 3.6–5.5)
RBC # BLD AUTO: 3.29 M/UL (ref 4.2–5.4)
SODIUM SERPL-SCNC: 139 MMOL/L (ref 135–145)
TRIGL SERPL-MCNC: 106 MG/DL (ref 0–149)
WBC # BLD AUTO: 4.2 K/UL (ref 4.8–10.8)

## 2025-01-21 PROCEDURE — 80061 LIPID PANEL: CPT

## 2025-01-21 PROCEDURE — 80048 BASIC METABOLIC PNL TOTAL CA: CPT

## 2025-01-21 PROCEDURE — 83036 HEMOGLOBIN GLYCOSYLATED A1C: CPT | Mod: GA

## 2025-01-21 PROCEDURE — 36415 COLL VENOUS BLD VENIPUNCTURE: CPT | Mod: GA

## 2025-01-21 PROCEDURE — 85027 COMPLETE CBC AUTOMATED: CPT

## 2025-01-21 PROCEDURE — 82306 VITAMIN D 25 HYDROXY: CPT

## 2025-01-22 DIAGNOSIS — E11.29 TYPE 2 DIABETES MELLITUS WITH OTHER DIABETIC KIDNEY COMPLICATION, WITH LONG-TERM CURRENT USE OF INSULIN (HCC): ICD-10-CM

## 2025-01-22 DIAGNOSIS — Z79.4 TYPE 2 DIABETES MELLITUS WITH OTHER DIABETIC KIDNEY COMPLICATION, WITH LONG-TERM CURRENT USE OF INSULIN (HCC): ICD-10-CM

## 2025-01-22 RX ORDER — SEMAGLUTIDE 1.34 MG/ML
1 INJECTION, SOLUTION SUBCUTANEOUS
Qty: 3 ML | Refills: 2 | Status: SHIPPED | OUTPATIENT
Start: 2025-01-22

## 2025-01-29 ENCOUNTER — APPOINTMENT (OUTPATIENT)
Dept: NEPHROLOGY | Facility: MEDICAL CENTER | Age: 50
End: 2025-01-29
Payer: MEDICARE

## 2025-01-30 ENCOUNTER — TELEMEDICINE (OUTPATIENT)
Dept: NEPHROLOGY | Facility: MEDICAL CENTER | Age: 50
End: 2025-01-30
Payer: MEDICARE

## 2025-01-30 VITALS
SYSTOLIC BLOOD PRESSURE: 128 MMHG | HEIGHT: 69 IN | DIASTOLIC BLOOD PRESSURE: 85 MMHG | WEIGHT: 198 LBS | HEART RATE: 85 BPM | BODY MASS INDEX: 29.33 KG/M2

## 2025-01-30 DIAGNOSIS — I10 PRIMARY HYPERTENSION: ICD-10-CM

## 2025-01-30 DIAGNOSIS — N18.32 STAGE 3B CHRONIC KIDNEY DISEASE: ICD-10-CM

## 2025-01-30 DIAGNOSIS — D64.9 ANEMIA, UNSPECIFIED TYPE: ICD-10-CM

## 2025-01-30 DIAGNOSIS — E87.5 HYPERKALEMIA: ICD-10-CM

## 2025-01-30 DIAGNOSIS — N17.9 AKI (ACUTE KIDNEY INJURY) (HCC): ICD-10-CM

## 2025-01-30 DIAGNOSIS — E55.9 VITAMIN D DEFICIENCY: ICD-10-CM

## 2025-01-30 DIAGNOSIS — E11.21 DIABETIC NEPHROPATHY ASSOCIATED WITH TYPE 2 DIABETES MELLITUS (HCC): ICD-10-CM

## 2025-01-30 ASSESSMENT — ENCOUNTER SYMPTOMS
SINUS PAIN: 0
COUGH: 0
PALPITATIONS: 0
FEVER: 0
HEMOPTYSIS: 0
DIARRHEA: 0
VOMITING: 0
WHEEZING: 0
FLANK PAIN: 0
SHORTNESS OF BREATH: 0
ORTHOPNEA: 0
EYES NEGATIVE: 1
WEIGHT LOSS: 0
NAUSEA: 0
ABDOMINAL PAIN: 0
CHILLS: 0

## 2025-01-30 ASSESSMENT — FIBROSIS 4 INDEX: FIB4 SCORE: 1.22

## 2025-01-30 NOTE — PROGRESS NOTES
Telemedicine: Established Patient   This evaluation was conducted via Teams using secure and encrypted videoconferencing technology. The patient was in their home in the [facility].    The patient's identity was confirmed and verbal consent was obtained for this virtual visit.    Subjective:   CC:   Chief Complaint   Patient presents with    Chronic Kidney Disease     Last seen 02/22/24    Results     Labs 06/18/24       Aviva Kenney is a 49 y.o. female presenting for evaluation and management of: CKD IIIb  Has long term hx.of HTN, DM II  DMN -albuminuria improved at 0.4  HTN;BP well controlled at goal  Doing better, no new complaints  Pedal edema well controlled with Lasix , low salt diet  No dysuria/hematuria/flank pain  Anemia:drop in Hb level - worse -iron panel at normal range to monitor  Low vit D level continue supplementation  Elevated PTH improving at 72  BUTCH/CKD III -creat level worse 1.37 -to 1.49 - 1.7 -to monitor -replace lisinopril with amlodipine  Hyperkalemia- stop Lisinopril      Review of Systems   Constitutional:  Negative for chills, fever, malaise/fatigue and weight loss.   HENT:  Negative for congestion, hearing loss and sinus pain.    Eyes: Negative.    Respiratory:  Negative for cough, hemoptysis, shortness of breath and wheezing.    Cardiovascular:  Negative for chest pain, palpitations, orthopnea and leg swelling.   Gastrointestinal:  Negative for abdominal pain, diarrhea, nausea and vomiting.   Genitourinary:  Negative for dysuria, flank pain, frequency, hematuria and urgency.   Skin: Negative.    All other systems reviewed and are negative.        No Known Allergies    Current medicines (including changes today)  Current Outpatient Medications   Medication Sig Dispense Refill    Semaglutide, 1 MG/DOSE, (OZEMPIC, 1 MG/DOSE,) 4 MG/3ML Solution Pen-injector Inject 1 mg under the skin every 7 days. 3 mL 2    Cholecalciferol (VITAMIN D-3) 125 MCG (5000 UT) Tab Take  by  "mouth.      clobetasol (TEMOVATE) 0.05 % external solution       lisinopril (PRINIVIL) 10 MG Tab Take 1 Tablet by mouth every day. 90 Tablet 3    furosemide (LASIX) 20 MG Tab Take 1 Tablet by mouth 2 times a day. 180 Tablet 3    hydrOXYzine HCl (ATARAX) 25 MG Tab 1/2-1 tab up to tid prn itching 90 Tablet 3    Insulin Pen Needle (NOVOFINE PEN NEEDLE) 32G X 6 MM Misc Use with insulin pen as directed before bed 100 Each 3    insulin lispro (HUMALOG) 100 UNIT/ML INJ Inject 10 units under skin 3 times a day before meals 30 mL 3    omeprazole (PRILOSEC) 20 MG delayed-release capsule       potassium chloride ER (KLOR-CON) 10 MEQ tablet 1 tab PO once daily only on the days you take 2  tabs lasix 90 Tablet 3    ketoconazole (NIZORAL) 2 % shampoo Apply to scalp, lather, wait 3 to 5 minutes; use 3 times weekly for up to 8 weeks 120 mL 6    BD INSULIN SYRINGE U/F 1/2UNIT 31G X 5/16\" 0.3 ML Misc Use to inject insulin tid before meals as directed 300 Each 3    estradiol (ESTRACE) 0.1 MG/GM vaginal cream Apply applicatorful into vagina M, W and Friday 42.5 g 3    insulin glargine 100 UNIT/ML SC SOPN injection Inject 25 units SQ into abd qhs; increase by 3 units every 2 nights until lmb=446; max 40 units per night 12 Each 3    HYDROcodone/acetaminophen (NORCO)  MG Tab 1 Tablet every 6 hours as needed.      cyclobenzaprine (FLEXERIL) 10 mg Tab 3 times daily      gabapentin (NEURONTIN) 300 MG Cap       omeprazole (PRILOSEC) 40 MG delayed-release capsule        No current facility-administered medications for this visit.       Patient Active Problem List    Diagnosis Date Noted    Rash 05/22/2024    Hyperlipidemia 01/05/2024    Vitamin D deficiency 01/05/2024    Screening for colorectal cancer 01/05/2024    Other fatigue 01/05/2024    Encounter for screening mammogram for breast cancer 01/05/2024    Pain of left calf 01/05/2024    Bilateral lower extremity edema 06/05/2023    S/P bariatric surgery 06/05/2023    Itchy skin " "06/05/2023    Family history of gout 06/05/2023    Leg cramps 06/05/2023    Vaginal dryness 03/06/2023    Stress at home 03/06/2023    Primary hypertension 02/06/2023    Bilateral shoulder pain 02/06/2023    Positive for microalbuminuria 11/21/2022    History of COVID-19 11/16/2022    Chronic bilateral low back pain 10/03/2022    Type 2 diabetes mellitus (HCC) 10/03/2022    Atypical mole 10/03/2022    History of melanoma 10/03/2022    History of right cataract extraction 10/03/2022    Abnormal liver enzymes 09/01/2022    BUTCH (acute kidney injury) (HCC) 09/01/2022    Hyperkalemia 09/01/2022    Morbid obesity (HCC) 09/01/2022    Pyelonephritis 09/01/2022       Family History   Problem Relation Age of Onset    Breast Cancer Mother     Diabetes Mother     Heart Disease Mother     Hypertension Mother     Hypertension Father     Heart Disease Father     Diabetes Father     Diabetes Brother        She  has a past medical history of Cataract, Diabetes (HCC), Infectious disease (10/2022), and Pre-op evaluation (12/13/2022).  She  has a past surgical history that includes cataract extraction with iol (Left); tonsillectomy; hysterectomy, total abdominal; gastric resection; cholecystectomy; primary c section; ovarian cystectomy (Bilateral); pr upper gi endoscopy,diagnosis (N/A, 12/07/2022); pr upper gi endoscopy,ctrl bleed (N/A, 12/07/2022); and abdominal hysterectomy total.       Objective:   /85 (BP Location: Left arm, Patient Position: Sitting, BP Cuff Size: Adult) Comment: per pt  Pulse 85 Comment: Per pt  Ht 1.753 m (5' 9\") Comment: Per pt  Wt 89.8 kg (198 lb) Comment: per pt  BMI 29.24 kg/m²     Physical Exam  Constitutional:       Appearance: Normal appearance.   HENT:      Nose: Nose normal.   Pulmonary:      Effort: Pulmonary effort is normal. No respiratory distress.   Musculoskeletal:      Cervical back: Normal range of motion and neck supple.   Neurological:      Mental Status: She is alert and oriented to " person, place, and time.   Psychiatric:         Mood and Affect: Mood normal.         Behavior: Behavior normal.         Thought Content: Thought content normal.         Judgment: Judgment normal.     Laboratory results reviewed: d/w Pt     Latest Reference Range & Units 06/18/24 13:06 06/24/24 17:26 10/28/24 13:23 01/21/25 13:40   WBC 4.8 - 10.8 K/uL 4.6 (L)  7.8 4.2 (L)   RBC 4.20 - 5.40 M/uL 3.86 (L)  3.77 (L) 3.29 (L)   Hemoglobin 12.0 - 16.0 g/dL 11.8 (L)  11.7 (L) 10.4 (L)   Hematocrit 37.0 - 47.0 % 36.8 (L)  35.3 (L) 32.0 (L)   MCV 81.4 - 97.8 fL 95.3  93.6 97.3   MCH 27.0 - 33.0 pg 30.6  31.0 31.6   MCHC 32.2 - 35.5 g/dL 32.1 (L)  33.1 32.5   RDW 35.9 - 50.0 fL 46.4  43.8 45.1   Platelet Count 164 - 446 K/uL 254  260 245   MPV 9.0 - 12.9 fL 12.0  11.2 11.9   Sodium 135 - 145 mmol/L 136  139 139   Potassium 3.6 - 5.5 mmol/L 5.4  5.7 (H) 5.8 (H)   Chloride 96 - 112 mmol/L 106  108 108   Co2 20 - 33 mmol/L 22  23 22   Anion Gap 7.0 - 16.0  8.0  8.0 9.0   Glucose 65 - 99 mg/dL 192 (H)  194 (H) 149 (H)   Bun 8 - 22 mg/dL 29 (H)  27 (H) 30 (H)   Creatinine 0.50 - 1.40 mg/dL 1.37  1.49 (H) 1.73 (H)   GFR (CKD-EPI) >60 mL/min/1.73 m 2 48 !  43 ! 36 !   Calcium 8.5 - 10.5 mg/dL 9.2  9.7 9.2   Iron 40 - 170 ug/dL   120    Total Iron Binding 250 - 450 ug/dL   193 (L)    % Saturation 15 - 55 %   62 (H)    Unsat Iron Binding 110 - 370 ug/dL   73 (L)    Glycohemoglobin 4.0 - 5.6 %  6.8 !  8.6 (H)   Estim. Avg Glu mg/dL    200   Fasting Status  Non-Fasting   Fasting  Fasting   Cholesterol,Tot 100 - 199 mg/dL    126   Triglycerides 0 - 149 mg/dL    106   HDL >=40 mg/dL    35 !   LDL <100 mg/dL    70   Micro Alb Creat Ratio 0 - 30 mg/g 456 (H)  463 (H)    Creatinine, Urine mg/dL 103.72  131.00    Microalbumin, Urine Random mg/dL 47.3  60.6    Urobilinogen, Urine Negative  1.0      Color  Yellow      Character  Clear      Specific Gravity <1.035  1.016      Ph 5.0 - 8.0  5.5      Glucose Negative mg/dL Negative      Ketones  Negative mg/dL Negative      Bilirubin Negative  Negative      Occult Blood Negative  Negative      Protein Negative mg/dL 100 !      Nitrite Negative  Negative      Leukocyte Esterase Negative  Negative      Micro Urine Req  Microscopic      WBC /hpf 0-2      RBC /hpf 0-2      Epithelial Cells /hpf Few      Bacteria None /hpf Few !      Hyaline Cast /lpf 6-10 !      25-Hydroxy   Vitamin D 25 30 - 100 ng/mL    33   Pth, Intact 14.0 - 72.0 pg/mL 88.4 (H)  72.5 (H)    (L): Data is abnormally low  (H): Data is abnormally high  !: Data is abnormal  Assessment and Plan:   The following treatment plan was discussed:     1. Stage 3b chronic kidney disease  - Basic Metabolic Panel; Future  - URINALYSIS; Future  - Basic Metabolic Panel; Future    2. Primary hypertension    3. Diabetic nephropathy associated with type 2 diabetes mellitus (HCC)  - MICROALBUMIN CREAT RATIO URINE; Future    4. Anemia, unspecified type  - CBC WITHOUT DIFFERENTIAL; Future    5. Vitamin D deficiency    6. Hyperkalemia  - Basic Metabolic Panel; Future  - Basic Metabolic Panel; Future    7. BUTCH (acute kidney injury) (HCC)    Recs:  Stop lisinopril  Replace with amlodipine 5 mg daily  Low salt, low potassium diet  Keep well hydrated  Repeat renal panel in 2 weeks    Follow-up: Return in about 2 months (around 3/30/2025).

## 2025-01-30 NOTE — PATIENT INSTRUCTIONS
Stop lisinopril  Replace with amlodipine 5 mg daily  Low salt, low potassium diet  Keep well hydrated  Repeat renal panel in 2 weeks

## 2025-02-05 RX ORDER — AMLODIPINE BESYLATE 5 MG/1
5 TABLET ORAL DAILY
Qty: 100 TABLET | Refills: 3 | Status: ON HOLD | OUTPATIENT
Start: 2025-02-05 | End: 2026-03-12

## 2025-02-19 ENCOUNTER — HOSPITAL ENCOUNTER (OUTPATIENT)
Dept: LAB | Facility: MEDICAL CENTER | Age: 50
End: 2025-02-19
Attending: INTERNAL MEDICINE
Payer: MEDICARE

## 2025-02-19 ENCOUNTER — APPOINTMENT (OUTPATIENT)
Dept: RADIOLOGY | Facility: MEDICAL CENTER | Age: 50
End: 2025-02-19
Attending: PHYSICIAN ASSISTANT
Payer: MEDICARE

## 2025-02-19 DIAGNOSIS — N18.32 STAGE 3B CHRONIC KIDNEY DISEASE: ICD-10-CM

## 2025-02-19 DIAGNOSIS — E11.21 DIABETIC NEPHROPATHY ASSOCIATED WITH TYPE 2 DIABETES MELLITUS (HCC): ICD-10-CM

## 2025-02-19 DIAGNOSIS — Z12.31 VISIT FOR SCREENING MAMMOGRAM: ICD-10-CM

## 2025-02-19 DIAGNOSIS — E87.5 HYPERKALEMIA: ICD-10-CM

## 2025-02-19 DIAGNOSIS — D64.9 ANEMIA, UNSPECIFIED TYPE: ICD-10-CM

## 2025-02-19 LAB
ANION GAP SERPL CALC-SCNC: 10 MMOL/L (ref 7–16)
APPEARANCE UR: CLEAR
BACTERIA #/AREA URNS HPF: ABNORMAL /HPF
BILIRUB UR QL STRIP.AUTO: NEGATIVE
BUN SERPL-MCNC: 34 MG/DL (ref 8–22)
CALCIUM SERPL-MCNC: 9.5 MG/DL (ref 8.5–10.5)
CASTS URNS QL MICRO: ABNORMAL /LPF (ref 0–2)
CHLORIDE SERPL-SCNC: 108 MMOL/L (ref 96–112)
CO2 SERPL-SCNC: 23 MMOL/L (ref 20–33)
COLOR UR: YELLOW
CREAT SERPL-MCNC: 1.69 MG/DL (ref 0.5–1.4)
CREAT UR-MCNC: 162 MG/DL
EPITHELIAL CELLS 1715: ABNORMAL /HPF (ref 0–5)
ERYTHROCYTE [DISTWIDTH] IN BLOOD BY AUTOMATED COUNT: 45.4 FL (ref 35.9–50)
GFR SERPLBLD CREATININE-BSD FMLA CKD-EPI: 37 ML/MIN/1.73 M 2
GLUCOSE SERPL-MCNC: 133 MG/DL (ref 65–99)
GLUCOSE UR STRIP.AUTO-MCNC: NEGATIVE MG/DL
HCT VFR BLD AUTO: 34.4 % (ref 37–47)
HGB BLD-MCNC: 11.1 G/DL (ref 12–16)
HYALINE CAST   1831: PRESENT /LPF
KETONES UR STRIP.AUTO-MCNC: ABNORMAL MG/DL
LEUKOCYTE ESTERASE UR QL STRIP.AUTO: ABNORMAL
MCH RBC QN AUTO: 30.8 PG (ref 27–33)
MCHC RBC AUTO-ENTMCNC: 32.3 G/DL (ref 32.2–35.5)
MCV RBC AUTO: 95.6 FL (ref 81.4–97.8)
MICRO URNS: ABNORMAL
MICROALBUMIN UR-MCNC: 17 MG/DL
MICROALBUMIN/CREAT UR: 105 MG/G (ref 0–30)
NITRITE UR QL STRIP.AUTO: NEGATIVE
PH UR STRIP.AUTO: 5.5 [PH] (ref 5–8)
PLATELET # BLD AUTO: 253 K/UL (ref 164–446)
PMV BLD AUTO: 11.9 FL (ref 9–12.9)
POTASSIUM SERPL-SCNC: 5 MMOL/L (ref 3.6–5.5)
PROT UR QL STRIP: 30 MG/DL
RBC # BLD AUTO: 3.6 M/UL (ref 4.2–5.4)
RBC # URNS HPF: ABNORMAL /HPF (ref 0–2)
RBC UR QL AUTO: NEGATIVE
SODIUM SERPL-SCNC: 141 MMOL/L (ref 135–145)
SP GR UR STRIP.AUTO: 1.02
UROBILINOGEN UR STRIP.AUTO-MCNC: 1 EU/DL
WBC # BLD AUTO: 5.7 K/UL (ref 4.8–10.8)
WBC #/AREA URNS HPF: ABNORMAL /HPF

## 2025-02-19 PROCEDURE — 82043 UR ALBUMIN QUANTITATIVE: CPT

## 2025-02-19 PROCEDURE — 36415 COLL VENOUS BLD VENIPUNCTURE: CPT

## 2025-02-19 PROCEDURE — 82570 ASSAY OF URINE CREATININE: CPT

## 2025-02-19 PROCEDURE — 80048 BASIC METABOLIC PNL TOTAL CA: CPT

## 2025-02-19 PROCEDURE — 81001 URINALYSIS AUTO W/SCOPE: CPT

## 2025-02-19 PROCEDURE — 85027 COMPLETE CBC AUTOMATED: CPT

## 2025-02-27 ENCOUNTER — HOSPITAL ENCOUNTER (OUTPATIENT)
Dept: RADIOLOGY | Facility: MEDICAL CENTER | Age: 50
End: 2025-02-27

## 2025-02-27 ENCOUNTER — HOSPITAL ENCOUNTER (INPATIENT)
Facility: MEDICAL CENTER | Age: 50
End: 2025-02-27
Attending: HOSPITALIST | Admitting: HOSPITALIST
Payer: MEDICARE

## 2025-02-27 ENCOUNTER — APPOINTMENT (OUTPATIENT)
Dept: RADIOLOGY | Facility: MEDICAL CENTER | Age: 50
DRG: 617 | End: 2025-02-27
Attending: HOSPITALIST
Payer: MEDICARE

## 2025-02-27 DIAGNOSIS — E11.628 DIABETIC FOOT INFECTION (HCC): ICD-10-CM

## 2025-02-27 DIAGNOSIS — L08.9 DIABETIC FOOT INFECTION (HCC): ICD-10-CM

## 2025-02-27 LAB
ANION GAP SERPL CALC-SCNC: 13 MMOL/L (ref 7–16)
BUN SERPL-MCNC: 41 MG/DL (ref 8–22)
CALCIUM SERPL-MCNC: 8.4 MG/DL (ref 8.5–10.5)
CHLORIDE SERPL-SCNC: 97 MMOL/L (ref 96–112)
CO2 SERPL-SCNC: 21 MMOL/L (ref 20–33)
CREAT SERPL-MCNC: 2.46 MG/DL (ref 0.5–1.4)
ERYTHROCYTE [DISTWIDTH] IN BLOOD BY AUTOMATED COUNT: 39.7 FL (ref 35.9–50)
GFR SERPLBLD CREATININE-BSD FMLA CKD-EPI: 24 ML/MIN/1.73 M 2
GLUCOSE BLD STRIP.AUTO-MCNC: 128 MG/DL (ref 65–99)
GLUCOSE BLD STRIP.AUTO-MCNC: 152 MG/DL (ref 65–99)
GLUCOSE BLD STRIP.AUTO-MCNC: 178 MG/DL (ref 65–99)
GLUCOSE SERPL-MCNC: 170 MG/DL (ref 65–99)
GRAM STN SPEC: NORMAL
HCT VFR BLD AUTO: 28.8 % (ref 37–47)
HGB BLD-MCNC: 9.7 G/DL (ref 12–16)
MCH RBC QN AUTO: 31.1 PG (ref 27–33)
MCHC RBC AUTO-ENTMCNC: 33.7 G/DL (ref 32.2–35.5)
MCV RBC AUTO: 92.3 FL (ref 81.4–97.8)
PLATELET # BLD AUTO: 237 K/UL (ref 164–446)
PMV BLD AUTO: 12 FL (ref 9–12.9)
POTASSIUM SERPL-SCNC: 4.9 MMOL/L (ref 3.6–5.5)
RBC # BLD AUTO: 3.12 M/UL (ref 4.2–5.4)
SCCMEC + MECA PNL NOSE NAA+PROBE: NEGATIVE
SIGNIFICANT IND 70042: NORMAL
SITE SITE: NORMAL
SODIUM SERPL-SCNC: 131 MMOL/L (ref 135–145)
SOURCE SOURCE: NORMAL
WBC # BLD AUTO: 15.2 K/UL (ref 4.8–10.8)

## 2025-02-27 PROCEDURE — A9270 NON-COVERED ITEM OR SERVICE: HCPCS | Performed by: HOSPITALIST

## 2025-02-27 PROCEDURE — 87641 MR-STAPH DNA AMP PROBE: CPT

## 2025-02-27 PROCEDURE — 36415 COLL VENOUS BLD VENIPUNCTURE: CPT

## 2025-02-27 PROCEDURE — 99223 1ST HOSP IP/OBS HIGH 75: CPT | Mod: AI | Performed by: HOSPITALIST

## 2025-02-27 PROCEDURE — 700102 HCHG RX REV CODE 250 W/ 637 OVERRIDE(OP): Performed by: HOSPITALIST

## 2025-02-27 PROCEDURE — 87077 CULTURE AEROBIC IDENTIFY: CPT

## 2025-02-27 PROCEDURE — 700111 HCHG RX REV CODE 636 W/ 250 OVERRIDE (IP): Mod: JZ | Performed by: HOSPITALIST

## 2025-02-27 PROCEDURE — 73718 MRI LOWER EXTREMITY W/O DYE: CPT | Mod: LT

## 2025-02-27 PROCEDURE — 80048 BASIC METABOLIC PNL TOTAL CA: CPT

## 2025-02-27 PROCEDURE — 87070 CULTURE OTHR SPECIMN AEROBIC: CPT

## 2025-02-27 PROCEDURE — 700105 HCHG RX REV CODE 258: Performed by: HOSPITALIST

## 2025-02-27 PROCEDURE — 87205 SMEAR GRAM STAIN: CPT

## 2025-02-27 PROCEDURE — 82962 GLUCOSE BLOOD TEST: CPT

## 2025-02-27 PROCEDURE — 770001 HCHG ROOM/CARE - MED/SURG/GYN PRIV*

## 2025-02-27 PROCEDURE — 85027 COMPLETE CBC AUTOMATED: CPT

## 2025-02-27 RX ORDER — INSULIN LISPRO 100 [IU]/ML
2-9 INJECTION, SOLUTION INTRAVENOUS; SUBCUTANEOUS
Status: DISCONTINUED | OUTPATIENT
Start: 2025-02-27 | End: 2025-03-01 | Stop reason: HOSPADM

## 2025-02-27 RX ORDER — OMEPRAZOLE 20 MG/1
20 CAPSULE, DELAYED RELEASE ORAL
Status: DISCONTINUED | OUTPATIENT
Start: 2025-02-27 | End: 2025-03-01 | Stop reason: HOSPADM

## 2025-02-27 RX ORDER — PROCHLORPERAZINE EDISYLATE 5 MG/ML
5-10 INJECTION INTRAMUSCULAR; INTRAVENOUS EVERY 4 HOURS PRN
Status: DISCONTINUED | OUTPATIENT
Start: 2025-02-27 | End: 2025-03-01 | Stop reason: HOSPADM

## 2025-02-27 RX ORDER — PROMETHAZINE HYDROCHLORIDE 25 MG/1
12.5-25 TABLET ORAL EVERY 4 HOURS PRN
Status: DISCONTINUED | OUTPATIENT
Start: 2025-02-27 | End: 2025-03-01 | Stop reason: HOSPADM

## 2025-02-27 RX ORDER — LABETALOL HYDROCHLORIDE 5 MG/ML
10 INJECTION, SOLUTION INTRAVENOUS EVERY 4 HOURS PRN
Status: DISCONTINUED | OUTPATIENT
Start: 2025-02-27 | End: 2025-03-01 | Stop reason: HOSPADM

## 2025-02-27 RX ORDER — DEXTROSE MONOHYDRATE 25 G/50ML
25 INJECTION, SOLUTION INTRAVENOUS
Status: DISCONTINUED | OUTPATIENT
Start: 2025-02-27 | End: 2025-03-01 | Stop reason: HOSPADM

## 2025-02-27 RX ORDER — ACETAMINOPHEN 325 MG/1
650 TABLET ORAL EVERY 6 HOURS PRN
Status: DISCONTINUED | OUTPATIENT
Start: 2025-02-27 | End: 2025-03-01 | Stop reason: HOSPADM

## 2025-02-27 RX ORDER — ONDANSETRON 4 MG/1
4 TABLET, ORALLY DISINTEGRATING ORAL EVERY 4 HOURS PRN
Status: DISCONTINUED | OUTPATIENT
Start: 2025-02-27 | End: 2025-03-01 | Stop reason: HOSPADM

## 2025-02-27 RX ORDER — LINEZOLID 600 MG/1
600 TABLET, FILM COATED ORAL EVERY 12 HOURS
Status: DISCONTINUED | OUTPATIENT
Start: 2025-02-27 | End: 2025-02-28

## 2025-02-27 RX ORDER — ENOXAPARIN SODIUM 100 MG/ML
40 INJECTION SUBCUTANEOUS DAILY
Status: DISCONTINUED | OUTPATIENT
Start: 2025-02-27 | End: 2025-03-01 | Stop reason: HOSPADM

## 2025-02-27 RX ORDER — CYCLOBENZAPRINE HCL 10 MG
10 TABLET ORAL 3 TIMES DAILY PRN
Status: DISCONTINUED | OUTPATIENT
Start: 2025-02-27 | End: 2025-03-01 | Stop reason: HOSPADM

## 2025-02-27 RX ORDER — HYDROCODONE BITARTRATE AND ACETAMINOPHEN 10; 325 MG/1; MG/1
1 TABLET ORAL EVERY 6 HOURS PRN
Refills: 0 | Status: DISCONTINUED | OUTPATIENT
Start: 2025-02-27 | End: 2025-03-01 | Stop reason: HOSPADM

## 2025-02-27 RX ORDER — PROMETHAZINE HYDROCHLORIDE 25 MG/1
12.5-25 SUPPOSITORY RECTAL EVERY 4 HOURS PRN
Status: DISCONTINUED | OUTPATIENT
Start: 2025-02-27 | End: 2025-03-01 | Stop reason: HOSPADM

## 2025-02-27 RX ORDER — AMLODIPINE BESYLATE 5 MG/1
5 TABLET ORAL DAILY
Status: DISCONTINUED | OUTPATIENT
Start: 2025-02-27 | End: 2025-03-01 | Stop reason: HOSPADM

## 2025-02-27 RX ORDER — SODIUM CHLORIDE 9 MG/ML
INJECTION, SOLUTION INTRAVENOUS CONTINUOUS
Status: DISCONTINUED | OUTPATIENT
Start: 2025-02-27 | End: 2025-02-28

## 2025-02-27 RX ORDER — ONDANSETRON 2 MG/ML
4 INJECTION INTRAMUSCULAR; INTRAVENOUS EVERY 4 HOURS PRN
Status: DISCONTINUED | OUTPATIENT
Start: 2025-02-27 | End: 2025-03-01 | Stop reason: HOSPADM

## 2025-02-27 RX ORDER — GABAPENTIN 300 MG/1
300 CAPSULE ORAL EVERY EVENING
Status: DISCONTINUED | OUTPATIENT
Start: 2025-02-27 | End: 2025-03-01

## 2025-02-27 RX ADMIN — HYDROCODONE BITARTRATE AND ACETAMINOPHEN 1 TABLET: 10; 325 TABLET ORAL at 20:26

## 2025-02-27 RX ADMIN — HYDROCODONE BITARTRATE AND ACETAMINOPHEN 1 TABLET: 10; 325 TABLET ORAL at 13:53

## 2025-02-27 RX ADMIN — AMLODIPINE BESYLATE 5 MG: 5 TABLET ORAL at 13:27

## 2025-02-27 RX ADMIN — ENOXAPARIN SODIUM 40 MG: 100 INJECTION SUBCUTANEOUS at 18:15

## 2025-02-27 RX ADMIN — GABAPENTIN 300 MG: 300 CAPSULE ORAL at 18:14

## 2025-02-27 RX ADMIN — SODIUM CHLORIDE: 9 INJECTION, SOLUTION INTRAVENOUS at 13:37

## 2025-02-27 RX ADMIN — AMPICILLIN AND SULBACTAM 3 G: 1; 2 INJECTION, POWDER, FOR SOLUTION INTRAMUSCULAR; INTRAVENOUS at 13:38

## 2025-02-27 RX ADMIN — INSULIN GLARGINE-YFGN 10 UNITS: 100 INJECTION, SOLUTION SUBCUTANEOUS at 18:17

## 2025-02-27 RX ADMIN — AMPICILLIN AND SULBACTAM 3 G: 1; 2 INJECTION, POWDER, FOR SOLUTION INTRAMUSCULAR; INTRAVENOUS at 18:14

## 2025-02-27 RX ADMIN — AMPICILLIN AND SULBACTAM 3 G: 1; 2 INJECTION, POWDER, FOR SOLUTION INTRAMUSCULAR; INTRAVENOUS at 23:40

## 2025-02-27 RX ADMIN — LINEZOLID 600 MG: 600 TABLET, FILM COATED ORAL at 20:21

## 2025-02-27 RX ADMIN — LINEZOLID 600 MG: 600 TABLET, FILM COATED ORAL at 13:27

## 2025-02-27 RX ADMIN — INSULIN LISPRO 2 UNITS: 100 INJECTION, SOLUTION INTRAVENOUS; SUBCUTANEOUS at 13:34

## 2025-02-27 ASSESSMENT — COGNITIVE AND FUNCTIONAL STATUS - GENERAL
SUGGESTED CMS G CODE MODIFIER MOBILITY: CH
DAILY ACTIVITIY SCORE: 24
SUGGESTED CMS G CODE MODIFIER DAILY ACTIVITY: CH
MOBILITY SCORE: 24

## 2025-02-27 ASSESSMENT — LIFESTYLE VARIABLES
HAVE YOU EVER FELT YOU SHOULD CUT DOWN ON YOUR DRINKING: NO
HOW MANY TIMES IN THE PAST YEAR HAVE YOU HAD 5 OR MORE DRINKS IN A DAY: 0
TOTAL SCORE: 0
TOTAL SCORE: 0
ON A TYPICAL DAY WHEN YOU DRINK ALCOHOL HOW MANY DRINKS DO YOU HAVE: 1
DOES PATIENT WANT TO STOP DRINKING: NO
EVER FELT BAD OR GUILTY ABOUT YOUR DRINKING: NO
AVERAGE NUMBER OF DAYS PER WEEK YOU HAVE A DRINK CONTAINING ALCOHOL: 0
EVER HAD A DRINK FIRST THING IN THE MORNING TO STEADY YOUR NERVES TO GET RID OF A HANGOVER: NO
CONSUMPTION TOTAL: NEGATIVE
HAVE PEOPLE ANNOYED YOU BY CRITICIZING YOUR DRINKING: NO
TOTAL SCORE: 0
ALCOHOL_USE: NO

## 2025-02-27 ASSESSMENT — SOCIAL DETERMINANTS OF HEALTH (SDOH)
WITHIN THE PAST 12 MONTHS, YOU WORRIED THAT YOUR FOOD WOULD RUN OUT BEFORE YOU GOT THE MONEY TO BUY MORE: SOMETIMES TRUE
WITHIN THE LAST YEAR, HAVE YOU BEEN HUMILIATED OR EMOTIONALLY ABUSED IN OTHER WAYS BY YOUR PARTNER OR EX-PARTNER?: NO
WITHIN THE LAST YEAR, HAVE YOU BEEN KICKED, HIT, SLAPPED, OR OTHERWISE PHYSICALLY HURT BY YOUR PARTNER OR EX-PARTNER?: NO
IN THE PAST 12 MONTHS, HAS THE ELECTRIC, GAS, OIL, OR WATER COMPANY THREATENED TO SHUT OFF SERVICE IN YOUR HOME?: YES
WITHIN THE LAST YEAR, HAVE TO BEEN RAPED OR FORCED TO HAVE ANY KIND OF SEXUAL ACTIVITY BY YOUR PARTNER OR EX-PARTNER?: NO
WITHIN THE PAST 12 MONTHS, THE FOOD YOU BOUGHT JUST DIDN'T LAST AND YOU DIDN'T HAVE MONEY TO GET MORE: NEVER TRUE
WITHIN THE LAST YEAR, HAVE YOU BEEN AFRAID OF YOUR PARTNER OR EX-PARTNER?: NO

## 2025-02-27 ASSESSMENT — PATIENT HEALTH QUESTIONNAIRE - PHQ9
SUM OF ALL RESPONSES TO PHQ9 QUESTIONS 1 AND 2: 0
2. FEELING DOWN, DEPRESSED, IRRITABLE, OR HOPELESS: NOT AT ALL
1. LITTLE INTEREST OR PLEASURE IN DOING THINGS: NOT AT ALL

## 2025-02-27 ASSESSMENT — FIBROSIS 4 INDEX: FIB4 SCORE: 1.2

## 2025-02-27 ASSESSMENT — ENCOUNTER SYMPTOMS
HEADACHES: 1
BACK PAIN: 1
FEVER: 1

## 2025-02-27 ASSESSMENT — PAIN DESCRIPTION - PAIN TYPE
TYPE: ACUTE PAIN

## 2025-02-27 NOTE — ASSESSMENT & PLAN NOTE
Insetting of chronic kidney disease stage III    Hold Lasix and lisinopril  Gentle hydration with half liter of NS  Avoid nephrotoxic agents  Improved with IV fluids and infection treatment  monitor

## 2025-02-27 NOTE — PROGRESS NOTES
4 Eyes Skin Assessment Completed by ROVERTO Ramirez and ISAAC Sanchez.    Head WDL  Ears scab right  Nose WDL  Mouth WDL - adentulous  Neck Scab R&L  Breast/Chest Scab  Shoulder Blades WDL  Spine WDL  (R) Arm/Elbow/Hand WDL  (L) Arm/Elbow/Hand WDL  Abdomen Scab  Groin WDL  Scrotum/Coccyx/Buttocks Redness and Blanching  (R) Leg Scabs  (L) Leg Scabs  (R) Heel/Foot/Toe Redness, Blanching, and Scab  (L) Heel/Foot/Toe Redness, Blanching, Swelling, and Scab, open left 4th toe wound          Devices In Places Pulse Ox      Interventions In Place Gray Ear Foams and Heels Loaded W/Pillows    Possible Skin Injury Yes    Pictures Uploaded Into Epic Yes  Wound Consult Placed Yes  RN Wound Prevention Protocol Ordered Yes

## 2025-02-27 NOTE — DISCHARGE PLANNING
"RN CM met with patient at bedside to complete admission assessment. Pleasantly A&Ox4 and able to verify information on face sheet.   Lives with her semi-functional/unemployed spouse, Bharath, in manufactured home (6 steps to enter, no handrails), in [facility].   Independent with ADLs and most IADLs at baseline (she can drive if necessary but more relies on her  for transportation).  has difficulty standing or walking for extended periods of time. Her employed/adult son, Nils, lives \"down the road\" from her.   Patient has active Medicare, [place] Medicaid Q[place], and [employer].   [facility] leadership and PFA made aware of situation.   Patient notified of need to cancel her [employer] as she reports having lived in [place] for the last 3 years and does not plan on returning to [place].   She receives approximately $1,100/month from SSDI.    and patient combined make around $2,100/month. She will qualify for full [place] Medicaid.    PCP is Barry Higgins PA-C.   Pending orthopaedic recommendations/procedure.     Case Management Discharge Planning    Admission Date: 2/27/2025  GMLOS:    ALOS: 0    6-Clicks ADL Score:    6-Clicks Mobility Score:      Anticipated Discharge Dispo: Discharge Disposition: D/T to home under [facility] care in anticipation of covered skilled care (06)  Discharge Address: [address] [place] [id number]  Discharge Contact Phone Number: [phone]    DME Needed: No    Action(s) Taken: Updated Provider/Nurse on Discharge Plan, Patient Conference, and DC Assessment Complete (See below)    Escalations Completed: Financial Department ([employer] to Medicaid).     Medically Clear: No    Next Steps: Pending orthopaedic recommendations/procedures.     Barriers to Discharge: Medical clearance, Pending Placement, Pending PT Evaluation, and Pending Procedures    Is the patient up for discharge tomorrow: No.     Care Transition Team Assessment    Information Source  Orientation Level: Oriented " X4  Information Given By: Patient  Informant's Name: Aviva  Who is responsible for making decisions for patient? : Patient    Readmission Evaluation  Is this a readmission?: No    Interdisciplinary Discharge Planning  Does Admitting Nurse Feel This Could be a Complex Discharge?: No  Primary Care Physician: Barry Higgins PA-C  Patient or legal guardian wants to designate a caregiver: No    Discharge Preparedness  What is your plan after discharge?: Home health care  What are your discharge supports?: Child, Spouse  Prior Functional Level: Ambulatory, Independent with Activities of Daily Living, Independent with Medication Management  Difficulity with ADLs: None  Difficulity with IADLs: Driving  Difficulity with IADL Comments:  primarily drives but patient can if necessary.    Functional Assesment  Prior Functional Level: Ambulatory, Independent with Activities of Daily Living, Independent with Medication Management    Finances  Financial Barriers to Discharge: Yes  Average Monthly Income: 1100 $  Source of Income: Social Security Disability  Prescription Coverage: Yes    Vision / Hearing Impairment  Vision Impairment : No (readers)  Hearing Impairment : No    Values / Beliefs / Concerns  Values / Beliefs Concerns : No    Advance Directive  Advance Directive?: None  Advance Directive offered?: AD Booklet refused    Domestic Abuse  Possible Abuse/Neglect Reported to:: Not Applicable    Psychological Assessment  History of Substance Abuse: None  History of Psychiatric Problems: No  Non-compliant with Treatment: No  Newly Diagnosed Illness: Yes    Discharge Risks or Barriers  Discharge risks or barriers?: Complex medical needs  Patient risk factors: Complex medical needs, Noncompliance    Anticipated Discharge Information  Discharge Disposition: D/T to home under [facility] care in anticipation of covered skilled care (06)  Discharge Address: [address] [place] [id number]  Discharge Contact Phone Number:  [phone]

## 2025-02-27 NOTE — ASSESSMENT & PLAN NOTE
With hyperglycemia and diabetic nephropathy and polyneuropathy    Patient has been on Ozempic monotherapy per her report lasting globin A1c is not at goal  Continue ISS, adjust as needed

## 2025-02-27 NOTE — ASSESSMENT & PLAN NOTE
Continue amlodipine  Hold lisinopril for now given BUTCH  Monitor blood pressure adjust accordingly  I ordered as needed labetalol

## 2025-02-27 NOTE — ASSESSMENT & PLAN NOTE
X-ray with changes concerning for possible osteomyelitis  MRI shows findings consistent with osteomyelitis of toe  Ortho consulted, NPO, plan for surgery today, likely amputation  Stop zyvox, mrsa nares negative  Continue unasyn  Follow up cultures

## 2025-02-27 NOTE — PROGRESS NOTES
Patient arrived to T331 and ambulated to bed with steady gait. Patient oriented x4. Report received from EMS (ED report given prior to arrival as well). Patient oriented up unit and fall precautions. Bed in low position, call light within reach.

## 2025-02-27 NOTE — PROGRESS NOTES
[facility] DIRECT ADMISSION REPORT       Chief complaint: toe ulcer  Pertinent history & patient course: 49yo female diabetic with fever left 4th toe ulcer xray concerning for early osteo. Received unasyn vanc   Pertinent imaging & lab results: per above  Consultants called prior to transfer and pertinent input from consultants: none    Reason for Transfer: will likely need cortho      Patient accepted for transfer: Yes  Accepting [facility] Facility: [facility] - Nursing to notify the Triage Coordinator in the RTOC via Voalte or Phone ext. [phone] when patient arrives to the unit. The Triage Coordinator will assign the admitting provider.    Consultants to be called upon arrival: ortho  Admission status: Inpatient.       The admitting provider is the point of contact for questions or concerns regarding patient's care.

## 2025-02-27 NOTE — H&P
Hospital Medicine History & Physical Note    Date of Service  2/27/2025    Primary Care Physician  Barry Higgins P.A.-C.    Consultants       Code Status  Full Code    Chief Complaint  Left toe swelling and redness      History of Presenting Illness  Aviva Kenney is a 50 y.o. female who presented 2/27/2025 with history of type 2 diabetes chronic kidney disease chronic back pain diabetic neuropathy presented to local emergency department for evaluation of pain and swelling in her left fourth toe.  She reports that she had a skin ulcer for several months which improved with wound care however over the past 2 to 3 days she has noted increased redness and swelling associated with fever 103.4 and drainage from her left fourth toe.  She presented to local emergency department where an x-ray revealed mild permeative appearance of the distal lateral aspect of the proximal phalanx concerning for early osteomyelitis.  She received IV Unasyn and vancomycin and was transferred to our facility  Patient has chronic kidney disease she is established with Dr. Gan her most recent chemistry panel revealed GFR of 37 creatinine 1.69 and most recent hemoglobin A1c is 5.6.  At the outlying facility her creatinine was 2.95    I discussed the plan of care with patient.    Review of Systems  Review of Systems   Constitutional:  Positive for fever.   Musculoskeletal:  Positive for back pain and joint pain.   Neurological:  Positive for headaches.       Past Medical History   has a past medical history of Cataract, Diabetes (HCC), Infectious disease (10/2022), and Pre-op evaluation (12/13/2022).    Surgical History   has a past surgical history that includes cataract extraction with iol (Left); tonsillectomy; hysterectomy, total abdominal; gastric resection; cholecystectomy; primary c section; ovarian cystectomy (Bilateral); pr upper gi endoscopy,diagnosis (N/A, 12/07/2022); pr upper gi endoscopy,ctrl bleed (N/A,  "2022); and abdominal hysterectomy total.     Family History  family history includes Breast Cancer in her mother; Diabetes in her brother, father, and mother; Heart Disease in her father and mother; Hypertension in her father and mother.       Social History   reports that she has never smoked. She has never used smokeless tobacco. She reports that she does not currently use alcohol. She reports current drug use. Drugs: Marijuana and Oral.    Allergies  No Known Allergies    Medications  Prior to Admission Medications   Prescriptions Last Dose Informant Patient Reported? Taking?   BD INSULIN SYRINGE U/F UNIT 31G X \" 0.3 ML Misc   No No   Sig: Use to inject insulin tid before meals as directed   Cholecalciferol (VITAMIN D-3) 125 MCG (5000 UT) Tab   Yes No   Sig: Take  by mouth.   HYDROcodone/acetaminophen (NORCO)  MG Tab   Yes No   Si Tablet every 6 hours as needed.   Insulin Pen Needle (NOVOFINE PEN NEEDLE) 32G X 6 MM Misc   No No   Sig: Use with insulin pen as directed before bed   Semaglutide, 1 MG/DOSE, (OZEMPIC, 1 MG/DOSE,) 4 MG/3ML Solution Pen-injector   No No   Sig: Inject 1 mg under the skin every 7 days.   amLODIPine (NORVASC) 5 MG Tab   No No   Sig: Take 1 Tablet by mouth every day.   clobetasol (TEMOVATE) 0.05 % external solution   Yes No   cyclobenzaprine (FLEXERIL) 10 mg Tab   Yes No   Sig: 3 times daily   estradiol (ESTRACE) 0.1 MG/GM vaginal cream   No No   Sig: Apply applicatorful into vagina ,  and Friday   furosemide (LASIX) 20 MG Tab   No No   Sig: Take 1 Tablet by mouth 2 times a day.   gabapentin (NEURONTIN) 300 MG Cap   Yes No   hydrOXYzine HCl (ATARAX) 25 MG Tab   No No   Si/2-1 tab up to tid prn itching   insulin glargine 100 UNIT/ML SC SOPN injection   No No   Sig: Inject 25 units SQ into abd qhs; increase by 3 units every 2 nights until syj=307; max 40 units per night   insulin lispro (HUMALOG) 100 UNIT/ML INJ   No No   Sig: Inject 10 units under skin 3 times a " "day before meals   ketoconazole (NIZORAL) 2 % shampoo   No No   Sig: Apply to scalp, lather, wait 3 to 5 minutes; use 3 times weekly for up to 8 weeks   lisinopril (PRINIVIL) 10 MG Tab   No No   Sig: Take 1 Tablet by mouth every day.   omeprazole (PRILOSEC) 20 MG delayed-release capsule   Yes No   omeprazole (PRILOSEC) 40 MG delayed-release capsule   Yes No   potassium chloride ER (KLOR-CON) 10 MEQ tablet   No No   Si tab PO once daily only on the days you take 2  tabs lasix      Facility-Administered Medications: None       Physical Exam                             Physical Exam  Vitals reviewed.   Constitutional:       Appearance: Normal appearance.   Cardiovascular:      Rate and Rhythm: Normal rate and regular rhythm.      Heart sounds: No murmur heard.  Pulmonary:      Effort: Pulmonary effort is normal.      Breath sounds: No rales.   Chest:      Chest wall: No tenderness.   Abdominal:      Palpations: Abdomen is soft.      Tenderness: There is no abdominal tenderness. There is no guarding.   Musculoskeletal:         General: Swelling and tenderness present.      Comments: Left fourth toe swelling with erythema to the dorsum of the foot and small amount of serosanguineous drainage with superficial wound on dorsum of toe    Foot is warm with palpable pulse and good capillary refill   Skin:     General: Skin is warm and dry.   Neurological:      General: No focal deficit present.      Mental Status: She is alert.   Psychiatric:         Mood and Affect: Mood normal.         Behavior: Behavior normal.         Laboratory:          No results for input(s): \"ALTSGPT\", \"ASTSGOT\", \"ALKPHOSPHAT\", \"TBILIRUBIN\", \"DBILIRUBIN\", \"GAMMAGT\", \"AMYLASE\", \"LIPASE\", \"ALB\", \"PREALBUMIN\", \"GLUCOSE\" in the last 72 hours.      No results for input(s): \"NTPROBNP\" in the last 72 hours.      No results for input(s): \"TROPONINT\" in the last 72 hours.    Imaging:  MR-FOOT-W/O LEFT    (Results Pending)     I reviewed imaging and lab " results from outlying facility as follows    X-ray of the left toe revealed soft tissue swelling of the fourth digit with osseous demineralization atherosclerosis of the foot.  Mild permeative appearance of the distal lateral aspect of the proximal phalanx of the fourth digit findings may represent early bone infection if there is continued suspicion for osteomyelitis further evaluation with MRI is recommended      CBC WBC 12.5 hemoglobin 10.2 platelets 220    Glucose 153 BUN 42 creatinine 2.95 GFR 19 sodium 132 potassium 4.6 chloride 96 bicarb 23 lactic acid 1.0 alk phos 642 AST 72 ALT 51        Procalcitonin 1.53    Assessment/Plan:  Justification for Admission Status  I anticipate this patient will require at least two midnights for appropriate medical management, necessitating inpatient admission because diabetic foot infection requiring IV antibiotics and further workup for possible osteomyelitis    Patient will need a Med/Surg bed on ORTHOPEDICS service .  The need is secondary to diabetic foot infection.    * Diabetic foot infection (HCC)- (present on admission)  Assessment & Plan  X-ray with changes concerning for possible osteomyelitis  Patient will be admitted I ordered IV Unasyn and p.o. Zyvox will avoid vancomycin for now given her BUTCH  I ordered MRI if confirms osteomyelitis we will plan on consulting orthopedic service for evaluation for toe amputation  I ordered wound culture  Reviewed with patient importance of controlling her diabetes long-term to prevent further complications and footcare  Will check TONJA given atherosclerosis noted on x-ray clinically she is perfusing her foot well    Primary hypertension- (present on admission)  Assessment & Plan  Continue amlodipine  Hold lisinopril for now given BUTCH  Monitor blood pressure adjust accordingly  I ordered as needed labetalol    BUTCH (acute kidney injury) (HCC)- (present on admission)  Assessment & Plan  Insetting of chronic kidney disease  stage III    Hold Lasix and lisinopril  Gentle hydration with half liter of NS  Avoid nephrotoxic agents  Monitor renal function electrolytes  If renal function not improved will consult nephrology patient established with Dr. Gan    Type 2 diabetes mellitus (HCC)- (present on admission)  Assessment & Plan  With hyperglycemia and diabetic nephropathy and polyneuropathy    Patient has been on Ozempic monotherapy per her report lasting globin A1c is not at goal  We will start her on Lantus and sliding scale insulin and monitor CBGs    Chronic bilateral low back pain- (present on admission)  Assessment & Plan  Continue home meds with Norco and Flexeril        VTE prophylaxis: enoxaparin ppx

## 2025-02-28 ENCOUNTER — ANESTHESIA EVENT (OUTPATIENT)
Dept: SURGERY | Facility: MEDICAL CENTER | Age: 50
End: 2025-02-28
Payer: MEDICARE

## 2025-02-28 ENCOUNTER — ANESTHESIA (OUTPATIENT)
Dept: SURGERY | Facility: MEDICAL CENTER | Age: 50
End: 2025-02-28
Payer: MEDICARE

## 2025-02-28 VITALS
BODY MASS INDEX: 31.8 KG/M2 | HEIGHT: 69 IN | DIASTOLIC BLOOD PRESSURE: 75 MMHG | SYSTOLIC BLOOD PRESSURE: 135 MMHG | OXYGEN SATURATION: 94 % | WEIGHT: 214.73 LBS | HEART RATE: 80 BPM | TEMPERATURE: 97.1 F | RESPIRATION RATE: 16 BRPM

## 2025-02-28 LAB
ANION GAP SERPL CALC-SCNC: 10 MMOL/L (ref 7–16)
BACTERIA SPEC ANAEROBE CULT: NORMAL
BACTERIA TISS AEROBE CULT: NORMAL
BACTERIA WND AEROBE CULT: ABNORMAL
BACTERIA WND AEROBE CULT: ABNORMAL
BUN SERPL-MCNC: 41 MG/DL (ref 8–22)
CALCIUM SERPL-MCNC: 9.1 MG/DL (ref 8.5–10.5)
CHLORIDE SERPL-SCNC: 99 MMOL/L (ref 96–112)
CO2 SERPL-SCNC: 23 MMOL/L (ref 20–33)
CREAT SERPL-MCNC: 2.13 MG/DL (ref 0.5–1.4)
EKG IMPRESSION: NORMAL
ERYTHROCYTE [DISTWIDTH] IN BLOOD BY AUTOMATED COUNT: 40.2 FL (ref 35.9–50)
GFR SERPLBLD CREATININE-BSD FMLA CKD-EPI: 28 ML/MIN/1.73 M 2
GLUCOSE BLD STRIP.AUTO-MCNC: 111 MG/DL (ref 65–99)
GLUCOSE BLD STRIP.AUTO-MCNC: 126 MG/DL (ref 65–99)
GLUCOSE BLD STRIP.AUTO-MCNC: 136 MG/DL (ref 65–99)
GLUCOSE BLD STRIP.AUTO-MCNC: 216 MG/DL (ref 65–99)
GLUCOSE BLD STRIP.AUTO-MCNC: 80 MG/DL (ref 65–99)
GLUCOSE SERPL-MCNC: 140 MG/DL (ref 65–99)
GRAM STN SPEC: ABNORMAL
GRAM STN SPEC: NORMAL
GRAM STN SPEC: NORMAL
HCT VFR BLD AUTO: 29.1 % (ref 37–47)
HGB BLD-MCNC: 9.8 G/DL (ref 12–16)
MCH RBC QN AUTO: 31.3 PG (ref 27–33)
MCHC RBC AUTO-ENTMCNC: 33.7 G/DL (ref 32.2–35.5)
MCV RBC AUTO: 93 FL (ref 81.4–97.8)
PATHOLOGY CONSULT NOTE: NORMAL
PLATELET # BLD AUTO: 288 K/UL (ref 164–446)
PMV BLD AUTO: 11.4 FL (ref 9–12.9)
POTASSIUM SERPL-SCNC: 4.3 MMOL/L (ref 3.6–5.5)
RBC # BLD AUTO: 3.13 M/UL (ref 4.2–5.4)
SIGNIFICANT IND 70042: ABNORMAL
SIGNIFICANT IND 70042: NORMAL
SITE SITE: ABNORMAL
SITE SITE: NORMAL
SODIUM SERPL-SCNC: 132 MMOL/L (ref 135–145)
SOURCE SOURCE: ABNORMAL
SOURCE SOURCE: NORMAL
WBC # BLD AUTO: 15.6 K/UL (ref 4.8–10.8)

## 2025-02-28 PROCEDURE — 160015 HCHG STAT PREOP MINUTES: Performed by: STUDENT IN AN ORGANIZED HEALTH CARE EDUCATION/TRAINING PROGRAM

## 2025-02-28 PROCEDURE — 87077 CULTURE AEROBIC IDENTIFY: CPT

## 2025-02-28 PROCEDURE — 82962 GLUCOSE BLOOD TEST: CPT | Mod: 91

## 2025-02-28 PROCEDURE — A9270 NON-COVERED ITEM OR SERVICE: HCPCS | Performed by: STUDENT IN AN ORGANIZED HEALTH CARE EDUCATION/TRAINING PROGRAM

## 2025-02-28 PROCEDURE — 160027 HCHG SURGERY MINUTES - 1ST 30 MINS LEVEL 2: Performed by: STUDENT IN AN ORGANIZED HEALTH CARE EDUCATION/TRAINING PROGRAM

## 2025-02-28 PROCEDURE — 87205 SMEAR GRAM STAIN: CPT

## 2025-02-28 PROCEDURE — 160009 HCHG ANES TIME/MIN: Performed by: STUDENT IN AN ORGANIZED HEALTH CARE EDUCATION/TRAINING PROGRAM

## 2025-02-28 PROCEDURE — A9270 NON-COVERED ITEM OR SERVICE: HCPCS | Performed by: HOSPITALIST

## 2025-02-28 PROCEDURE — 88311 DECALCIFY TISSUE: CPT | Performed by: PATHOLOGY

## 2025-02-28 PROCEDURE — 87070 CULTURE OTHR SPECIMN AEROBIC: CPT

## 2025-02-28 PROCEDURE — 700102 HCHG RX REV CODE 250 W/ 637 OVERRIDE(OP)

## 2025-02-28 PROCEDURE — 160048 HCHG OR STATISTICAL LEVEL 1-5: Performed by: STUDENT IN AN ORGANIZED HEALTH CARE EDUCATION/TRAINING PROGRAM

## 2025-02-28 PROCEDURE — 0Y6W0Z0 DETACHMENT AT LEFT 4TH TOE, COMPLETE, OPEN APPROACH: ICD-10-PCS | Performed by: STUDENT IN AN ORGANIZED HEALTH CARE EDUCATION/TRAINING PROGRAM

## 2025-02-28 PROCEDURE — 93005 ELECTROCARDIOGRAM TRACING: CPT | Mod: TC | Performed by: STUDENT IN AN ORGANIZED HEALTH CARE EDUCATION/TRAINING PROGRAM

## 2025-02-28 PROCEDURE — 700102 HCHG RX REV CODE 250 W/ 637 OVERRIDE(OP): Performed by: STUDENT IN AN ORGANIZED HEALTH CARE EDUCATION/TRAINING PROGRAM

## 2025-02-28 PROCEDURE — 700101 HCHG RX REV CODE 250: Performed by: STUDENT IN AN ORGANIZED HEALTH CARE EDUCATION/TRAINING PROGRAM

## 2025-02-28 PROCEDURE — 80048 BASIC METABOLIC PNL TOTAL CA: CPT

## 2025-02-28 PROCEDURE — 99222 1ST HOSP IP/OBS MODERATE 55: CPT | Mod: 25 | Performed by: STUDENT IN AN ORGANIZED HEALTH CARE EDUCATION/TRAINING PROGRAM

## 2025-02-28 PROCEDURE — 93010 ELECTROCARDIOGRAM REPORT: CPT | Performed by: INTERNAL MEDICINE

## 2025-02-28 PROCEDURE — 700111 HCHG RX REV CODE 636 W/ 250 OVERRIDE (IP): Performed by: STUDENT IN AN ORGANIZED HEALTH CARE EDUCATION/TRAINING PROGRAM

## 2025-02-28 PROCEDURE — 97602 WOUND(S) CARE NON-SELECTIVE: CPT

## 2025-02-28 PROCEDURE — 87075 CULTR BACTERIA EXCEPT BLOOD: CPT

## 2025-02-28 PROCEDURE — 28820 AMPUTATION OF TOE: CPT | Mod: T3 | Performed by: STUDENT IN AN ORGANIZED HEALTH CARE EDUCATION/TRAINING PROGRAM

## 2025-02-28 PROCEDURE — 700105 HCHG RX REV CODE 258: Performed by: HOSPITALIST

## 2025-02-28 PROCEDURE — 160035 HCHG PACU - 1ST 60 MINS PHASE I: Performed by: STUDENT IN AN ORGANIZED HEALTH CARE EDUCATION/TRAINING PROGRAM

## 2025-02-28 PROCEDURE — 700111 HCHG RX REV CODE 636 W/ 250 OVERRIDE (IP): Mod: JZ | Performed by: HOSPITALIST

## 2025-02-28 PROCEDURE — 88305 TISSUE EXAM BY PATHOLOGIST: CPT | Performed by: PATHOLOGY

## 2025-02-28 PROCEDURE — 88311 DECALCIFY TISSUE: CPT | Mod: 26 | Performed by: PATHOLOGY

## 2025-02-28 PROCEDURE — 700102 HCHG RX REV CODE 250 W/ 637 OVERRIDE(OP): Performed by: HOSPITALIST

## 2025-02-28 PROCEDURE — 160002 HCHG RECOVERY MINUTES (STAT): Performed by: STUDENT IN AN ORGANIZED HEALTH CARE EDUCATION/TRAINING PROGRAM

## 2025-02-28 PROCEDURE — 8968 PR NO CHARGE - PROCEDURE: Mod: 80ROC,T3 | Performed by: STUDENT IN AN ORGANIZED HEALTH CARE EDUCATION/TRAINING PROGRAM

## 2025-02-28 PROCEDURE — 99232 SBSQ HOSP IP/OBS MODERATE 35: CPT | Performed by: STUDENT IN AN ORGANIZED HEALTH CARE EDUCATION/TRAINING PROGRAM

## 2025-02-28 PROCEDURE — 85027 COMPLETE CBC AUTOMATED: CPT

## 2025-02-28 PROCEDURE — 700111 HCHG RX REV CODE 636 W/ 250 OVERRIDE (IP): Mod: JZ | Performed by: STUDENT IN AN ORGANIZED HEALTH CARE EDUCATION/TRAINING PROGRAM

## 2025-02-28 PROCEDURE — 87015 SPECIMEN INFECT AGNT CONCNTJ: CPT

## 2025-02-28 PROCEDURE — 770001 HCHG ROOM/CARE - MED/SURG/GYN PRIV*

## 2025-02-28 PROCEDURE — 88305 TISSUE EXAM BY PATHOLOGIST: CPT | Mod: 26 | Performed by: PATHOLOGY

## 2025-02-28 PROCEDURE — A9270 NON-COVERED ITEM OR SERVICE: HCPCS

## 2025-02-28 PROCEDURE — 700105 HCHG RX REV CODE 258: Performed by: STUDENT IN AN ORGANIZED HEALTH CARE EDUCATION/TRAINING PROGRAM

## 2025-02-28 RX ORDER — ONDANSETRON 2 MG/ML
4 INJECTION INTRAMUSCULAR; INTRAVENOUS
Status: DISCONTINUED | OUTPATIENT
Start: 2025-02-28 | End: 2025-02-28 | Stop reason: HOSPADM

## 2025-02-28 RX ORDER — LIDOCAINE HYDROCHLORIDE 20 MG/ML
INJECTION, SOLUTION EPIDURAL; INFILTRATION; INTRACAUDAL; PERINEURAL PRN
Status: DISCONTINUED | OUTPATIENT
Start: 2025-02-28 | End: 2025-02-28 | Stop reason: SURG

## 2025-02-28 RX ORDER — SODIUM CHLORIDE, SODIUM LACTATE, POTASSIUM CHLORIDE, CALCIUM CHLORIDE 600; 310; 30; 20 MG/100ML; MG/100ML; MG/100ML; MG/100ML
INJECTION, SOLUTION INTRAVENOUS
Status: DISCONTINUED | OUTPATIENT
Start: 2025-02-28 | End: 2025-02-28 | Stop reason: SURG

## 2025-02-28 RX ORDER — DEXTROSE MONOHYDRATE 25 G/50ML
25 INJECTION, SOLUTION INTRAVENOUS
Status: DISCONTINUED | OUTPATIENT
Start: 2025-02-28 | End: 2025-02-28 | Stop reason: HOSPADM

## 2025-02-28 RX ORDER — LABETALOL HYDROCHLORIDE 5 MG/ML
5 INJECTION, SOLUTION INTRAVENOUS
Status: DISCONTINUED | OUTPATIENT
Start: 2025-02-28 | End: 2025-02-28 | Stop reason: HOSPADM

## 2025-02-28 RX ORDER — OXYCODONE HCL 5 MG/5 ML
10 SOLUTION, ORAL ORAL
Status: COMPLETED | OUTPATIENT
Start: 2025-02-28 | End: 2025-02-28

## 2025-02-28 RX ORDER — AMOXICILLIN 250 MG
2 CAPSULE ORAL EVERY EVENING
Status: DISCONTINUED | OUTPATIENT
Start: 2025-02-28 | End: 2025-03-01 | Stop reason: HOSPADM

## 2025-02-28 RX ORDER — MORPHINE SULFATE 4 MG/ML
3 INJECTION INTRAVENOUS EVERY 4 HOURS PRN
Status: DISCONTINUED | OUTPATIENT
Start: 2025-02-28 | End: 2025-03-01 | Stop reason: HOSPADM

## 2025-02-28 RX ORDER — DEXMEDETOMIDINE HYDROCHLORIDE 100 UG/ML
INJECTION, SOLUTION INTRAVENOUS PRN
Status: DISCONTINUED | OUTPATIENT
Start: 2025-02-28 | End: 2025-02-28 | Stop reason: SURG

## 2025-02-28 RX ORDER — INSULIN LISPRO 100 [IU]/ML
2-9 INJECTION, SOLUTION INTRAVENOUS; SUBCUTANEOUS EVERY 6 HOURS
Status: DISCONTINUED | OUTPATIENT
Start: 2025-02-28 | End: 2025-02-28 | Stop reason: HOSPADM

## 2025-02-28 RX ORDER — POLYETHYLENE GLYCOL 3350 17 G/17G
1 POWDER, FOR SOLUTION ORAL
Status: DISCONTINUED | OUTPATIENT
Start: 2025-02-28 | End: 2025-03-01 | Stop reason: HOSPADM

## 2025-02-28 RX ORDER — ALBUTEROL SULFATE 5 MG/ML
2.5 SOLUTION RESPIRATORY (INHALATION)
Status: DISCONTINUED | OUTPATIENT
Start: 2025-02-28 | End: 2025-02-28 | Stop reason: HOSPADM

## 2025-02-28 RX ORDER — DIPHENHYDRAMINE HYDROCHLORIDE 50 MG/ML
12.5 INJECTION, SOLUTION INTRAMUSCULAR; INTRAVENOUS
Status: DISCONTINUED | OUTPATIENT
Start: 2025-02-28 | End: 2025-02-28 | Stop reason: HOSPADM

## 2025-02-28 RX ORDER — ONDANSETRON 2 MG/ML
INJECTION INTRAMUSCULAR; INTRAVENOUS PRN
Status: DISCONTINUED | OUTPATIENT
Start: 2025-02-28 | End: 2025-02-28 | Stop reason: SURG

## 2025-02-28 RX ORDER — EPHEDRINE SULFATE 50 MG/ML
5 INJECTION, SOLUTION INTRAVENOUS
Status: DISCONTINUED | OUTPATIENT
Start: 2025-02-28 | End: 2025-02-28 | Stop reason: HOSPADM

## 2025-02-28 RX ORDER — HALOPERIDOL 5 MG/ML
1 INJECTION INTRAMUSCULAR
Status: DISCONTINUED | OUTPATIENT
Start: 2025-02-28 | End: 2025-02-28 | Stop reason: HOSPADM

## 2025-02-28 RX ORDER — DEXAMETHASONE SODIUM PHOSPHATE 4 MG/ML
INJECTION, SOLUTION INTRA-ARTICULAR; INTRALESIONAL; INTRAMUSCULAR; INTRAVENOUS; SOFT TISSUE PRN
Status: DISCONTINUED | OUTPATIENT
Start: 2025-02-28 | End: 2025-02-28 | Stop reason: SURG

## 2025-02-28 RX ORDER — OXYCODONE HCL 5 MG/5 ML
5 SOLUTION, ORAL ORAL
Status: COMPLETED | OUTPATIENT
Start: 2025-02-28 | End: 2025-02-28

## 2025-02-28 RX ORDER — HYDRALAZINE HYDROCHLORIDE 20 MG/ML
5 INJECTION INTRAMUSCULAR; INTRAVENOUS
Status: DISCONTINUED | OUTPATIENT
Start: 2025-02-28 | End: 2025-02-28 | Stop reason: HOSPADM

## 2025-02-28 RX ADMIN — INSULIN LISPRO 3 UNITS: 100 INJECTION, SOLUTION INTRAVENOUS; SUBCUTANEOUS at 21:54

## 2025-02-28 RX ADMIN — PROPOFOL 200 MG: 10 INJECTION, EMULSION INTRAVENOUS at 13:38

## 2025-02-28 RX ADMIN — SENNOSIDES AND DOCUSATE SODIUM 2 TABLET: 50; 8.6 TABLET ORAL at 17:40

## 2025-02-28 RX ADMIN — GABAPENTIN 300 MG: 300 CAPSULE ORAL at 17:40

## 2025-02-28 RX ADMIN — AMPICILLIN AND SULBACTAM 3 G: 1; 2 INJECTION, POWDER, FOR SOLUTION INTRAMUSCULAR; INTRAVENOUS at 21:53

## 2025-02-28 RX ADMIN — SODIUM CHLORIDE, POTASSIUM CHLORIDE, SODIUM LACTATE AND CALCIUM CHLORIDE: 600; 310; 30; 20 INJECTION, SOLUTION INTRAVENOUS at 13:37

## 2025-02-28 RX ADMIN — AMLODIPINE BESYLATE 5 MG: 5 TABLET ORAL at 04:18

## 2025-02-28 RX ADMIN — AMPICILLIN AND SULBACTAM 3 G: 1; 2 INJECTION, POWDER, FOR SOLUTION INTRAMUSCULAR; INTRAVENOUS at 04:17

## 2025-02-28 RX ADMIN — DEXMEDETOMIDINE HYDROCHLORIDE 10 MCG: 100 INJECTION, SOLUTION INTRAVENOUS at 13:44

## 2025-02-28 RX ADMIN — HYDROCODONE BITARTRATE AND ACETAMINOPHEN 1 TABLET: 10; 325 TABLET ORAL at 08:39

## 2025-02-28 RX ADMIN — FENTANYL CITRATE 50 MCG: 50 INJECTION, SOLUTION INTRAMUSCULAR; INTRAVENOUS at 14:33

## 2025-02-28 RX ADMIN — OXYCODONE HYDROCHLORIDE 10 MG: 5 SOLUTION ORAL at 14:26

## 2025-02-28 RX ADMIN — HYDROCODONE BITARTRATE AND ACETAMINOPHEN 1 TABLET: 10; 325 TABLET ORAL at 20:07

## 2025-02-28 RX ADMIN — POLYETHYLENE GLYCOL 3350 1 PACKET: 17 POWDER, FOR SOLUTION ORAL at 04:30

## 2025-02-28 RX ADMIN — LINEZOLID 600 MG: 600 TABLET, FILM COATED ORAL at 04:18

## 2025-02-28 RX ADMIN — AMPICILLIN AND SULBACTAM 3 G: 1; 2 INJECTION, POWDER, FOR SOLUTION INTRAMUSCULAR; INTRAVENOUS at 15:50

## 2025-02-28 RX ADMIN — ONDANSETRON 4 MG: 2 INJECTION INTRAMUSCULAR; INTRAVENOUS at 13:52

## 2025-02-28 RX ADMIN — FENTANYL CITRATE 50 MCG: 50 INJECTION, SOLUTION INTRAMUSCULAR; INTRAVENOUS at 14:29

## 2025-02-28 RX ADMIN — DEXAMETHASONE SODIUM PHOSPHATE 4 MG: 4 INJECTION INTRA-ARTICULAR; INTRALESIONAL; INTRAMUSCULAR; INTRAVENOUS; SOFT TISSUE at 13:41

## 2025-02-28 RX ADMIN — CYCLOBENZAPRINE 10 MG: 10 TABLET, FILM COATED ORAL at 04:21

## 2025-02-28 RX ADMIN — LIDOCAINE HYDROCHLORIDE 100 MG: 20 INJECTION, SOLUTION EPIDURAL; INFILTRATION; INTRACAUDAL; PERINEURAL at 13:38

## 2025-02-28 RX ADMIN — FENTANYL CITRATE 100 MCG: 50 INJECTION, SOLUTION INTRAMUSCULAR; INTRAVENOUS at 13:38

## 2025-02-28 ASSESSMENT — PAIN DESCRIPTION - PAIN TYPE
TYPE: ACUTE PAIN
TYPE: ACUTE PAIN;SURGICAL PAIN
TYPE: ACUTE PAIN
TYPE: SURGICAL PAIN
TYPE: SURGICAL PAIN

## 2025-02-28 ASSESSMENT — ENCOUNTER SYMPTOMS
PALPITATIONS: 0
DIZZINESS: 0
HEADACHES: 0
COUGH: 0
BACK PAIN: 0
SENSORY CHANGE: 0
FEVER: 0
EYE PAIN: 0
VOMITING: 0
ABDOMINAL PAIN: 0
NAUSEA: 0
INSOMNIA: 0
SHORTNESS OF BREATH: 0
FOCAL WEAKNESS: 0
CHILLS: 0
BLURRED VISION: 0

## 2025-02-28 ASSESSMENT — LIFESTYLE VARIABLES: SUBSTANCE_ABUSE: 0

## 2025-02-28 NOTE — OP REPORT
Operative Report    Date of Surgery: 2/28/2025    Operating Surgeon:   Primary: Jamar Coronado M.D.  Fellow: Ismael Brown M.D.      Assistant:  Ismael Brown MD - ortho trauma fellow    Preoperative diagnosis:  Left 4th toe osteomyelitis    Postoperative diagnosis:   Left 4th toe osteomyelitis    Procedure:  Left 4th toe amputation through metatarsophalangeal joint    Implants: none    Indications:  Aviva Kenney 50 y.o. female DMII, CKD, and peripheral neuropathy with progressive skin ulceration and wound to left 4th toe. MRI concerning for underlying osteomyelitis to the distal and middle phalanx of the digit.     Discussed clinical, physical, and imaging findings with patient. Considering the significance of the wound and infection, and unreliable outcome with nonoperative management, the patient was indicated for operative intervention. Surgery consists of left 4th toe amputation. I discussed the clinical, physical, and imaging findings with patient and I answered any questions.     Risks, benefits, and alternatives discussed with patient. Risks for surgery may include bleeding, infection, pain, persistent dysfunction, damage to surrounding neurovascular structures, anesthesia complications, operative failure, stroke, DVT, or death. Patient agrees to proceed with surgery. Informed consent obtained.    Description:    Patient was taken to the operating room and placed supine on the operating room table. Prepped and drapped in usual sterile fashion. A surgical timeout was performed to verify the patient, laterality of surgery, planned procedure, and receipt of perioperative antibiotics (ancef).    A fishmouth incision was made at the base of the fourth toe.  Deep dissection down to bone was performed.  The metatarsal phalangeal joint was identified and incised.  Amputation of the digit was then completed.  The wound was then debrided bluntly and sharply for any unhealthy tissue.  It  was irrigated copiously.  One gram of vancomycin powder and 1.2 grams tobramycin powder were dispersed throughout the wound. A layered closure was performed. A soft compressive dressing was applied.    The amputated digit was then incised.  A bone sample from the middle phalanx was then obtained and sent for culture.    The patient was awoken from anesthesia and transferred off the operating table without complication.     Specimens: 4th toe bone for culture    Anesthesia type: laryngeal mask anesthesia    Blood loss: 5 cc    Drains: none    Wound Classification: IV, Dirty or Infected.    Blood Products Administered: none    Postoperative condition: Stable    Postoperative Plan:  - Heel weightbearing until wound heals  - ROM: As Tolerated  - Antibiotics: per ID  - Pain Control: Per protocol  - DVT Prophylaxis: Per protocol until discharge, none at discharge  - Disposition: to floor, RTC 2-3 weeks PAVAN ortho trauma PA for wound check

## 2025-02-28 NOTE — PROGRESS NOTES
[facility] Medicine Daily Progress Note    Date of Service  2/28/2025    Chief Complaint  Aviva Kenney is a 50 y.o. female admitted 2/27/2025 with left toe pain/swelling.    Hospital Course    Aviva Kenney is a 50 y.o. female who presented 2/27/2025 with history of type 2 diabetes chronic kidney disease chronic back pain diabetic neuropathy presented to local emergency department for evaluation of pain and swelling in her left fourth toe.  She reports that she had a skin ulcer for several months which improved with wound care however over the past 2 to 3 days she has noted increased redness and swelling associated with fever 103.4 and drainage from her left fourth toe.  She presented to local emergency department where an x-ray revealed mild permeative appearance of the distal lateral aspect of the proximal phalanx concerning for early osteomyelitis.  She received IV Unasyn and vancomycin and was transferred to our facility  Patient has chronic kidney disease she is established with Dr. Gan her most recent chemistry panel revealed GFR of 37 creatinine 1.69 and most recent hemoglobin A1c is 5.6.  At the outlying facility her creatinine was 2.95    Interval Problem Update  No acute events overnight.  Patient reports ongoing headache and toe pain.  MRI foot reviewed showing osteomyelitis of left fourth toe phalange and surrounding cellulitis.  Orthopedic surgery consulted for likely amputation of toe today.  NPO, patient has not had any breakfast.  Continue antibiotics. Follow up operative cultures. MRSA nares negative, can stop zyvox.  Continue insulin sliding scale for diabetes, monitor.    I have discussed this patient's plan of care and discharge plan at IDT rounds today with Case Management, Nursing, Nursing leadership, and other members of the IDT team.    Consultants/Specialty  orthopedics    Code Status  Full Code    Disposition  The patient is not medically cleared for discharge to  home or a post-acute facility.      I have placed the appropriate orders for post-discharge needs.    Review of Systems  Review of Systems   Constitutional:  Negative for chills and fever.   Eyes:  Negative for blurred vision and pain.   Respiratory:  Negative for cough and shortness of breath.    Cardiovascular:  Negative for chest pain, palpitations and leg swelling.   Gastrointestinal:  Negative for abdominal pain, nausea and vomiting.   Genitourinary:  Negative for dysuria and urgency.   Musculoskeletal:  Negative for back pain.        +toe pain and swelling   Skin:  Negative for itching and rash.   Neurological:  Negative for dizziness, sensory change, focal weakness and headaches.   Psychiatric/Behavioral:  Negative for substance abuse. The patient does not have insomnia.         Physical Exam  Temp:  [36.5 °C (97.7 °F)-36.7 °C (98.1 °F)] 36.7 °C (98.1 °F)  Pulse:  [91-99] 94  Resp:  [15-17] 16  BP: (135-177)/(80-99) 160/84  SpO2:  [90 %-96 %] 93 %    Physical Exam  Vitals reviewed.   Constitutional:       General: She is not in acute distress.     Appearance: She is not diaphoretic.   HENT:      Head: Normocephalic and atraumatic.      Right Ear: External ear normal.      Left Ear: External ear normal.      Nose: Nose normal. No congestion.      Mouth/Throat:      Pharynx: No oropharyngeal exudate or posterior oropharyngeal erythema.   Eyes:      Extraocular Movements: Extraocular movements intact.      Pupils: Pupils are equal, round, and reactive to light.   Cardiovascular:      Rate and Rhythm: Normal rate and regular rhythm.   Pulmonary:      Effort: Pulmonary effort is normal. No respiratory distress.      Breath sounds: Normal breath sounds. No wheezing.   Abdominal:      General: Bowel sounds are normal. There is no distension.      Palpations: Abdomen is soft.      Tenderness: There is no abdominal tenderness.   Musculoskeletal:         General: No swelling. Normal range of motion.      Cervical back:  Normal range of motion and neck supple.      Right lower leg: No edema.      Left lower leg: No edema.      Comments: Left fourth toe dorsal skin wound, dark without drainage; surrounding erythema   Skin:     General: Skin is warm and dry.   Neurological:      General: No focal deficit present.      Mental Status: She is alert and oriented to person, place, and time.      Cranial Nerves: No cranial nerve deficit.      Motor: No weakness.   Psychiatric:         Mood and Affect: Mood normal.         Behavior: Behavior normal.         Fluids  No intake or output data in the 24 hours ending 02/28/25 0950     Laboratory  Recent Labs     02/27/25  1249 02/28/25  0153   WBC 15.2* 15.6*   RBC 3.12* 3.13*   HEMOGLOBIN 9.7* 9.8*   HEMATOCRIT 28.8* 29.1*   MCV 92.3 93.0   MCH 31.1 31.3   MCHC 33.7 33.7   RDW 39.7 40.2   PLATELETCT 237 288   MPV 12.0 11.4     Recent Labs     02/27/25  1249 02/28/25  0153   SODIUM 131* 132*   POTASSIUM 4.9 4.3   CHLORIDE 97 99   CO2 21 23   GLUCOSE 170* 140*   BUN 41* 41*   CREATININE 2.46* 2.13*   CALCIUM 8.4* 9.1                   Imaging  MR-FOOT-W/O LEFT   Final Result      1.  Marrow edema involving the fourth proximal and middle phalanges consistent with osteomyelitis.      2.  Cellulitis in involving the fourth toe extending through the mid and forefoot.      3.  Degenerative change of the first MTP and first IP joints.      US-EXTREMITY ARTERY LOWER BILAT W/TONJA (COMBO)    (Results Pending)        Assessment/Plan  * Diabetic foot infection (HCC)- (present on admission)  Assessment & Plan  X-ray with changes concerning for possible osteomyelitis  MRI shows findings consistent with osteomyelitis of toe  Ortho consulted, NPO, plan for surgery today, likely amputation  Stop zyvox, mrsa nares negative  Continue unasyn  Follow up cultures    Primary hypertension- (present on admission)  Assessment & Plan  Continue amlodipine  Hold lisinopril for now given BUTCH  Monitor blood pressure adjust  accordingly  I ordered as needed labetalol    BUTCH (acute kidney injury) (HCC)- (present on admission)  Assessment & Plan  Insetting of chronic kidney disease stage III    Hold Lasix and lisinopril  Gentle hydration with half liter of NS  Avoid nephrotoxic agents  Improved with IV fluids and infection treatment  monitor    Type 2 diabetes mellitus (HCC)- (present on admission)  Assessment & Plan  With hyperglycemia and diabetic nephropathy and polyneuropathy    Patient has been on Ozempic monotherapy per her report lasting globin A1c is not at goal  Continue ISS, adjust as needed    Chronic bilateral low back pain- (present on admission)  Assessment & Plan  Continue home meds with Norco and Flexeril         VTE prophylaxis: VTE Selection    I have performed a physical exam and reviewed and updated ROS and Plan today (2/28/2025). In review of yesterday's note (2/27/2025), there are no changes except as documented above.

## 2025-02-28 NOTE — CONSULTS
Orthopaedic Surgery Consult    Date: 2/28/2025     History Present Illness    Aviva Kenney 50 y.o. female DMII, CKD, and peripheral neuropathy with progressive skin ulceration and wound to left 4th toe. MRI concerning for underlying osteomyelitis to the distal and middle phalanx of the digit.    Past Medical History:  Active Ambulatory Problems     Diagnosis Date Noted    Chronic bilateral low back pain 10/03/2022    Type 2 diabetes mellitus (HCC) 10/03/2022    Atypical mole 10/03/2022    History of melanoma 10/03/2022    History of right cataract extraction 10/03/2022    Abnormal liver enzymes 09/01/2022    BUTCH (acute kidney injury) (HCC) 09/01/2022    Hyperkalemia 09/01/2022    Morbid obesity (HCC) 09/01/2022    Pyelonephritis 09/01/2022    History of COVID-19 11/16/2022    Positive for microalbuminuria 11/21/2022    Primary hypertension 02/06/2023    Bilateral shoulder pain 02/06/2023    Vaginal dryness 03/06/2023    Stress at home 03/06/2023    Bilateral lower extremity edema 06/05/2023    S/P bariatric surgery 06/05/2023    Itchy skin 06/05/2023    Family history of gout 06/05/2023    Leg cramps 06/05/2023    Hyperlipidemia 01/05/2024    Vitamin D deficiency 01/05/2024    Screening for colorectal cancer 01/05/2024    Other fatigue 01/05/2024    Encounter for screening mammogram for breast cancer 01/05/2024    Pain of left calf 01/05/2024    Rash 05/22/2024     Resolved Ambulatory Problems     Diagnosis Date Noted    Pre-op evaluation 12/13/2022     Past Medical History:   Diagnosis Date    Cataract     Diabetes (HCC)     Infectious disease 10/2022       Past Surgical History:  Past Surgical History:   Procedure Laterality Date    OR UPPER GI ENDOSCOPY,DIAGNOSIS N/A 12/07/2022    Procedure: ESOPHAGOGASTRODUODENOSCOPY (EGD) WITH BRUSHINGS AND DILATATION VS OVER THE SCOPE CLIP PLACEMENT;  Surgeon: Bridger Gongora M.D.;  Location: SURGERY [facility];  Service: General    OR UPPER GI ENDOSCOPY,CTRL  BLEED N/A 12/07/2022    Procedure: EGD, WITH CLIP PLACEMENT;  Surgeon: Bridger Gongora M.D.;  Location: SURGERY [facility];  Service: General    ABDOMINAL HYSTERECTOMY TOTAL      CATARACT EXTRACTION WITH IOL Left     CHOLECYSTECTOMY      GASTRIC RESECTION      HYSTERECTOMY, TOTAL ABDOMINAL      OVARIAN CYSTECTOMY Bilateral     PRIMARY C SECTION      x 2    TONSILLECTOMY         Medications:  Medications Prior to Admission   Medication Sig Dispense Refill Last Dose/Taking    amLODIPine (NORVASC) 5 MG Tab Take 1 Tablet by mouth every day. 100 Tablet 3 2/26/2025 Morning    Cholecalciferol (VITAMIN D-3) 125 MCG (5000 UT) Tab Take  by mouth.   2/26/2025 Morning    furosemide (LASIX) 20 MG Tab Take 1 Tablet by mouth 2 times a day. (Patient taking differently: Take 20 mg by mouth 2 times a day as needed. Indications: Edema, LLE swelling) 180 Tablet 3 2/24/2025    HYDROcodone/acetaminophen (NORCO)  MG Tab 1 Tablet every 6 hours as needed.   2/27/2025 Morning    cyclobenzaprine (FLEXERIL) 10 mg Tab 3 times daily   2/26/2025 at 11:59 PM    gabapentin (NEURONTIN) 300 MG Cap    2/26/2025 Bedtime    Semaglutide, 1 MG/DOSE, (OZEMPIC, 1 MG/DOSE,) 4 MG/3ML Solution Pen-injector Inject 1 mg under the skin every 7 days. 3 mL 2 2/23/2025    clobetasol (TEMOVATE) 0.05 % external solution 1 Application as needed (rash).       hydrOXYzine HCl (ATARAX) 25 MG Tab 1/2-1 tab up to tid prn itching 90 Tablet 3 2/22/2025    Insulin Pen Needle (NOVOFINE PEN NEEDLE) 32G X 6 MM Misc Use with insulin pen as directed before bed 100 Each 3     insulin lispro (HUMALOG) 100 UNIT/ML INJ Inject 10 units under skin 3 times a day before meals (Patient taking differently: as needed for High Blood Sugar. Inject 10 units under skin 3 times a day before meals) 30 mL 3 2/23/2025    omeprazole (PRILOSEC) 20 MG delayed-release capsule as needed (reflux).   1/1/2025    potassium chloride ER (KLOR-CON) 10 MEQ tablet 1 tab PO once daily only on the days you  "take 2  tabs lasix (Patient taking differently: 1 time a day as needed. 1 tab PO once daily only on the days you take 2  tabs lasix) 90 Tablet 3 2/24/2025    BD INSULIN SYRINGE U/F 1/2UNIT 31G X 5/16\" 0.3 ML Misc Use to inject insulin tid before meals as directed 300 Each 3 2/23/2025    estradiol (ESTRACE) 0.1 MG/GM vaginal cream Apply applicatorful into vagina M, W and Friday (Patient taking differently: as needed. Apply applicatorful into vagina M, W and Friday) 42.5 g 3 2/13/2025    insulin glargine 100 UNIT/ML SC SOPN injection Inject 25 units SQ into abd qhs; increase by 3 units every 2 nights until krn=671; max 40 units per night (Patient taking differently: as needed. Inject 25 units SQ into abd qhs; increase by 3 units every 2 nights until vdx=692; max 40 units per night) 12 Each 3 2/24/2025     Current Facility-Administered Medications   Medication Dose Route Frequency Provider Last Rate Last Admin    [MAR Hold] senna-docusate (Pericolace Or Senokot S) 8.6-50 MG per tablet 2 Tablet  2 Tablet Oral Q EVENING Lyle Leavitt P.A.-C.        And    [MAR Hold] polyethylene glycol/lytes (Miralax) Packet 1 Packet  1 Packet Oral QDAY PRN CHANTE Wheeler.A.-C.   1 Packet at 02/28/25 0430    [MAR Hold] morphine 4 MG/ML injection 3 mg  3 mg Intravenous Q4HRS PRN Goldy Gipson M.D.        [MAR Hold] amLODIPine (Norvasc) tablet 5 mg  5 mg Oral DAILY Loy Peguero M.D.   5 mg at 02/28/25 0418    [MAR Hold] cyclobenzaprine (Flexeril) tablet 10 mg  10 mg Oral TID PRN Loy Peguero M.D.   10 mg at 02/28/25 0421    [MAR Hold] gabapentin (Neurontin) capsule 300 mg  300 mg Oral Q EVENING Loy Peguero M.D.   300 mg at 02/27/25 1814    [MAR Hold] HYDROcodone/acetaminophen (Norco)  MG per tablet 1 Tablet  1 Tablet Oral Q6HRS PRN Loy Peguero M.D.   1 Tablet at 02/28/25 0839    [MAR Hold] omeprazole (PriLOSEC) capsule 20 mg  20 mg Oral QDAY PRN Loy Peguero M.D.        [Held by " provider] enoxaparin (Lovenox) inj 40 mg  40 mg Subcutaneous DAILY AT 1800 Loy Peguero M.D.   40 mg at 02/27/25 1815    [MAR Hold] acetaminophen (Tylenol) tablet 650 mg  650 mg Oral Q6HRS PRN Loy Peguero M.D.        [MAR Hold] labetalol (Normodyne/Trandate) injection 10 mg  10 mg Intravenous Q4HRS PRN Loy Peguero M.D.        [MAR Hold] ondansetron (Zofran) syringe/vial injection 4 mg  4 mg Intravenous Q4HRS PRN Loy Peguero M.D.        [MAR Hold] ondansetron (Zofran ODT) dispertab 4 mg  4 mg Oral Q4HRS PRN Loy Peguero M.D.        [MAR Hold] promethazine (Phenergan) tablet 12.5-25 mg  12.5-25 mg Oral Q4HRS PRN Loy Peguero M.D.        [MAR Hold] promethazine (Phenergan) suppository 12.5-25 mg  12.5-25 mg Rectal Q4HRS PRN Loy Peguero M.D.        [MAR Hold] prochlorperazine (Compazine) injection 5-10 mg  5-10 mg Intravenous Q4HRS PRN Loy Peguero M.D.        [MAR Hold] insulin lispro (HumaLOG,AdmeLOG) subcutaneous injection  2-9 Units Subcutaneous 4X/DAY ACHPHILLY Peguero M.D.   2 Units at 02/27/25 1334    And    [MAR Hold] dextrose 50% (D50W) injection 25 g  25 g Intravenous Q15 MIN PRN Loy Peguero M.D.        [MAR Hold] ampicillin/sulbactam (Unasyn) 3 g in  mL IVPB  3 g Intravenous Q6HRS Loy Peguero M.D.   Stopped at 02/28/25 0447       Allergies:  Patient has no known allergies.    Family History:  Family History   Problem Relation Age of Onset    Breast Cancer Mother     Diabetes Mother     Heart Disease Mother     Hypertension Mother     Hypertension Father     Heart Disease Father     Diabetes Father     Diabetes Brother        Social History:  Social History     Socioeconomic History    Marital status:     Highest education level: Some college, no degree   Occupational History    Occupation: disabled for lower back   Tobacco Use    Smoking status: Never    Smokeless tobacco: Never   Vaping Use    Vaping  status: Never Used   Substance and Sexual Activity    Alcohol use: Not Currently     Comment: occ    Drug use: Yes     Types: Marijuana, Oral     Comment: occ     Social Drivers of Health     Financial Resource Strain: Medium Risk (6/23/2024)    Overall Financial Resource Strain (CARDIA)     Difficulty of Paying Living Expenses: Somewhat hard   Food Insecurity: Food Insecurity Present (2/27/2025)    Hunger Vital Sign     Worried About Running Out of Food in the Last Year: Sometimes true     Ran Out of Food in the Last Year: Never true   Transportation Needs: No Transportation Needs (2/27/2025)    PRAPARE - Transportation     Lack of Transportation (Medical): No     Lack of Transportation (Non-Medical): No   Physical Activity: Sufficiently Active (6/23/2024)    Exercise Vital Sign     Days of Exercise per Week: 3 days     Minutes of Exercise per Session: 50 min   Stress: Stress Concern Present (6/23/2024)    [place] [employer] - Occupational Stress Questionnaire     Feeling of Stress : To some extent   Social Connections: Moderately Isolated (6/23/2024)    Social Connection and Isolation Panel [NHANES]     Frequency of Communication with Friends and Family: More than three times a week     Frequency of Social Gatherings with Friends and Family: More than three times a week     Attends Jainism Services: Never     Active Member of Clubs or Organizations: No     Attends Club or Organization Meetings: Patient declined     Marital Status:    Intimate Partner Violence: Not At Risk (2/27/2025)    Humiliation, Afraid, Rape, and Kick questionnaire     Fear of Current or Ex-Partner: No     Emotionally Abused: No     Physically Abused: No     Sexually Abused: No   Housing Stability: Low Risk  (2/27/2025)    Housing Stability Vital Sign     Unable to Pay for Housing in the Last Year: No     Number of Times Moved in the Last Year: 0     Homeless in the Last Year: No       ROS:  Complete ROS  performed. ROS otherwise negative except for pertinent positives, negatives noted above in HPI.    Physical Exam     Vitals:    02/28/25 1217   BP: (!) 143/81   Pulse: 95   Resp: 18   Temp: 36.1 °C (97 °F)   SpO2: 96%       Physical Exam  General: NAD    Lungs: No increased work of breathing  Heart: Regular rate by palpation of peripheral pulse    LLE  4th toe with dorsal ulceration and regional erythema, + drainage  Decreased sensation throughout foot  Motor intact EHL/FHL/TA/GS  Foot WWP    Labs, Imaging   Imaging:   MR-FOOT-W/O LEFT   Final Result      1.  Marrow edema involving the fourth proximal and middle phalanges consistent with osteomyelitis.      2.  Cellulitis in involving the fourth toe extending through the mid and forefoot.      3.  Degenerative change of the first MTP and first IP joints.      US-EXTREMITY ARTERY LOWER BILAT W/TONJA (COMBO)    (Results Pending)       Laboratory Values  Recent Labs     02/27/25  1249 02/28/25  0153   WBC 15.2* 15.6*   RBC 3.12* 3.13*   HEMOGLOBIN 9.7* 9.8*   HEMATOCRIT 28.8* 29.1*   MCV 92.3 93.0   MCH 31.1 31.3   MCHC 33.7 33.7   RDW 39.7 40.2   PLATELETCT 237 288   MPV 12.0 11.4     Recent Labs     02/27/25  1249 02/28/25  0153   SODIUM 131* 132*   POTASSIUM 4.9 4.3   CHLORIDE 97 99   CO2 21 23   GLUCOSE 170* 140*   BUN 41* 41*           Assessment   Aviva Lawson Kenney 50 y.o. female DMII, CKD, and peripheral neuropathy with progressive skin ulceration and wound to left 4th toe. MRI concerning for underlying osteomyelitis to the distal and middle phalanx of the digit.    Plan     Discussed clinical, physical, and imaging findings with patient. Considering the significance of the wound and infection, and unreliable outcome with nonoperative management, the patient was indicated for operative intervention. Surgery consists of left 4th toe amputation. I discussed the clinical, physical, and imaging findings with patient and I answered any questions.    Risks,  benefits, and alternatives discussed with patient. Risks for surgery may include bleeding, infection, pain, persistent dysfunction, damage to surrounding neurovascular structures, anesthesia complications, operative failure, stroke, DVT, or death. Patient agrees to proceed with surgery. Informed consent obtained.      Signed:  Jamar Coronado M.D., MD  2/28/2025 1:10 PM

## 2025-02-28 NOTE — ANESTHESIA PROCEDURE NOTES
Airway    Date/Time: 2/28/2025 1:41 PM    Performed by: Jeffry Lora M.D.  Authorized by: Jeffry Lora M.D.    Location:  OR  Urgency:  Elective  Indications for Airway Management:  Anesthesia      Spontaneous Ventilation: absent    Sedation Level:  Deep  Preoxygenated: Yes    Mask Difficulty Assessment:  0 - not attempted  Final Airway Type:  Supraglottic airway  Final Supraglottic Airway:  Standard LMA    SGA Size:  4  Number of Attempts at Approach:  1

## 2025-02-28 NOTE — DISCHARGE PLANNING
Case Management Discharge Planning    Admission Date: 2/27/2025  GMLOS:    ALOS: 1    6-Clicks ADL Score: 24  6-Clicks Mobility Score: 24      Anticipated Discharge Dispo: Discharge Disposition: D/T to home under HHA care in anticipation of covered skilled care (06)  Discharge Address: [address] [place] [address], [facility]. [address], [facility] [address]  Discharge Contact Phone Number: [phone]    DME Needed: No    Action(s) Taken: Patient was discussed in IDT rounds.  Per MD, patient to OR today for toe amputation.  Anticipate DC to home with PO antibiotics in 1-2 days.      Escalations Completed: None    Medically Clear: No    Next Steps: RN CM to continue to follow for DC planning      Barriers to Discharge: Medical clearance

## 2025-02-28 NOTE — CARE PLAN
The patient is Stable - Low risk of patient condition declining or worsening         Progress made toward(s) clinical / shift goals:    Problem: Knowledge Deficit - Standard  Goal: Patient and family/care givers will demonstrate understanding of plan of care, disease process/condition, diagnostic tests and medications  Outcome: Progressing     Problem: Pain - Standard  Goal: Alleviation of pain or a reduction in pain to the patient’s comfort goal  Outcome: Progressing     Problem: Wound/ / Incision Healing  Goal: Patient's wound/surgical incision will decrease in size and heals properly  Outcome: Progressing       Patient is not progressing towards the following goals:

## 2025-02-28 NOTE — CARE PLAN
The patient is Stable - Low risk of patient condition declining or worsening    Shift Goals  Clinical Goals: IV Abx, U/S Lower Extremities, POC Pending, Wound Consult  Patient Goals: Rest    Progress made toward(s) clinical / shift goals:      Problem: Knowledge Deficit - Standard  Goal: Patient and family/care givers will demonstrate understanding of plan of care, disease process/condition, diagnostic tests and medications  Outcome: Progressing  Education provided on plan of care this shift. Questions answered. Patient verbalizes understanding.      Problem: Pain - Standard  Goal: Alleviation of pain or a reduction in pain to the patient’s comfort goal  Outcome: Progressing     Problem: Wound/ / Incision Healing  Goal: Patient's wound/surgical incision will decrease in size and heals properly  Outcome: Progressing

## 2025-02-28 NOTE — ANESTHESIA PREPROCEDURE EVALUATION
Case: [id number] Date/Time: 02/28/25 1342    Procedure: AMPUTATION, TOE-4TH (Left)    Location: [facility] OR 16 / SURGERY [facility]    Surgeons: Jamar Coronado M.D.            Relevant Problems   CARDIAC   (positive) Primary hypertension         (positive) BUTCH (acute kidney injury) (HCC)   (positive) Pyelonephritis      ENDO   (positive) Type 2 diabetes mellitus (HCC)       Physical Exam    Airway   Mallampati: II  TM distance: >3 FB  Neck ROM: full       Cardiovascular - normal exam  Rhythm: regular  Rate: normal  (-) murmur     Dental - normal exam  (+) upper dentures, lower dentures           Pulmonary - normal exam  Breath sounds clear to auscultation     Abdominal    Neurological - normal exam                   Anesthesia Plan    ASA 3   ASA physical status 3 criteria: diabetes - poorly controlled    Plan - general       Airway plan will be LMA          Induction: intravenous    Postoperative Plan: Postoperative administration of opioids is intended.    Pertinent diagnostic labs and testing reviewed    Informed Consent:    Anesthetic plan and risks discussed with patient.    Use of blood products discussed with: patient whom consented to blood products.

## 2025-02-28 NOTE — ANESTHESIA TIME REPORT
Anesthesia Start and Stop Event Times       Date Time Event    2/28/2025 1328 Ready for Procedure     1337 Anesthesia Start     1408 Anesthesia Stop          Responsible Staff  02/28/25      Name Role Begin Tree Teran Ma, M.D. Anesth 1331 1406          Anesthesia Time Report

## 2025-02-28 NOTE — ANESTHESIA POSTPROCEDURE EVALUATION
Patient: Aviva Kenney    Procedure Summary       Date: 02/28/25 Room / Location: [place] [address] / SURGERY [facility]    Anesthesia Start: 1337 Anesthesia Stop: 1408    Procedure: AMPUTATION, TOE-4TH (Left: Fourth Toe) Diagnosis: (LEFT 4TH TOE OSTEOMYELITIS)    Surgeons: Jamar Coronado M.D. Responsible Provider: Jeffry Lora M.D.    Anesthesia Type: general ASA Status: 3            Final Anesthesia Type: general  Last vitals  BP   Blood Pressure: 138/89    Temp   36.7 °C (98.1 °F)    Pulse   94   Resp   16    SpO2   93 %      Anesthesia Post Evaluation    Patient location during evaluation: PACU  Patient participation: complete - patient participated  Level of consciousness: awake and alert    Airway patency: patent  Anesthetic complications: no  Cardiovascular status: hemodynamically stable  Respiratory status: acceptable  Hydration status: euvolemic    PONV: none          No notable events documented.     Nurse Pain Score: 4 (NPRS)

## 2025-03-01 ENCOUNTER — PHARMACY VISIT (OUTPATIENT)
Dept: PHARMACY | Facility: MEDICAL CENTER | Age: 50
End: 2025-03-01
Payer: COMMERCIAL

## 2025-03-01 VITALS
BODY MASS INDEX: 31.8 KG/M2 | WEIGHT: 214.73 LBS | SYSTOLIC BLOOD PRESSURE: 145 MMHG | DIASTOLIC BLOOD PRESSURE: 80 MMHG | TEMPERATURE: 97.5 F | HEIGHT: 69 IN | OXYGEN SATURATION: 99 % | HEART RATE: 93 BPM | RESPIRATION RATE: 18 BRPM

## 2025-03-01 LAB
ANION GAP SERPL CALC-SCNC: 12 MMOL/L (ref 7–16)
BACTERIA WND AEROBE CULT: ABNORMAL
BACTERIA WND AEROBE CULT: ABNORMAL
BUN SERPL-MCNC: 48 MG/DL (ref 8–22)
CALCIUM SERPL-MCNC: 8.6 MG/DL (ref 8.5–10.5)
CHLORIDE SERPL-SCNC: 100 MMOL/L (ref 96–112)
CO2 SERPL-SCNC: 20 MMOL/L (ref 20–33)
CREAT SERPL-MCNC: 2.04 MG/DL (ref 0.5–1.4)
ERYTHROCYTE [DISTWIDTH] IN BLOOD BY AUTOMATED COUNT: 39.4 FL (ref 35.9–50)
GFR SERPLBLD CREATININE-BSD FMLA CKD-EPI: 30 ML/MIN/1.73 M 2
GLUCOSE BLD STRIP.AUTO-MCNC: 256 MG/DL (ref 65–99)
GLUCOSE SERPL-MCNC: 206 MG/DL (ref 65–99)
GRAM STN SPEC: ABNORMAL
HCT VFR BLD AUTO: 26.9 % (ref 37–47)
HGB BLD-MCNC: 9 G/DL (ref 12–16)
MCH RBC QN AUTO: 31.5 PG (ref 27–33)
MCHC RBC AUTO-ENTMCNC: 33.5 G/DL (ref 32.2–35.5)
MCV RBC AUTO: 94.1 FL (ref 81.4–97.8)
PLATELET # BLD AUTO: 260 K/UL (ref 164–446)
PMV BLD AUTO: 11.6 FL (ref 9–12.9)
POTASSIUM SERPL-SCNC: 5.2 MMOL/L (ref 3.6–5.5)
RBC # BLD AUTO: 2.86 M/UL (ref 4.2–5.4)
SIGNIFICANT IND 70042: ABNORMAL
SITE SITE: ABNORMAL
SODIUM SERPL-SCNC: 132 MMOL/L (ref 135–145)
SOURCE SOURCE: ABNORMAL
WBC # BLD AUTO: 12.8 K/UL (ref 4.8–10.8)

## 2025-03-01 PROCEDURE — 700111 HCHG RX REV CODE 636 W/ 250 OVERRIDE (IP): Mod: JZ | Performed by: HOSPITALIST

## 2025-03-01 PROCEDURE — 700102 HCHG RX REV CODE 250 W/ 637 OVERRIDE(OP): Performed by: STUDENT IN AN ORGANIZED HEALTH CARE EDUCATION/TRAINING PROGRAM

## 2025-03-01 PROCEDURE — 97162 PT EVAL MOD COMPLEX 30 MIN: CPT

## 2025-03-01 PROCEDURE — 82962 GLUCOSE BLOOD TEST: CPT

## 2025-03-01 PROCEDURE — A9270 NON-COVERED ITEM OR SERVICE: HCPCS | Performed by: HOSPITALIST

## 2025-03-01 PROCEDURE — RXMED WILLOW AMBULATORY MEDICATION CHARGE: Performed by: STUDENT IN AN ORGANIZED HEALTH CARE EDUCATION/TRAINING PROGRAM

## 2025-03-01 PROCEDURE — 700102 HCHG RX REV CODE 250 W/ 637 OVERRIDE(OP): Performed by: HOSPITALIST

## 2025-03-01 PROCEDURE — 85027 COMPLETE CBC AUTOMATED: CPT

## 2025-03-01 PROCEDURE — 80048 BASIC METABOLIC PNL TOTAL CA: CPT

## 2025-03-01 PROCEDURE — 700102 HCHG RX REV CODE 250 W/ 637 OVERRIDE(OP)

## 2025-03-01 PROCEDURE — A9270 NON-COVERED ITEM OR SERVICE: HCPCS | Performed by: STUDENT IN AN ORGANIZED HEALTH CARE EDUCATION/TRAINING PROGRAM

## 2025-03-01 PROCEDURE — 700105 HCHG RX REV CODE 258: Performed by: HOSPITALIST

## 2025-03-01 PROCEDURE — 99239 HOSP IP/OBS DSCHRG MGMT >30: CPT | Performed by: STUDENT IN AN ORGANIZED HEALTH CARE EDUCATION/TRAINING PROGRAM

## 2025-03-01 PROCEDURE — A9270 NON-COVERED ITEM OR SERVICE: HCPCS

## 2025-03-01 RX ORDER — GABAPENTIN 300 MG/1
300 CAPSULE ORAL 3 TIMES DAILY
Status: DISCONTINUED | OUTPATIENT
Start: 2025-03-01 | End: 2025-03-01 | Stop reason: HOSPADM

## 2025-03-01 RX ORDER — BISACODYL 10 MG
10 SUPPOSITORY, RECTAL RECTAL DAILY
Status: DISCONTINUED | OUTPATIENT
Start: 2025-03-01 | End: 2025-03-01 | Stop reason: HOSPADM

## 2025-03-01 RX ORDER — HYDROCODONE BITARTRATE AND ACETAMINOPHEN 10; 325 MG/1; MG/1
1 TABLET ORAL EVERY 6 HOURS PRN
Qty: 20 TABLET | Refills: 0 | Status: SHIPPED | OUTPATIENT
Start: 2025-03-01 | End: 2025-03-06

## 2025-03-01 RX ADMIN — AMPICILLIN AND SULBACTAM 3 G: 1; 2 INJECTION, POWDER, FOR SOLUTION INTRAMUSCULAR; INTRAVENOUS at 04:29

## 2025-03-01 RX ADMIN — BISACODYL 10 MG: 10 SUPPOSITORY RECTAL at 06:09

## 2025-03-01 RX ADMIN — INSULIN LISPRO 5 UNITS: 100 INJECTION, SOLUTION INTRAVENOUS; SUBCUTANEOUS at 08:54

## 2025-03-01 RX ADMIN — GABAPENTIN 300 MG: 300 CAPSULE ORAL at 12:43

## 2025-03-01 RX ADMIN — POLYETHYLENE GLYCOL 3350 1 PACKET: 17 POWDER, FOR SOLUTION ORAL at 05:37

## 2025-03-01 RX ADMIN — AMLODIPINE BESYLATE 5 MG: 5 TABLET ORAL at 04:27

## 2025-03-01 RX ADMIN — AMPICILLIN AND SULBACTAM 3 G: 1; 2 INJECTION, POWDER, FOR SOLUTION INTRAMUSCULAR; INTRAVENOUS at 10:04

## 2025-03-01 ASSESSMENT — GAIT ASSESSMENTS
ASSISTIVE DEVICE: FRONT WHEEL WALKER
GAIT LEVEL OF ASSIST: MODIFIED INDEPENDENT
DISTANCE (FEET): 60
DEVIATION: OTHER (COMMENT)

## 2025-03-01 ASSESSMENT — COGNITIVE AND FUNCTIONAL STATUS - GENERAL
MOBILITY SCORE: 24
SUGGESTED CMS G CODE MODIFIER MOBILITY: CH

## 2025-03-01 NOTE — DISCHARGE INSTRUCTIONS
Patient is to follow up with orthopedic surgery in 2 weeks for wound check.   Patient is to be heel weight bearing of left lower extremity until follow up with surgery team.

## 2025-03-01 NOTE — PROGRESS NOTES
Discharge orders received.  Patient arrived to the discharge lounge.  PIV removed by floor RN. Meds to beds medications verified by discharge RN, bag of medications given to patient.  Instructions given, medications reviewed and general discharge education provided to patient.  Follow up appointments discussed.  Patient verbalized understanding of dc instructions and prescriptions.  Patient signed discharge instructions.  Patient verbalized she had all belongings with her, Denied having any home medications locked in our inpatient pharmacy that  she needs back. Patient wheeled from discharge lounge to private vehicle. Patient left via car with daughter to home in stable condition.

## 2025-03-01 NOTE — THERAPY
Physical Therapy Contact Note    Patient Name: Aviva Kenney  Age:  50 y.o., Sex:  female  Medical Record #: [id number]  Today's Date: 3/1/2025    PT eval complete, full note to follow.  Needs a FWW for home discharge, no other PT needs at discharge.

## 2025-03-01 NOTE — DISCHARGE PLANNING
Case Management Discharge Planning    Admission Date: 2/27/2025  GMLOS:    ALOS: 2    6-Clicks ADL Score: 24  6-Clicks Mobility Score: 24      Anticipated Discharge Dispo: Discharge Disposition: D/T to home under A care in anticipation of covered skilled care (06)  Discharge Address: [address] [place] [address], [facility], [facility] [address]  Discharge Contact Phone Number: [phone]    DME Needed: Yes    DME Ordered: Yes    Action(s) Taken: Patient is medically cleared to discharge home. RNCM obtained FWW choice for [facility] and faxed to [facility]. Patient has a ride home.     Escalations Completed: None    Medically Clear: Yes    Next Steps: no other CM needs     Barriers to Discharge: None    Is the patient up for discharge tomorrow: No - today

## 2025-03-01 NOTE — DISCHARGE SUMMARY
Discharge Summary    CHIEF COMPLAINT ON ADMISSION  No chief complaint on file.      Reason for Admission  Orthopedics (Diabetic foot ulcer)     Admission Date  2/27/2025    CODE STATUS  Full Code    HPI & HOSPITAL COURSE  Aviva Kenney is a 50 y.o. female who presented 2/27/2025 with history of type 2 diabetes chronic kidney disease chronic back pain diabetic neuropathy presented to local emergency department for evaluation of pain and swelling in her left fourth toe.  She reports that she had a skin ulcer for several months which improved with wound care however over the past 2 to 3 days she has noted increased redness and swelling associated with fever 103.4 and drainage from her left fourth toe.  She presented to local emergency department where an x-ray revealed mild permeative appearance of the distal lateral aspect of the proximal phalanx concerning for early osteomyelitis.  She received IV Unasyn and vancomycin and was transferred to our facility. Here at [facility], MRI foot confirmed osteomyelitis of left fourth toe middle and proximal phalange with associated cellulitis. Patient also with acute kidney injury related to infection. Orthopedic surgery consulted who performed amputation of left fourth toe. Wound healing well. Patient treated with IV antibiotics during hospitalization, transitioned to oral antibiotics for toe wound and cellulitis. Patient is to follow up with orthopedic surgery in 2 weeks for wound check. Her acute kidney injury has improved by time of discharge. She is to follow up with orthopedic surgery, her PCP and her nephrologist. Physical therapy recommend walker for home, walker is ordered. Patient is to be heel weight bearing of left lower extremity until follow up with surgery team.           Therefore, she is discharged in fair and stable condition to home with close outpatient follow-up.    The patient met 2-midnight criteria for an inpatient stay at the time of  "discharge.    Discharge Date  3/1/2025    FOLLOW UP ITEMS POST DISCHARGE  Take medications as prescribed.  Follow up with orthopedic surgery, PCP, nephrology.    DISCHARGE DIAGNOSES  Principal Problem:    Diabetic foot infection (HCC) (POA: Yes)  Active Problems:    Chronic bilateral low back pain (POA: Yes)    Type 2 diabetes mellitus (HCC) (POA: Yes)    BUTCH (acute kidney injury) (HCC) (POA: Yes)    Primary hypertension (POA: Yes)  Resolved Problems:    * No resolved hospital problems. *      FOLLOW UP  Future Appointments   Date Time Provider Department Center   4/10/2025  1:30 PM Christina Gan M.D. [facility] [address] [place] [address]   6/10/2025  3:20 PM Barry Higgins P.A.-C. [facility] Kavon Coronado M.D.  [address] [facility] [address]  [phone]    Schedule an appointment as soon as possible for a visit in 2 week(s)        MEDICATIONS ON DISCHARGE     Medication List        START taking these medications        Instructions   amoxicillin-clavulanate 875-125 MG Tabs  Commonly known as: Augmentin   Take 1 Tablet by mouth 2 times a day for 5 days.  Dose: 1 Tablet            CHANGE how you take these medications        Instructions   HYDROcodone/acetaminophen  MG Tabs  What changed:   how to take this  reasons to take this  Commonly known as: Norco   Take 1 Tablet by mouth every 6 hours as needed for Severe Pain for up to 5 days.  Dose: 1 Tablet            CONTINUE taking these medications        Instructions   amLODIPine 5 MG Tabs  Commonly known as: Norvasc   Take 1 Tablet by mouth every day.  Dose: 5 mg     BD Insulin Syringe U/F 1/2Unit 31G X 5/16\" 0.3 ML Misc  Generic drug: Insulin Syringe-Needle U-100   Doctor's comments: E11.9  Use to inject insulin tid before meals as directed     clobetasol 0.05 % external solution  Commonly known as: Temovate   1 Application as needed (rash).  Dose: 1 Application     cyclobenzaprine 10 mg Tabs  Commonly known as: Flexeril   3 times daily     estradiol 0.1 " MG/GM vaginal cream  Commonly known as: Estrace   Apply applicatorful into vagina M, W and Friday     furosemide 20 MG Tabs  Commonly known as: Lasix   Take 1 Tablet by mouth 2 times a day.  Dose: 20 mg     gabapentin 300 MG Caps  Commonly known as: Neurontin      hydrOXYzine HCl 25 MG Tabs  Commonly known as: Atarax   1/2-1 tab up to tid prn itching     insulin glargine 100 UNIT/ML Sopn injection  Commonly known as: Lantus   Doctor's comments: Increasing dose for better blood sugar management  Inject 25 units SQ into abd qhs; increase by 3 units every 2 nights until rrx=083; max 40 units per night     insulin lispro 100 UNIT/ML  Commonly known as: HumaLOG   Doctor's comments: Pt indicates her insurance didn't cover the pen ; this is a 3 month supply  Inject 10 units under skin 3 times a day before meals     Novofine Pen Needle 32G X 6 MM Misc  Generic drug: Insulin Pen Needle   Doctor's comments: E11.9  Use with insulin pen as directed before bed     omeprazole 20 MG delayed-release capsule  Commonly known as: PriLOSEC   as needed (reflux).     Ozempic (1 MG/DOSE) 4 MG/3ML Sopn  Generic drug: Semaglutide (1 MG/DOSE)   Inject 1 mg under the skin every 7 days.  Dose: 1 mg     potassium chloride ER 10 MEQ tablet  Commonly known as: Klor-Con   Doctor's comments: D/c hydrochlorothiazide  1 tab PO once daily only on the days you take 2  tabs lasix     Vitamin D-3 125 MCG (5000 UT) Tabs   Take  by mouth.              Allergies  No Known Allergies    DIET  Orders Placed This Encounter   Procedures    Diet Order Diet: Consistent CHO (Diabetic)     Standing Status:   Standing     Number of Occurrences:   1     Diet::   Consistent CHO (Diabetic) [4]       ACTIVITY  As tolerated.  Heel weight bearing left foot    CONSULTATIONS  Orthopedic surgery    PROCEDURES       Operative Report     Date of Surgery: 2/28/2025     Operating Surgeon:   Primary: Jamar Coronado M.D.  Fellow: Ismael Brown M.D.        Assistant:  Ismael Brown MD - ortho trauma fellow     Preoperative diagnosis:  Left 4th toe osteomyelitis     Postoperative diagnosis:   Left 4th toe osteomyelitis     Procedure:  Left 4th toe amputation through metatarsophalangeal joint     Implants: none     Indications:  Aviva Kenney 50 y.o. female DMII, CKD, and peripheral neuropathy with progressive skin ulceration and wound to left 4th toe. MRI concerning for underlying osteomyelitis to the distal and middle phalanx of the digit.      Discussed clinical, physical, and imaging findings with patient. Considering the significance of the wound and infection, and unreliable outcome with nonoperative management, the patient was indicated for operative intervention. Surgery consists of left 4th toe amputation. I discussed the clinical, physical, and imaging findings with patient and I answered any questions.     Risks, benefits, and alternatives discussed with patient. Risks for surgery may include bleeding, infection, pain, persistent dysfunction, damage to surrounding neurovascular structures, anesthesia complications, operative failure, stroke, DVT, or death. Patient agrees to proceed with surgery. Informed consent obtained.     Description:     Patient was taken to the operating room and placed supine on the operating room table. Prepped and drapped in usual sterile fashion. A surgical timeout was performed to verify the patient, laterality of surgery, planned procedure, and receipt of perioperative antibiotics (ancef).     A fishmouth incision was made at the base of the fourth toe.  Deep dissection down to bone was performed.  The metatarsal phalangeal joint was identified and incised.  Amputation of the digit was then completed.  The wound was then debrided bluntly and sharply for any unhealthy tissue.  It was irrigated copiously.  One gram of vancomycin powder and 1.2 grams tobramycin powder were dispersed throughout the wound. A layered  closure was performed. A soft compressive dressing was applied.     The amputated digit was then incised.  A bone sample from the middle phalanx was then obtained and sent for culture.     The patient was awoken from anesthesia and transferred off the operating table without complication.      Specimens: 4th toe bone for culture     Anesthesia type: laryngeal mask anesthesia     Blood loss: 5 cc     Drains: none     Wound Classification: IV, Dirty or Infected.     Blood Products Administered: none     Postoperative condition: Stable     Postoperative Plan:  - Heel weightbearing until wound heals  - ROM: As Tolerated  - Antibiotics: per ID  - Pain Control: Per protocol  - DVT Prophylaxis: Per protocol until discharge, none at discharge  - Disposition: to floor, RTC 2-3 weeks PAVAN ortho trauma PA for wound check          LABORATORY  Lab Results   Component Value Date    SODIUM 132 (L) 03/01/2025    POTASSIUM 5.2 03/01/2025    CHLORIDE 100 03/01/2025    CO2 20 03/01/2025    GLUCOSE 206 (H) 03/01/2025    BUN 48 (H) 03/01/2025    CREATININE 2.04 (H) 03/01/2025        Lab Results   Component Value Date    WBC 12.8 (H) 03/01/2025    HEMOGLOBIN 9.0 (L) 03/01/2025    HEMATOCRIT 26.9 (L) 03/01/2025    PLATELETCT 260 03/01/2025        Total time of the discharge process exceeds 33 minutes.

## 2025-03-01 NOTE — CARE PLAN
The patient is Stable - Low risk of patient condition declining or worsening    Shift Goals  Clinical Goals: pain control, mobility  Patient Goals: bowel movement, pain control  Family Goals: not present    Progress made toward(s) clinical / shift goals:    Problem: Knowledge Deficit - Standard  Goal: Patient and family/care givers will demonstrate understanding of plan of care, disease process/condition, diagnostic tests and medications  Outcome: Met     Problem: Pain - Standard  Goal: Alleviation of pain or a reduction in pain to the patient’s comfort goal  Outcome: Met     Problem: Wound/ / Incision Healing  Goal: Patient's wound/surgical incision will decrease in size and heals properly  Outcome: Met     Problem: Bowel Elimination  Goal: Establish and maintain regular bowel function  Outcome: Met       Patient is not progressing towards the following goals:

## 2025-03-01 NOTE — PROGRESS NOTES
4 Eyes Skin Assessment Completed by ROVERTO Abdul and ROVERTO Callejas.    Head WDL  Ears WDL  Nose WDL  Mouth WDL  Neck WDL  Breast/Chest Scab left breast; left shoulder    Shoulder Blades WDL  Spine WDL  (R) Arm/Elbow/Hand WDL  (L) Arm/Elbow/Hand WDL  Abdomen Scar  Groin WDL  Scrotum/Coccyx/Buttocks Redness and Blanching  (R) Leg Scab lateral thigh  (L) Leg Scab medial calf  (R) Heel/Foot/Toe WDL  (L) Heel/Foot/Toe Redness; incision           Devices In Places Blood Pressure Cuff, Pulse Ox, SCD's, and Nasal Cannula      Interventions In Place Gray Ear Foams and Pressure Redistribution Mattress    Possible Skin Injury No    Pictures Uploaded Into Epic Yes  Wound Consult Placed N/A  RN Wound Prevention Protocol Ordered No

## 2025-03-01 NOTE — CARE PLAN
The patient is Stable - Low risk of patient condition declining or worsening    Shift Goals  Clinical Goals: pain control, wound consult  Patient Goals: rest  Family Goals: not present    Progress made toward(s) clinical / shift goals:    Problem: Knowledge Deficit - Standard  Goal: Patient and family/care givers will demonstrate understanding of plan of care, disease process/condition, diagnostic tests and medications  Outcome: Progressing     Problem: Pain - Standard  Goal: Alleviation of pain or a reduction in pain to the patient’s comfort goal  Outcome: Progressing     Problem: Wound/ / Incision Healing  Goal: Patient's wound/surgical incision will decrease in size and heals properly  Outcome: Progressing       Patient is not progressing towards the following goals:

## 2025-03-01 NOTE — WOUND TEAM
[facility]  Inpatient Services  Wound and Skin Care Brief Evaluation    Admission Date: 2/27/2025     Last order of IP CONSULT TO WOUND CARE was found on 2/27/2025 from Hospital Encounter on 2/27/2025     HPI, PMH, SH: Reviewed    No chief complaint on file.    Diagnosis: Diabetic foot infection (HCC) [E11.628, L08.9]    Unit where seen by Wound Team: T331/02     Wound consult placed regarding BLE and Sacrum . Chart and images reviewed.. This clinician in to assess patient. Patient pleasant and agreeable. . Non-selectively debrided with Wound cleanser.       BLE: Scabs in various stages of healing okay to leave open to air    Sacrum is red, removed offloading adhesive foam, skin is red but quick to zoe.      L. 4th toe with surgical dressing in place.      Bilateral heels are intact                No pressure injuries or advanced wound care needs identified. Wound consult completed. No further follow up unless indicated and consulted.     Wound 02/27/25 Other (comment) Leg Bilateral (Active)   Date First Assessed/Time First Assessed: 02/27/25 1000   Present on Original Admission: Yes  Hand Hygiene Completed: Yes  Primary Wound Type: Other (comment)  Location: Leg  Laterality: Bilateral      Assessments 2/28/2025  6:00 PM   Wound Image       Site Assessment Red;Pink;Scabbed   Periwound Assessment Red;Pink   Margins Attached edges;Defined edges   Closure Open to air   Drainage Amount None   Treatments Cleansed;Nonselective debridement;Site care   Wound Cleansing Approved Wound Cleanser   Periwound Protectant Not Applicable   Dressing Status Open to Air   NEXT Weekly Photo (Inpatient Only) 03/05/25   Wound Team Following Not following       PREVENTATIVE INTERVENTIONS:    Q shift Rj - performed per nursing policy  Q shift pressure point assessments - performed per nursing policy    Patient had no interventions in place.

## 2025-03-01 NOTE — CARE PLAN
The patient is Stable - Low risk of patient condition declining or worsening    Shift Goals  Clinical Goals: PT/OT Eval, Skin Integrity, Pain Management, Bowel Protocol  Patient Goals: Pain Management, Rest  Family Goals: not present    Progress made toward(s) clinical / shift goals:      Problem: Knowledge Deficit - Standard  Goal: Patient and family/care givers will demonstrate understanding of plan of care, disease process/condition, diagnostic tests and medications  Outcome: Progressing  Education provided on plan of care this shift. Questions answered. Patient verbalizes understanding.      Problem: Pain - Standard  Goal: Alleviation of pain or a reduction in pain to the patient’s comfort goal  Outcome: Progressing  Patient educated on pain scale. Encouraged patient to verbalize pain. Pain medicated per MAR. Patient nonpharmacological measures in place such as reposition and pillows for comfort.      Problem: Wound/ / Incision Healing  Goal: Patient's wound/surgical incision will decrease in size and heals properly  Outcome: Progressing     Problem: Bowel Elimination  Goal: Establish and maintain regular bowel function  Outcome: Progressing  Bowel protocol ordered. Administered Miralax PRN.

## 2025-03-02 LAB
BACTERIA TISS AEROBE CULT: ABNORMAL
BACTERIA TISS AEROBE CULT: ABNORMAL
GRAM STN SPEC: ABNORMAL
SIGNIFICANT IND 70042: ABNORMAL
SITE SITE: ABNORMAL
SOURCE SOURCE: ABNORMAL

## 2025-03-02 NOTE — THERAPY
Physical Therapy   Initial Evaluation     Patient Name: Aviva Kenney  Age:  50 y.o., Sex:  female  Medical Record #: [id number]  Today's Date: 3/1/2025     Precautions  Precautions: Heel Weight Bearing Left Lower Extremity    Assessment  Patient is evaluated in the acute setting s/p L 4th tow amp, and is now heel WB L LE until incision heals.  Pt demo'd excellent safety with bed mobility, transfers, gait and stairs (she is able to demo better L LE heel WB during gait with use of walker) and is encouraged to continue use of the walker until incision heals.  No other acute PT needs anticipate at DC.    Plan    Physical Therapy Initial Treatment Plan   Duration: Evaluation only    DC Equipment Recommendations: Front-Wheel Walker  Discharge Recommendations: Anticipate that the patient will have no further physical therapy needs after discharge from the hospital             Objective       03/01/25 1145    Services   Is patient using  services for this encounter? No   Initial Contact Note    Initial Contact Note Order Received and Verified, Physical Therapy Evaluation in Progress with Full Report to Follow.   Precautions   Precautions Heel Weight Bearing Left Lower Extremity   Pain 0 - 10 Group   Therapist Pain Assessment Post Activity Pain Same as Prior to Activity   Prior Living Situation   Prior Services None   Housing / Facility Mobile Home   Steps Into Home 4  (pt describes wider steps that FWW can fit on)   Steps In Home 0   Equipment Owned None   Lives with - Patient's Self Care Capacity Spouse   Prior Level of Functional Mobility   Bed Mobility Independent   Transfer Status Independent   Ambulation Independent   Ambulation Distance community   Assistive Devices Used None   Stairs Independent   History of Falls   History of Falls No   Cognition    Comments Pt is awake, receptive, able to make needs known and provides insight to PLOF   Passive ROM Lower Body   Passive ROM Lower Body  WDL   Active ROM Lower Body    Active ROM Lower Body  WDL   Comments left foot in ace wrap, no strike through drainage noted   Strength Lower Body   Lower Body Strength  WDL   Sensation Lower Body   Lower Extremity Sensation   X   Comments neuropathy   Coordination Lower Body    Coordination Lower Body  WDL   Balance Assessment   Sitting Balance (Static) Normal   Sitting Balance (Dynamic) Normal   Standing Balance (Static) Good   Standing Balance (Dynamic) Fair +   Weight Shift Sitting Good   Weight Shift Standing Fair   Comments with FWW   Bed Mobility    Supine to Sit Independent   Sit to Supine Independent   Gait Analysis   Gait Level Of Assist Modified Independent   Assistive Device Front Wheel Walker   Distance (Feet) 60   # of Times Distance was Traveled 1   Deviation Other (Comment)  (Heel WB on left better performed with use of FWW)   # of Stairs Climbed 5  (Reviewed technique to avoid toe off on left)   Level of Assist with Stairs Modified Independent   Weight Bearing Status Heel WB L LE   Functional Mobility   Sit to Stand Modified Independent   Bed, Chair, Wheelchair Transfer Modified Independent   Transfer Method Stand Step   6 Clicks Assessment - How much HELP from from another person do you currently need... (If the patient hasn't done an activity recently, how much help from another person do you think he/she would need if he/she tried?)   Turning from your back to your side while in a flat bed without using bedrails? 4   Moving from lying on your back to sitting on the side of a flat bed without using bedrails? 4   Moving to and from a bed to a chair (including a wheelchair)? 4   Standing up from a chair using your arms (e.g., wheelchair, or bedside chair)? 4   Walking in hospital room? 4   Climbing 3-5 steps with a railing? 4   6 clicks Mobility Score 24   Activity Tolerance   Standing about 10 mins   Physical Therapy Initial Treatment Plan    Duration Evaluation only   Anticipated Discharge  Equipment and Recommendations   DC Equipment Recommendations Front-Wheel Walker   Discharge Recommendations Anticipate that the patient will have no further physical therapy needs after discharge from the hospital   Interdisciplinary Plan of Care Collaboration   IDT Collaboration with  Nursing   Collaboration Comments RN aware of session   Session Information   Date / Session Number  3/1 - PT eval only

## 2025-03-02 NOTE — PROGRESS NOTES
"      Orthopaedic Progress Note    Interval changes:  Patient doing well post op  L foot dressings are CDI  Cleared for DC to home by ortho pending medicine and ID team clearance    ROS - Patient denies any new issues.  Pain well controlled.    BP (!) 145/80   Pulse 93   Temp 36.4 °C (97.5 °F) (Temporal)   Resp 18   Ht 1.753 m (5' 9\")   Wt 97.4 kg (214 lb 11.7 oz)   SpO2 99%     Patient seen and examined  No acute distress  Breathing non labored  RRR  L foot dressing CDI, DNVI, moves all remaining toes, cap refill <2 sec.     Recent Labs     02/27/25  1249 02/28/25  0153 03/01/25  0225   WBC 15.2* 15.6* 12.8*   RBC 3.12* 3.13* 2.86*   HEMOGLOBIN 9.7* 9.8* 9.0*   HEMATOCRIT 28.8* 29.1* 26.9*   MCV 92.3 93.0 94.1   MCH 31.1 31.3 31.5   MCHC 33.7 33.7 33.5   RDW 39.7 40.2 39.4   PLATELETCT 237 288 260   MPV 12.0 11.4 11.6       Active Hospital Problems    Diagnosis     Diabetic foot infection (HCC) [E11.628, L08.9]     Primary hypertension [I10]     Chronic bilateral low back pain [M54.50, G89.29]     Type 2 diabetes mellitus (HCC) [E11.9]     BUTCH (acute kidney injury) ([facility]) [N17.9]        Assessment/Plan:  Patient doing well post op  L foot dressings are CDI  Cleared for DC to home by ortho pending medicine and ID team clearance  POD#1 S/P Left 4th toe amputation through metatarsophalangeal joint   Wt bearing status - WBAT thru heel  Wound care/Drains - Dressings to be changed every other day by nursing. Or PRN for saturation starting POD#2  Future Procedures - none planned   Lovenox: Start 2/27, Duration-until ambulatory > 150'  Sutures/Staples out- 14-21 days post operatively. Removal will completed by ortho mid levels only.  PT/OT-initiated  Antibiotics: unasyn 3g IV Q6  DVT Prophylaxis- TEDS/SCDs/Foot pumps  Rodriguez-not needed per ortho  Case Coordination for Discharge Planning - Disposition per therapy recs.    "

## 2025-03-03 LAB
BACTERIA SPEC ANAEROBE CULT: NORMAL
SIGNIFICANT IND 70042: NORMAL
SITE SITE: NORMAL
SOURCE SOURCE: NORMAL

## 2025-03-15 ENCOUNTER — APPOINTMENT (OUTPATIENT)
Dept: RADIOLOGY | Facility: MEDICAL CENTER | Age: 50
DRG: 673 | End: 2025-03-15
Attending: STUDENT IN AN ORGANIZED HEALTH CARE EDUCATION/TRAINING PROGRAM
Payer: MEDICARE

## 2025-03-15 ENCOUNTER — HOSPITAL ENCOUNTER (INPATIENT)
Facility: MEDICAL CENTER | Age: 50
LOS: 6 days | DRG: 673 | End: 2025-03-22
Attending: STUDENT IN AN ORGANIZED HEALTH CARE EDUCATION/TRAINING PROGRAM | Admitting: STUDENT IN AN ORGANIZED HEALTH CARE EDUCATION/TRAINING PROGRAM
Payer: MEDICARE

## 2025-03-15 ENCOUNTER — HOSPITAL ENCOUNTER (OUTPATIENT)
Dept: RADIOLOGY | Facility: MEDICAL CENTER | Age: 50
End: 2025-03-15
Payer: MEDICARE

## 2025-03-15 DIAGNOSIS — N18.4 ANEMIA DUE TO STAGE 4 CHRONIC KIDNEY DISEASE (HCC): ICD-10-CM

## 2025-03-15 DIAGNOSIS — E87.20 METABOLIC ACIDOSIS: ICD-10-CM

## 2025-03-15 DIAGNOSIS — N17.9 ACUTE RENAL FAILURE, UNSPECIFIED ACUTE RENAL FAILURE TYPE (HCC): ICD-10-CM

## 2025-03-15 DIAGNOSIS — E87.1 HYPONATREMIA: ICD-10-CM

## 2025-03-15 DIAGNOSIS — G93.40 ACUTE ENCEPHALOPATHY: ICD-10-CM

## 2025-03-15 DIAGNOSIS — Z91.89 AT RISK FOR CONSTIPATION: ICD-10-CM

## 2025-03-15 DIAGNOSIS — E87.5 HYPERKALEMIA: ICD-10-CM

## 2025-03-15 DIAGNOSIS — E11.628 DIABETIC FOOT INFECTION (HCC): ICD-10-CM

## 2025-03-15 DIAGNOSIS — Z98.84 S/P BARIATRIC SURGERY: ICD-10-CM

## 2025-03-15 DIAGNOSIS — L08.9 DIABETIC FOOT INFECTION (HCC): ICD-10-CM

## 2025-03-15 DIAGNOSIS — N18.32 ACUTE RENAL FAILURE SUPERIMPOSED ON STAGE 3B CHRONIC KIDNEY DISEASE, UNSPECIFIED ACUTE RENAL FAILURE TYPE (HCC): ICD-10-CM

## 2025-03-15 DIAGNOSIS — M86.8X7 OTHER OSTEOMYELITIS OF LEFT FOOT (HCC): ICD-10-CM

## 2025-03-15 DIAGNOSIS — I10 PRIMARY HYPERTENSION: ICD-10-CM

## 2025-03-15 DIAGNOSIS — N17.9 ACUTE RENAL FAILURE SUPERIMPOSED ON STAGE 3B CHRONIC KIDNEY DISEASE, UNSPECIFIED ACUTE RENAL FAILURE TYPE (HCC): ICD-10-CM

## 2025-03-15 DIAGNOSIS — D63.1 ANEMIA DUE TO STAGE 4 CHRONIC KIDNEY DISEASE (HCC): ICD-10-CM

## 2025-03-15 LAB
ALBUMIN SERPL BCP-MCNC: 2.8 G/DL (ref 3.2–4.9)
ALBUMIN/GLOB SERPL: 0.6 G/DL
ALP SERPL-CCNC: 517 U/L (ref 30–99)
ALT SERPL-CCNC: 25 U/L (ref 2–50)
ANION GAP SERPL CALC-SCNC: 18 MMOL/L (ref 7–16)
APPEARANCE UR: ABNORMAL
APTT PPP: 44.1 SEC (ref 24.7–36)
AST SERPL-CCNC: 45 U/L (ref 12–45)
B-OH-BUTYR SERPL-MCNC: 0.6 MMOL/L (ref 0.02–0.27)
BACTERIA #/AREA URNS HPF: ABNORMAL /HPF
BASOPHILS # BLD AUTO: 0.1 % (ref 0–1.8)
BASOPHILS # BLD: 0.01 K/UL (ref 0–0.12)
BILIRUB SERPL-MCNC: 0.6 MG/DL (ref 0.1–1.5)
BILIRUB UR QL STRIP.AUTO: NEGATIVE
BUN SERPL-MCNC: 118 MG/DL (ref 8–22)
CALCIUM ALBUM COR SERPL-MCNC: 8.6 MG/DL (ref 8.5–10.5)
CALCIUM SERPL-MCNC: 7.6 MG/DL (ref 8.5–10.5)
CASTS URNS QL MICRO: ABNORMAL /LPF (ref 0–2)
CHLORIDE SERPL-SCNC: 87 MMOL/L (ref 96–112)
CK SERPL-CCNC: 466 U/L (ref 0–154)
CO2 SERPL-SCNC: 12 MMOL/L (ref 20–33)
COLOR UR: ABNORMAL
CREAT SERPL-MCNC: 12.5 MG/DL (ref 0.5–1.4)
CRP SERPL HS-MCNC: 5.58 MG/DL (ref 0–0.75)
EKG IMPRESSION: NORMAL
EOSINOPHIL # BLD AUTO: 0.01 K/UL (ref 0–0.51)
EOSINOPHIL NFR BLD: 0.1 % (ref 0–6.9)
EPITHELIAL CELLS 1715: ABNORMAL /HPF (ref 0–5)
ERYTHROCYTE [DISTWIDTH] IN BLOOD BY AUTOMATED COUNT: 42.7 FL (ref 35.9–50)
GFR SERPLBLD CREATININE-BSD FMLA CKD-EPI: 3 ML/MIN/1.73 M 2
GLOBULIN SER CALC-MCNC: 4.6 G/DL (ref 1.9–3.5)
GLUCOSE SERPL-MCNC: 103 MG/DL (ref 65–99)
GLUCOSE UR STRIP.AUTO-MCNC: NEGATIVE MG/DL
GRANULAR CASTS  1716G: PRESENT /LPF
HCG SERPL QL: NEGATIVE
HCT VFR BLD AUTO: 24.3 % (ref 37–47)
HGB BLD-MCNC: 8.1 G/DL (ref 12–16)
HYALINE CAST   1831: PRESENT /LPF
IMM GRANULOCYTES # BLD AUTO: 0.04 K/UL (ref 0–0.11)
IMM GRANULOCYTES NFR BLD AUTO: 0.5 % (ref 0–0.9)
INR PPP: 1.31 (ref 0.87–1.13)
KETONES UR STRIP.AUTO-MCNC: ABNORMAL MG/DL
LEUKOCYTE ESTERASE UR QL STRIP.AUTO: ABNORMAL
LYMPHOCYTES # BLD AUTO: 0.69 K/UL (ref 1–4.8)
LYMPHOCYTES NFR BLD: 9.4 % (ref 22–41)
MAGNESIUM SERPL-MCNC: 2.3 MG/DL (ref 1.5–2.5)
MCH RBC QN AUTO: 30.2 PG (ref 27–33)
MCHC RBC AUTO-ENTMCNC: 33.3 G/DL (ref 32.2–35.5)
MCV RBC AUTO: 90.7 FL (ref 81.4–97.8)
MICRO URNS: ABNORMAL
MONOCYTES # BLD AUTO: 0.26 K/UL (ref 0–0.85)
MONOCYTES NFR BLD AUTO: 3.5 % (ref 0–13.4)
NEUTROPHILS # BLD AUTO: 6.35 K/UL (ref 1.82–7.42)
NEUTROPHILS NFR BLD: 86.4 % (ref 44–72)
NITRITE UR QL STRIP.AUTO: NEGATIVE
NRBC # BLD AUTO: 0 K/UL
NRBC BLD-RTO: 0 /100 WBC (ref 0–0.2)
NT-PROBNP SERPL IA-MCNC: ABNORMAL PG/ML (ref 0–125)
PH UR STRIP.AUTO: 5 [PH] (ref 5–8)
PHOSPHATE SERPL-MCNC: 10.4 MG/DL (ref 2.5–4.5)
PLATELET # BLD AUTO: 404 K/UL (ref 164–446)
PMV BLD AUTO: 10.2 FL (ref 9–12.9)
POTASSIUM SERPL-SCNC: 6.7 MMOL/L (ref 3.6–5.5)
PROT SERPL-MCNC: 7.4 G/DL (ref 6–8.2)
PROT UR QL STRIP: >=1000 MG/DL
PROTHROMBIN TIME: 16.3 SEC (ref 12–14.6)
RBC # BLD AUTO: 2.68 M/UL (ref 4.2–5.4)
RBC # URNS HPF: ABNORMAL /HPF (ref 0–2)
RBC UR QL AUTO: ABNORMAL
SODIUM SERPL-SCNC: 117 MMOL/L (ref 135–145)
SP GR UR STRIP.AUTO: 1.02
TROPONIN T SERPL-MCNC: 129 NG/L (ref 6–19)
URIC ACID CRYSTAL  1766: PRESENT /HPF
UROBILINOGEN UR STRIP.AUTO-MCNC: 1 EU/DL
WBC # BLD AUTO: 7.4 K/UL (ref 4.8–10.8)
WBC #/AREA URNS HPF: ABNORMAL /HPF

## 2025-03-15 PROCEDURE — 81001 URINALYSIS AUTO W/SCOPE: CPT

## 2025-03-15 PROCEDURE — 99291 CRITICAL CARE FIRST HOUR: CPT

## 2025-03-15 PROCEDURE — 86140 C-REACTIVE PROTEIN: CPT

## 2025-03-15 PROCEDURE — 82550 ASSAY OF CK (CPK): CPT

## 2025-03-15 PROCEDURE — 83735 ASSAY OF MAGNESIUM: CPT

## 2025-03-15 PROCEDURE — 84484 ASSAY OF TROPONIN QUANT: CPT

## 2025-03-15 PROCEDURE — 82010 KETONE BODYS QUAN: CPT

## 2025-03-15 PROCEDURE — 93005 ELECTROCARDIOGRAM TRACING: CPT | Mod: TC | Performed by: STUDENT IN AN ORGANIZED HEALTH CARE EDUCATION/TRAINING PROGRAM

## 2025-03-15 PROCEDURE — 700111 HCHG RX REV CODE 636 W/ 250 OVERRIDE (IP): Performed by: STUDENT IN AN ORGANIZED HEALTH CARE EDUCATION/TRAINING PROGRAM

## 2025-03-15 PROCEDURE — 82962 GLUCOSE BLOOD TEST: CPT

## 2025-03-15 PROCEDURE — 93005 ELECTROCARDIOGRAM TRACING: CPT | Mod: TC

## 2025-03-15 PROCEDURE — 36415 COLL VENOUS BLD VENIPUNCTURE: CPT

## 2025-03-15 PROCEDURE — 80053 COMPREHEN METABOLIC PANEL: CPT

## 2025-03-15 PROCEDURE — 85025 COMPLETE CBC W/AUTO DIFF WBC: CPT

## 2025-03-15 PROCEDURE — 96375 TX/PRO/DX INJ NEW DRUG ADDON: CPT

## 2025-03-15 PROCEDURE — 85652 RBC SED RATE AUTOMATED: CPT

## 2025-03-15 PROCEDURE — 85730 THROMBOPLASTIN TIME PARTIAL: CPT

## 2025-03-15 PROCEDURE — 83880 ASSAY OF NATRIURETIC PEPTIDE: CPT

## 2025-03-15 PROCEDURE — 86900 BLOOD TYPING SEROLOGIC ABO: CPT

## 2025-03-15 PROCEDURE — 87040 BLOOD CULTURE FOR BACTERIA: CPT | Mod: 91

## 2025-03-15 PROCEDURE — 86850 RBC ANTIBODY SCREEN: CPT

## 2025-03-15 PROCEDURE — 700111 HCHG RX REV CODE 636 W/ 250 OVERRIDE (IP): Mod: JZ | Performed by: INTERNAL MEDICINE

## 2025-03-15 PROCEDURE — 84703 CHORIONIC GONADOTROPIN ASSAY: CPT

## 2025-03-15 PROCEDURE — 84100 ASSAY OF PHOSPHORUS: CPT

## 2025-03-15 PROCEDURE — 85610 PROTHROMBIN TIME: CPT

## 2025-03-15 PROCEDURE — 87086 URINE CULTURE/COLONY COUNT: CPT

## 2025-03-15 PROCEDURE — 700101 HCHG RX REV CODE 250: Performed by: STUDENT IN AN ORGANIZED HEALTH CARE EDUCATION/TRAINING PROGRAM

## 2025-03-15 PROCEDURE — 86901 BLOOD TYPING SEROLOGIC RH(D): CPT

## 2025-03-15 RX ORDER — HYDROCODONE BITARTRATE AND ACETAMINOPHEN 10; 325 MG/1; MG/1
1 TABLET ORAL 3 TIMES DAILY PRN
COMMUNITY

## 2025-03-15 RX ORDER — INDOMETHACIN 25 MG/1
50 CAPSULE ORAL ONCE
Status: COMPLETED | OUTPATIENT
Start: 2025-03-16 | End: 2025-03-15

## 2025-03-15 RX ORDER — SODIUM BICARBONATE IN D5W 150/1000ML
PLASTIC BAG, INJECTION (ML) INTRAVENOUS CONTINUOUS
Status: DISCONTINUED | OUTPATIENT
Start: 2025-03-16 | End: 2025-03-16

## 2025-03-15 RX ORDER — FUROSEMIDE 10 MG/ML
100 INJECTION INTRAMUSCULAR; INTRAVENOUS ONCE
Status: COMPLETED | OUTPATIENT
Start: 2025-03-15 | End: 2025-03-15

## 2025-03-15 RX ORDER — OMEPRAZOLE 20 MG/1
20 CAPSULE, DELAYED RELEASE ORAL
COMMUNITY

## 2025-03-15 RX ORDER — DEXTROSE MONOHYDRATE 25 G/50ML
25 INJECTION, SOLUTION INTRAVENOUS ONCE
Status: COMPLETED | OUTPATIENT
Start: 2025-03-16 | End: 2025-03-15

## 2025-03-15 RX ADMIN — SODIUM BICARBONATE 50 MEQ: 84 INJECTION INTRAVENOUS at 23:51

## 2025-03-15 RX ADMIN — INSULIN HUMAN 5 UNITS: 100 INJECTION, SOLUTION PARENTERAL at 23:52

## 2025-03-15 RX ADMIN — FUROSEMIDE 100 MG: 10 INJECTION, SOLUTION INTRAVENOUS at 23:39

## 2025-03-15 RX ADMIN — DEXTROSE MONOHYDRATE 25 G: 25 INJECTION, SOLUTION INTRAVENOUS at 23:51

## 2025-03-15 ASSESSMENT — FIBROSIS 4 INDEX: FIB4 SCORE: 1.17

## 2025-03-15 ASSESSMENT — PAIN DESCRIPTION - PAIN TYPE: TYPE: ACUTE PAIN

## 2025-03-16 ENCOUNTER — APPOINTMENT (OUTPATIENT)
Dept: RADIOLOGY | Facility: MEDICAL CENTER | Age: 50
DRG: 673 | End: 2025-03-16
Attending: STUDENT IN AN ORGANIZED HEALTH CARE EDUCATION/TRAINING PROGRAM
Payer: MEDICARE

## 2025-03-16 ENCOUNTER — APPOINTMENT (OUTPATIENT)
Dept: CARDIOLOGY | Facility: MEDICAL CENTER | Age: 50
DRG: 673 | End: 2025-03-16
Attending: INTERNAL MEDICINE
Payer: MEDICARE

## 2025-03-16 ENCOUNTER — APPOINTMENT (OUTPATIENT)
Dept: RADIOLOGY | Facility: MEDICAL CENTER | Age: 50
DRG: 673 | End: 2025-03-16
Attending: INTERNAL MEDICINE
Payer: MEDICARE

## 2025-03-16 PROBLEM — E87.1 HYPONATREMIA: Status: ACTIVE | Noted: 2025-03-16

## 2025-03-16 PROBLEM — D64.9 ANEMIA: Status: ACTIVE | Noted: 2025-03-16

## 2025-03-16 PROBLEM — R79.89 ELEVATED TROPONIN: Status: ACTIVE | Noted: 2025-03-16

## 2025-03-16 PROBLEM — N18.9 ACUTE-ON-CHRONIC KIDNEY INJURY (HCC): Status: ACTIVE | Noted: 2022-09-01

## 2025-03-16 PROBLEM — N18.32 ACUTE RENAL FAILURE SUPERIMPOSED ON STAGE 3B CHRONIC KIDNEY DISEASE (HCC): Status: ACTIVE | Noted: 2022-09-01

## 2025-03-16 PROBLEM — M86.9 OSTEOMYELITIS OF LEFT FOOT (HCC): Status: ACTIVE | Noted: 2025-03-16

## 2025-03-16 PROBLEM — G93.40 ACUTE ENCEPHALOPATHY: Status: ACTIVE | Noted: 2025-03-16

## 2025-03-16 LAB
ABO + RH BLD: NORMAL
ABO GROUP BLD: NORMAL
ANION GAP SERPL CALC-SCNC: 13 MMOL/L (ref 7–16)
ANION GAP SERPL CALC-SCNC: 14 MMOL/L (ref 7–16)
ANION GAP SERPL CALC-SCNC: 14 MMOL/L (ref 7–16)
ANION GAP SERPL CALC-SCNC: 21 MMOL/L (ref 7–16)
BLD GP AB SCN SERPL QL: NORMAL
BUN SERPL-MCNC: 124 MG/DL (ref 8–22)
BUN SERPL-MCNC: 83 MG/DL (ref 8–22)
BUN SERPL-MCNC: 85 MG/DL (ref 8–22)
BUN SERPL-MCNC: 86 MG/DL (ref 8–22)
CALCIUM SERPL-MCNC: 7.3 MG/DL (ref 8.5–10.5)
CALCIUM SERPL-MCNC: 7.3 MG/DL (ref 8.5–10.5)
CALCIUM SERPL-MCNC: 7.4 MG/DL (ref 8.5–10.5)
CALCIUM SERPL-MCNC: 7.6 MG/DL (ref 8.5–10.5)
CHLORIDE SERPL-SCNC: 86 MMOL/L (ref 96–112)
CHLORIDE SERPL-SCNC: 89 MMOL/L (ref 96–112)
CHLORIDE SERPL-SCNC: 91 MMOL/L (ref 96–112)
CHLORIDE SERPL-SCNC: 94 MMOL/L (ref 96–112)
CO2 SERPL-SCNC: 11 MMOL/L (ref 20–33)
CO2 SERPL-SCNC: 18 MMOL/L (ref 20–33)
CO2 SERPL-SCNC: 20 MMOL/L (ref 20–33)
CO2 SERPL-SCNC: 20 MMOL/L (ref 20–33)
CREAT SERPL-MCNC: 12.7 MG/DL (ref 0.5–1.4)
CREAT SERPL-MCNC: 8.5 MG/DL (ref 0.5–1.4)
CREAT SERPL-MCNC: 9.08 MG/DL (ref 0.5–1.4)
CREAT SERPL-MCNC: 9.63 MG/DL (ref 0.5–1.4)
CREAT UR-MCNC: 203 MG/DL
CREAT UR-MCNC: 206 MG/DL
ERYTHROCYTE [SEDIMENTATION RATE] IN BLOOD BY WESTERGREN METHOD: 100 MM/HOUR (ref 0–25)
GFR SERPLBLD CREATININE-BSD FMLA CKD-EPI: 3 ML/MIN/1.73 M 2
GFR SERPLBLD CREATININE-BSD FMLA CKD-EPI: 5 ML/MIN/1.73 M 2
GLUCOSE BLD STRIP.AUTO-MCNC: 107 MG/DL (ref 65–99)
GLUCOSE BLD STRIP.AUTO-MCNC: 131 MG/DL (ref 65–99)
GLUCOSE BLD STRIP.AUTO-MCNC: 151 MG/DL (ref 65–99)
GLUCOSE BLD STRIP.AUTO-MCNC: 157 MG/DL (ref 65–99)
GLUCOSE BLD STRIP.AUTO-MCNC: 200 MG/DL (ref 65–99)
GLUCOSE BLD STRIP.AUTO-MCNC: 237 MG/DL (ref 65–99)
GLUCOSE BLD STRIP.AUTO-MCNC: 98 MG/DL (ref 65–99)
GLUCOSE BLD STRIP.AUTO-MCNC: 99 MG/DL (ref 65–99)
GLUCOSE SERPL-MCNC: 106 MG/DL (ref 65–99)
GLUCOSE SERPL-MCNC: 114 MG/DL (ref 65–99)
GLUCOSE SERPL-MCNC: 151 MG/DL (ref 65–99)
GLUCOSE SERPL-MCNC: 172 MG/DL (ref 65–99)
HBV CORE AB SERPL QL IA: NONREACTIVE
HBV SURFACE AB SERPL IA-ACNC: <3.5 MIU/ML (ref 0–10)
HBV SURFACE AG SER QL: NORMAL
LACTATE SERPL-SCNC: 2.9 MMOL/L (ref 0.5–2)
MAGNESIUM SERPL-MCNC: 2.3 MG/DL (ref 1.5–2.5)
MICROALBUMIN UR-MCNC: >440 MG/DL
MICROALBUMIN/CREAT UR: NORMAL MG/G (ref 0–30)
OSMOLALITY SERPL: 306 MOSM/KG H2O (ref 278–298)
OSMOLALITY UR: 312 MOSM/KG H2O (ref 300–900)
POTASSIUM SERPL-SCNC: 4.4 MMOL/L (ref 3.6–5.5)
POTASSIUM SERPL-SCNC: 5.1 MMOL/L (ref 3.6–5.5)
POTASSIUM SERPL-SCNC: 5.9 MMOL/L (ref 3.6–5.5)
POTASSIUM SERPL-SCNC: 6.6 MMOL/L (ref 3.6–5.5)
PROT UR-MCNC: >600 MG/DL (ref 0–15)
PROT/CREAT UR: ABNORMAL MG/G (ref 10–107)
RH BLD: NORMAL
SODIUM SERPL-SCNC: 118 MMOL/L (ref 135–145)
SODIUM SERPL-SCNC: 122 MMOL/L (ref 135–145)
SODIUM SERPL-SCNC: 125 MMOL/L (ref 135–145)
SODIUM SERPL-SCNC: 126 MMOL/L (ref 135–145)
SODIUM UR-SCNC: <20 MMOL/L
TROPONIN T SERPL-MCNC: 132 NG/L (ref 6–19)

## 2025-03-16 PROCEDURE — 86704 HEP B CORE ANTIBODY TOTAL: CPT

## 2025-03-16 PROCEDURE — 83605 ASSAY OF LACTIC ACID: CPT

## 2025-03-16 PROCEDURE — 87340 HEPATITIS B SURFACE AG IA: CPT

## 2025-03-16 PROCEDURE — 700102 HCHG RX REV CODE 250 W/ 637 OVERRIDE(OP)

## 2025-03-16 PROCEDURE — 73630 X-RAY EXAM OF FOOT: CPT | Mod: LT

## 2025-03-16 PROCEDURE — 99292 CRITICAL CARE ADDL 30 MIN: CPT | Mod: 25 | Performed by: INTERNAL MEDICINE

## 2025-03-16 PROCEDURE — 770000 HCHG ROOM/CARE - INTERMEDIATE ICU *

## 2025-03-16 PROCEDURE — 80048 BASIC METABOLIC PNL TOTAL CA: CPT

## 2025-03-16 PROCEDURE — 700105 HCHG RX REV CODE 258: Performed by: INTERNAL MEDICINE

## 2025-03-16 PROCEDURE — 96366 THER/PROPH/DIAG IV INF ADDON: CPT

## 2025-03-16 PROCEDURE — 83930 ASSAY OF BLOOD OSMOLALITY: CPT

## 2025-03-16 PROCEDURE — 93306 TTE W/DOPPLER COMPLETE: CPT

## 2025-03-16 PROCEDURE — 90935 HEMODIALYSIS ONE EVALUATION: CPT

## 2025-03-16 PROCEDURE — 700111 HCHG RX REV CODE 636 W/ 250 OVERRIDE (IP)

## 2025-03-16 PROCEDURE — 84156 ASSAY OF PROTEIN URINE: CPT

## 2025-03-16 PROCEDURE — 700102 HCHG RX REV CODE 250 W/ 637 OVERRIDE(OP): Performed by: INTERNAL MEDICINE

## 2025-03-16 PROCEDURE — 700111 HCHG RX REV CODE 636 W/ 250 OVERRIDE (IP): Mod: JZ | Performed by: INTERNAL MEDICINE

## 2025-03-16 PROCEDURE — 82570 ASSAY OF URINE CREATININE: CPT

## 2025-03-16 PROCEDURE — 99291 CRITICAL CARE FIRST HOUR: CPT | Mod: 25 | Performed by: STUDENT IN AN ORGANIZED HEALTH CARE EDUCATION/TRAINING PROGRAM

## 2025-03-16 PROCEDURE — 700111 HCHG RX REV CODE 636 W/ 250 OVERRIDE (IP): Performed by: STUDENT IN AN ORGANIZED HEALTH CARE EDUCATION/TRAINING PROGRAM

## 2025-03-16 PROCEDURE — 700101 HCHG RX REV CODE 250: Performed by: INTERNAL MEDICINE

## 2025-03-16 PROCEDURE — 02HV33Z INSERTION OF INFUSION DEVICE INTO SUPERIOR VENA CAVA, PERCUTANEOUS APPROACH: ICD-10-PCS | Performed by: INTERNAL MEDICINE

## 2025-03-16 PROCEDURE — 700105 HCHG RX REV CODE 258: Performed by: STUDENT IN AN ORGANIZED HEALTH CARE EDUCATION/TRAINING PROGRAM

## 2025-03-16 PROCEDURE — 86706 HEP B SURFACE ANTIBODY: CPT

## 2025-03-16 PROCEDURE — 5A1D70Z PERFORMANCE OF URINARY FILTRATION, INTERMITTENT, LESS THAN 6 HOURS PER DAY: ICD-10-PCS | Performed by: INTERNAL MEDICINE

## 2025-03-16 PROCEDURE — 84300 ASSAY OF URINE SODIUM: CPT

## 2025-03-16 PROCEDURE — 99152 MOD SED SAME PHYS/QHP 5/>YRS: CPT

## 2025-03-16 PROCEDURE — 700101 HCHG RX REV CODE 250

## 2025-03-16 PROCEDURE — 74176 CT ABD & PELVIS W/O CONTRAST: CPT

## 2025-03-16 PROCEDURE — 99223 1ST HOSP IP/OBS HIGH 75: CPT | Performed by: INTERNAL MEDICINE

## 2025-03-16 PROCEDURE — 36556 INSERT NON-TUNNEL CV CATH: CPT | Performed by: INTERNAL MEDICINE

## 2025-03-16 PROCEDURE — 82962 GLUCOSE BLOOD TEST: CPT

## 2025-03-16 PROCEDURE — 96365 THER/PROPH/DIAG IV INF INIT: CPT

## 2025-03-16 PROCEDURE — 700111 HCHG RX REV CODE 636 W/ 250 OVERRIDE (IP): Performed by: INTERNAL MEDICINE

## 2025-03-16 PROCEDURE — 84484 ASSAY OF TROPONIN QUANT: CPT

## 2025-03-16 PROCEDURE — A9270 NON-COVERED ITEM OR SERVICE: HCPCS

## 2025-03-16 PROCEDURE — 82043 UR ALBUMIN QUANTITATIVE: CPT

## 2025-03-16 PROCEDURE — C1752 CATH,HEMODIALYSIS,SHORT-TERM: HCPCS

## 2025-03-16 PROCEDURE — 0JH63XZ INSERTION OF TUNNELED VASCULAR ACCESS DEVICE INTO CHEST SUBCUTANEOUS TISSUE AND FASCIA, PERCUTANEOUS APPROACH: ICD-10-PCS | Performed by: INTERNAL MEDICINE

## 2025-03-16 PROCEDURE — 36556 INSERT NON-TUNNEL CV CATH: CPT

## 2025-03-16 PROCEDURE — A9270 NON-COVERED ITEM OR SERVICE: HCPCS | Performed by: INTERNAL MEDICINE

## 2025-03-16 PROCEDURE — 83735 ASSAY OF MAGNESIUM: CPT

## 2025-03-16 PROCEDURE — 83935 ASSAY OF URINE OSMOLALITY: CPT

## 2025-03-16 PROCEDURE — 700111 HCHG RX REV CODE 636 W/ 250 OVERRIDE (IP): Mod: JZ | Performed by: HOSPITALIST

## 2025-03-16 RX ORDER — AMOXICILLIN 250 MG
2 CAPSULE ORAL EVERY EVENING
Status: DISCONTINUED | OUTPATIENT
Start: 2025-03-16 | End: 2025-03-22 | Stop reason: HOSPADM

## 2025-03-16 RX ORDER — VITAMIN B COMPLEX
5000 TABLET ORAL DAILY
Status: DISCONTINUED | OUTPATIENT
Start: 2025-03-16 | End: 2025-03-22 | Stop reason: HOSPADM

## 2025-03-16 RX ORDER — INDOMETHACIN 25 MG/1
50 CAPSULE ORAL ONCE
Status: COMPLETED | OUTPATIENT
Start: 2025-03-16 | End: 2025-03-16

## 2025-03-16 RX ORDER — ENOXAPARIN SODIUM 100 MG/ML
40 INJECTION SUBCUTANEOUS DAILY
Status: DISCONTINUED | OUTPATIENT
Start: 2025-03-16 | End: 2025-03-16

## 2025-03-16 RX ORDER — OMEPRAZOLE 20 MG/1
20 CAPSULE, DELAYED RELEASE ORAL DAILY
Status: DISCONTINUED | OUTPATIENT
Start: 2025-03-16 | End: 2025-03-16

## 2025-03-16 RX ORDER — HYDRALAZINE HYDROCHLORIDE 20 MG/ML
20 INJECTION INTRAMUSCULAR; INTRAVENOUS EVERY 4 HOURS PRN
Status: DISCONTINUED | OUTPATIENT
Start: 2025-03-16 | End: 2025-03-18

## 2025-03-16 RX ORDER — MIDAZOLAM HYDROCHLORIDE 1 MG/ML
2 INJECTION INTRAMUSCULAR; INTRAVENOUS ONCE
Status: COMPLETED | OUTPATIENT
Start: 2025-03-16 | End: 2025-03-16

## 2025-03-16 RX ORDER — HEPARIN SODIUM 5000 [USP'U]/ML
5000 INJECTION, SOLUTION INTRAVENOUS; SUBCUTANEOUS EVERY 8 HOURS
Status: DISCONTINUED | OUTPATIENT
Start: 2025-03-16 | End: 2025-03-22 | Stop reason: HOSPADM

## 2025-03-16 RX ORDER — ONDANSETRON 2 MG/ML
4 INJECTION INTRAMUSCULAR; INTRAVENOUS EVERY 4 HOURS PRN
Status: DISCONTINUED | OUTPATIENT
Start: 2025-03-16 | End: 2025-03-22 | Stop reason: HOSPADM

## 2025-03-16 RX ORDER — HEPARIN SODIUM 1000 [USP'U]/ML
1200 INJECTION, SOLUTION INTRAVENOUS; SUBCUTANEOUS
Status: DISCONTINUED | OUTPATIENT
Start: 2025-03-16 | End: 2025-03-21

## 2025-03-16 RX ORDER — OMEPRAZOLE 20 MG/1
20 CAPSULE, DELAYED RELEASE ORAL DAILY
Status: DISCONTINUED | OUTPATIENT
Start: 2025-03-17 | End: 2025-03-22 | Stop reason: HOSPADM

## 2025-03-16 RX ORDER — DEXTROSE MONOHYDRATE 25 G/50ML
100 INJECTION, SOLUTION INTRAVENOUS ONCE
Status: COMPLETED | OUTPATIENT
Start: 2025-03-16 | End: 2025-03-16

## 2025-03-16 RX ORDER — CALCIUM GLUCONATE 20 MG/ML
2 INJECTION, SOLUTION INTRAVENOUS ONCE
Status: COMPLETED | OUTPATIENT
Start: 2025-03-16 | End: 2025-03-16

## 2025-03-16 RX ORDER — GABAPENTIN 300 MG/1
300 CAPSULE ORAL EVERY EVENING
Status: DISCONTINUED | OUTPATIENT
Start: 2025-03-16 | End: 2025-03-22 | Stop reason: HOSPADM

## 2025-03-16 RX ORDER — POLYETHYLENE GLYCOL 3350 17 G/17G
1 POWDER, FOR SOLUTION ORAL
Status: DISCONTINUED | OUTPATIENT
Start: 2025-03-16 | End: 2025-03-22 | Stop reason: HOSPADM

## 2025-03-16 RX ORDER — SODIUM CHLORIDE 9 MG/ML
100 INJECTION, SOLUTION INTRAVENOUS
Status: DISCONTINUED | OUTPATIENT
Start: 2025-03-16 | End: 2025-03-22 | Stop reason: HOSPADM

## 2025-03-16 RX ORDER — AMLODIPINE BESYLATE 10 MG/1
5 TABLET ORAL DAILY
Status: DISCONTINUED | OUTPATIENT
Start: 2025-03-16 | End: 2025-03-18

## 2025-03-16 RX ORDER — INSULIN LISPRO 100 [IU]/ML
2-9 INJECTION, SOLUTION INTRAVENOUS; SUBCUTANEOUS
Status: DISCONTINUED | OUTPATIENT
Start: 2025-03-16 | End: 2025-03-22 | Stop reason: HOSPADM

## 2025-03-16 RX ORDER — DEXTROSE MONOHYDRATE 25 G/50ML
25 INJECTION, SOLUTION INTRAVENOUS
Status: DISCONTINUED | OUTPATIENT
Start: 2025-03-16 | End: 2025-03-22 | Stop reason: HOSPADM

## 2025-03-16 RX ADMIN — HEPARIN SODIUM 5000 UNITS: 5000 INJECTION, SOLUTION INTRAVENOUS; SUBCUTANEOUS at 16:09

## 2025-03-16 RX ADMIN — MIDAZOLAM HYDROCHLORIDE 2 MG: 1 INJECTION, SOLUTION INTRAMUSCULAR; INTRAVENOUS at 06:39

## 2025-03-16 RX ADMIN — HYDRALAZINE HYDROCHLORIDE 20 MG: 20 INJECTION, SOLUTION INTRAMUSCULAR; INTRAVENOUS at 10:34

## 2025-03-16 RX ADMIN — HEPARIN SODIUM 5000 UNITS: 5000 INJECTION, SOLUTION INTRAVENOUS; SUBCUTANEOUS at 23:02

## 2025-03-16 RX ADMIN — INSULIN HUMAN 10 UNITS: 100 INJECTION, SOLUTION PARENTERAL at 06:35

## 2025-03-16 RX ADMIN — AMLODIPINE BESYLATE 5 MG: 10 TABLET ORAL at 09:58

## 2025-03-16 RX ADMIN — HYDRALAZINE HYDROCHLORIDE 20 MG: 20 INJECTION, SOLUTION INTRAMUSCULAR; INTRAVENOUS at 16:12

## 2025-03-16 RX ADMIN — SODIUM BICARBONATE 50 MEQ: 84 INJECTION, SOLUTION INTRAVENOUS at 17:15

## 2025-03-16 RX ADMIN — SODIUM BICARBONATE 50 MEQ: 84 INJECTION INTRAVENOUS at 06:29

## 2025-03-16 RX ADMIN — INSULIN LISPRO 2 UNITS: 100 INJECTION, SOLUTION INTRAVENOUS; SUBCUTANEOUS at 22:26

## 2025-03-16 RX ADMIN — CALCIUM GLUCONATE 2 G: 20 INJECTION, SOLUTION INTRAVENOUS at 04:54

## 2025-03-16 RX ADMIN — SODIUM BICARBONATE 50 ML/HR: 84 INJECTION, SOLUTION INTRAVENOUS at 00:50

## 2025-03-16 RX ADMIN — OMEPRAZOLE 20 MG: 20 CAPSULE, DELAYED RELEASE ORAL at 05:39

## 2025-03-16 RX ADMIN — ONDANSETRON 4 MG: 2 INJECTION INTRAMUSCULAR; INTRAVENOUS at 16:42

## 2025-03-16 RX ADMIN — Medication 5000 UNITS: at 09:59

## 2025-03-16 RX ADMIN — POLYETHYLENE GLYCOL 3350 1 PACKET: 17 POWDER, FOR SOLUTION ORAL at 17:13

## 2025-03-16 RX ADMIN — HEPARIN SODIUM 1200 UNITS: 1000 INJECTION, SOLUTION INTRAVENOUS; SUBCUTANEOUS at 09:45

## 2025-03-16 RX ADMIN — KETAMINE HYDROCHLORIDE 80 MG: 50 INJECTION INTRAMUSCULAR; INTRAVENOUS at 06:39

## 2025-03-16 RX ADMIN — SENNOSIDES AND DOCUSATE SODIUM 2 TABLET: 50; 8.6 TABLET ORAL at 17:13

## 2025-03-16 RX ADMIN — SODIUM ZIRCONIUM CYCLOSILICATE 10 G: 10 POWDER, FOR SUSPENSION ORAL at 06:26

## 2025-03-16 RX ADMIN — DEXTROSE MONOHYDRATE 100 ML: 25 INJECTION, SOLUTION INTRAVENOUS at 06:26

## 2025-03-16 ASSESSMENT — ENCOUNTER SYMPTOMS
COUGH: 0
BRUISES/BLEEDS EASILY: 0
WHEEZING: 0
MYALGIAS: 1
COUGH: 1
CLAUDICATION: 0
VOMITING: 0
HEARTBURN: 0
BLOOD IN STOOL: 0
WEAKNESS: 1
CONSTIPATION: 1
HEMOPTYSIS: 0
ABDOMINAL PAIN: 0
DIARRHEA: 0
FEVER: 0
HEADACHES: 0
RESPIRATORY NEGATIVE: 1
CHILLS: 0
SENSORY CHANGE: 0
POLYDIPSIA: 0
NAUSEA: 0
PALPITATIONS: 0
DEPRESSION: 0
SPEECH CHANGE: 0
EYES NEGATIVE: 1
NAUSEA: 1
DIZZINESS: 0
VOMITING: 1
NECK PAIN: 0
CARDIOVASCULAR NEGATIVE: 1
BACK PAIN: 1
ORTHOPNEA: 0
NERVOUS/ANXIOUS: 1
TINGLING: 0
FOCAL WEAKNESS: 0
SHORTNESS OF BREATH: 0
SPUTUM PRODUCTION: 0
GASTROINTESTINAL NEGATIVE: 1
TREMORS: 1

## 2025-03-16 ASSESSMENT — PAIN DESCRIPTION - PAIN TYPE
TYPE: ACUTE PAIN

## 2025-03-16 NOTE — ASSESSMENT & PLAN NOTE
Now resolved on HD  Follow daily  Recent Labs     03/20/25  2107 03/21/25  0456 03/21/25  1158   SODIUM 127* 127* 128*   POTASSIUM 5.3 5.0 4.3   CHLORIDE 90* 91* 93*   CO2 24 23 21   GLUCOSE 188* 178* 170*   BUN 30* 35* 18   CREATININE 5.28* 5.77* 3.38*

## 2025-03-16 NOTE — ASSESSMENT & PLAN NOTE
Possibly secondary to renal disease  We will repeat iron studies, reticulocyte count, currently no evidence of blood loss  Monitor hemoglobin  Transfuse to keep Hb>7  Recent Labs     03/18/25  2302 03/20/25  0703 03/21/25  0456   HEMOGLOBIN 9.1* 8.3* 8.2*   HEMATOCRIT 28.0* 25.7* 24.4*   MCV 92.4 93.8 92.4   MCH 30.0 30.3 31.1   PLATELETCT 381 308 335

## 2025-03-16 NOTE — PROGRESS NOTES
Pulmonary and Critical Care Medicine Progress Note    This lady tolerated her HD/UF well.  Her repeat potassium is 4.4.  Her serum bicarbonate is 20.  Her sodium is 125, which is increased from 118 at 0329 hrs. this morning.  Will continue to check her BMP every 4 hours.  She may be safely transferred to the [facility].    Lyle Arreola MD  Pulmonary and Critical Care Medicine

## 2025-03-16 NOTE — PROGRESS NOTES
[facility] Dialysis Progress Note       STAT HD #1 ordered by Dr. Cox. Treatment started at 0740 and ended at 0940.    Total Net UF: 1500mL.     Pt tolerated treatment well with no issues. CVC placed at bedside, confirmed via X-Ray. Consent signed by 2 MDs prior to treatment initiation due to pt still being sedated. See flow sheet for details.     CVC patent, locked with Heparin 1:1000 units; 1.2 mL to RED port and 1.2 mL to BLUE port post dialysis. No s/s of infection present. Dressing in place, CDI.     Report given to ROVERTO Garcia.

## 2025-03-16 NOTE — CONSULTS
Nephrology Consultation    Date of Service  3/16/2025    Referring Physician  MARICRUZ Joyner*    Consulting Physician  Theo Cox M.D.    Reason for Consultation  BUTCH on CKD 3b-4    History of Presenting Illness  50 y.o. 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    Patient was recently admitted to this hospital from 2/27/2025 through 3/1/2025.  She had osteomyelitis, and had left fourth toe amputation on 2/28/2025.  She was discharged on 5 days of Augmentin.    Patient presented to outside hospital in [facility] yesterday with progressive weakness and fatigue.  Outside labs revealed worsening kidney failure and hyperkalemia, and patient was admitted to our hospital.  Conservative measures with Rodriguez catheter, high-dose IV Lasix, sodium and bicarbonate infusion were attempted, but patient remained hyperkalemic.  Therefore, hemodialysis catheter was placed, and patient received dialysis early this morning with 1.5 L removed.    Patient seen and examined earlier this afternoon after dialysis.  She remains quite somnolent.  She says she has no idea where she is or what the circumstances have been the last few days.  She denies chest pain, shortness of breath.  She has been anuric so far during the day shift today.    Review of Systems  Review of Systems   Constitutional:  Negative for fever.   Respiratory:  Negative for shortness of breath.    Cardiovascular:  Negative for chest pain.   Gastrointestinal:  Negative for abdominal pain.   All other systems reviewed and are negative.      Past Medical History  Past Medical History:   Diagnosis Date    Cataract     Diabetes (HCC)     Type 2    Infectious disease 10/2022    Covid    Pre-op evaluation 12/13/2022       Surgical History  Past Surgical History:   Procedure Laterality Date    TOE AMPUTATION Left 2/28/2025     Procedure: AMPUTATION, TOE-4TH;  Surgeon: Jamar Coronado M.D.;  Location: SURGERY [facility];  Service: Orthopedics    ID UPPER GI ENDOSCOPY,DIAGNOSIS N/A 12/07/2022    Procedure: ESOPHAGOGASTRODUODENOSCOPY (EGD) WITH BRUSHINGS AND DILATATION VS OVER THE SCOPE CLIP PLACEMENT;  Surgeon: Bridger Gongora M.D.;  Location: SURGERY [facility];  Service: General    ID UPPER GI ENDOSCOPY,CTRL BLEED N/A 12/07/2022    Procedure: EGD, WITH CLIP PLACEMENT;  Surgeon: Bridger Gongora M.D.;  Location: SURGERY [facility];  Service: General    ABDOMINAL HYSTERECTOMY TOTAL      CATARACT EXTRACTION WITH IOL Left     CHOLECYSTECTOMY      GASTRIC RESECTION      HYSTERECTOMY, TOTAL ABDOMINAL      OVARIAN CYSTECTOMY Bilateral     PRIMARY C SECTION      x 2    TONSILLECTOMY         Family History  Family History   Problem Relation Age of Onset    Breast Cancer Mother     Diabetes Mother     Heart Disease Mother     Hypertension Mother     Hypertension Father     Heart Disease Father     Diabetes Father     Diabetes Brother        Social History  Social History     Socioeconomic History    Marital status:      Spouse name: Not on file    Number of children: Not on file    Years of education: Not on file    Highest education level: Some college, no degree   Occupational History    Occupation: disabled for lower back   Tobacco Use    Smoking status: Never    Smokeless tobacco: Never   Vaping Use    Vaping status: Never Used   Substance and Sexual Activity    Alcohol use: Not Currently     Comment: occ    Drug use: Yes     Types: Marijuana, Oral     Comment: occ    Sexual activity: Not on file   Other Topics Concern    Not on file   Social History Narrative    Not on file     Social Drivers of Health     Financial Resource Strain: Medium Risk (6/23/2024)    Overall Financial Resource Strain (CARDIA)     Difficulty of Paying Living Expenses: Somewhat hard   Food Insecurity: Food Insecurity Present (2/27/2025)    Hunger Vital Sign      Worried About Running Out of Food in the Last Year: Sometimes true     Ran Out of Food in the Last Year: Never true   Transportation Needs: No Transportation Needs (2/27/2025)    PRAPARE - Transportation     Lack of Transportation (Medical): No     Lack of Transportation (Non-Medical): No   Physical Activity: Sufficiently Active (6/23/2024)    Exercise Vital Sign     Days of Exercise per Week: 3 days     Minutes of Exercise per Session: 50 min   Stress: Stress Concern Present (6/23/2024)    [employer] [place] [employer] - Occupational Stress Questionnaire     Feeling of Stress : To some extent   Social Connections: Moderately Isolated (6/23/2024)    Social Connection and Isolation Panel [NHANES]     Frequency of Communication with Friends and Family: More than three times a week     Frequency of Social Gatherings with Friends and Family: More than three times a week     Attends Gnosticism Services: Never     Active Member of Clubs or Organizations: No     Attends Club or Organization Meetings: Patient declined     Marital Status:    Intimate Partner Violence: Not At Risk (2/27/2025)    Humiliation, Afraid, Rape, and Kick questionnaire     Fear of Current or Ex-Partner: No     Emotionally Abused: No     Physically Abused: No     Sexually Abused: No   Housing Stability: Low Risk  (2/27/2025)    Housing Stability Vital Sign     Unable to Pay for Housing in the Last Year: No     Number of Times Moved in the Last Year: 0     Homeless in the Last Year: No       Medications  Current Facility-Administered Medications   Medication Dose Route Frequency Provider Last Rate Last Admin    heparin injection 5,000 Units  5,000 Units Subcutaneous Q8HRS Alf Virgen M.D.        senna-docusate (Pericolace Or Senokot S) 8.6-50 MG per tablet 2 Tablet  2 Tablet Oral Q EVENING Alf Virgen M.D.        And    polyethylene glycol/lytes (Miralax) Packet 1 Packet  1 Packet Oral QDAY PRN Alf Virgen M.D.         [Held by provider] gabapentin (Neurontin) capsule 300 mg  300 mg Oral Q EVENING Alf Virgen M.D.        [START ON 3/17/2025] epoetin (Retacrit) 5,000 Units injection (Dialysis Use Only)  5,000 Units Intravenous MO, WE + FR Theo Cox M.D.        NS (Bolus) 0.9 % infusion 100 mL  100 mL Intravenous DIALYSIS PRN Theo Cox M.D.        insulin lispro (HumaLOG,AdmeLOG) subcutaneous injection  2-9 Units Subcutaneous 4X/DAY ACHS Lyle Arreola M.D.        And    dextrose 50% (D50W) injection 25 g  25 g Intravenous Q15 MIN PRN Lyle Arreola M.D.        [START ON 3/17/2025] omeprazole (PriLOSEC) capsule 20 mg  20 mg Oral DAILY Lyle Arreola M.D.        amLODIPine (Norvasc) tablet 5 mg  5 mg Oral DAILY Lyle Arreola M.D.   5 mg at 03/16/25 0958    vitamin D3 (Cholecalciferol) tablet 5,000 Units  5,000 Units Oral DAILY Lyle Arreola M.D.   5,000 Units at 03/16/25 0959    hydrALAZINE (Apresoline) injection 20 mg  20 mg Intravenous Q4HRS PRN Lyle Arreola M.D.   20 mg at 03/16/25 1034    heparin intracatheter (for DIALYSIS USE ONLY) 1,200 Units  1,200 Units Intracatheter DIALYSIS PRN Theo Cox M.D.   1,200 Units at 03/16/25 0945    heparin intracatheter (for DIALYSIS USE ONLY) 1,200 Units  1,200 Units Intracatheter DIALYSIS PRN Theo Cox M.D.   1,200 Units at 03/16/25 0945       Allergies  No Known Allergies    Physical Exam  Temp:  [36.1 °C (96.9 °F)-36.9 °C (98.4 °F)] 36.1 °C (96.9 °F)  Pulse:  [72-91] 75  Resp:  [8-34] 19  BP: (138-194)/(65-92) 141/86  SpO2:  [90 %-100 %] 98 %    Physical Exam  Constitutional:       General: She is not in acute distress.     Appearance: She is well-developed. She is obese. She is ill-appearing.   HENT:      Head: Normocephalic and atraumatic.      Mouth/Throat:      Mouth: Mucous membranes are moist.      Pharynx: Oropharynx is clear. No oropharyngeal exudate.   Eyes:      General: No scleral icterus.     Extraocular  Movements: Extraocular movements intact.   Neck:      Trachea: No tracheal deviation.   Cardiovascular:      Rate and Rhythm: Normal rate and regular rhythm.      Heart sounds: Normal heart sounds. No murmur heard.  Pulmonary:      Effort: Pulmonary effort is normal.      Breath sounds: No stridor. No rales.   Abdominal:      Palpations: Abdomen is soft.      Tenderness: There is no abdominal tenderness.   Musculoskeletal:         General: Normal range of motion.      Cervical back: Normal range of motion. No rigidity.      Right lower leg: No edema.      Left lower leg: No edema.   Skin:     General: Skin is warm and dry.   Neurological:      General: No focal deficit present.      Mental Status: She is lethargic and disoriented.   Psychiatric:         Mood and Affect: Mood normal.         Behavior: Behavior normal.     Dialysis access: Right IJ temporary Vas-Cath    Fluids  Date 03/16/25 0700 - 03/17/25 0659   Shift 1367-0777 [id number] 0633-3342 24 Hour Total   INTAKE   I.V. 200   200   Dialysis 500   500   IV Piggyback 94.5   94.5   Shift Total 794.5   794.5   OUTPUT   Urine 55   55   Dialysis 2000 2000   Shift Total 2055 2055   Weight (kg) 95.3 95.3 95.3 95.3       Laboratory  Labs reviewed, pertinent labs below.  Recent Labs     03/15/25  2218   WBC 7.4   RBC 2.68*   HEMOGLOBIN 8.1*   HEMATOCRIT 24.3*   MCV 90.7   MCH 30.2   MCHC 33.3   RDW 42.7   PLATELETCT 404   MPV 10.2     Recent Labs     03/16/25  0329 03/16/25  0950 03/16/25  1429   SODIUM 118* 125* 126*   POTASSIUM 6.6* 4.4 5.1   CHLORIDE 86* 91* 94*   CO2 11* 20 18*   GLUCOSE 151* 114* 106*   * 83* 86*   CREATININE 12.70* 8.50* 9.08*   CALCIUM 7.3* 7.4* 7.6*     Recent Labs     03/15/25  2218   APTT 44.1*   INR 1.31*            URINALYSIS:  Lab Results   Component Value Date/Time    COLORURINE Dark Yellow 03/15/2025 2310    CLARITY Cloudy (A) 03/15/2025 2310    SPECGRAVITY 1.023 03/15/2025 2310    PHURINE 5.0 03/15/2025 2310    KETONES Trace  (A) 03/15/2025 2310    PROTEINURIN >=1000 (A) 03/15/2025 2310    BILIRUBINUR Negative 03/15/2025 2310    UROBILU 1.0 03/15/2025 2310    NITRITE Negative 03/15/2025 2310    LEUKESTERAS Small (A) 03/15/2025 2310    OCCULTBLOOD Large (A) 03/15/2025 2310     [facility]  Lab Results   Component Value Date/Time    TOTPROTUR 50.0 (H) 02/20/2024 1137      Lab Results   Component Value Date/Time    CREATININEU 120.49 02/20/2024 1137       Imaging interpreted by radiologist. Imaging reports reviewed with pertinent findings below  DX-CHEST-FOR LINE PLACEMENT Perform procedure in: Patient's Room   Final Result         1. Right jugular dialysis line in place without complication.      2. Shallow breath. Mild pulmonary edema suggested.                  DX-FOOT-COMPLETE 3+ LEFT   Final Result      1.  Prior amputation of the fourth toe.      2.  No evidence of fracture or bony destructive change.      CT-RENAL COLIC EVALUATION(A/P W/O)   Final Result      1.  No evidence of bowel obstruction or focal inflammatory change.      2.  No evidence of renal or ureteral stone or hydronephrosis.      3.  Small amount of ascites.      4.  Atherosclerotic vascular calcification.      5.  Anasarca.      6.  Bibasal atelectasis and minimal bilateral pleural effusions.      7.  Constipation.      8.  Prior gastric bypass procedure.      DX-OUTSIDE IMAGES-DX CHEST   Final Result      CT-OUTSIDE IMAGES-CT HEAD   Final Result      EC-ECHOCARDIOGRAM COMPLETE W/ CONT    (Results Pending)         Assessment/Plan  50 y.o. 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    1.  BUTCH on CKD 3B to 4.  Anuric.  Unclear etiology of BUTCH, unlikely to be AIN with short course of antibiotics.  More likely due to ATN given granular casts on urinalysis on admission.  She is at high risk for progression of CKD to ESRD due to history of poorly  controlled diabetes, but only had microalbuminuria, so this seems quite rapid for decompensation of diabetic nephropathy.  If patient urinates, recommend checking urinalysis, urine protein quantification.  In the meantime, continue daily dialysis initiation.  Will consider kidney biopsy to elucidate diagnosis and prognosis if patient remains dialysis dependent.  HD#1 - 2 hours, 250/500 BFR/DFR.  HD#2 - 2.5 hours, 250/500 BFR/DFR.   HD#3 - 3 hours, 300/600 BFR/DFR.      2.  Dialysis access: Right IJ temporary Vas-Cath, maintain for now.    3.  Weakness and fatigue, reason for admission to outside hospital.  Likely due to kidney failure.  Continue dialysis as above.    4.  Hyponatremia, unclear trigger.  Improving slightly too rapidly.  Recommend hypotonic fluid administration with 50 mEq of sodium bicarbonate and 1 L of D5W (hypotonic solution) at 125 mL/h for 1 L only.  Continue checking basic metabolic panel every 4 hours.  Goal sodium correction no more than 8 mEq/L in a 24-hour period.    5.  Metabolic acidosis, noted on admission, improved with dialysis, slightly worsening again.  Recommend hypotonic sodium bicarbonate solution as above.  Continue dialysis as above.    6.  Hyperkalemia, noted on admission, improved with dialysis.  Check renal function panel at least once daily.    7.  Hyperphosphatemia, likely due to BUTCH.  No acute need for phosphorus binders.  Check renal function panel daily/phosphorus level at least once daily.    8.  Normocytic anemia.  I will order LULY's 3 times weekly with dialysis.  Check CBC daily.    Discussed with ICU physician Dr. Lyle Dasilva MD  Nephrology  [facility]

## 2025-03-16 NOTE — ED TRIAGE NOTES
"Chief Complaint   Patient presents with    Other     Pt transferred from [facility] for acute renal failure and elevated potassium.  Pt reported she went to hospital coz she was feeling confused and \"out of it \" started 2 weeks back. H/o DM and HTN     Per ems report pt potassium was 7.1, Na 114 mg/dl--received meds as follows : dextrose with insulin, calcium gluconate, lasix 80 mg IV, and ongoing 10 mg albuterol.    On arrival pt is alert and oriented x 4, denies chest pain or sob.  H/o DM, HTN    Pt attached to cardiac monitor, call bell in reach, bed in low position.  "

## 2025-03-16 NOTE — PROCEDURES
Date of service:  3/16/2025    Title:  Hemodialysis central venous catheter placement - internal jugular vein    Indication:  Acute kidney injury superimposed on stage IIIb chronic kidney disease    Narrative:    A time out was performed identifying the correct patient, correct procedure and correct location prior to this procedure.    The right neck was prepped with chlorhexidine and draped in the usual sterile fashion.  1% Xylocaine solution was used for topical anesthesia.  A hemodialysis central venous catheter was placed into the right internal jugular vein under ultrasound guidance using the technique described by Christine without difficulty or apparent complication.  The guidewire was removed intact.  The line was sutured into place and a sterile dressing was placed over the line.  All ports flush and return venous blood easily.  The patient tolerated the procedure quite nicely.  No complications are apparent.  A STAT CXR is ordered to confirm placement.  The catheter may be safely used for infusion, medication administration and hemodialysis.    This was an EMERGENT procedure.  It was medically necessary to perform this procedure emergently to prevent life threatening deterioration.    Lyle Arreola MD  Pulmonary and Critical Care Medicine

## 2025-03-16 NOTE — PROGRESS NOTES
Critical Care Progress Note    Date of admission  3/15/2025    Chief Complaint  50 y.o. female admitted 3/15/2025 with acute encephalopathy, acute on chronic kidney injury and hyperkalemia.  She has a history of DM II, HTN, CKD, osteomyelitis in the foot.    Hospital Course      3/16 -    emergent HD.  Trend electrolytes.      Interval Problem Update  Reviewed last 24 hour events:      SR  98.4      Review of Systems  Review of Systems   Unable to perform ROS: Acuity of condition        Vital Signs for last 24 hours   Temp:  [36.4 °C (97.5 °F)-36.9 °C (98.4 °F)] 36.4 °C (97.5 °F)  Pulse:  [76-91] 76  Resp:  [9-34] 19  BP: (138-194)/(65-88) 194/88  SpO2:  [90 %-100 %] 98 %    Hemodynamic parameters for last 24 hours       Respiratory Information for the last 24 hours       Physical Exam   Physical Exam  Constitutional:       Appearance: She is obese.   Cardiovascular:      Comments: Sinus rhythm  Pulmonary:      Breath sounds: Rales (Few crackles) present. No wheezing.   Abdominal:      General: There is no distension.      Tenderness: There is no abdominal tenderness.   Skin:     General: Skin is warm.   Neurological:      Comments: She is awake and alert.  She is anxious.         Medications  Current Facility-Administered Medications   Medication Dose Route Frequency Provider Last Rate Last Admin    heparin injection 5,000 Units  5,000 Units Subcutaneous Q8HRS Alf Virgen M.D.        senna-docusate (Pericolace Or Senokot S) 8.6-50 MG per tablet 2 Tablet  2 Tablet Oral Q EVENING Alf Virgen M.D.        And    polyethylene glycol/lytes (Miralax) Packet 1 Packet  1 Packet Oral QDAY PRN Alf Virgen M.D.        [Held by provider] gabapentin (Neurontin) capsule 300 mg  300 mg Oral Q EVENING Alf Virgen M.D.        sodium zirconium cyclosilicate (Lokelma) packet 10 g  10 g Oral TID Alf Virgen M.D.   10 g at 03/16/25 0626    Followed by    [START ON 3/18/2025] sodium zirconium cyclosilicate  (Lokelma) packet 10 g  10 g Oral DAILY AT NOON Alf Virgen M.D.        [START ON 3/17/2025] epoetin (Retacrit) 5,000 Units injection (Dialysis Use Only)  5,000 Units Intravenous MO, WE + FR Theo Cox M.D.        NS (Bolus) 0.9 % infusion 100 mL  100 mL Intravenous DIALYSIS PRN Theo Cox M.D.        insulin lispro (HumaLOG,AdmeLOG) subcutaneous injection  2-9 Units Subcutaneous 4X/DAY ACHS Lyle Arreola M.D.        And    dextrose 50% (D50W) injection 25 g  25 g Intravenous Q15 MIN PRN Lyle Arreola M.D.        [START ON 3/17/2025] omeprazole (PriLOSEC) capsule 20 mg  20 mg Oral DAILY Lyle Arreola M.D.        amLODIPine (Norvasc) tablet 5 mg  5 mg Oral DAILY Lyle Arreola M.D.        vitamin D3 (Cholecalciferol) tablet 5,000 Units  5,000 Units Oral DAILY Lyle Arreola M.D.        hydrALAZINE (Apresoline) injection 20 mg  20 mg Intravenous Q4HRS PRN Lyle Arreola M.D.           Fluids    Intake/Output Summary (Last 24 hours) at 3/16/2025 0738  Last data filed at 3/16/2025 0600  Gross per 24 hour   Intake 158.33 ml   Output 300 ml   Net -141.67 ml       Laboratory      Recent Labs     03/15/25  2218   CPKTOTAL 466*     Recent Labs     03/15/25  2218 03/16/25  0329   SODIUM 117* 118*   POTASSIUM 6.7* 6.6*   CHLORIDE 87* 86*   CO2 12* 11*   * 124*   CREATININE 12.50* 12.70*   MAGNESIUM 2.3 2.3   PHOSPHORUS 10.4*  --    CALCIUM 7.6* 7.3*     Recent Labs     03/15/25  2218 03/16/25  0329   ALTSGPT 25  --    ASTSGOT 45  --    ALKPHOSPHAT 517*  --    TBILIRUBIN 0.6  --    GLUCOSE 103* 151*     Recent Labs     03/15/25  2218   WBC 7.4   NEUTSPOLYS 86.40*   LYMPHOCYTES 9.40*   MONOCYTES 3.50   EOSINOPHILS 0.10   BASOPHILS 0.10   ASTSGOT 45   ALTSGPT 25   ALKPHOSPHAT 517*   TBILIRUBIN 0.6     Recent Labs     03/15/25  2218   RBC 2.68*   HEMOGLOBIN 8.1*   HEMATOCRIT 24.3*   PLATELETCT 404   PROTHROMBTM 16.3*   APTT 44.1*   INR 1.31*       Imaging  X-Ray:  I  have personally reviewed the images and compared with prior images. and My impression is: Pulmonary vascular congestion    Assessment/Plan  * Acute renal failure superimposed on stage 3b chronic kidney disease (HCC)- (present on admission)  Assessment & Plan  No hydronephrosis on imaging  Emergent HD/UF    Hyperkalemia- (present on admission)  Assessment & Plan  Emergent HD    Acute encephalopathy  Assessment & Plan  Acute metabolic encephalopathy  Limit sedatives and mind altering medications as much as possible  Follow neuro exam    Elevated troponin  Assessment & Plan  Demand ischemia    Primary hypertension- (present on admission)  Assessment & Plan  Goal SBP less than 160  Continue amlodipine, 5 mg daily    Type 2 diabetes mellitus (HCC)- (present on admission)  Assessment & Plan  Glycohemoglobin 8.6 on 1/25/2025  Sliding scale insulin    Hyponatremia- (present on admission)  Assessment & Plan  Hypervolemic hyponatremia with acute on chronic kidney disease  Trend sodium         VTE:  Heparin  Ulcer: Not Indicated  Lines: Rodriguez Catheter  Ongoing indication addressed    I have performed a physical exam and reviewed and updated ROS and Plan today (3/16/2025). In review of yesterday's note (3/15/2025), there are no changes except as documented above.     I have assessed and reassessed her respiratory status, blood pressure, hemodynamics, cardiovascular status, urine output, serial determinations of her electrolytes and acid-base status.  She is at increased risk for worsening cardiovascular, renal system dysfunction.    Discussed patient condition and risk of morbidity and/or mortality with RN, RT, and Pharmacy    The patient remains critically ill.  Critical care time = 85 minutes in directly providing and coordinating critical care and extensive data review.  No time overlap and excludes procedures.    The time spent is in addition to the 50 minutes spent by Dr. Red earlier today.    Lyle Driver  MD [facility]  Pulmonary and Critical Care Medicine

## 2025-03-16 NOTE — CARE PLAN
The patient is Watcher - Medium risk of patient condition declining or worsening         Progress made toward(s) clinical / shift goals:    Problem: Knowledge Deficit - Standard  Goal: Patient and family/care givers will demonstrate understanding of plan of care, disease process/condition, diagnostic tests and medications  Outcome: Progressing     Problem: Skin Integrity  Goal: Skin integrity is maintained or improved  Outcome: Progressing

## 2025-03-16 NOTE — PROGRESS NOTES
Time of Arrival: 0235  HR: 86  BP: 154/89  SpO2: 94% RA  RR: 18  Temp: 96.6f  Weight: 103.7kg      Does patient consent to inventory of belongings:     Belongings sent home with family (Please include name); No belongings with pt upon arrival to TICU    Belongings at bedside:  None    4 Eyes Skin Assessment Completed by ROVERTO Draper and ROVERTO Michelle.    Head WDL  Ears WDL  Nose WDL  Mouth WDL; missing teeth  Neck Redness; abrasions  Breast/Chest Redness and Bruising; midaxillary; scattered abrasions  Shoulder Blades WDL  Spine Redness; scattered small abrasions  (R) Arm/Elbow/Hand Redness and Abrasion  (L) Arm/Elbow/Hand Redness and Abrasion  Abdomen WDL  Groin WDL  Scrotum/Coccyx/Buttocks WDL  (R) Leg Redness and Scab  (L) Leg Redness and Abrasion; scab  (R) Heel/Foot/Toe Redness and Scab; scattered abrasions  (L) Heel/Foot/Toe Redness; abrasions (scattered); missing 4th toe          Devices In Places ECG, Blood Pressure Cuff, Pulse Ox, Rodriguez, and SCD's      Interventions In Place Sacral Mepilex, TAP System, Pillows, Q2 Turns, Low Air Loss Mattress, and Heels Loaded W/Pillows    Possible Skin Injury Yes    Pictures Uploaded Into Epic Yes  Wound Consult Placed Yes  RN Wound Prevention Protocol Ordered Yes

## 2025-03-16 NOTE — ASSESSMENT & PLAN NOTE
Patient presented with sodium 117  Hypervolemic hypoNa  Volume and electrolyte correction with hemodialysis  Daily BMP  Recent Labs     03/20/25  2107 03/21/25  0456 03/21/25  1158   SODIUM 127* 127* 128*   POTASSIUM 5.3 5.0 4.3   CHLORIDE 90* 91* 93*   CO2 24 23 21   GLUCOSE 188* 178* 170*   BUN 30* 35* 18   CREATININE 5.28* 5.77* 3.38*     OK to salt food  Free water avoidance

## 2025-03-16 NOTE — ASSESSMENT & PLAN NOTE
Patient has chronic kidney secondary to diabetes mellitis  following outpatient with nephrology with baseline creatinine 1.1  Patient presented  with creatinine 12.5 and .  Nephrology consult, hemodialysis catheter placement, currently tolerating hemodialysis  Renal biopsy suggested to further delineate the patient's disease process, prognostication  Daily BMP  Renally dose medications as appropriate

## 2025-03-16 NOTE — PROGRESS NOTES
Pulmonary Critical Care Progress Note    49 y/o with type 2 DM c/b neuropathy, chronic back pain, CKD, recent hospitalization for osteomyelitis of let fourth middle and proximal phalange with associated cellulitis s/p amputation of left 4th toe, course c/b [facility] (-3/1/25) who presents today to [place] with progressive fatigue and weakness.    At outside facility found to have Cr: 13, K: 7's. Was given Bicarb, Ca, 80 IV lasix then transferred here for higher level of care.    CTH: negative for acute intracranial process    On ED arrival here noted to be hypertensive 180/80, HR 90, saturating high 90s to 100% on room air.  Labs notable for Na: 117, K: 6.7, CO: 12, A, Cr: 12.5, BUN: 118, Ca: 7.6, phos: 10.4, CK: 466,CRP: 5.58, osm serum: 306, urine Na: <20, Urine osm 312, UA: protein, ketones, granular casts, trop 129, bnp: 13430, H/H: 8.1/24.3, WBC 7.4 with left shift, ESR: 100    CT renal without contrast: No evidence of bowel obstruction or focal inflammatory change. No evidence of renal or ureteral stone or hydronephrosis. Small amount of ascites. Atherosclerotic vascular calcification. Anasarca. Bibasal atelectasis and minimal bilateral pleural effusions. Constipation. Prior gastric bypass procedure.    XR foot:  Prior amputation of the fourth toe.  No evidence of fracture or bony destructive change.    Rodriguez inserted. CT renal without stone or hydronephrosis. 100 mg lasix given in addition to Insulin, bicarb, albuterol. Started on Bicarb gtt 150 ml/hr.    Gen: NAD  Neuro: alert and oriented, however forgetful intermittently confused. +myoclonic jerks  Cards: RRR  Pulm: CTA   Abd: soft, NT, +fluid wave  Ext: warm and perfused    A/P:  Neuro:  #Encephalopathic  Likely d/t metabolic derangements, quite uremic, head ct neg. Will undergo HD  On opiates and gabapentin at home, hold for now     Cardiac:   #HTN  Hx of htn and increasingly hypertensive here - received lasix 80mg x1, 100mg x1  Anti-htn prns  Hold PTA  amlodipine, lasix    #demand ischemia - elevated trop, no changes in EKG from prior. Has e/o Q waves and TWI in leads III, aVF  Trend troponin  Tte ordered    Pulm: maintaining saturation on RA     Renal:   BUTCH on CKD - unclear etiology. CK mildly elevated. No e/o hydronephrosis. Possibly prerenal in setting of poor po intake, n/v. Sees Dr. Gan outpatient for CKD.   Given 80 x1 earlier and add'l 100mg IV here with 150cc out. Given ongoing hyperkalemia despite medical management will need HD. Appreciate nephrology assistance. EKG stable from high K. Dr. Meng graciously agreed to place Trialysis line.  Continue bicarb gtt    Hyponatremic hypervolemia  Secondary to renal disease process  Trend with q4h BMP    GI: sips/water okay, otherwise no diet until metabolic derangements improved  Constipation - lokelma, laxatives prn    Heme:   Chronic normocytic anemia - slight drop in hgb from prior. No signs of bleeding.  CTM    ID: prior hx of OM - completed clindamycin  No signs of active infxn    Endo: type 2 DM (hgbA1c 8.6)  - ISS    DVT ppx - heparin  GI ppx - PPI  Lines/Tubes/Drains - PIV x2, ramos  Bowel reg - lokelma  Nutrition - NPO except sips  Glucose - ISS    I have performed a physical exam and reviewed and updated ROS and Plan today     Critical care time = 50 minutes in directly providing and coordinating critical care and extensive data review.  No time overlap and excludes procedures.     Lea Red MD  Pulmonary, Sleep, and Critical Care Medicine  [facility]

## 2025-03-16 NOTE — CONSULTS
Hospital Medicine Consultation    Date of Service  3/16/2025    Referring Physician  Dr. Lyle Bro MD  Critical care    Consulting Physician  Andre Ellis M.D.  [facility] medicine has not been involved in the current hospitalization,    Reason for Consultation  Hospital medicine consultation requested the patient admitted with acute encephalopathy, acute on chronic kidney injury and hyperkalemia.    History of Presenting Illness  50 y.o. female who presented 3/15/2025 with a past med history of diabetes type 2, insulin requiring, hypertension, CKD, presenting as a transfer from [facility] for acute on chronic kidney failure with hyperkalemia, the patient reportedly not feeling well over the last several days, nausea vomiting, not being herself since Saturday, patient complains of being foggy, progressive weakness, generalized fatigue over the last week, the patient recently undergoing a fourth left toe amputation on March 1 secondary to diabetic ulcer, on laboratory evaluation patient was found with a white cell count of 7.4 hemoglobin 8.1 hematocrit 24 platelet count 404 sed rate 100 sodium 117 potassium 6.7 creatinine 12.5  phosphorus 10.4 the patient had CT renal performed showing no obstructive process, the patient mated to the ICU level of care for initiation of urgent dialysis, dialysis catheter placed by the intensivist, nephrology consulted for initiation of hemodialysis.  The patient currently afebrile, heart rate in the 70s, respiration unlabored, the patient is saturating in the high 90s on 2.5 L nasal cannula oxygen, blood pressure 140s over 80s,  Laboratory data currently with a sodium of 125 potassium 4.4 chloride 91 bicarb 28 glucose 114 BUN 83 creatinine 8.5  The patient had a session of hemodialysis with fluid removal of 1.5 L  Per nursing report the patient is anuric    Review of Systems  Review of Systems   Constitutional:  Positive for  malaise/fatigue. Negative for chills and fever.   HENT: Negative.     Eyes: Negative.    Respiratory: Negative.  Negative for cough.    Cardiovascular: Negative.  Negative for chest pain and palpitations.   Gastrointestinal: Negative.  Negative for heartburn, nausea and vomiting.   Genitourinary: Negative.  Negative for dysuria and frequency.   Musculoskeletal:  Positive for back pain and myalgias. Negative for neck pain.   Skin: Negative.  Negative for itching and rash.   Neurological:  Positive for weakness. Negative for dizziness, focal weakness and headaches.   Endo/Heme/Allergies: Negative.  Negative for polydipsia. Does not bruise/bleed easily.   Psychiatric/Behavioral:  Negative for depression. The patient is nervous/anxious.        Past Medical History   has a past medical history of Cataract, Diabetes (HCC), Infectious disease (10/2022), and Pre-op evaluation (12/13/2022).    Surgical History   has a past surgical history that includes cataract extraction with iol (Left); tonsillectomy; hysterectomy, total abdominal; gastric resection; cholecystectomy; primary c section; ovarian cystectomy (Bilateral); pr upper gi endoscopy,diagnosis (N/A, 12/07/2022); pr upper gi endoscopy,ctrl bleed (N/A, 12/07/2022); abdominal hysterectomy total; and toe amputation (Left, 2/28/2025).    Family History  family history includes Breast Cancer in her mother; Diabetes in her brother, father, and mother; Heart Disease in her father and mother; Hypertension in her father and mother.    Social History   reports that she has never smoked. She has never used smokeless tobacco. She reports that she does not currently use alcohol. She reports current drug use. Drugs: Marijuana and Oral.    Medications  Prior to Admission Medications   Prescriptions Last Dose Informant Patient Reported? Taking?   Cholecalciferol (VITAMIN D-3) 125 MCG (5000 UT) Tab 3/15/2025 Morning Patient Yes Yes   Sig: Take 5,000 Units by mouth every day.    HYDROcodone/acetaminophen (NORCO)  MG Tab 3/15/2025 Noon Patient Yes Yes   Sig: Take 1-2 Tablets by mouth every 6 hours as needed.   Semaglutide, 1 MG/DOSE, (OZEMPIC, 1 MG/DOSE,) 4 MG/3ML Solution Pen-injector 3/9/2025 Morning Patient No No   Sig: Inject 1 mg under the skin every 7 days.   amLODIPine (NORVASC) 5 MG Tab 3/15/2025 Morning Patient No Yes   Sig: Take 1 Tablet by mouth every day.   clindamycin (CLEOCIN) 300 MG Cap 3/15/2025 Noon Patient No Yes   Sig: Take 2 Capsules by mouth 3 times a day for 10 days.   furosemide (LASIX) 20 MG Tab 3/15/2025 Morning Patient No Yes   Sig: Take 1 Tablet by mouth 2 times a day.   gabapentin (NEURONTIN) 300 MG Cap 3/15/2025 Morning Patient Yes Yes   Si mg 3 times a day.   insulin lispro (HUMALOG) 100 UNIT/ML INJ Unknown Patient No No   Sig: Inject 10 units under skin 3 times a day before meals   Patient taking differently: as needed. Inject 10 units under skin 3 times a day before meals   omeprazole (PRILOSEC) 20 MG delayed-release capsule 3/15/2025 Morning Patient Yes Yes   Sig: Take 20 mg by mouth every day.      Facility-Administered Medications: None       Allergies  No Known Allergies    Physical Exam  Temp:  [36.1 °C (96.9 °F)-36.9 °C (98.4 °F)] 36.1 °C (96.9 °F)  Pulse:  [72-91] 75  Resp:  [8-34] 19  BP: (138-194)/(65-92) 141/86  SpO2:  [90 %-100 %] 98 %    Physical Exam  Vitals and nursing note reviewed.   Constitutional:       Appearance: She is well-developed. She is obese. She is ill-appearing. She is not diaphoretic.   HENT:      Head: Normocephalic and atraumatic.      Nose: Nose normal.   Eyes:      Conjunctiva/sclera: Conjunctivae normal.      Pupils: Pupils are equal, round, and reactive to light.   Neck:      Thyroid: No thyromegaly.      Vascular: No JVD.   Cardiovascular:      Rate and Rhythm: Normal rate and regular rhythm.      Heart sounds: Normal heart sounds.      No friction rub. No gallop.   Pulmonary:      Effort: Pulmonary effort is  normal.      Breath sounds: Rhonchi present. No wheezing or rales.   Abdominal:      General: Bowel sounds are normal. There is no distension.      Palpations: Abdomen is soft. There is no mass.      Tenderness: There is no abdominal tenderness. There is no guarding or rebound.   Musculoskeletal:         General: No tenderness. Normal range of motion.      Cervical back: Normal range of motion and neck supple.      Comments: Left fourth toe amputation   Lymphadenopathy:      Cervical: No cervical adenopathy.   Skin:     General: Skin is warm and dry.      Coloration: Skin is pale.   Neurological:      Mental Status: She is alert and oriented to person, place, and time.      Cranial Nerves: No cranial nerve deficit.   Psychiatric:         Behavior: Behavior normal.         Fluids  Date 03/16/25 0700 - 03/17/25 0659   Shift 8941-2270 3193-4859 6324-8666 24 Hour Total   INTAKE   I.V. 200   200   Dialysis 500   500   IV Piggyback 94.5   94.5   Shift Total 794.5   794.5   OUTPUT   Urine 55   55   Dialysis 2000 2000   Shift Total 2055 2055   Weight (kg) 95.3 95.3 95.3 95.3       Laboratory  Recent Labs     03/15/25  2218   WBC 7.4   RBC 2.68*   HEMOGLOBIN 8.1*   HEMATOCRIT 24.3*   MCV 90.7   MCH 30.2   MCHC 33.3   RDW 42.7   PLATELETCT 404   MPV 10.2     Recent Labs     03/15/25  2218 03/16/25  0329 03/16/25  0950   SODIUM 117* 118* 125*   POTASSIUM 6.7* 6.6* 4.4   CHLORIDE 87* 86* 91*   CO2 12* 11* 20   GLUCOSE 103* 151* 114*   * 124* 83*   CREATININE 12.50* 12.70* 8.50*   CALCIUM 7.6* 7.3* 7.4*     Recent Labs     03/15/25  2218   APTT 44.1*   INR 1.31*                 Imaging  DX-CHEST-FOR LINE PLACEMENT Perform procedure in: Patient's Room   Final Result         1. Right jugular dialysis line in place without complication.      2. Shallow breath. Mild pulmonary edema suggested.                  DX-FOOT-COMPLETE 3+ LEFT   Final Result      1.  Prior amputation of the fourth toe.      2.  No evidence of  fracture or bony destructive change.      CT-RENAL COLIC EVALUATION(A/P W/O)   Final Result      1.  No evidence of bowel obstruction or focal inflammatory change.      2.  No evidence of renal or ureteral stone or hydronephrosis.      3.  Small amount of ascites.      4.  Atherosclerotic vascular calcification.      5.  Anasarca.      6.  Bibasal atelectasis and minimal bilateral pleural effusions.      7.  Constipation.      8.  Prior gastric bypass procedure.      DX-OUTSIDE IMAGES-DX CHEST   Final Result      CT-OUTSIDE IMAGES-CT HEAD   Final Result      EC-ECHOCARDIOGRAM COMPLETE W/ CONT    (Results Pending)       Assessment/Plan  * Acute renal failure superimposed on stage 3b chronic kidney disease (HCC)- (present on admission)  Assessment & Plan  Patient has chronic kidney secondary to diabetes mellitis  following outpatient with nephrology with baseline creatinine 1.1  Patient presented  with creatinine 12.5 and .  Nephrology consult, hemodialysis catheter placement  Hemodialysis initiated, close follow-up on electrolytes, potassium    Acute encephalopathy- (present on admission)  Assessment & Plan  Acute metabolic encephalopathy  Limit sedatives and mind altering medications as much as possible  Follow neuro exam    Osteomyelitis of left foot (HCC)  Assessment & Plan  Patient has recent history of admission for osteomyelitis of her left foot S/P left 4th toe amputated 03/01/2024  On examination the wound is dry and now signs of infection  Will continue monitor    Anemia  Assessment & Plan  hemoglobin 8.1, hematocrit 24.3  Monitor hemoglobin  Transfuse to keep Hb>7    Elevated troponin  Assessment & Plan  Patient has troponin 129, BNP 67570 likely type II injury  Patient denied any chest pain, shortness of breath, palpitations  EKG:  Sinus rhythm, no acute ST-T changes  ECHO, pending      Hyponatremia- (present on admission)  Assessment & Plan  Patient presented with sodium 117  Likely volume  overload, renal failure,  Volume and electrolyte correction with hemodialysis  Close laboratory follow-up    Hyperlipidemia- (present on admission)  Assessment & Plan  Consider future statin therapy    Primary hypertension- (present on admission)  Assessment & Plan  Patient on amlodipine  5 mg   Monitor closely, adjust as indicated, as needed medication available    Hyperkalemia- (present on admission)  Assessment & Plan  Patient presents potassium 6.7  Worsened renal failure, hemodialysis initiated    Type 2 diabetes mellitus (HCC)- (present on admission)  Assessment & Plan  Patient on insulin lispro 100, 10 units per meal and Ozempic  Patient recent hemoglobin A1c 8.6 January 2025  Patient has complication related to diabetes mellitus include kidney disease, neuropathy  Patient has recent amputation of her left fourth toe secondary to diabetic ulcer  Medium dose sliding scale  Diabetic education    Plan  3/16  Initiation of hemodialysis with close laboratory follow-up  Monitor sodium and potassium levels closely, metabolic panel follow-up   blood pressure control  Glycemic control  A.m. labs  Likely will need diabetic teaching  See orders  Close monitoring of ins and outs  Patient is has a high medical complexity, complex decision making and is at high risk for complication, morbidity, and mortality.  I spent 63 minutes, reviewing the chart, obtaining and/or reviewing separately obtained history. Performing a medically appropriate examination and evaluation.  Counseling and educating the patient. Ordering and reviewing medications, tests, or procedures.   Documenting clinical information in EPIC. Independently interpreting results and communicating results to patient. Discussing future disposition of care with patient, RN and case management.    Thank you for consulting with us, we will follow closely while patient is hospitalized      Please note that this dictation was created using voice recognition software. I  have made every reasonable attempt to correct obvious errors, but I expect that there are errors of grammar and possibly context that I did not discover before finalizing the note.

## 2025-03-16 NOTE — ED NOTES
Med rec updated and complete. Allergies reviewed.      Pt confirmed name and date of birth.      Pt is currently on  clindamycin . Start date 03/12/25 for a 10 day course.   Pt recently finished a 5 day course of Augmentin 875-125 mg on 03/06/25.    No anticoagulant or antiplatelet medications.      [facility]  [facility] [phone]

## 2025-03-16 NOTE — H&P
"R ICU GOLD History & Physical Note    Date of Service  3/16/2025    [facility] Team: R ICU Gold Team   Attending: Lyle Arreola M.d.  Senior Resident: Dr. Alf Virgen  Contact Number: [phone]    Primary Care Physician  Barry Higgins P.A.-C.    Consultants  neurology    Code Status  Full Code    Chief Complaint  Chief Complaint   Patient presents with    Other     Pt transferred from [facility] [place] for acute renal failure and elevated potassium.  Pt reported she went to hospital coz she was feeling confused and \"out of it \" started 2 weeks back. H/o DM and HTN       History of Presenting Illness (HPI):  This is a 50 years old female with past medical history of diabetes mellitus type II on insulin, hypertension, chronic kidney disease who presented today to ED as a transfer from [facility] for acute on chronic kidney disease. Patient endorses nausea and vomiting for the last couple days.  Patient and patient  reports that she has not been herself since last Saturday, patient reports she does not remember her conversation with her  and she has foggy brain.  She she endorsed dry cough, feeling warm, streaky blood per nose when sneezing. She says that she has had progressive weakness and generalized fatigue over the last week Patient denies any chest pain, shortness of breath, lower extremity swelling, but she states having constipation.  Patient had 4th  left toe amputated March 1, 2025 secondary to diabetic ulcer.    Lab work showed white blood cell 7.4, hemoglobin 8.1, hematocrit 24.3, platelet 404, , sodium 117, potassium 6.7, creatinine 12.50, , phosphorus 10.4, CPK total 466, troponin 129, BNP 03160    Imaging:  CT renal study shows no obstructive process   EKG  Sinus rhythm  Inferior infarct, old  Consider anterior infarct  Compared to ECG 02/28/2025 10:14:25  Myocardial infarct finding now present    I discussed the plan of care with patient.    Review of " Systems  Review of Systems   Constitutional:  Positive for malaise/fatigue. Negative for chills and fever.   HENT:  Negative for hearing loss and tinnitus.    Respiratory:  Positive for cough. Negative for hemoptysis, sputum production, shortness of breath and wheezing.    Cardiovascular:  Negative for chest pain, palpitations, orthopnea, claudication and leg swelling.   Gastrointestinal:  Positive for constipation, nausea and vomiting. Negative for abdominal pain, blood in stool, diarrhea and heartburn.   Genitourinary:  Negative for dysuria, frequency, hematuria and urgency.   Skin:  Positive for itching and rash.   Neurological:  Positive for tremors. Negative for dizziness, tingling, sensory change, speech change, focal weakness and headaches.   Psychiatric/Behavioral:  Negative for depression.        Past Medical History   has a past medical history of Cataract, Diabetes (HCC), Infectious disease (10/2022), and Pre-op evaluation (12/13/2022).    Surgical History   has a past surgical history that includes cataract extraction with iol (Left); tonsillectomy; hysterectomy, total abdominal; gastric resection; cholecystectomy; primary c section; ovarian cystectomy (Bilateral); pr upper gi endoscopy,diagnosis (N/A, 12/07/2022); pr upper gi endoscopy,ctrl bleed (N/A, 12/07/2022); abdominal hysterectomy total; and toe amputation (Left, 2/28/2025).     Family History  family history includes Breast Cancer in her mother; Diabetes in her brother, father, and mother; Heart Disease in her father and mother; Hypertension in her father and mother.   Family history reviewed with patient.     Social History  Tobacco: Denied  Alcohol: Denied  Primary Care Provider: Reviewed Barry Higgins      Allergies  No Known Allergies    Medications  Prior to Admission Medications   Prescriptions Last Dose Informant Patient Reported? Taking?   Cholecalciferol (VITAMIN D-3) 125 MCG (5000 UT) Tab 3/15/2025 Morning Patient Yes Yes   Sig:  Take 5,000 Units by mouth every day.   HYDROcodone/acetaminophen (NORCO)  MG Tab 3/15/2025 Noon Patient Yes Yes   Sig: Take 1-2 Tablets by mouth every 6 hours as needed.   Semaglutide, 1 MG/DOSE, (OZEMPIC, 1 MG/DOSE,) 4 MG/3ML Solution Pen-injector 3/9/2025 Morning Patient No No   Sig: Inject 1 mg under the skin every 7 days.   amLODIPine (NORVASC) 5 MG Tab 3/15/2025 Morning Patient No Yes   Sig: Take 1 Tablet by mouth every day.   clindamycin (CLEOCIN) 300 MG Cap 3/15/2025 Noon Patient No Yes   Sig: Take 2 Capsules by mouth 3 times a day for 10 days.   furosemide (LASIX) 20 MG Tab 3/15/2025 Morning Patient No Yes   Sig: Take 1 Tablet by mouth 2 times a day.   gabapentin (NEURONTIN) 300 MG Cap 3/15/2025 Morning Patient Yes Yes   Si mg 3 times a day.   insulin lispro (HUMALOG) 100 UNIT/ML INJ Unknown Patient No No   Sig: Inject 10 units under skin 3 times a day before meals   Patient taking differently: as needed. Inject 10 units under skin 3 times a day before meals   omeprazole (PRILOSEC) 20 MG delayed-release capsule 3/15/2025 Morning Patient Yes Yes   Sig: Take 20 mg by mouth every day.      Facility-Administered Medications: None       Physical Exam  Temp:  [36.4 °C (97.6 °F)] 36.4 °C (97.6 °F)  Pulse:  [82-91] 86  Resp:  [10-34] 34  BP: (138-182)/(65-86) 147/77  SpO2:  [90 %-100 %] 93 %  Blood Pressure: (!) 152/79   Temperature: 36.4 °C (97.6 °F)   Pulse: 86   Respiration: 14   Pulse Oximetry: 94 %       Physical Exam  Constitutional:       General: She is in acute distress.      Appearance: She is ill-appearing and toxic-appearing.   HENT:      Head: Normocephalic and atraumatic.      Nose: Nose normal.      Mouth/Throat:      Mouth: Mucous membranes are moist.   Cardiovascular:      Rate and Rhythm: Normal rate and regular rhythm.      Pulses: Normal pulses.      Heart sounds: Normal heart sounds.   Pulmonary:      Effort: Pulmonary effort is normal. No respiratory distress.      Breath sounds:  Normal breath sounds. No wheezing, rhonchi or rales.   Abdominal:      General: Abdomen is flat. Bowel sounds are normal.      Palpations: Abdomen is soft.   Musculoskeletal:         General: Deformity present.      Cervical back: Normal range of motion.   Skin:     General: Skin is warm and dry.      Capillary Refill: Capillary refill takes less than 2 seconds.   Neurological:      General: No focal deficit present.      Mental Status: She is oriented to person, place, and time.   Psychiatric:         Mood and Affect: Mood normal.         Laboratory:  Recent Labs     03/15/25  2218   WBC 7.4   RBC 2.68*   HEMOGLOBIN 8.1*   HEMATOCRIT 24.3*   MCV 90.7   MCH 30.2   MCHC 33.3   RDW 42.7   PLATELETCT 404   MPV 10.2     Recent Labs     03/15/25  2218   SODIUM 117*   POTASSIUM 6.7*   CHLORIDE 87*   CO2 12*   GLUCOSE 103*   *   CREATININE 12.50*   CALCIUM 7.6*     Recent Labs     03/15/25  2218   ALTSGPT 25   ASTSGOT 45   ALKPHOSPHAT 517*   TBILIRUBIN 0.6   GLUCOSE 103*     Recent Labs     03/15/25  2218   APTT 44.1*   INR 1.31*     Recent Labs     03/15/25  2218   NTPROBNP 76305*         Recent Labs     03/15/25  2218   TROPONINT 129*       Imaging:  DX-FOOT-COMPLETE 3+ LEFT   Final Result      1.  Prior amputation of the fourth toe.      2.  No evidence of fracture or bony destructive change.      CT-RENAL COLIC EVALUATION(A/P W/O)   Final Result      1.  No evidence of bowel obstruction or focal inflammatory change.      2.  No evidence of renal or ureteral stone or hydronephrosis.      3.  Small amount of ascites.      4.  Atherosclerotic vascular calcification.      5.  Anasarca.      6.  Bibasal atelectasis and minimal bilateral pleural effusions.      7.  Constipation.      8.  Prior gastric bypass procedure.      DX-OUTSIDE IMAGES-DX CHEST   Final Result      CT-OUTSIDE IMAGES-CT HEAD   Final Result      EC-ECHOCARDIOGRAM COMPLETE W/ CONT    (Results Pending)       X-Ray:  I have personally reviewed the  images and compared with prior images.  EKG:  I have personally reviewed the images and compared with prior images.    Assessment/Plan:  Problem Representation  I anticipate this patient will require at least two midnights for appropriate medical management, necessitating inpatient admission because electrolyte derangements    Patient will need a ICU (Adult and Pediatrics) bed on CARDIOLOGY service .  The need is secondary to BUTCH on CKD.    * Acute renal failure superimposed on stage 3b chronic kidney disease (HCC)- (present on admission)  Assessment & Plan  Patient has chronic kidney secondary to diabetes mellitis  following outpatient with nephrology with baseline creatinine 1.1  Patient presented today with creatinine 12.5 and .  IV fluid replacement  Optimize blood pressure use vasopressor if needed  Avoid nephrotoxic drugs  Renally adjust drugs  Nephrology on board and recommended Lasix 100 mg  Daily serum creatinine, BUN, electrolytes, urine output    Hyponatremia- (present on admission)  Assessment & Plan  Patient presented with sodium 117  Patient reported nausea and vomiting and confusion for the last couple days  No seizure or respiratory distress  There is underlying causes  Continue sodium bicarbonate   Serum osmolality every 4 hours  Correct electrolyte like potassium and magnesium  Daily weight and fluid balance    Osteomyelitis of left foot (HCC)  Assessment & Plan  Patient has recent history of admission for osteomyelitis of her left foot S/P left 4th toe amputated 03/01/2024  On examination the wound is dry and now signs of infection  Will continue monitor    Anemia  Assessment & Plan  hemoglobin 8.1, hematocrit 24.3  Monitor hemoglobin  Transfuse to keep Hb>7    Elevated troponin  Assessment & Plan  Patient has troponin 129, BNP 38050  Patient denied any chest pain, shortness of breath, palpitations  EKG:  Sinus rhythm  Inferior infarct, old  Consider anterior infarct  Compared to ECG  02/28/2025 10:14:25  Myocardial infarct finding now present  Trend troponin  ECHO      Primary hypertension- (present on admission)  Assessment & Plan  Patient on amlodipine  5 mg   Will resume home medications    Hyperkalemia- (present on admission)  Assessment & Plan  Patient presents potassium 6.7  Continue sodium bicarb  Address underlying causes  Reviewed potassium level every 2 hours  Monitor EKG  Assess urine output    Type 2 diabetes mellitus (HCC)- (present on admission)  Assessment & Plan  Patient on insulin lispro 100, 10 units per meal and Ozempic  Patient recent hemoglobin A1c 8.6 January 2025  Patient has complication related to diabetes mellitus include kidney disease, neuropathy  Patient has recent amputation of her left fourth toe secondary to diabetic ulcer  Medium dose sliding scale  Diabetic education        VTE prophylaxis: heparin ppx

## 2025-03-16 NOTE — ASSESSMENT & PLAN NOTE
Acute metabolic encephalopathy  Limit sedatives and mind altering medications as much as possible  Follow neuro exam

## 2025-03-16 NOTE — ASSESSMENT & PLAN NOTE
Patient has troponin 129, BNP 30787 likely type II injury  Patient denied any chest pain or CP equivalent  EKG:  Sinus rhythm, no acute ST-T changes  ECHO, without regional wall motion abnormality, normal EF

## 2025-03-16 NOTE — ASSESSMENT & PLAN NOTE
Patient has recent history of admission for osteomyelitis of her left foot S/P left 4th toe amputated 03/01/2024  On examination the wound is dry and now signs of infection  Will continue monitor

## 2025-03-16 NOTE — ED NOTES
Pt is awake on bed. Rodriguez cathter F 16 inserted aseptically as ordered. (+) urine output, urine sample collected and sent. Pt tolerated.    Blood culture x 2, and COD collected and sent to lab.

## 2025-03-16 NOTE — PROGRESS NOTES
"Dr. Arreola to bedside to place dialysis catheter.   Time out called, patient identifiers verified. Procedure agreed upon.   13 Yi, 15cm catheter kit utilized and verified   Procedure start 0653  Procedure end 0711      BP (!) 194/88   Pulse 76   Temp 36.4 °C (97.5 °F) (Temporal)   Resp 19   Ht 1.753 m (5' 9\")   Wt 95.3 kg (210 lb)   SpO2 98%   BMI 31.01 kg/m²     "

## 2025-03-16 NOTE — CARE PLAN
The patient is Stable - Low risk of patient condition declining or worsening    Shift Goals  Clinical Goals: correct electrolytes, monitor urine, safety  Patient Goals: feel better  Family Goals: NOHEMY    Progress made toward(s) clinical / shift goals:    Problem: Knowledge Deficit - Standard  Goal: Patient and family/care givers will demonstrate understanding of plan of care, disease process/condition, diagnostic tests and medications  3/16/2025 1559 by Chica Bell R.N.  Outcome: Progressing  3/16/2025 1559 by Chica Bell R.N.  Outcome: Progressing     Problem: Pain - Standard  Goal: Alleviation of pain or a reduction in pain to the patient’s comfort goal  Outcome: Progressing       Patient is not progressing towards the following goals:

## 2025-03-16 NOTE — ED NOTES
Rasta  from Lab called with critical result of Phosphorous 10.4, Potassium 6.7, Sodium 117, Troponin 129 at 2320H. Critical lab result read back to Rasta.   Dr. Forte notified of critical lab result at 2323H.  Critical lab result read back by Dr. Forte.

## 2025-03-16 NOTE — WOUND TEAM
Wound team consulted for patient's 4th toe amputation incision.  Patient follows with the [facility], last appointment on 3/12/25 states to leave site [facility] and to follow up in 2 weeks.  Please update [facility] with any concerns.  Consult completed at this time

## 2025-03-16 NOTE — PROGRESS NOTES
Critical labs reported from lab personnel including K of 6.6, Na of 118, and troponin of 132. MD galvan.

## 2025-03-16 NOTE — ED PROVIDER NOTES
"ED Provider Note    CHIEF COMPLAINT  Chief Complaint   Patient presents with    Other     Pt transferred from [facility] for acute renal failure and elevated potassium.  Pt reported she went to hospital coz she was feeling confused and \"out of it \" started 2 weeks back. H/o DM and HTN       EXTERNAL RECORDS REVIEWED  Reviewed inpatient admission discharge summary 3/1/2025, patient was admitted to the hospital for swelling of her left fourth toe, diagnosed with osteomyelitis    Reviewed outpatient nephrology note by , patient has a history of CKD stage IIIb    Reviewed emergency department note from [facility]  Chemistry sodium 114, potassium 7.1, chloride 83, bicarb 12, anion gap 19, creatinine 12.7, glucose 122, lactic normal, total bilirubin 0.5, troponin 138  Urinalysis: 300+ protein, negative nitrates, trace leukocyte esterase, moderate bacteria, 11-20 squamous epithelial cells, 11-20 white blood cells  CT head negative chest x-ray negative CBC white blood cell count 6.4, platelet count 417, hemoglobin 8.2    HPI/ROS  LIMITATION TO HISTORY   Select: : None  OUTSIDE HISTORIAN(S):  EMS providing collateral history    Aviva Kenney is a 50 y.o. female with past medical history of type II diabetes presenting as a transfer from [facility] for acute renal failure on chronic kidney disease.  Patient was recently admitted to the hospital for osteomyelitis of the fourth toe of the left foot.  She was ultimately discharged with a prescription for clindamycin.  Says that she has been taking antibiotics, she denies any fevers chills increased pain in her left foot though she does suffer from neuropathy.  She says that she has had progressive weakness and generalized fatigue over the last week.  She went to [facility] and was ultimately transferred here for higher level care.    PAST MEDICAL HISTORY   has a past medical history of Cataract, Diabetes (HCC), Infectious " "disease (10/2022), and Pre-op evaluation (12/13/2022).    SURGICAL HISTORY   has a past surgical history that includes cataract extraction with iol (Left); tonsillectomy; hysterectomy, total abdominal; gastric resection; cholecystectomy; primary c section; ovarian cystectomy (Bilateral); upper gi endoscopy,diagnosis (N/A, 12/07/2022); upper gi endoscopy,ctrl bleed (N/A, 12/07/2022); abdominal hysterectomy total; and toe amputation (Left, 2/28/2025).    FAMILY HISTORY  Family History   Problem Relation Age of Onset    Breast Cancer Mother     Diabetes Mother     Heart Disease Mother     Hypertension Mother     Hypertension Father     Heart Disease Father     Diabetes Father     Diabetes Brother        SOCIAL HISTORY  Social History     Tobacco Use    Smoking status: Never    Smokeless tobacco: Never   Vaping Use    Vaping status: Never Used   Substance and Sexual Activity    Alcohol use: Not Currently     Comment: occ    Drug use: Yes     Types: Marijuana, Oral     Comment: occ    Sexual activity: Not on file       CURRENT MEDICATIONS  Home Medications       Reviewed by Claritza Brenner R.N. (Registered Nurse) on 03/15/25 at 2208  Med List Status: Partial     Medication Last Dose Status   amLODIPine (NORVASC) 5 MG Tab  Active   BD INSULIN SYRINGE U/F 1/2UNIT 31G X 5/16\" 0.3 ML Misc  Active   Cholecalciferol (VITAMIN D-3) 125 MCG (5000 UT) Tab  Active   clindamycin (CLEOCIN) 300 MG Cap  Active   clobetasol (TEMOVATE) 0.05 % external solution  Active   cyclobenzaprine (FLEXERIL) 10 mg Tab  Active   estradiol (ESTRACE) 0.1 MG/GM vaginal cream  Active   furosemide (LASIX) 20 MG Tab  Active   gabapentin (NEURONTIN) 300 MG Cap  Active   hydrOXYzine HCl (ATARAX) 25 MG Tab  Active   insulin glargine 100 UNIT/ML SC SOPN injection  Active   insulin lispro (HUMALOG) 100 UNIT/ML INJ  Active   Insulin Pen Needle (NOVOFINE PEN NEEDLE) 32G X 6 MM Misc  Active   omeprazole (PRILOSEC) 20 MG delayed-release capsule  Active " "  potassium chloride ER (KLOR-CON) 10 MEQ tablet  Active   Semaglutide, 1 MG/DOSE, (OZEMPIC, 1 MG/DOSE,) 4 MG/3ML Solution Pen-injector  Active                    ALLERGIES  No Known Allergies    PHYSICAL EXAM  VITAL SIGNS: BP (!) 182/86   Pulse 90   Temp 36.4 °C (97.6 °F) (Temporal)   Resp (!) 24   Ht 1.753 m (5' 9\")   Wt 95.3 kg (210 lb)   SpO2 100%   BMI 31.01 kg/m²    General: Chronically ill-appearing, conversational not confused  Neuro: oriented x 3, moving all extremities.   HEENT:   - Head: Normocephalic, atraumatic  - Eyes: PERRL  - Ears/Nose: normal external nose and ears  - Mouth: Dry mucosal membranes  Neck: Jugular venous distention  Resp: clear to auscultation, no increased work of breathing  CV: Regular rate and rhythm  Abd: Soft, non-tender, non-distended  Extremities: Generalized anasarca bilateral lower extremity  Psych: lucid and conversational , cooperative with questioning        DIAGNOSTIC STUDIES / PROCEDURES    LABS and ECG  Results for orders placed or performed during the hospital encounter of 03/15/25   EKG    Collection Time: 03/15/25 10:11 PM   Result Value Ref Range    Report       [facility] Emergency Dept.    Test Date:  2025-03-15  Pt Name:    CALLIE FOWLER                Department: ER  MRN:        [id number]                      Room:        13  Gender:     Female                       Technician: [id number]  :        [id number]                   Requested By:ER TRIAGE PROTOCOL  Order #:    [id number]                    Reading MD:    Measurements  Intervals                                Axis  Rate:       91                           P:          -27  RI:         179                          QRS:        -11  QRSD:       106                          T:          25  QT:         372  QTc:        458    Interpretive Statements  Sinus rhythm  Inferior infarct, old  Consider anterior infarct  Compared to ECG 2025 10:14:25  Myocardial infarct finding now " present     Prothrombin Time    Collection Time: 03/15/25 10:18 PM   Result Value Ref Range    PT 16.3 (H) 12.0 - 14.6 sec    INR 1.31 (H) 0.87 - 1.13   CBC WITH DIFFERENTIAL    Collection Time: 03/15/25 10:18 PM   Result Value Ref Range    WBC 7.4 4.8 - 10.8 K/uL    RBC 2.68 (L) 4.20 - 5.40 M/uL    Hemoglobin 8.1 (L) 12.0 - 16.0 g/dL    Hematocrit 24.3 (L) 37.0 - 47.0 %    MCV 90.7 81.4 - 97.8 fL    MCH 30.2 27.0 - 33.0 pg    MCHC 33.3 32.2 - 35.5 g/dL    RDW 42.7 35.9 - 50.0 fL    Platelet Count 404 164 - 446 K/uL    MPV 10.2 9.0 - 12.9 fL    Neutrophils-Polys 86.40 (H) 44.00 - 72.00 %    Lymphocytes 9.40 (L) 22.00 - 41.00 %    Monocytes 3.50 0.00 - 13.40 %    Eosinophils 0.10 0.00 - 6.90 %    Basophils 0.10 0.00 - 1.80 %    Immature Granulocytes 0.50 0.00 - 0.90 %    Nucleated RBC 0.00 0.00 - 0.20 /100 WBC    Neutrophils (Absolute) 6.35 1.82 - 7.42 K/uL    Lymphs (Absolute) 0.69 (L) 1.00 - 4.80 K/uL    Monos (Absolute) 0.26 0.00 - 0.85 K/uL    Eos (Absolute) 0.01 0.00 - 0.51 K/uL    Baso (Absolute) 0.01 0.00 - 0.12 K/uL    Immature Granulocytes (abs) 0.04 0.00 - 0.11 K/uL    NRBC (Absolute) 0.00 K/uL   ABO Rh Confirm    Collection Time: 03/15/25 10:18 PM   Result Value Ref Range    ABO Rh Confirm O NEG    Comp Metabolic Panel    Collection Time: 03/15/25 10:18 PM   Result Value Ref Range    Sodium 117 (LL) 135 - 145 mmol/L    Potassium 6.7 (HH) 3.6 - 5.5 mmol/L    Chloride 87 (L) 96 - 112 mmol/L    Co2 12 (L) 20 - 33 mmol/L    Anion Gap 18.0 (H) 7.0 - 16.0    Glucose 103 (H) 65 - 99 mg/dL    Bun 118 (H) 8 - 22 mg/dL    Creatinine 12.50 (HH) 0.50 - 1.40 mg/dL    Calcium 7.6 (L) 8.5 - 10.5 mg/dL    Correct Calcium 8.6 8.5 - 10.5 mg/dL    AST(SGOT) 45 12 - 45 U/L    ALT(SGPT) 25 2 - 50 U/L    Alkaline Phosphatase 517 (H) 30 - 99 U/L    Total Bilirubin 0.6 0.1 - 1.5 mg/dL    Albumin 2.8 (L) 3.2 - 4.9 g/dL    Total Protein 7.4 6.0 - 8.2 g/dL    Globulin 4.6 (H) 1.9 - 3.5 g/dL    A-G Ratio 0.6 g/dL   CREATINE KINASE     Collection Time: 03/15/25 10:18 PM   Result Value Ref Range    CPK Total 466 (H) 0 - 154 U/L   MAGNESIUM    Collection Time: 03/15/25 10:18 PM   Result Value Ref Range    Magnesium 2.3 1.5 - 2.5 mg/dL   proBrain Natriuretic Peptide, NT    Collection Time: 03/15/25 10:18 PM   Result Value Ref Range    NT-proBNP 69943 (H) 0 - 125 pg/mL   PHOSPHORUS    Collection Time: 03/15/25 10:18 PM   Result Value Ref Range    Phosphorus 10.4 (HH) 2.5 - 4.5 mg/dL   TROPONIN    Collection Time: 03/15/25 10:18 PM   Result Value Ref Range    Troponin T 129 (H) 6 - 19 ng/L   BETA-HYDROXYBUTYRIC ACID    Collection Time: 03/15/25 10:18 PM   Result Value Ref Range    beta-Hydroxybutyric Acid 0.60 (H) 0.02 - 0.27 mmol/L   APTT    Collection Time: 03/15/25 10:18 PM   Result Value Ref Range    APTT 44.1 (H) 24.7 - 36.0 sec   Sed Rate    Collection Time: 03/15/25 10:18 PM   Result Value Ref Range    Sed Rate Westergren 100 (H) 0 - 25 mm/hour   CRP QUANTITIVE (NON-CARDIAC)    Collection Time: 03/15/25 10:18 PM   Result Value Ref Range    Stat C-Reactive Protein 5.58 (H) 0.00 - 0.75 mg/dL   HCG QUAL SERUM    Collection Time: 03/15/25 10:18 PM   Result Value Ref Range    Beta-Hcg Qualitative Serum Negative Negative   ESTIMATED GFR    Collection Time: 03/15/25 10:18 PM   Result Value Ref Range    GFR (CKD-EPI) 3 (A) >60 mL/min/1.73 m 2   BLOOD CULTURE    Collection Time: 03/15/25 11:00 PM    Specimen: Peripheral; Blood   Result Value Ref Range    Significant Indicator NEG     Source BLD     Site PERIPHERAL     Culture Result       No Growth  Note: Blood cultures are incubated for 5 days and  are monitored continuously.Positive blood cultures  are called to the RN and reported as soon as  they are identified.     BLOOD CULTURE    Collection Time: 03/15/25 11:10 PM    Specimen: Peripheral; Blood   Result Value Ref Range    Significant Indicator POS (POS)     Source BLD     Site PERIPHERAL     Culture Result (A)      A significant status has been  triggered by the BACTEC  instrument due to an increase in CO2 production.  Gram  stain of blood culture bottle shows NO ORGANISMS SEEN.  Further investigation is in progress.  Note: Blood cultures are incubated for 5 days and  are monitored continuously. Positive blood cultures  are called to the RN and reported as soon as  they are identified.     COD (ADULT)    Collection Time: 03/15/25 11:10 PM   Result Value Ref Range    ABO Grouping Only O     Rh Grouping Only NEG     Antibody Screen-Cod NEG    URINALYSIS (UA)    Collection Time: 03/15/25 11:10 PM    Specimen: Blood   Result Value Ref Range    Color Dark Yellow     Character Cloudy (A)     Specific Gravity 1.023 <1.035    Ph 5.0 5.0 - 8.0    Glucose Negative Negative mg/dL    Ketones Trace (A) Negative mg/dL    Protein >=1000 (A) Negative mg/dL    Bilirubin Negative Negative    Urobilinogen, Urine 1.0 <=1.0 EU/dL    Nitrite Negative Negative    Leukocyte Esterase Small (A) Negative    Occult Blood Large (A) Negative    Micro Urine Req Microscopic    URINE MICROSCOPIC (W/UA)    Collection Time: 03/15/25 11:10 PM   Result Value Ref Range    WBC 6-10 (A) /hpf    RBC 21-50 (A) 0 - 2 /hpf    Bacteria Rare (A) None /hpf    Epithelial Cells 6-10 (A) 0 - 5 /hpf    Uric Acid Crystal Present (A) Absent /hpf    Urine Casts 3-5 (A) 0 - 2 /lpf    Hyaline Cast Present /lpf    Granular Casts Present (A) Absent /lpf   POCT glucose device results    Collection Time: 03/15/25 11:49 PM   Result Value Ref Range    POC Glucose, Blood 131 (H) 65 - 99 mg/dL   POCT glucose device results    Collection Time: 03/16/25 12:45 AM   Result Value Ref Range    POC Glucose, Blood 157 (H) 65 - 99 mg/dL   OSMOLALITY SERUM    Collection Time: 03/16/25 12:56 AM   Result Value Ref Range    Osmolality Serum 306 (H) 278 - 298 mOsm/kg H2O   OSMOLALITY URINE    Collection Time: 03/16/25 12:56 AM   Result Value Ref Range    Osmolality Urine 312 300 - 900 mOsm/kg H2O   URINE SODIUM RANDOM    Collection  Time: 03/16/25 12:56 AM   Result Value Ref Range    Sodium, Urine -per volume <20 mmol/L       RADIOLOGY  I have independently interpreted the diagnostic imaging associated with this visit and am waiting the final reading from the radiologist.   My preliminary interpretation is as follows:   - Reviewed CT renal study shows no obvious obstructive process, hydronephrosis, hydroureter.  Radiologist interpretation:   DX-FOOT-COMPLETE 3+ LEFT   Final Result      1.  Prior amputation of the fourth toe.      2.  No evidence of fracture or bony destructive change.      CT-RENAL COLIC EVALUATION(A/P W/O)   Final Result      1.  No evidence of bowel obstruction or focal inflammatory change.      2.  No evidence of renal or ureteral stone or hydronephrosis.      3.  Small amount of ascites.      4.  Atherosclerotic vascular calcification.      5.  Anasarca.      6.  Bibasal atelectasis and minimal bilateral pleural effusions.      7.  Constipation.      8.  Prior gastric bypass procedure.      DX-OUTSIDE IMAGES-DX CHEST   Final Result      CT-OUTSIDE IMAGES-CT HEAD   Final Result      EC-ECHOCARDIOGRAM COMPLETE W/ CONT    (Results Pending)           MEDICAL DECISION MAKING      ED COURSE AND PLAN    50-year-old female presenting to the emergency department as a transfer for acute renal failure, hyperkalemia, metabolic acidosis, hyponatremia.  She has a history of stage IIIb CKD.  Recently treated for osteomyelitis of the left foot, currently on clindamycin after amputation of the fourth toe of the left foot.    On arrival to the emergency department I repeated her lab, she remains hyperkalemic with a potassium of 6.7.  Initial EKG at the outside hospital showed some sinusoidal changes of the QT segment, this has corrected after calcium and shifting with insulin, albuterol, bicarb.    I repeated the insulin and bicarb here for her hyperkalemia.  I discussed case with , nephrologist who at this time did not want  placement of hemodialysis catheter, wanted to try resuscitation with bicarbonate and Lasix.  He ordered a dose of Lasix 100 mg and recommended initiating bicarb infusion at 150 cc/h.  I was bit concerned about overcorrection of her hyponatremia.   I conferred with the ED pharmacist and we started the drip at 100 cc/h.  CT renal study shows no obstructive process.  Rodriguez catheter was placed, she put out about 200 cc of urine.  I discussed case with intensivist Dr. Barrios for admission to the ICU            Procedures:    CRITICAL CARE  The very real possibilty of a deterioration of this patient's condition required the highest level of my preparedness for sudden, emergent intervention.  I provided critical care services, which included medication orders, frequent reevaluations of the patient's condition and response to treatment, ordering and reviewing test results, and discussing the case with various consultants.  The critical care time associated with the care of the patient was 65 minutes. Review chart for interventions. This time is exclusive of any other billable procedures.     ----------------------------------------------------------------------------------  DISCUSSIONS    I have discussed management of the patient with the following physicians and KIARA's:      Discussion of management with other Q or appropriate source(s):     Escalation of care considered, and ultimately not performed:    Barriers to care at this time, including but not limited to:     Decision tools and prescription drugs considered including, but not limited to:     FINAL IMPRESSION    1. Acute renal failure, unspecified acute renal failure type (HCC)    2. Hyperkalemia    3. Hyponatremia    4. Metabolic acidosis        Current Discharge Medication List          No follow-up provider specified.      DISPOSITION    Admission: The patient will be admitted for further evaluation and treatment. Discussed case with consultants and relayed  all necessary information.      This chart was dictated using an electronic voice recognition software. The chart has been reviewed and edited but there is still possibility for dictation errors due to limitation of software.    Hernán Forte,  3/16/2025      No

## 2025-03-16 NOTE — ASSESSMENT & PLAN NOTE
Patient on amlodipine  5 mg: increase dose to 10mg daily  Monitor closely, adjust as indicated, as needed medication available  Vitals:    03/21/25 1535   BP: (!) 156/84   Pulse: 84   Resp: 20   Temp: 35.9 °C (96.7 °F)   SpO2: 95%

## 2025-03-16 NOTE — ASSESSMENT & PLAN NOTE
Patient on insulin lispro 100, 10 units per meal and Ozempic  Patient recent hemoglobin A1c 8.6 January 2025  Patient has complication related to diabetes mellitus include kidney disease, neuropathy  Patient has recent amputation of her left fourth toe secondary to diabetic ulcer  On current regimen BG is running low 100s

## 2025-03-17 ENCOUNTER — APPOINTMENT (OUTPATIENT)
Dept: RADIOLOGY | Facility: MEDICAL CENTER | Age: 50
DRG: 673 | End: 2025-03-17
Attending: HOSPITALIST
Payer: MEDICARE

## 2025-03-17 LAB
ALBUMIN SERPL BCP-MCNC: 2.4 G/DL (ref 3.2–4.9)
ALBUMIN/GLOB SERPL: 0.6 G/DL
ALP SERPL-CCNC: 391 U/L (ref 30–99)
ALT SERPL-CCNC: 18 U/L (ref 2–50)
ANION GAP SERPL CALC-SCNC: 10 MMOL/L (ref 7–16)
ANION GAP SERPL CALC-SCNC: 12 MMOL/L (ref 7–16)
AST SERPL-CCNC: 34 U/L (ref 12–45)
BASOPHILS # BLD AUTO: 0.4 % (ref 0–1.8)
BASOPHILS # BLD: 0.02 K/UL (ref 0–0.12)
BILIRUB SERPL-MCNC: 0.5 MG/DL (ref 0.1–1.5)
BUN SERPL-MCNC: 54 MG/DL (ref 8–22)
BUN SERPL-MCNC: 56 MG/DL (ref 8–22)
BUN SERPL-MCNC: 86 MG/DL (ref 8–22)
BUN SERPL-MCNC: 86 MG/DL (ref 8–22)
CALCIUM ALBUM COR SERPL-MCNC: 8.5 MG/DL (ref 8.5–10.5)
CALCIUM SERPL-MCNC: 7.2 MG/DL (ref 8.5–10.5)
CALCIUM SERPL-MCNC: 7.2 MG/DL (ref 8.5–10.5)
CALCIUM SERPL-MCNC: 7.4 MG/DL (ref 8.5–10.5)
CALCIUM SERPL-MCNC: 7.5 MG/DL (ref 8.5–10.5)
CHLORIDE SERPL-SCNC: 90 MMOL/L (ref 96–112)
CHLORIDE SERPL-SCNC: 90 MMOL/L (ref 96–112)
CHLORIDE SERPL-SCNC: 91 MMOL/L (ref 96–112)
CHLORIDE SERPL-SCNC: 91 MMOL/L (ref 96–112)
CO2 SERPL-SCNC: 20 MMOL/L (ref 20–33)
CO2 SERPL-SCNC: 20 MMOL/L (ref 20–33)
CO2 SERPL-SCNC: 23 MMOL/L (ref 20–33)
CO2 SERPL-SCNC: 24 MMOL/L (ref 20–33)
CREAT SERPL-MCNC: 6.75 MG/DL (ref 0.5–1.4)
CREAT SERPL-MCNC: 7.11 MG/DL (ref 0.5–1.4)
CREAT SERPL-MCNC: 9.12 MG/DL (ref 0.5–1.4)
CREAT SERPL-MCNC: 9.28 MG/DL (ref 0.5–1.4)
EOSINOPHIL # BLD AUTO: 0.08 K/UL (ref 0–0.51)
EOSINOPHIL NFR BLD: 1.6 % (ref 0–6.9)
ERYTHROCYTE [DISTWIDTH] IN BLOOD BY AUTOMATED COUNT: 42.8 FL (ref 35.9–50)
GFR SERPLBLD CREATININE-BSD FMLA CKD-EPI: 5 ML/MIN/1.73 M 2
GFR SERPLBLD CREATININE-BSD FMLA CKD-EPI: 5 ML/MIN/1.73 M 2
GFR SERPLBLD CREATININE-BSD FMLA CKD-EPI: 7 ML/MIN/1.73 M 2
GFR SERPLBLD CREATININE-BSD FMLA CKD-EPI: 7 ML/MIN/1.73 M 2
GLOBULIN SER CALC-MCNC: 3.8 G/DL (ref 1.9–3.5)
GLUCOSE BLD STRIP.AUTO-MCNC: 103 MG/DL (ref 65–99)
GLUCOSE BLD STRIP.AUTO-MCNC: 106 MG/DL (ref 65–99)
GLUCOSE BLD STRIP.AUTO-MCNC: 114 MG/DL (ref 65–99)
GLUCOSE BLD STRIP.AUTO-MCNC: 125 MG/DL (ref 65–99)
GLUCOSE SERPL-MCNC: 103 MG/DL (ref 65–99)
GLUCOSE SERPL-MCNC: 110 MG/DL (ref 65–99)
GLUCOSE SERPL-MCNC: 126 MG/DL (ref 65–99)
GLUCOSE SERPL-MCNC: 135 MG/DL (ref 65–99)
HCT VFR BLD AUTO: 20.2 % (ref 37–47)
HGB BLD-MCNC: 7 G/DL (ref 12–16)
IMM GRANULOCYTES # BLD AUTO: 0.01 K/UL (ref 0–0.11)
IMM GRANULOCYTES NFR BLD AUTO: 0.2 % (ref 0–0.9)
INR PPP: 1.49 (ref 0.87–1.13)
LV EJECT FRACT MOD 2C 99903: 71.86
LV EJECT FRACT MOD 4C 99902: 63.87
LV EJECT FRACT MOD BP 99901: 68.24
LYMPHOCYTES # BLD AUTO: 1.16 K/UL (ref 1–4.8)
LYMPHOCYTES NFR BLD: 23.8 % (ref 22–41)
MAGNESIUM SERPL-MCNC: 2 MG/DL (ref 1.5–2.5)
MCH RBC QN AUTO: 30.7 PG (ref 27–33)
MCHC RBC AUTO-ENTMCNC: 34.7 G/DL (ref 32.2–35.5)
MCV RBC AUTO: 88.6 FL (ref 81.4–97.8)
MONOCYTES # BLD AUTO: 0.52 K/UL (ref 0–0.85)
MONOCYTES NFR BLD AUTO: 10.7 % (ref 0–13.4)
NEUTROPHILS # BLD AUTO: 3.09 K/UL (ref 1.82–7.42)
NEUTROPHILS NFR BLD: 63.3 % (ref 44–72)
NRBC # BLD AUTO: 0 K/UL
NRBC BLD-RTO: 0 /100 WBC (ref 0–0.2)
PHOSPHATE SERPL-MCNC: 8.1 MG/DL (ref 2.5–4.5)
PLATELET # BLD AUTO: 354 K/UL (ref 164–446)
PMV BLD AUTO: 10.3 FL (ref 9–12.9)
POTASSIUM SERPL-SCNC: 4.6 MMOL/L (ref 3.6–5.5)
POTASSIUM SERPL-SCNC: 5.2 MMOL/L (ref 3.6–5.5)
POTASSIUM SERPL-SCNC: 5.8 MMOL/L (ref 3.6–5.5)
POTASSIUM SERPL-SCNC: 6.2 MMOL/L (ref 3.6–5.5)
PROT SERPL-MCNC: 6.2 G/DL (ref 6–8.2)
PROTHROMBIN TIME: 18.1 SEC (ref 12–14.6)
RBC # BLD AUTO: 2.28 M/UL (ref 4.2–5.4)
SODIUM SERPL-SCNC: 123 MMOL/L (ref 135–145)
SODIUM SERPL-SCNC: 123 MMOL/L (ref 135–145)
SODIUM SERPL-SCNC: 124 MMOL/L (ref 135–145)
SODIUM SERPL-SCNC: 125 MMOL/L (ref 135–145)
WBC # BLD AUTO: 4.9 K/UL (ref 4.8–10.8)

## 2025-03-17 PROCEDURE — 84100 ASSAY OF PHOSPHORUS: CPT

## 2025-03-17 PROCEDURE — 90935 HEMODIALYSIS ONE EVALUATION: CPT

## 2025-03-17 PROCEDURE — 700102 HCHG RX REV CODE 250 W/ 637 OVERRIDE(OP)

## 2025-03-17 PROCEDURE — 700111 HCHG RX REV CODE 636 W/ 250 OVERRIDE (IP)

## 2025-03-17 PROCEDURE — 85610 PROTHROMBIN TIME: CPT

## 2025-03-17 PROCEDURE — 700111 HCHG RX REV CODE 636 W/ 250 OVERRIDE (IP): Mod: JZ | Performed by: INTERNAL MEDICINE

## 2025-03-17 PROCEDURE — 82962 GLUCOSE BLOOD TEST: CPT | Mod: 91

## 2025-03-17 PROCEDURE — 85025 COMPLETE CBC W/AUTO DIFF WBC: CPT

## 2025-03-17 PROCEDURE — 99232 SBSQ HOSP IP/OBS MODERATE 35: CPT | Performed by: INTERNAL MEDICINE

## 2025-03-17 PROCEDURE — 700102 HCHG RX REV CODE 250 W/ 637 OVERRIDE(OP): Performed by: HOSPITALIST

## 2025-03-17 PROCEDURE — 80048 BASIC METABOLIC PNL TOTAL CA: CPT

## 2025-03-17 PROCEDURE — 700102 HCHG RX REV CODE 250 W/ 637 OVERRIDE(OP): Performed by: INTERNAL MEDICINE

## 2025-03-17 PROCEDURE — 80053 COMPREHEN METABOLIC PANEL: CPT

## 2025-03-17 PROCEDURE — 700111 HCHG RX REV CODE 636 W/ 250 OVERRIDE (IP): Mod: JZ,TB | Performed by: INTERNAL MEDICINE

## 2025-03-17 PROCEDURE — A9270 NON-COVERED ITEM OR SERVICE: HCPCS | Performed by: INTERNAL MEDICINE

## 2025-03-17 PROCEDURE — A9270 NON-COVERED ITEM OR SERVICE: HCPCS | Performed by: HOSPITALIST

## 2025-03-17 PROCEDURE — 770000 HCHG ROOM/CARE - INTERMEDIATE ICU *

## 2025-03-17 PROCEDURE — 76775 US EXAM ABDO BACK WALL LIM: CPT

## 2025-03-17 PROCEDURE — 83735 ASSAY OF MAGNESIUM: CPT

## 2025-03-17 PROCEDURE — A9270 NON-COVERED ITEM OR SERVICE: HCPCS

## 2025-03-17 RX ORDER — ETHYL ALCOHOL 62 %
1 SWAB, MEDICATED TOPICAL 2 TIMES DAILY
Status: DISCONTINUED | OUTPATIENT
Start: 2025-03-17 | End: 2025-03-19 | Stop reason: ALTCHOICE

## 2025-03-17 RX ORDER — HYDROCODONE BITARTRATE AND ACETAMINOPHEN 10; 325 MG/1; MG/1
1 TABLET ORAL EVERY 6 HOURS PRN
Refills: 0 | Status: DISCONTINUED | OUTPATIENT
Start: 2025-03-17 | End: 2025-03-22 | Stop reason: HOSPADM

## 2025-03-17 RX ADMIN — HEPARIN SODIUM 5000 UNITS: 5000 INJECTION, SOLUTION INTRAVENOUS; SUBCUTANEOUS at 13:06

## 2025-03-17 RX ADMIN — OMEPRAZOLE 20 MG: 20 CAPSULE, DELAYED RELEASE ORAL at 05:13

## 2025-03-17 RX ADMIN — HEPARIN SODIUM 5000 UNITS: 5000 INJECTION, SOLUTION INTRAVENOUS; SUBCUTANEOUS at 05:13

## 2025-03-17 RX ADMIN — HEPARIN SODIUM 5000 UNITS: 5000 INJECTION, SOLUTION INTRAVENOUS; SUBCUTANEOUS at 21:12

## 2025-03-17 RX ADMIN — HYDRALAZINE HYDROCHLORIDE 20 MG: 20 INJECTION, SOLUTION INTRAMUSCULAR; INTRAVENOUS at 13:06

## 2025-03-17 RX ADMIN — Medication 1 APPLICATOR: at 17:31

## 2025-03-17 RX ADMIN — AMLODIPINE BESYLATE 5 MG: 10 TABLET ORAL at 05:13

## 2025-03-17 RX ADMIN — HEPARIN SODIUM 1200 UNITS: 1000 INJECTION, SOLUTION INTRAVENOUS; SUBCUTANEOUS at 09:35

## 2025-03-17 RX ADMIN — Medication 1 APPLICATOR: at 11:55

## 2025-03-17 RX ADMIN — Medication 5000 UNITS: at 05:14

## 2025-03-17 RX ADMIN — EPOETIN ALFA-EPBX 5000 UNITS: 3000 INJECTION, SOLUTION INTRAVENOUS; SUBCUTANEOUS at 08:30

## 2025-03-17 RX ADMIN — HYDROCODONE BITARTRATE AND ACETAMINOPHEN 1 TABLET: 10; 325 TABLET ORAL at 16:55

## 2025-03-17 RX ADMIN — SENNOSIDES AND DOCUSATE SODIUM 2 TABLET: 50; 8.6 TABLET ORAL at 17:29

## 2025-03-17 ASSESSMENT — PAIN DESCRIPTION - PAIN TYPE
TYPE: ACUTE PAIN
TYPE: ACUTE PAIN;CHRONIC PAIN
TYPE: ACUTE PAIN
TYPE: ACUTE PAIN;CHRONIC PAIN
TYPE: ACUTE PAIN;CHRONIC PAIN
TYPE: ACUTE PAIN
TYPE: ACUTE PAIN

## 2025-03-17 ASSESSMENT — ENCOUNTER SYMPTOMS
HEARTBURN: 0
RESPIRATORY NEGATIVE: 1
NERVOUS/ANXIOUS: 1
COUGH: 0
BRUISES/BLEEDS EASILY: 0
MYALGIAS: 1
CHILLS: 0
FOCAL WEAKNESS: 0
PALPITATIONS: 0
VOMITING: 0
EYES NEGATIVE: 1
DIZZINESS: 0
SHORTNESS OF BREATH: 0
ABDOMINAL PAIN: 0
NECK PAIN: 0
WEAKNESS: 1
DEPRESSION: 1
GASTROINTESTINAL NEGATIVE: 1
POLYDIPSIA: 0
HEADACHES: 0
NAUSEA: 0
BACK PAIN: 0
FEVER: 0

## 2025-03-17 ASSESSMENT — FIBROSIS 4 INDEX: FIB4 SCORE: 1.09

## 2025-03-17 NOTE — PROGRESS NOTES
Hospital Medicine Daily Progress Note    Date of Service  3/17/2025    Chief Complaint  Aviva Kenney is a 50 y.o. female admitted 3/15/2025 with acute encephalopathy, acute on chronic kidney injury, hyperkalemia    Hospital Course  50 y.o. female who presented 3/15/2025 with a past med history of diabetes type 2, insulin requiring, hypertension, CKD, presenting as a transfer from [facility] for acute on chronic kidney failure with hyperkalemia, the patient reportedly not feeling well over the last several days, nausea vomiting, not being herself since Saturday, patient complains of being foggy, progressive weakness, generalized fatigue over the last week, the patient recently undergoing a fourth left toe amputation on March 1 secondary to diabetic ulcer, on laboratory evaluation patient was found with a white cell count of 7.4 hemoglobin 8.1 hematocrit 24 platelet count 404 sed rate 100 sodium 117 potassium 6.7 creatinine 12.5  phosphorus 10.4 the patient had CT renal performed showing no obstructive process, the patient mated to the ICU level of care for initiation of urgent dialysis, dialysis catheter placed by the intensivist, nephrology consulted for initiation of hemodialysis.  The patient currently afebrile, heart rate in the 70s, respiration unlabored, the patient is saturating in the high 90s on 2.5 L nasal cannula oxygen, blood pressure 140s over 80s,  Laboratory data currently with a sodium of 125 potassium 4.4 chloride 91 bicarb 28 glucose 114 BUN 83 creatinine 8.5  The patient admitted to the IMCU level of care, aggressive electrolyte correction and hemodialysis needed    Interval Problem Update  Patient seen and examined today.  Data, Medication data reviewed.  Case discussed with nursing as available.  Plan of Care reviewed with patient and notified of changes.  3/17 the patient improved, intermittently hypertensive, currently controlled in the 120s to 130s  systolic range, afebrile, heart rate in 80s to 90s, respiration unlabored, the patient is saturating in mid 90s on room air, the patient undergoing hemodialysis session #2 this morning, 2 L ultrafiltration, mentation has significantly improved since admission, nephrology is planning for a renal biopsy for further diagnosis and prognostication  The patient is currently found with a white count of 4.9 hemoglobin 7, no blood loss, platelet count 354, sodium 123 potassium 6.2 chloride 91 bicarb 28 glucose 110 BUN 86 creatinine 9.28 improved after dialysis to a BUN of 54 creatinine 6.75 potassium 4.6 corrected calcium 8.5 glucose levels in the 100-1 50 range, INR 1.49, echocardiogram normal with a Jek fraction of 55 to 60%,    I have discussed this patient's plan of care and discharge plan at IDT rounds today with Case Management, Nursing, Nursing leadership, and other members of the IDT team.    Consultants/Specialty  critical care and nephrology    Code Status  Full Code    Disposition  The patient is not medically cleared for discharge to home or a post-acute facility.  Anticipate discharge to: home with close outpatient follow-up    I have placed the appropriate orders for post-discharge needs.    Review of Systems  Review of Systems   Constitutional:  Positive for malaise/fatigue. Negative for chills and fever.   HENT: Negative.     Eyes: Negative.    Respiratory: Negative.  Negative for cough.    Cardiovascular:  Positive for leg swelling. Negative for chest pain and palpitations.   Gastrointestinal: Negative.  Negative for heartburn, nausea and vomiting.   Genitourinary: Negative.  Negative for dysuria and frequency.   Musculoskeletal:  Positive for joint pain and myalgias. Negative for back pain and neck pain.   Skin: Negative.  Negative for itching and rash.   Neurological:  Positive for weakness. Negative for dizziness, focal weakness and headaches.   Endo/Heme/Allergies: Negative.  Negative for polydipsia.  Does not bruise/bleed easily.   Psychiatric/Behavioral:  Positive for depression. The patient is nervous/anxious.         Physical Exam  Temp:  [35.8 °C (96.5 °F)-36.2 °C (97.1 °F)] 35.9 °C (96.6 °F)  Pulse:  [72-86] 85  Resp:  [8-19] 18  BP: (124-193)/(58-96) 125/87  SpO2:  [92 %-98 %] 93 %    Physical Exam  Vitals and nursing note reviewed.   Constitutional:       Appearance: She is well-developed. She is obese. She is ill-appearing. She is not diaphoretic.   HENT:      Head: Normocephalic and atraumatic.      Nose: Nose normal.   Eyes:      Conjunctiva/sclera: Conjunctivae normal.      Pupils: Pupils are equal, round, and reactive to light.   Neck:      Thyroid: No thyromegaly.      Vascular: No JVD.   Cardiovascular:      Rate and Rhythm: Normal rate and regular rhythm.      Heart sounds: Normal heart sounds.      No friction rub. No gallop.   Pulmonary:      Effort: Pulmonary effort is normal.      Breath sounds: Normal breath sounds. No wheezing or rales.   Abdominal:      General: Bowel sounds are normal. There is no distension.      Palpations: Abdomen is soft. There is no mass.      Tenderness: There is no abdominal tenderness. There is no guarding or rebound.   Musculoskeletal:         General: Swelling present. No tenderness. Normal range of motion.      Cervical back: Normal range of motion and neck supple.      Right lower leg: Edema present.      Left lower leg: Edema present.   Lymphadenopathy:      Cervical: No cervical adenopathy.   Skin:     General: Skin is warm and dry.      Coloration: Skin is pale.   Neurological:      Mental Status: She is alert and oriented to person, place, and time.      Cranial Nerves: No cranial nerve deficit.   Psychiatric:         Behavior: Behavior normal.         Fluids    Intake/Output Summary (Last 24 hours) at 3/17/2025 0858  Last data filed at 3/17/2025 0600  Gross per 24 hour   Intake 2008.13 ml   Output 2163 ml   Net -154.87 ml        Laboratory  Recent Labs      03/15/25  2218 03/17/25  0351   WBC 7.4 4.9   RBC 2.68* 2.28*   HEMOGLOBIN 8.1* 7.0*   HEMATOCRIT 24.3* 20.2*   MCV 90.7 88.6   MCH 30.2 30.7   MCHC 33.3 34.7   RDW 42.7 42.8   PLATELETCT 404 354   MPV 10.2 10.3     Recent Labs     03/16/25  1917 03/16/25  2346 03/17/25  0351   SODIUM 122* 123* 123*   POTASSIUM 5.9* 5.8* 6.2*   CHLORIDE 89* 91* 91*   CO2 20 20 20   GLUCOSE 172* 135* 110*   BUN 85* 86* 86*   CREATININE 9.63* 9.12* 9.28*   CALCIUM 7.3* 7.2* 7.2*     Recent Labs     03/15/25  2218 03/17/25  0351   APTT 44.1*  --    INR 1.31* 1.49*               Imaging  EC-ECHOCARDIOGRAM COMPLETE W/O CONT   Final Result      DX-CHEST-FOR LINE PLACEMENT Perform procedure in: Patient's Room   Final Result         1. Right jugular dialysis line in place without complication.      2. Shallow breath. Mild pulmonary edema suggested.                  DX-FOOT-COMPLETE 3+ LEFT   Final Result      1.  Prior amputation of the fourth toe.      2.  No evidence of fracture or bony destructive change.      CT-RENAL COLIC EVALUATION(A/P W/O)   Final Result      1.  No evidence of bowel obstruction or focal inflammatory change.      2.  No evidence of renal or ureteral stone or hydronephrosis.      3.  Small amount of ascites.      4.  Atherosclerotic vascular calcification.      5.  Anasarca.      6.  Bibasal atelectasis and minimal bilateral pleural effusions.      7.  Constipation.      8.  Prior gastric bypass procedure.      DX-OUTSIDE IMAGES-DX CHEST   Final Result      CT-OUTSIDE IMAGES-CT HEAD   Final Result      CT-NEEDLE CORE BX-RENAL    (Results Pending)        Assessment/Plan  * Acute renal failure superimposed on stage 3b chronic kidney disease (HCC)- (present on admission)  Assessment & Plan  Patient has chronic kidney secondary to diabetes mellitis  following outpatient with nephrology with baseline creatinine 1.1  Patient presented  with creatinine 12.5 and .  Nephrology consult, hemodialysis catheter placement,  currently tolerating hemodialysis  Renal biopsy suggested to further delineate the patient's disease process, prognostication      Acute encephalopathy- (present on admission)  Assessment & Plan  Acute metabolic encephalopathy  Limit sedatives and mind altering medications as much as possible  Follow neuro exam  Currently significantly improved, monitor    Osteomyelitis of left foot (HCC)  Assessment & Plan  Patient has recent history of admission for osteomyelitis of her left foot S/P left 4th toe amputated 03/01/2024  On examination the wound is dry and now signs of infection  Will continue monitor    Anemia  Assessment & Plan  Possibly secondary to renal disease  We will repeat iron studies, reticulocyte count, currently no evidence of blood loss  Monitor hemoglobin  Transfuse to keep Hb>7    Elevated troponin  Assessment & Plan  Patient has troponin 129, BNP 07312 likely type II injury  Patient denied any chest pain, shortness of breath, palpitations  EKG:  Sinus rhythm, no acute ST-T changes  ECHO, without regional wall motion abnormality, normal EF      Hyponatremia- (present on admission)  Assessment & Plan  Patient presented with sodium 117  Likely volume overload, renal failure,  Volume and electrolyte correction with hemodialysis  Close laboratory follow-up    Hyperlipidemia- (present on admission)  Assessment & Plan  Consider future statin therapy    Primary hypertension- (present on admission)  Assessment & Plan  Patient on amlodipine  5 mg   Monitor closely, adjust as indicated, as needed medication available    Hyperkalemia- (present on admission)  Assessment & Plan  Patient presents potassium 6.7  Worsened renal failure, hemodialysis initiated    Type 2 diabetes mellitus (HCC)- (present on admission)  Assessment & Plan  Patient on insulin lispro 100, 10 units per meal and Ozempic  Patient recent hemoglobin A1c 8.6 January 2025  Patient has complication related to diabetes mellitus include kidney  disease, neuropathy  Patient has recent amputation of her left fourth toe secondary to diabetic ulcer  Medium dose sliding scale  Diabetic education     Plan  3/17  Ongoing renal replacement therapy, nephrology following,  Renal biopsy on 3/18 planned  Monitor sodium and potassium levels closely, metabolic panel follow-up   blood pressure control currently improved, monitor  Glycemic control currently improved, monitor  Further anemia workup, renal ultrasound  A.m. labs  diabetic teaching  See orders  Close monitoring of ins and outs  Patient is has a high medical complexity, complex decision making and is at high risk for complication, morbidity, and mortality.  I spent 52 minutes, reviewing the chart, obtaining and/or reviewing separately obtained history. Performing a medically appropriate examination and evaluation.  Counseling and educating the patient. Ordering and reviewing medications, tests, or procedures.   Documenting clinical information in EPIC. Independently interpreting results and communicating results to patient. Discussing future disposition of care with patient, RN and case management.    VTE prophylaxis: Heparin subcu    I have performed a physical exam and reviewed and updated ROS and Plan today (3/17/2025). In review of yesterday's note (3/16/2025), there are no changes except as documented above.      Please note that this dictation was created using voice recognition software. I have made every reasonable attempt to correct obvious errors, but I expect that there are errors of grammar and possibly context that I did not discover before finalizing the note.

## 2025-03-17 NOTE — PROGRESS NOTES
"Nephrology Daily Progress Note    Date of Service  3/17/2025    Chief Complaint  50 y.o. 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    Interval Problem Update  3/17 - patient had HD #2 this AM with 2L UF tolerated well. Much more alert today, but says she does not remember why she went to the hospital. Her  Bharath says she was increasingly confused for the week leading up to admission, moaning \"it hurts it hurts.\" But she could not localize pain. He finally brought her to the hospital where she was found in kidney failure. I discussed the need for a kidney biopsy, for both kidney diagnosis and prognosis, and she agrees.     Review of Systems  Review of Systems   Constitutional:  Positive for malaise/fatigue. Negative for fever.   Respiratory:  Negative for shortness of breath.    Cardiovascular:  Negative for chest pain.   Gastrointestinal:  Negative for abdominal pain.   All other systems reviewed and are negative.       Physical Exam  Temp:  [35.8 °C (96.5 °F)-36.4 °C (97.5 °F)] 36.4 °C (97.5 °F)  Pulse:  [75-86] 83  Resp:  [9-19] 18  BP: (119-174)/(58-96) 133/67  SpO2:  [92 %-98 %] 94 %    Physical Exam  Constitutional:       General: She is not in acute distress.     Appearance: She is well-developed. She is obese.   HENT:      Head: Normocephalic and atraumatic.      Mouth/Throat:      Mouth: Mucous membranes are moist.      Pharynx: Oropharynx is clear. No oropharyngeal exudate.   Eyes:      General: No scleral icterus.     Extraocular Movements: Extraocular movements intact.   Neck:      Trachea: No tracheal deviation.   Cardiovascular:      Rate and Rhythm: Normal rate and regular rhythm.      Heart sounds: Normal heart sounds. No murmur heard.  Pulmonary:      Effort: Pulmonary effort is normal.      Breath sounds: No stridor. No rales. "   Abdominal:      Palpations: Abdomen is soft.      Tenderness: There is no abdominal tenderness.   Musculoskeletal:         General: Normal range of motion.      Cervical back: Normal range of motion. No rigidity.      Right lower leg: Edema (trace) present.      Left lower leg: Edema (trace) present.   Skin:     General: Skin is warm and dry.   Neurological:      General: No focal deficit present.      Mental Status: She is alert and oriented to person, place, and time.   Psychiatric:         Mood and Affect: Mood normal.         Behavior: Behavior normal.     [facility]    Fluids    Intake/Output Summary (Last 24 hours) at 3/17/2025 1107  Last data filed at 3/17/2025 0945  Gross per 24 hour   Intake 2058.13 ml   Output 2688 ml   Net -629.87 ml       Laboratory  Labs reviewed, pertinent labs below.  Recent Labs     03/15/25  2218 03/17/25  0351   WBC 7.4 4.9   RBC 2.68* 2.28*   HEMOGLOBIN 8.1* 7.0*   HEMATOCRIT 24.3* 20.2*   MCV 90.7 88.6   MCH 30.2 30.7   MCHC 33.3 34.7   RDW 42.7 42.8   PLATELETCT 404 354   MPV 10.2 10.3     Recent Labs     03/16/25 1917 03/16/25  2346 03/17/25  0351   SODIUM 122* 123* 123*   POTASSIUM 5.9* 5.8* 6.2*   CHLORIDE 89* 91* 91*   CO2 20 20 20   GLUCOSE 172* 135* 110*   BUN 85* 86* 86*   CREATININE 9.63* 9.12* 9.28*   CALCIUM 7.3* 7.2* 7.2*     Recent Labs     03/15/25  2218 03/17/25  0351   APTT 44.1*  --    INR 1.31* 1.49*           URINALYSIS:  Lab Results   Component Value Date/Time    COLORURINE Dark Yellow 03/15/2025 2310    CLARITY Cloudy (A) 03/15/2025 2310    SPECGRAVITY 1.023 03/15/2025 2310    PHURINE 5.0 03/15/2025 2310    KETONES Trace (A) 03/15/2025 2310    PROTEINURIN >=1000 (A) 03/15/2025 2310    BILIRUBINUR Negative 03/15/2025 2310    UROBILU 1.0 03/15/2025 2310    NITRITE Negative 03/15/2025 2310    LEUKESTERAS Small (A) 03/15/2025 2310    OCCULTBLOOD Large (A) 03/15/2025 2310     [facility]  Lab Results   Component Value Date/Time    TOTPROTUR  >600.0 (H) 03/16/2025 1429      Lab Results   Component Value Date/Time    CREATININEU 206.00 03/16/2025 1429         Imaging interpreted by radiologist. Imaging reports reviewed with pertinent findings below  EC-ECHOCARDIOGRAM COMPLETE W/O CONT   Final Result      DX-CHEST-FOR LINE PLACEMENT Perform procedure in: Patient's Room   Final Result         1. Right jugular dialysis line in place without complication.      2. Shallow breath. Mild pulmonary edema suggested.                  DX-FOOT-COMPLETE 3+ LEFT   Final Result      1.  Prior amputation of the fourth toe.      2.  No evidence of fracture or bony destructive change.      CT-RENAL COLIC EVALUATION(A/P W/O)   Final Result      1.  No evidence of bowel obstruction or focal inflammatory change.      2.  No evidence of renal or ureteral stone or hydronephrosis.      3.  Small amount of ascites.      4.  Atherosclerotic vascular calcification.      5.  Anasarca.      6.  Bibasal atelectasis and minimal bilateral pleural effusions.      7.  Constipation.      8.  Prior gastric bypass procedure.      DX-OUTSIDE IMAGES-DX CHEST   Final Result      CT-OUTSIDE IMAGES-CT HEAD   Final Result      CT-NEEDLE CORE BX-RENAL    (Results Pending)         Current Facility-Administered Medications   Medication Dose Route Frequency Provider Last Rate Last Admin    Pharmacy Consult Request   Other PHARMACY TO DOSE Theo Cox M.D.        Nozin nasal  swab  1 Applicator Each Nostril BID Andre Ellis M.D.        HYDROcodone/acetaminophen (Norco)  MG per tablet 1 Tablet  1 Tablet Oral Q6HRS PRN Andre Ellis M.D.        heparin injection 5,000 Units  5,000 Units Subcutaneous Q8HRS Alf Virgen M.D.   5,000 Units at 03/17/25 0513    senna-docusate (Pericolace Or Senokot S) 8.6-50 MG per tablet 2 Tablet  2 Tablet Oral Q EVENING Alf Virgen M.D.   2 Tablet at 03/16/25 1713    And    polyethylene glycol/lytes (Miralax) Packet 1 Packet  1 Packet  Oral QDAY PRN Alf Virgen M.D.   1 Packet at 03/16/25 1713    [Held by provider] gabapentin (Neurontin) capsule 300 mg  300 mg Oral Q EVENING Alf Virgen M.D.        epoetin (Retacrit) 5,000 Units injection (Dialysis Use Only)  5,000 Units Intravenous MO, WE + FR Theo Cox M.D.   5,000 Units at 03/17/25 0830    NS (Bolus) 0.9 % infusion 100 mL  100 mL Intravenous DIALYSIS PRN Theo Cox M.D.        insulin lispro (HumaLOG,AdmeLOG) subcutaneous injection  2-9 Units Subcutaneous 4X/DAY JAMES Arreola M.D.   2 Units at 03/16/25 2226    And    dextrose 50% (D50W) injection 25 g  25 g Intravenous Q15 MIN PRN Lyle Arreola M.D.        omeprazole (PriLOSEC) capsule 20 mg  20 mg Oral DAILY Lyle Arreola M.D.   20 mg at 03/17/25 0513    amLODIPine (Norvasc) tablet 5 mg  5 mg Oral DAILY Lyle Arreola M.D.   5 mg at 03/17/25 0513    vitamin D3 (Cholecalciferol) tablet 5,000 Units  5,000 Units Oral DAILY Lyle Arreola M.D.   5,000 Units at 03/17/25 0514    hydrALAZINE (Apresoline) injection 20 mg  20 mg Intravenous Q4HRS PRN Lyle Arreola M.D.   20 mg at 03/16/25 1612    heparin intracatheter (for DIALYSIS USE ONLY) 1,200 Units  1,200 Units Intracatheter DIALYSIS PRN Theo Cox M.D.   1,200 Units at 03/17/25 0935    heparin intracatheter (for DIALYSIS USE ONLY) 1,200 Units  1,200 Units Intracatheter DIALYSIS PRN Theo Cox M.D.   1,200 Units at 03/17/25 0935    ondansetron (Zofran) syringe/vial injection 4 mg  4 mg Intravenous Q4HRS PRN Andre Ellis M.D.   4 mg at 03/16/25 1642         Assessment/Plan  50 y.o. 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     1.  BUTCH on CKD 3B to 4.  Oliguric.  Unclear etiology of BUTCH, unlikely to be AIN with short course of antibiotics.  More likely due to ATN  given granular casts on urinalysis on admission. However, she does have nephrotic range proteinuria, likely from diabetic nephropathy. I recommend kidney biopsy to help determine kidney diagnosis and prognosis. If she is ESRD, can start planning for PD or permanent vascular access.  HD#1 - 2 hours, 250/500 BFR/DFR. (Yesterday)  HD#2 - 2.5 hours, 250/500 BFR/DFR. (Done today)  HD#3 - 3 hours, 300/600 BFR/DFR.  (Plan for tomorrow)     2.  Dialysis access: Right IJ temporary Vas-Cath, maintain for now.     3.  Weakness and fatigue, reason for admission to outside hospital.  Likely due to kidney failure.  Continue dialysis as above.     4.  Hyponatremia, unclear trigger.  Persistent. Limit hypotonic fluids.  Do not order salt tabs.  Check serum sodium q8-12 hours.      5.  Metabolic acidosis, noted on admission, improved. Continue dialysis as above. No further need for bicarb supplements. Check renal function panel daily.      6.  Hyperkalemia, noted on admission, improved with dialysis.  Then recurrent again. Should improve with HD again today. Check renal function panel at least once daily.     7.  Hyperphosphatemia, likely due to BUTCH. Slowly improving. There is no acute need for phosphorus binders.  Check renal function panel daily/phosphorus level at least once daily.     8.  Normocytic anemia. Persistent.  Continue LULY's 3 times weekly with dialysis.  Check CBC daily.     Discussed with Dr. Andre Cox MD  Nephrology  [facility]

## 2025-03-17 NOTE — CARE PLAN
The patient is Watcher - Medium risk of patient condition declining or worsening    Shift Goals  Clinical Goals: vss, monitor labs, safety, monitor urine output  Patient Goals: rest  Family Goals: adelita- none present    Progress made toward(s) clinical / shift goals:      Problem: Knowledge Deficit - Standard  Goal: Patient and family/care givers will demonstrate understanding of plan of care, disease process/condition, diagnostic tests and medications  Outcome: Progressing     Problem: Skin Integrity  Goal: Skin integrity is maintained or improved  Outcome: Progressing     Problem: Fall Risk  Goal: Patient will remain free from falls  Outcome: Progressing     Problem: Pain - Standard  Goal: Alleviation of pain or a reduction in pain to the patient’s comfort goal  Outcome: Progressing       Patient is not progressing towards the following goals:

## 2025-03-17 NOTE — CARE PLAN
The patient is Stable - Low risk of patient condition declining or worsening    Shift Goals  Clinical Goals: vss, monitor labs, safety, monitor urine output  Patient Goals: rest  Family Goals: adelita- none present    Progress made toward(s) clinical / shift goals:      Problem: Knowledge Deficit - Standard  Goal: Patient and family/care givers will demonstrate understanding of plan of care, disease process/condition, diagnostic tests and medications  Outcome: Progressing     Problem: Skin Integrity  Goal: Skin integrity is maintained or improved  Outcome: Progressing     Problem: Fall Risk  Goal: Patient will remain free from falls  Outcome: Progressing     Problem: Pain - Standard  Goal: Alleviation of pain or a reduction in pain to the patient’s comfort goal  Outcome: Progressing       Patient is not progressing towards the following goals:

## 2025-03-17 NOTE — PROGRESS NOTES
[facility]     #2 HD treatment today, has elevated K+ this morning. 2.5 hrs dialysis treatment started at 0705 and completed at 0935. Nephrologist Dr. Cox notified of treatment start.     NET UF: 2000 mL     Patient is A&Ox4, no pain and discomfort reported. BP slightly elevated, no chest pain, HR WNL and stable, no SOB and afebrile. Right neck temporary IJ CVC, dressing is CDI and no signs and symptoms of infection noted. CVC Lines are patent w/ good blood flow. Lab results and orders reviewed prior to treatment start.     Patient completed and tolerated dialysis treatment well w/o complications. VS stayed within normal limits. Right neck temporary IJ, CVC care done aseptically, lines flushed, Heparin lock in each lines, then clamped and capped. Access site labelled and dated.    Report given to MAYA Schneider RN     See Dialysis flow sheet for details

## 2025-03-17 NOTE — DISCHARGE PLANNING
"The following Assessment was completed by [facility] on 2/27/25        RN RAEGAN met with patient at bedside to complete admission assessment. Pleasantly A&Ox4 and able to verify information on face sheet.   Lives with her semi-functional/unemployed spouse, Bharath, in manufactured home (6 steps to enter, no handrails), in [facility].   Independent with ADLs and most IADLs at baseline (she can drive if necessary but more relies on her  for transportation).  has difficulty standing or walking for extended periods of time. Her employed/adult son, Nils, lives \"down the road\" from her.   Patient has active Medicare, [place] Medicaid Q[place], and [employer].   [facility] leadership and PFA made aware of situation.   Patient notified of need to cancel her [employer]-[place] as she reports having lived in [place] for the last 3 years and does not plan on returning to [place].   She receives approximately $1,100/month from SSDI.    and patient combined make around $2,100/month. She will qualify for full [place] Medicaid.    PCP is Barry Higgins PA-C.            "

## 2025-03-17 NOTE — CARE PLAN
The patient is Stable - Low risk of patient condition declining or worsening    Shift Goals  Clinical Goals: HD, VSS, ADLs, Safety  Patient Goals: rest  Family Goals: adelita- none present    Progress made toward(s) clinical / shift goals:     Problem: Knowledge Deficit - Standard  Goal: Patient and family/care givers will demonstrate understanding of plan of care, disease process/condition, diagnostic tests and medications  Outcome: Progressing     Problem: Skin Integrity  Goal: Skin integrity is maintained or improved  Outcome: Progressing     Problem: Fall Risk  Goal: Patient will remain free from falls  Outcome: Progressing     Problem: Pain - Standard  Goal: Alleviation of pain or a reduction in pain to the patient’s comfort goal  Outcome: Progressing       Patient is not progressing towards the following goals:

## 2025-03-18 ENCOUNTER — APPOINTMENT (OUTPATIENT)
Dept: RADIOLOGY | Facility: MEDICAL CENTER | Age: 50
DRG: 673 | End: 2025-03-18
Attending: INTERNAL MEDICINE
Payer: MEDICARE

## 2025-03-18 LAB
ABO GROUP BLD: NORMAL
ALBUMIN SERPL BCP-MCNC: 2.3 G/DL (ref 3.2–4.9)
ALBUMIN/GLOB SERPL: 0.6 G/DL
ALP SERPL-CCNC: 371 U/L (ref 30–99)
ALT SERPL-CCNC: 16 U/L (ref 2–50)
ANION GAP SERPL CALC-SCNC: 11 MMOL/L (ref 7–16)
ANION GAP SERPL CALC-SCNC: 12 MMOL/L (ref 7–16)
ANION GAP SERPL CALC-SCNC: 9 MMOL/L (ref 7–16)
AST SERPL-CCNC: 35 U/L (ref 12–45)
BACTERIA UR CULT: NORMAL
BARCODED ABORH UBTYP: 9500
BARCODED PRD CODE UBPRD: NORMAL
BARCODED UNIT NUM UBUNT: NORMAL
BASOPHILS # BLD AUTO: 0.5 % (ref 0–1.8)
BASOPHILS # BLD: 0.03 K/UL (ref 0–0.12)
BILIRUB SERPL-MCNC: 0.4 MG/DL (ref 0.1–1.5)
BLD GP AB SCN SERPL QL: NORMAL
BUN SERPL-MCNC: 30 MG/DL (ref 8–22)
BUN SERPL-MCNC: 34 MG/DL (ref 8–22)
BUN SERPL-MCNC: 57 MG/DL (ref 8–22)
CALCIUM ALBUM COR SERPL-MCNC: 8.8 MG/DL (ref 8.5–10.5)
CALCIUM SERPL-MCNC: 7.4 MG/DL (ref 8.5–10.5)
CALCIUM SERPL-MCNC: 7.9 MG/DL (ref 8.5–10.5)
CALCIUM SERPL-MCNC: 7.9 MG/DL (ref 8.5–10.5)
CHLORIDE SERPL-SCNC: 90 MMOL/L (ref 96–112)
CHLORIDE SERPL-SCNC: 91 MMOL/L (ref 96–112)
CHLORIDE SERPL-SCNC: 91 MMOL/L (ref 96–112)
CO2 SERPL-SCNC: 21 MMOL/L (ref 20–33)
CO2 SERPL-SCNC: 25 MMOL/L (ref 20–33)
CO2 SERPL-SCNC: 26 MMOL/L (ref 20–33)
COMPONENT R 8504R: NORMAL
CREAT SERPL-MCNC: 4.74 MG/DL (ref 0.5–1.4)
CREAT SERPL-MCNC: 5.39 MG/DL (ref 0.5–1.4)
CREAT SERPL-MCNC: 7.71 MG/DL (ref 0.5–1.4)
EOSINOPHIL # BLD AUTO: 0.11 K/UL (ref 0–0.51)
EOSINOPHIL NFR BLD: 2 % (ref 0–6.9)
ERYTHROCYTE [DISTWIDTH] IN BLOOD BY AUTOMATED COUNT: 44.5 FL (ref 35.9–50)
ERYTHROCYTE [DISTWIDTH] IN BLOOD BY AUTOMATED COUNT: 46.3 FL (ref 35.9–50)
FERRITIN SERPL-MCNC: 1060 NG/ML (ref 10–291)
GFR SERPLBLD CREATININE-BSD FMLA CKD-EPI: 11 ML/MIN/1.73 M 2
GFR SERPLBLD CREATININE-BSD FMLA CKD-EPI: 6 ML/MIN/1.73 M 2
GFR SERPLBLD CREATININE-BSD FMLA CKD-EPI: 9 ML/MIN/1.73 M 2
GLOBULIN SER CALC-MCNC: 3.9 G/DL (ref 1.9–3.5)
GLUCOSE BLD STRIP.AUTO-MCNC: 136 MG/DL (ref 65–99)
GLUCOSE BLD STRIP.AUTO-MCNC: 136 MG/DL (ref 65–99)
GLUCOSE BLD STRIP.AUTO-MCNC: 154 MG/DL (ref 65–99)
GLUCOSE BLD STRIP.AUTO-MCNC: 88 MG/DL (ref 65–99)
GLUCOSE BLD STRIP.AUTO-MCNC: 91 MG/DL (ref 65–99)
GLUCOSE SERPL-MCNC: 100 MG/DL (ref 65–99)
GLUCOSE SERPL-MCNC: 104 MG/DL (ref 65–99)
GLUCOSE SERPL-MCNC: 88 MG/DL (ref 65–99)
HCT VFR BLD AUTO: 23 % (ref 37–47)
HCT VFR BLD AUTO: 28 % (ref 37–47)
HGB BLD-MCNC: 7.6 G/DL (ref 12–16)
HGB BLD-MCNC: 9.1 G/DL (ref 12–16)
HGB RETIC QN AUTO: 32.4 PG/CELL (ref 29–35)
IMM GRANULOCYTES # BLD AUTO: 0.03 K/UL (ref 0–0.11)
IMM GRANULOCYTES NFR BLD AUTO: 0.5 % (ref 0–0.9)
IMM RETICS NFR: 12.4 % (ref 2.6–16.1)
INR PPP: 1.48 (ref 0.87–1.13)
IRON SATN MFR SERPL: 37 % (ref 15–55)
IRON SERPL-MCNC: 49 UG/DL (ref 40–170)
LYMPHOCYTES # BLD AUTO: 1.61 K/UL (ref 1–4.8)
LYMPHOCYTES NFR BLD: 28.6 % (ref 22–41)
MAGNESIUM SERPL-MCNC: 2 MG/DL (ref 1.5–2.5)
MCH RBC QN AUTO: 30 PG (ref 27–33)
MCH RBC QN AUTO: 31.1 PG (ref 27–33)
MCHC RBC AUTO-ENTMCNC: 32.5 G/DL (ref 32.2–35.5)
MCHC RBC AUTO-ENTMCNC: 33 G/DL (ref 32.2–35.5)
MCV RBC AUTO: 92.4 FL (ref 81.4–97.8)
MCV RBC AUTO: 94.3 FL (ref 81.4–97.8)
MONOCYTES # BLD AUTO: 0.54 K/UL (ref 0–0.85)
MONOCYTES NFR BLD AUTO: 9.6 % (ref 0–13.4)
NEUTROPHILS # BLD AUTO: 3.31 K/UL (ref 1.82–7.42)
NEUTROPHILS NFR BLD: 58.8 % (ref 44–72)
NRBC # BLD AUTO: 0 K/UL
NRBC BLD-RTO: 0 /100 WBC (ref 0–0.2)
PHOSPHATE SERPL-MCNC: 6.6 MG/DL (ref 2.5–4.5)
PLATELET # BLD AUTO: 314 K/UL (ref 164–446)
PLATELET # BLD AUTO: 381 K/UL (ref 164–446)
PMV BLD AUTO: 10.5 FL (ref 9–12.9)
PMV BLD AUTO: 9.8 FL (ref 9–12.9)
POTASSIUM SERPL-SCNC: 3.8 MMOL/L (ref 3.6–5.5)
POTASSIUM SERPL-SCNC: 4.2 MMOL/L (ref 3.6–5.5)
POTASSIUM SERPL-SCNC: 5.3 MMOL/L (ref 3.6–5.5)
PRODUCT TYPE UPROD: NORMAL
PROT SERPL-MCNC: 6.2 G/DL (ref 6–8.2)
PROTHROMBIN TIME: 18 SEC (ref 12–14.6)
RBC # BLD AUTO: 2.44 M/UL (ref 4.2–5.4)
RBC # BLD AUTO: 3.03 M/UL (ref 4.2–5.4)
RETICS # AUTO: 0.1 M/UL (ref 0.04–0.12)
RETICS/RBC NFR: 3.9 % (ref 0.8–2.6)
RH BLD: NORMAL
SIGNIFICANT IND 70042: NORMAL
SITE SITE: NORMAL
SODIUM SERPL-SCNC: 123 MMOL/L (ref 135–145)
SODIUM SERPL-SCNC: 126 MMOL/L (ref 135–145)
SODIUM SERPL-SCNC: 127 MMOL/L (ref 135–145)
SOURCE SOURCE: NORMAL
TIBC SERPL-MCNC: 131 UG/DL (ref 250–450)
UIBC SERPL-MCNC: 82 UG/DL (ref 110–370)
UNIT STATUS USTAT: NORMAL
VIT B12 SERPL-MCNC: 1816 PG/ML (ref 211–911)
WBC # BLD AUTO: 5.6 K/UL (ref 4.8–10.8)
WBC # BLD AUTO: 6.5 K/UL (ref 4.8–10.8)

## 2025-03-18 PROCEDURE — 770006 HCHG ROOM/CARE - MED/SURG/GYN SEMI*

## 2025-03-18 PROCEDURE — 85025 COMPLETE CBC W/AUTO DIFF WBC: CPT

## 2025-03-18 PROCEDURE — 86923 COMPATIBILITY TEST ELECTRIC: CPT

## 2025-03-18 PROCEDURE — 83735 ASSAY OF MAGNESIUM: CPT

## 2025-03-18 PROCEDURE — 85046 RETICYTE/HGB CONCENTRATE: CPT

## 2025-03-18 PROCEDURE — 700102 HCHG RX REV CODE 250 W/ 637 OVERRIDE(OP): Performed by: INTERNAL MEDICINE

## 2025-03-18 PROCEDURE — 700111 HCHG RX REV CODE 636 W/ 250 OVERRIDE (IP): Performed by: INTERNAL MEDICINE

## 2025-03-18 PROCEDURE — 90935 HEMODIALYSIS ONE EVALUATION: CPT

## 2025-03-18 PROCEDURE — 700102 HCHG RX REV CODE 250 W/ 637 OVERRIDE(OP): Performed by: HOSPITALIST

## 2025-03-18 PROCEDURE — 85610 PROTHROMBIN TIME: CPT

## 2025-03-18 PROCEDURE — A9270 NON-COVERED ITEM OR SERVICE: HCPCS | Performed by: HOSPITALIST

## 2025-03-18 PROCEDURE — 86850 RBC ANTIBODY SCREEN: CPT

## 2025-03-18 PROCEDURE — 86900 BLOOD TYPING SEROLOGIC ABO: CPT

## 2025-03-18 PROCEDURE — 84100 ASSAY OF PHOSPHORUS: CPT

## 2025-03-18 PROCEDURE — 86480 TB TEST CELL IMMUN MEASURE: CPT

## 2025-03-18 PROCEDURE — 80053 COMPREHEN METABOLIC PANEL: CPT

## 2025-03-18 PROCEDURE — 80048 BASIC METABOLIC PNL TOTAL CA: CPT

## 2025-03-18 PROCEDURE — 82607 VITAMIN B-12: CPT

## 2025-03-18 PROCEDURE — A9270 NON-COVERED ITEM OR SERVICE: HCPCS | Performed by: INTERNAL MEDICINE

## 2025-03-18 PROCEDURE — 85027 COMPLETE CBC AUTOMATED: CPT

## 2025-03-18 PROCEDURE — 83550 IRON BINDING TEST: CPT

## 2025-03-18 PROCEDURE — 86901 BLOOD TYPING SEROLOGIC RH(D): CPT

## 2025-03-18 PROCEDURE — 36415 COLL VENOUS BLD VENIPUNCTURE: CPT

## 2025-03-18 PROCEDURE — 700111 HCHG RX REV CODE 636 W/ 250 OVERRIDE (IP): Mod: JZ | Performed by: HOSPITALIST

## 2025-03-18 PROCEDURE — 83540 ASSAY OF IRON: CPT

## 2025-03-18 PROCEDURE — 82728 ASSAY OF FERRITIN: CPT

## 2025-03-18 PROCEDURE — 36430 TRANSFUSION BLD/BLD COMPNT: CPT

## 2025-03-18 PROCEDURE — 97602 WOUND(S) CARE NON-SELECTIVE: CPT

## 2025-03-18 PROCEDURE — 82962 GLUCOSE BLOOD TEST: CPT

## 2025-03-18 PROCEDURE — P9016 RBC LEUKOCYTES REDUCED: HCPCS

## 2025-03-18 RX ORDER — OXYCODONE HYDROCHLORIDE 5 MG/1
5-10 TABLET ORAL EVERY 4 HOURS PRN
Refills: 0 | Status: DISCONTINUED | OUTPATIENT
Start: 2025-03-18 | End: 2025-03-22 | Stop reason: HOSPADM

## 2025-03-18 RX ORDER — HYDRALAZINE HYDROCHLORIDE 20 MG/ML
20 INJECTION INTRAMUSCULAR; INTRAVENOUS EVERY 4 HOURS PRN
Status: DISCONTINUED | OUTPATIENT
Start: 2025-03-18 | End: 2025-03-22 | Stop reason: HOSPADM

## 2025-03-18 RX ORDER — CARVEDILOL 6.25 MG/1
6.25 TABLET ORAL 2 TIMES DAILY WITH MEALS
Status: DISCONTINUED | OUTPATIENT
Start: 2025-03-18 | End: 2025-03-22 | Stop reason: HOSPADM

## 2025-03-18 RX ORDER — AMLODIPINE BESYLATE 10 MG/1
10 TABLET ORAL DAILY
Status: DISCONTINUED | OUTPATIENT
Start: 2025-03-19 | End: 2025-03-22 | Stop reason: HOSPADM

## 2025-03-18 RX ADMIN — HYDROCODONE BITARTRATE AND ACETAMINOPHEN 1 TABLET: 10; 325 TABLET ORAL at 12:40

## 2025-03-18 RX ADMIN — HEPARIN SODIUM 1200 UNITS: 1000 INJECTION, SOLUTION INTRAVENOUS; SUBCUTANEOUS at 11:10

## 2025-03-18 RX ADMIN — CARVEDILOL 6.25 MG: 6.25 TABLET, FILM COATED ORAL at 17:25

## 2025-03-18 RX ADMIN — OXYCODONE 10 MG: 5 TABLET ORAL at 16:30

## 2025-03-18 RX ADMIN — HYDROCODONE BITARTRATE AND ACETAMINOPHEN 1 TABLET: 10; 325 TABLET ORAL at 05:35

## 2025-03-18 RX ADMIN — OMEPRAZOLE 20 MG: 20 CAPSULE, DELAYED RELEASE ORAL at 05:36

## 2025-03-18 RX ADMIN — ONDANSETRON 4 MG: 2 INJECTION INTRAMUSCULAR; INTRAVENOUS at 18:00

## 2025-03-18 RX ADMIN — Medication 1 APPLICATOR: at 05:37

## 2025-03-18 RX ADMIN — AMLODIPINE BESYLATE 5 MG: 10 TABLET ORAL at 05:37

## 2025-03-18 RX ADMIN — Medication 5000 UNITS: at 05:36

## 2025-03-18 RX ADMIN — Medication 1 APPLICATOR: at 17:25

## 2025-03-18 RX ADMIN — INSULIN LISPRO 2 UNITS: 100 INJECTION, SOLUTION INTRAVENOUS; SUBCUTANEOUS at 20:43

## 2025-03-18 ASSESSMENT — COGNITIVE AND FUNCTIONAL STATUS - GENERAL
DAILY ACTIVITIY SCORE: 23
SUGGESTED CMS G CODE MODIFIER MOBILITY: CJ
SUGGESTED CMS G CODE MODIFIER DAILY ACTIVITY: CI
CLIMB 3 TO 5 STEPS WITH RAILING: A LITTLE
MOVING FROM LYING ON BACK TO SITTING ON SIDE OF FLAT BED: A LITTLE
TURNING FROM BACK TO SIDE WHILE IN FLAT BAD: A LITTLE
HELP NEEDED FOR BATHING: A LITTLE
MOBILITY SCORE: 21

## 2025-03-18 ASSESSMENT — PAIN DESCRIPTION - PAIN TYPE
TYPE: ACUTE PAIN
TYPE: ACUTE PAIN;CHRONIC PAIN
TYPE: ACUTE PAIN

## 2025-03-18 ASSESSMENT — ENCOUNTER SYMPTOMS
ABDOMINAL PAIN: 0
HEADACHES: 0
LOSS OF CONSCIOUSNESS: 0
CHILLS: 0
DIZZINESS: 0
FEVER: 0
SHORTNESS OF BREATH: 0
COUGH: 0
PALPITATIONS: 0
BACK PAIN: 0
NAUSEA: 0
DIARRHEA: 0
VOMITING: 0

## 2025-03-18 ASSESSMENT — FIBROSIS 4 INDEX: FIB4 SCORE: 1.09

## 2025-03-18 NOTE — PROGRESS NOTES
Patient admitted to room via hospital bed accompanied by TICU RN, with monitor on, O2 at 1LNC. Oriented to room. Assessment done. CHG bath given. NPO post mn instructed, voiced undersatnding. Needs attended. In bed with call light within reach.

## 2025-03-18 NOTE — PROGRESS NOTES
[facility] Services     Dialysis treatment started at 0810 and completed at 1110. Nephrologist Dr. Cox notified of treatment start.     NET UF: 2000 mL     Patient is A&Ox4, Patient reported pain and discomfort on her back, PCN notified, recently medicated. VS within normal limits. Rightneck non-tunneled IJ CVC, dressing is CDI and no signs and symptoms of infection noted. CVC Lines are patent w/ good blood flow. Lab results and orders reviewed prior to treatment start.     Patient completed and tolerated dialysis treatment well w/o complications. VS stayed within normal limits. Right neck non-tunneled IJ, CVC care done aseptically, lines flushed, Heparin lock in each lines, then clamped and capped. Access site labelled and dated.    Report given to MAYA Castro RN     See Dialysis flow sheet for details

## 2025-03-18 NOTE — PROGRESS NOTES
Report given to Augustina. Pt transferred from T630 to S527. All pt belongings and medication sent with pt. Keep rachel for strict I's&O's per Dr. Fuller.

## 2025-03-18 NOTE — PROGRESS NOTES
Hospital Medicine Daily Progress Note    Date of Service  3/18/2025    Chief Complaint  Aviva Kenney is a 50 y.o. female admitted 3/15/2025 with altered mental status    Hospital Course  49yo PMHx DM, HTN, CKD IIIb.  Sent to us from [facility] in [place] with worsened renal function and altered mental status    Interval Problem Update  Pt states she feels a little better than when she first came to the hospital     I have discussed this patient's plan of care and discharge plan at IDT rounds today with Case Management, Nursing, Nursing leadership, and other members of the IDT team.    Consultants/Specialty  nephrology    Code Status  Full Code    Disposition  The patient is not medically cleared for discharge to home or a post-acute facility.      I have placed the appropriate orders for post-discharge needs.    Review of Systems  Review of Systems   Constitutional:  Positive for malaise/fatigue. Negative for chills and fever.   Respiratory:  Negative for cough and shortness of breath.    Cardiovascular:  Negative for chest pain, palpitations and leg swelling.   Gastrointestinal:  Negative for abdominal pain, diarrhea, nausea and vomiting.   Genitourinary:  Negative for dysuria and urgency.   Musculoskeletal:  Negative for back pain.   Skin:  Negative for rash.   Neurological:  Negative for dizziness, loss of consciousness and headaches.        Physical Exam  Temp:  [36.4 °C (97.5 °F)-36.9 °C (98.5 °F)] 36.7 °C (98 °F)  Pulse:  [80-96] 92  Resp:  [9-26] 26  BP: (119-175)/() 146/83  SpO2:  [92 %-99 %] 96 %    Physical Exam  Constitutional:       General: She is not in acute distress.     Appearance: Normal appearance. She is well-developed. She is obese. She is not diaphoretic.   HENT:      Head: Normocephalic and atraumatic.   Neck:      Vascular: No JVD.   Cardiovascular:      Rate and Rhythm: Normal rate and regular rhythm.      Heart sounds: No murmur heard.  Pulmonary:      Effort: Pulmonary effort  is normal. No respiratory distress.      Breath sounds: No stridor. No wheezing or rales.   Abdominal:      Palpations: Abdomen is soft.      Tenderness: There is no abdominal tenderness. There is no guarding or rebound.   Skin:     General: Skin is warm and dry.      Findings: No rash.   Neurological:      Mental Status: She is oriented to person, place, and time.   Psychiatric:         Mood and Affect: Mood normal.         Behavior: Behavior normal.         Thought Content: Thought content normal.         Fluids    Intake/Output Summary (Last 24 hours) at 3/18/2025 0632  Last data filed at 3/18/2025 0500  Gross per 24 hour   Intake 550 ml   Output 2675 ml   Net -2125 ml        Laboratory  Recent Labs     03/15/25  2218 03/17/25  0351 03/18/25  0209   WBC 7.4 4.9 5.6   RBC 2.68* 2.28* 2.44*   HEMOGLOBIN 8.1* 7.0* 7.6*   HEMATOCRIT 24.3* 20.2* 23.0*   MCV 90.7 88.6 94.3   MCH 30.2 30.7 31.1   MCHC 33.3 34.7 33.0   RDW 42.7 42.8 46.3   PLATELETCT 404 354 314   MPV 10.2 10.3 10.5     Recent Labs     03/17/25  1050 03/17/25  1730 03/18/25  0209   SODIUM 125* 124* 123*   POTASSIUM 4.6 5.2 5.3   CHLORIDE 90* 90* 90*   CO2 23 24 21   GLUCOSE 103* 126* 104*   BUN 54* 56* 57*   CREATININE 6.75* 7.11* 7.71*   CALCIUM 7.5* 7.4* 7.4*     Recent Labs     03/15/25  2218 03/17/25  0351 03/18/25  0209   APTT 44.1*  --   --    INR 1.31* 1.49* 1.48*               Imaging  US-RENAL   Final Result         1.  Echogenic bilateral kidneys, a nonspecific finding commonly associated with medical renal disease      EC-ECHOCARDIOGRAM COMPLETE W/O CONT   Final Result      DX-CHEST-FOR LINE PLACEMENT Perform procedure in: Patient's Room   Final Result         1. Right jugular dialysis line in place without complication.      2. Shallow breath. Mild pulmonary edema suggested.                  DX-FOOT-COMPLETE 3+ LEFT   Final Result      1.  Prior amputation of the fourth toe.      2.  No evidence of fracture or bony destructive change.       CT-RENAL COLIC EVALUATION(A/P W/O)   Final Result      1.  No evidence of bowel obstruction or focal inflammatory change.      2.  No evidence of renal or ureteral stone or hydronephrosis.      3.  Small amount of ascites.      4.  Atherosclerotic vascular calcification.      5.  Anasarca.      6.  Bibasal atelectasis and minimal bilateral pleural effusions.      7.  Constipation.      8.  Prior gastric bypass procedure.      DX-OUTSIDE IMAGES-DX CHEST   Final Result      CT-OUTSIDE IMAGES-CT HEAD   Final Result      CT-NEEDLE CORE BX-RENAL    (Results Pending)        Assessment/Plan  * Acute renal failure superimposed on stage 3b chronic kidney disease (HCC)- (present on admission)  Assessment & Plan  Patient has chronic kidney secondary to diabetes mellitis  following outpatient with nephrology with baseline creatinine 1.1  Patient presented  with creatinine 12.5 and .  Nephrology consult, hemodialysis catheter placement, currently tolerating hemodialysis  Renal biopsy suggested to further delineate the patient's disease process, prognostication  Daily BMP  Renally dose medications as appropriate      Acute encephalopathy- (present on admission)  Assessment & Plan  Acute metabolic encephalopathy  Resolved with HD  Follow neuro exam      Osteomyelitis of left foot (HCC)  Assessment & Plan  Patient has recent history of admission for osteomyelitis of her left foot S/P left 4th toe amputated 03/01/2024  On examination the wound is dry and now signs of infection  Will continue monitor    Anemia  Assessment & Plan  Possibly secondary to renal disease  We will repeat iron studies, reticulocyte count, currently no evidence of blood loss  Monitor hemoglobin  Transfuse to keep Hb>7    Elevated troponin  Assessment & Plan  Patient has troponin 129, BNP 23785 likely type II injury  Patient denied any chest pain or CP equivalent  EKG:  Sinus rhythm, no acute ST-T changes  ECHO, without regional wall motion  abnormality, normal EF      Hyponatremia- (present on admission)  Assessment & Plan  Patient presented with sodium 117  Hypervolemic hypoNa  Volume and electrolyte correction with hemodialysis  Daily BMP    Hyperlipidemia- (present on admission)  Assessment & Plan  Consider future statin therapy    Primary hypertension- (present on admission)  Assessment & Plan  Patient on amlodipine  5 mg: increase dose to 10mg daily  Monitor closely, adjust as indicated, as needed medication available    Hyperkalemia- (present on admission)  Assessment & Plan  Now resolved on HD  Follow daily    Type 2 diabetes mellitus (HCC)- (present on admission)  Assessment & Plan  Patient on insulin lispro 100, 10 units per meal and Ozempic  Patient recent hemoglobin A1c 8.6 January 2025  Patient has complication related to diabetes mellitus include kidney disease, neuropathy  Patient has recent amputation of her left fourth toe secondary to diabetic ulcer  On current regimen BG is running low 100s         VTE prophylaxis: VTE Selection    I have performed a physical exam and reviewed and updated ROS and Plan today (3/18/2025). In review of yesterday's note (3/17/2025), there are no changes except as documented above.

## 2025-03-18 NOTE — WOUND TEAM
"[facility]  Inpatient Services  Wound and Skin Care Brief Evaluation    Admission Date: 3/15/2025     Last order of IP CONSULT TO WOUND CARE was found on 3/17/2025 from Hospital Encounter on 3/15/2025     HPI, PMH, SH: Reviewed    Chief Complaint   Patient presents with    Other     Pt transferred from [facility] for acute renal failure and elevated potassium.  Pt reported she went to hospital coz she was feeling confused and \"out of it \" started 2 weeks back. H/o DM and HTN     Diagnosis: BUTCH (acute kidney injury) (HCC) [N17.9]  Hyponatremia [E87.1]    Unit where seen by Wound Team: [id number]     Wound consult placed regarding Left foot. Chart and images reviewed. This discussed with bedside RN. This clinician in to assess patient. Patient pleasant and agreeable. Non-selectively debrided with No rinse foam soap and Moist warm washcloth.   Blanchable redness to the medial side of left foot.   No pressure injuries or advanced wound care needs identified. Wound consult completed. No further follow up unless indicated and consulted.          PREVENTATIVE INTERVENTIONS:    Q shift Rj - performed per nursing policy  Q shift pressure point assessments - performed per nursing policy    Surface/Positioning  ICU Low Airloss - Currently in Place  "

## 2025-03-18 NOTE — CARE PLAN
The patient is Stable - Low risk of patient condition declining or worsening    Shift Goals  Clinical Goals: hemodynamic stability, dialysis, monitor BMP  Patient Goals: rest  Family Goals: NOHEMY    Progress made toward(s) clinical / shift goals:      Patient remained hemodynamically stable throughout shift. Pt received dialysis during shift. BMP was monitored throughout shift and a unit of PRBCs were given.      Problem: Knowledge Deficit - Standard  Goal: Patient and family/care givers will demonstrate understanding of plan of care, disease process/condition, diagnostic tests and medications  Outcome: Progressing     Problem: Skin Integrity  Goal: Skin integrity is maintained or improved  Outcome: Progressing     Problem: Fall Risk  Goal: Patient will remain free from falls  Outcome: Progressing     Problem: Pain - Standard  Goal: Alleviation of pain or a reduction in pain to the patient’s comfort goal  Outcome: Progressing       Patient is not progressing towards the following goals:

## 2025-03-18 NOTE — PROGRESS NOTES
4 Eyes Skin Assessment Completed by ROVERTO Ramirez and ROVERTO Monreal.    Head WDL  Ears scab to the R ear    Nose WDL  Mouth WDL  Neck RIJ  Breast/Chest Abrasion and Scab    Shoulder Blades scab  Spine scab    (R) Arm/Elbow/Hand Scab, bruising    (L) Arm/Elbow/Hand Scab  Abdomen Scab  Groin Excoriation to the pannus    Scrotum/Coccyx/Buttocks Redness and Blanching  (R) Leg Scab          (L) Leg Scab    (R) Heel/Foot/Toe Redness and Blanching  (L) Heel/Foot/Toe Redness and Non-Blanching to the inner aspect of left foot, toe amputation                   Devices In Places ECG, Blood Pressure Cuff, Pulse Ox, Rodriguez, SCD's, and Nasal Cannula      Interventions In Place Gray Ear Foams, Sacral Mepilex, TAP System, Pillows, Low Air Loss Mattress, and Heels Loaded W/Pillows    Possible Skin Injury Yes    Pictures Uploaded Into Epic Yes  Wound Consult Placed Yes  RN Wound Prevention Protocol Ordered Yes

## 2025-03-18 NOTE — PROGRESS NOTES
4 Eyes Skin Assessment Completed by ROVERTO Rudolph and Ranjana RN.    Head WDL  Ears WDL  Nose WDL  Mouth WDL  Neck WDL  Breast/Chest WDL  Shoulder Blades WDL  Spine WDL  (R) Arm/Elbow/Hand Scab and Bruising  (L) Arm/Elbow/Hand Scab and Bruising   Abdomen Redness  Groin Redness  Scrotum/Coccyx/Buttocks WDL  (R) Leg Scab  (L) Leg Scab  (R) Heel/Foot/Toe Redness and Blanching  (L) Heel/Foot/Toe Missing 4th toe and redness nonblanching                    Devices In Places Blood Pressure Cuff and Rodriguez      Interventions In Place Sacral Mepilex and Barrier Cream    Possible Skin Injury Yes    Pictures Uploaded Into Epic Yes  Wound Consult Placed Yes  RN Wound Prevention Protocol Ordered Yes

## 2025-03-18 NOTE — PROGRESS NOTES
Assumed care of patient at 1330. Received report from Arlene LAYNE. Pt is A&O x 4, on RA. Reporting a pain level of. Assessment completed. BP is elevated at 159/77, pt denies any SOB or chest pain. All other VSS. Bowel sounds are active in all 4 quadrants. Lung sounds are clear. Pt oriented to room and use of call light. Skin check completed. Call light within reach and bed is locked and in lowest position. Reinforced the need to call for assistance. Plan of care discussed and patient does not have any further needs at this time.

## 2025-03-18 NOTE — PROGRESS NOTES
Pt presents to CT4. Pt was consented by MD at bedside, confirmed by this RN and consent at bedside. Pt transferred to CT4 table in Supine position. Patient underwent a CT guided non-targeted renal biopsy by Dr. Case. Procedure site was marked by MD and verified using imaging guidance. Pt placed on monitor, prepped and draped in a sterile fashion. Vitals were taken every 5 minutes and remained stable during procedure (see doc flow sheet for results). CO2 waveform capnography was monitored and remained WNL throughout procedure. Report called to *** RN. Pt transported by stretcher with RN to ***.     Specimen: *** in *** hand delivered to lab.      REF: ***  LOT: ***  EXP: ***

## 2025-03-18 NOTE — PROCEDURES
Date of service: 03/18/25    Diagnosis: BUTCH on CKD 3-4 requiring dialysis.  Patient remains oligo-anuric.  Patient seen and examined on hemodialysis during treatment #3. Patient is stable, tolerating hemodialysis. Denies chest pain and shortness of breath. Orders updated as needed. Please refer to flowsheet for full details.    Access: Right IJ temporary Vas-Cath  UF goal: 2 L    Plan: Today is dialysis treatment #3.  Will plan on dialysis again tomorrow, then continuing Monday Wednesday Friday schedule.  I do recommend kidney biopsy, more so to determine prognosis, as I am sure she has diabetic nephropathy, but I am unsure if there is possibility of renal recovery.    Theo Cox MD  Nephrology   [facility]

## 2025-03-18 NOTE — DISCHARGE PLANNING
Outpatient Dialysis Placement Notification     Received notification for outpatient dialysis placement from Dr. Cox.     Met/Spoke with patient and confirmed demographics and insurance information.  Provided patient with dialysis education, education materials, and list of HD centers.     Referral initiated to:    [facility]  [address] [place] [address]     Pending medical and financial clearance.     Xitlaly Reyes   Dialysis Coordinator / [facility]   Ph: [phone]

## 2025-03-19 ENCOUNTER — APPOINTMENT (OUTPATIENT)
Dept: RADIOLOGY | Facility: MEDICAL CENTER | Age: 50
DRG: 673 | End: 2025-03-19
Attending: INTERNAL MEDICINE
Payer: MEDICARE

## 2025-03-19 LAB
ANION GAP SERPL CALC-SCNC: 10 MMOL/L (ref 7–16)
ANION GAP SERPL CALC-SCNC: 8 MMOL/L (ref 7–16)
ANION GAP SERPL CALC-SCNC: 9 MMOL/L (ref 7–16)
APPEARANCE UR: ABNORMAL
BACTERIA #/AREA URNS HPF: ABNORMAL /HPF
BILIRUB UR QL STRIP.AUTO: NEGATIVE
BUN SERPL-MCNC: 22 MG/DL (ref 8–22)
BUN SERPL-MCNC: 37 MG/DL (ref 8–22)
BUN SERPL-MCNC: 38 MG/DL (ref 8–22)
CALCIUM SERPL-MCNC: 7.7 MG/DL (ref 8.5–10.5)
CALCIUM SERPL-MCNC: 7.9 MG/DL (ref 8.5–10.5)
CALCIUM SERPL-MCNC: 7.9 MG/DL (ref 8.5–10.5)
CASTS URNS QL MICRO: ABNORMAL /LPF (ref 0–2)
CHLORIDE SERPL-SCNC: 90 MMOL/L (ref 96–112)
CHLORIDE SERPL-SCNC: 92 MMOL/L (ref 96–112)
CHLORIDE SERPL-SCNC: 96 MMOL/L (ref 96–112)
CO2 SERPL-SCNC: 26 MMOL/L (ref 20–33)
CO2 SERPL-SCNC: 26 MMOL/L (ref 20–33)
CO2 SERPL-SCNC: 28 MMOL/L (ref 20–33)
COLOR UR: ABNORMAL
CREAT SERPL-MCNC: 4.1 MG/DL (ref 0.5–1.4)
CREAT SERPL-MCNC: 5.79 MG/DL (ref 0.5–1.4)
CREAT SERPL-MCNC: 5.88 MG/DL (ref 0.5–1.4)
EPITHELIAL CELLS 1715: ABNORMAL /HPF (ref 0–5)
GAMMA INTERFERON BACKGROUND BLD IA-ACNC: 0.04 IU/ML
GFR SERPLBLD CREATININE-BSD FMLA CKD-EPI: 13 ML/MIN/1.73 M 2
GFR SERPLBLD CREATININE-BSD FMLA CKD-EPI: 8 ML/MIN/1.73 M 2
GFR SERPLBLD CREATININE-BSD FMLA CKD-EPI: 8 ML/MIN/1.73 M 2
GLUCOSE BLD STRIP.AUTO-MCNC: 118 MG/DL (ref 65–99)
GLUCOSE BLD STRIP.AUTO-MCNC: 162 MG/DL (ref 65–99)
GLUCOSE BLD STRIP.AUTO-MCNC: 73 MG/DL (ref 65–99)
GLUCOSE BLD STRIP.AUTO-MCNC: 94 MG/DL (ref 65–99)
GLUCOSE SERPL-MCNC: 135 MG/DL (ref 65–99)
GLUCOSE SERPL-MCNC: 88 MG/DL (ref 65–99)
GLUCOSE SERPL-MCNC: 88 MG/DL (ref 65–99)
GLUCOSE UR STRIP.AUTO-MCNC: NEGATIVE MG/DL
KETONES UR STRIP.AUTO-MCNC: NEGATIVE MG/DL
LEUKOCYTE ESTERASE UR QL STRIP.AUTO: ABNORMAL
M TB IFN-G BLD-IMP: NEGATIVE
M TB IFN-G CD4+ BCKGRND COR BLD-ACNC: 0.01 IU/ML
MAGNESIUM SERPL-MCNC: 2.2 MG/DL (ref 1.5–2.5)
MICRO URNS: ABNORMAL
MITOGEN IGNF BCKGRD COR BLD-ACNC: 2.62 IU/ML
NITRITE UR QL STRIP.AUTO: NEGATIVE
PATHOLOGY CONSULT NOTE: NORMAL
PH UR STRIP.AUTO: 6.5 [PH] (ref 5–8)
PHOSPHATE SERPL-MCNC: 5.5 MG/DL (ref 2.5–4.5)
POTASSIUM SERPL-SCNC: 4.1 MMOL/L (ref 3.6–5.5)
POTASSIUM SERPL-SCNC: 4.2 MMOL/L (ref 3.6–5.5)
POTASSIUM SERPL-SCNC: 4.3 MMOL/L (ref 3.6–5.5)
PROT UR QL STRIP: 300 MG/DL
QFT TB2 - NIL TBQ2: 0.01 IU/ML
RBC # URNS HPF: ABNORMAL /HPF (ref 0–2)
RBC UR QL AUTO: ABNORMAL
SODIUM SERPL-SCNC: 126 MMOL/L (ref 135–145)
SODIUM SERPL-SCNC: 126 MMOL/L (ref 135–145)
SODIUM SERPL-SCNC: 133 MMOL/L (ref 135–145)
SP GR UR STRIP.AUTO: 1.02
UROBILINOGEN UR STRIP.AUTO-MCNC: 0.2 EU/DL
WBC #/AREA URNS HPF: >100 /HPF

## 2025-03-19 PROCEDURE — 700101 HCHG RX REV CODE 250

## 2025-03-19 PROCEDURE — 700102 HCHG RX REV CODE 250 W/ 637 OVERRIDE(OP): Performed by: INTERNAL MEDICINE

## 2025-03-19 PROCEDURE — 81001 URINALYSIS AUTO W/SCOPE: CPT

## 2025-03-19 PROCEDURE — 700102 HCHG RX REV CODE 250 W/ 637 OVERRIDE(OP): Performed by: HOSPITALIST

## 2025-03-19 PROCEDURE — 90935 HEMODIALYSIS ONE EVALUATION: CPT

## 2025-03-19 PROCEDURE — A9270 NON-COVERED ITEM OR SERVICE: HCPCS | Performed by: INTERNAL MEDICINE

## 2025-03-19 PROCEDURE — 700111 HCHG RX REV CODE 636 W/ 250 OVERRIDE (IP): Mod: TB | Performed by: INTERNAL MEDICINE

## 2025-03-19 PROCEDURE — 700111 HCHG RX REV CODE 636 W/ 250 OVERRIDE (IP): Mod: JZ | Performed by: HOSPITALIST

## 2025-03-19 PROCEDURE — 700111 HCHG RX REV CODE 636 W/ 250 OVERRIDE (IP): Performed by: STUDENT IN AN ORGANIZED HEALTH CARE EDUCATION/TRAINING PROGRAM

## 2025-03-19 PROCEDURE — A9270 NON-COVERED ITEM OR SERVICE: HCPCS

## 2025-03-19 PROCEDURE — 770006 HCHG ROOM/CARE - MED/SURG/GYN SEMI*

## 2025-03-19 PROCEDURE — 0TB13ZX EXCISION OF LEFT KIDNEY, PERCUTANEOUS APPROACH, DIAGNOSTIC: ICD-10-PCS | Performed by: STUDENT IN AN ORGANIZED HEALTH CARE EDUCATION/TRAINING PROGRAM

## 2025-03-19 PROCEDURE — 88329 PATH CONSLTJ DRG SURG: CPT | Performed by: PATHOLOGY

## 2025-03-19 PROCEDURE — 80048 BASIC METABOLIC PNL TOTAL CA: CPT | Mod: 91

## 2025-03-19 PROCEDURE — A9270 NON-COVERED ITEM OR SERVICE: HCPCS | Performed by: HOSPITALIST

## 2025-03-19 PROCEDURE — 88300 SURGICAL PATH GROSS: CPT | Performed by: PATHOLOGY

## 2025-03-19 PROCEDURE — 700102 HCHG RX REV CODE 250 W/ 637 OVERRIDE(OP)

## 2025-03-19 PROCEDURE — C1750 CATH, HEMODIALYSIS,LONG-TERM: HCPCS

## 2025-03-19 PROCEDURE — 88300 SURGICAL PATH GROSS: CPT | Mod: 26 | Performed by: PATHOLOGY

## 2025-03-19 PROCEDURE — 700111 HCHG RX REV CODE 636 W/ 250 OVERRIDE (IP)

## 2025-03-19 PROCEDURE — 82962 GLUCOSE BLOOD TEST: CPT

## 2025-03-19 PROCEDURE — 36415 COLL VENOUS BLD VENIPUNCTURE: CPT

## 2025-03-19 PROCEDURE — 0JH63XZ INSERTION OF TUNNELED VASCULAR ACCESS DEVICE INTO CHEST SUBCUTANEOUS TISSUE AND FASCIA, PERCUTANEOUS APPROACH: ICD-10-PCS | Performed by: STUDENT IN AN ORGANIZED HEALTH CARE EDUCATION/TRAINING PROGRAM

## 2025-03-19 PROCEDURE — 02HV33Z INSERTION OF INFUSION DEVICE INTO SUPERIOR VENA CAVA, PERCUTANEOUS APPROACH: ICD-10-PCS | Performed by: STUDENT IN AN ORGANIZED HEALTH CARE EDUCATION/TRAINING PROGRAM

## 2025-03-19 PROCEDURE — 700111 HCHG RX REV CODE 636 W/ 250 OVERRIDE (IP): Mod: JZ,TB | Performed by: INTERNAL MEDICINE

## 2025-03-19 PROCEDURE — 84100 ASSAY OF PHOSPHORUS: CPT

## 2025-03-19 PROCEDURE — 83735 ASSAY OF MAGNESIUM: CPT

## 2025-03-19 PROCEDURE — 99152 MOD SED SAME PHYS/QHP 5/>YRS: CPT

## 2025-03-19 RX ORDER — SODIUM CHLORIDE 9 MG/ML
500 INJECTION, SOLUTION INTRAVENOUS
Status: ACTIVE | OUTPATIENT
Start: 2025-03-19 | End: 2025-03-19

## 2025-03-19 RX ORDER — HYDROMORPHONE HYDROCHLORIDE 1 MG/ML
1 INJECTION, SOLUTION INTRAMUSCULAR; INTRAVENOUS; SUBCUTANEOUS EVERY 4 HOURS PRN
Status: DISCONTINUED | OUTPATIENT
Start: 2025-03-19 | End: 2025-03-22 | Stop reason: HOSPADM

## 2025-03-19 RX ORDER — HEPARIN SODIUM 1000 [USP'U]/ML
INJECTION, SOLUTION INTRAVENOUS; SUBCUTANEOUS
Status: COMPLETED
Start: 2025-03-19 | End: 2025-03-19

## 2025-03-19 RX ORDER — SEVELAMER CARBONATE 800 MG/1
800 TABLET, FILM COATED ORAL
Status: DISCONTINUED | OUTPATIENT
Start: 2025-03-19 | End: 2025-03-22 | Stop reason: HOSPADM

## 2025-03-19 RX ORDER — MIDAZOLAM HYDROCHLORIDE 1 MG/ML
.5-2 INJECTION INTRAMUSCULAR; INTRAVENOUS PRN
Status: ACTIVE | OUTPATIENT
Start: 2025-03-19 | End: 2025-03-19

## 2025-03-19 RX ORDER — HYDRALAZINE HYDROCHLORIDE 20 MG/ML
INJECTION INTRAMUSCULAR; INTRAVENOUS
Status: COMPLETED
Start: 2025-03-19 | End: 2025-03-19

## 2025-03-19 RX ORDER — ONDANSETRON 2 MG/ML
4 INJECTION INTRAMUSCULAR; INTRAVENOUS PRN
Status: ACTIVE | OUTPATIENT
Start: 2025-03-19 | End: 2025-03-19

## 2025-03-19 RX ORDER — MIDAZOLAM HYDROCHLORIDE 1 MG/ML
INJECTION INTRAMUSCULAR; INTRAVENOUS
Status: COMPLETED
Start: 2025-03-19 | End: 2025-03-19

## 2025-03-19 RX ORDER — HYDRALAZINE HYDROCHLORIDE 20 MG/ML
10 INJECTION INTRAMUSCULAR; INTRAVENOUS ONCE
Status: COMPLETED | OUTPATIENT
Start: 2025-03-19 | End: 2025-03-19

## 2025-03-19 RX ORDER — LIDOCAINE HYDROCHLORIDE AND EPINEPHRINE 10; 10 MG/ML; UG/ML
INJECTION, SOLUTION INFILTRATION; PERINEURAL
Status: COMPLETED
Start: 2025-03-19 | End: 2025-03-19

## 2025-03-19 RX ORDER — CEFAZOLIN SODIUM 1 G/3ML
INJECTION, POWDER, FOR SOLUTION INTRAMUSCULAR; INTRAVENOUS
Status: COMPLETED
Start: 2025-03-19 | End: 2025-03-19

## 2025-03-19 RX ADMIN — MIDAZOLAM HYDROCHLORIDE 1 MG: 1 INJECTION, SOLUTION INTRAMUSCULAR; INTRAVENOUS at 14:06

## 2025-03-19 RX ADMIN — OMEPRAZOLE 20 MG: 20 CAPSULE, DELAYED RELEASE ORAL at 05:50

## 2025-03-19 RX ADMIN — HYDRALAZINE HYDROCHLORIDE 10 MG: 20 INJECTION, SOLUTION INTRAMUSCULAR; INTRAVENOUS at 14:10

## 2025-03-19 RX ADMIN — ONDANSETRON 4 MG: 2 INJECTION INTRAMUSCULAR; INTRAVENOUS at 23:21

## 2025-03-19 RX ADMIN — OXYCODONE 10 MG: 5 TABLET ORAL at 20:38

## 2025-03-19 RX ADMIN — CEFAZOLIN 2 G: 10 INJECTION, POWDER, FOR SOLUTION INTRAVENOUS at 14:41

## 2025-03-19 RX ADMIN — HYDROCODONE BITARTRATE AND ACETAMINOPHEN 1 TABLET: 10; 325 TABLET ORAL at 17:04

## 2025-03-19 RX ADMIN — EPOETIN ALFA-EPBX 5000 UNITS: 3000 INJECTION, SOLUTION INTRAVENOUS; SUBCUTANEOUS at 11:08

## 2025-03-19 RX ADMIN — HYDROMORPHONE HYDROCHLORIDE 1 MG: 1 INJECTION, SOLUTION INTRAMUSCULAR; INTRAVENOUS; SUBCUTANEOUS at 17:40

## 2025-03-19 RX ADMIN — FENTANYL CITRATE 25 MCG: 50 INJECTION, SOLUTION INTRAMUSCULAR; INTRAVENOUS at 14:47

## 2025-03-19 RX ADMIN — EPOETIN ALFA-EPBX 3000 UNITS: 3000 INJECTION, SOLUTION INTRAVENOUS; SUBCUTANEOUS at 11:08

## 2025-03-19 RX ADMIN — MIDAZOLAM HYDROCHLORIDE 0.5 MG: 1 INJECTION, SOLUTION INTRAMUSCULAR; INTRAVENOUS at 14:46

## 2025-03-19 RX ADMIN — SENNOSIDES AND DOCUSATE SODIUM 2 TABLET: 50; 8.6 TABLET ORAL at 18:00

## 2025-03-19 RX ADMIN — MIDAZOLAM HYDROCHLORIDE 0.5 MG: 1 INJECTION, SOLUTION INTRAMUSCULAR; INTRAVENOUS at 14:43

## 2025-03-19 RX ADMIN — HYDROCODONE BITARTRATE AND ACETAMINOPHEN 1 TABLET: 10; 325 TABLET ORAL at 23:09

## 2025-03-19 RX ADMIN — MIDAZOLAM HYDROCHLORIDE 0.5 MG: 1 INJECTION, SOLUTION INTRAMUSCULAR; INTRAVENOUS at 14:11

## 2025-03-19 RX ADMIN — HYDRALAZINE HYDROCHLORIDE 10 MG: 20 INJECTION INTRAMUSCULAR; INTRAVENOUS at 14:10

## 2025-03-19 RX ADMIN — CARVEDILOL 6.25 MG: 6.25 TABLET, FILM COATED ORAL at 17:04

## 2025-03-19 RX ADMIN — FENTANYL CITRATE 50 MCG: 50 INJECTION, SOLUTION INTRAMUSCULAR; INTRAVENOUS at 14:06

## 2025-03-19 RX ADMIN — HEPARIN SODIUM 3600 UNITS: 1000 INJECTION, SOLUTION INTRAVENOUS; SUBCUTANEOUS at 14:58

## 2025-03-19 RX ADMIN — FENTANYL CITRATE 50 MCG: 50 INJECTION, SOLUTION INTRAMUSCULAR; INTRAVENOUS at 14:43

## 2025-03-19 RX ADMIN — AMLODIPINE BESYLATE 10 MG: 10 TABLET ORAL at 05:50

## 2025-03-19 RX ADMIN — SEVELAMER CARBONATE 800 MG: 800 TABLET, FILM COATED ORAL at 17:39

## 2025-03-19 RX ADMIN — HEPARIN SODIUM 1200 UNITS: 1000 INJECTION, SOLUTION INTRAVENOUS; SUBCUTANEOUS at 11:24

## 2025-03-19 RX ADMIN — Medication 1 APPLICATOR: at 05:53

## 2025-03-19 RX ADMIN — Medication 5000 UNITS: at 05:50

## 2025-03-19 RX ADMIN — INSULIN LISPRO 2 UNITS: 100 INJECTION, SOLUTION INTRAVENOUS; SUBCUTANEOUS at 17:10

## 2025-03-19 RX ADMIN — EPOETIN ALFA-EPBX 2000 UNITS: 2000 INJECTION, SOLUTION INTRAVENOUS; SUBCUTANEOUS at 11:08

## 2025-03-19 RX ADMIN — LIDOCAINE HYDROCHLORIDE AND EPINEPHRINE: 10; 10 INJECTION, SOLUTION INFILTRATION; PERINEURAL at 14:50

## 2025-03-19 RX ADMIN — FENTANYL CITRATE 25 MCG: 50 INJECTION, SOLUTION INTRAMUSCULAR; INTRAVENOUS at 14:51

## 2025-03-19 ASSESSMENT — ENCOUNTER SYMPTOMS
ABDOMINAL PAIN: 0
DIZZINESS: 0
COUGH: 0
VOMITING: 0
CHILLS: 0
NAUSEA: 0
DIARRHEA: 0
BACK PAIN: 0
FEVER: 0
HEADACHES: 0
PALPITATIONS: 0
LOSS OF CONSCIOUSNESS: 0
SHORTNESS OF BREATH: 0

## 2025-03-19 ASSESSMENT — PAIN DESCRIPTION - PAIN TYPE
TYPE: ACUTE PAIN

## 2025-03-19 ASSESSMENT — FIBROSIS 4 INDEX: FIB4 SCORE: 1.1

## 2025-03-19 NOTE — PROCEDURES
Date of service: 03/19/25    Diagnosis: BUTCH on CKD3-4 vs ESRD requiring dialysis. Patient seen and examined on hemodialysis during treatment. Patient is stable, tolerating hemodialysis. Denies chest pain and shortness of breath. Orders updated as needed. Please refer to flowsheet for full details.    Access: RIJ temp vascath  UF goal: 1L    Plan: Continue HD on Monday Wednesday Friday schedule moving forward. She remains oligo-anuric. Recommend tunneled HD catheter placement. Still awaiting kidney biopsy to determine kidney diagnosis and prognosis. I fear she is ESRD, but I hope there is reversible BUTCH.     Theo Cox MD  Nephrology   [facility]

## 2025-03-19 NOTE — PROGRESS NOTES
Blood transfusion started at 1738 at 120mL/hr. Dual verification and sign off complete. This RN remained with patient for 15 minutes at bedside to assess s/s of reaction. Reassessment and vitals taken at 1753; vitals stable at this time.   1755 patient began to have sudden onset nausea. Charge RN and ROVERTO Rudolph notified at this time. Rate decreased to 100 mL/hr and Zofran administered per MAR.   This RN Stayed with patient for an additional 15 minutes post nausea medication administration and rate change to ensure no further reactions. Pt tolerating rate of blood transfusion at this time. ROVERTO Rudolph notified hospitalist at this time, MD aware and verbally verified that patient can continue to receive blood transfusion at 100mL/hr. Charge RN aware at this time as well. Patient tolerating blood transfusion and meal at this time. Emesis bag at bedside and no further needs from patient. Patient provided education to call if feeling any signs of nausea, vomiting, etc. Pt verbalized understanding.

## 2025-03-19 NOTE — CARE PLAN
The patient is Watcher - Medium risk of patient condition declining or worsening    Shift Goals  Clinical Goals: BT, monitor H&H  Patient Goals: rest  Family Goals: none present    Progress made toward(s) clinical / shift goals: 1 unit PRBC transfused, VSS. Recent hgb 9.1, no active signs of bleeding noted. No further follow up order for H&H monitoring, notified MONIKA Gilman.     Patient is not progressing towards the following goals: n/a

## 2025-03-19 NOTE — OR SURGEON
Immediate Post- Operative Note        Findings: Left kidney. TDC      Procedure(s): Left renal biopsy. TDC placement      Estimated Blood Loss: Less than 5 ml        Complications: None            3/19/2025     3:11 PM     Carlos Cadena M.D.

## 2025-03-19 NOTE — DISCHARGE PLANNING
Case Management Discharge Planning    Admission Date: 3/15/2025  GMLOS: 4.4  ALOS: 3    6-Clicks ADL Score: 23  6-Clicks Mobility Score: 21      Anticipated Discharge Dispo: Discharge Disposition: Discharged to home/self care (01)    DME Needed: No    Action(s) Taken: OTHER    Discussed patient's case with MD during IDT Unit Rounds. Per MD, patient is not medically clear.    SW requested update regarding the dialysis chair from Amalia, Dialysis Coordinator.    Per Amalia, referral is pending TB Clearance.    Escalations Completed: None    Medically Clear: No    Next Steps: Home with O/P Dialysis.    Barriers to Discharge: Medical clearance. Outpatient Dialysis Chair.     Is the patient up for discharge tomorrow:

## 2025-03-19 NOTE — PROGRESS NOTES
3hr HD started @ 0821 and ended @ 1121,net UF = 1000ml- adjusted per bp tolerance.VSS post HD,RIJ non TDC dressing CDI. Report given to RELL Sinclair RN,post tx V/S: 143,68,83,16,97F,97% RA.

## 2025-03-19 NOTE — PROGRESS NOTES
Assumed care of patient after receiving report from [facility]. Pt is A&Ox4. Bed is low/locked. Discussed dialysis and renal biopsy for today. No further needs at this time.

## 2025-03-19 NOTE — CARE PLAN
The patient is Stable - Low risk of patient condition declining or worsening    Shift Goals  Clinical Goals: Monitor H&H  Patient Goals: Rest  Family Goals: NOHEMY    Progress made toward(s) clinical / shift goals:      Problem: Knowledge Deficit - Standard  Goal: Patient and family/care givers will demonstrate understanding of plan of care, disease process/condition, diagnostic tests and medications  Outcome: Progressing  Note: Pt verbalizes and understands the procedure for tunneling the HD cath. Pt communicating that the cath is for her dialysis. Pt's general knowledge of dialysis is improving.      Problem: Skin Integrity  Goal: Skin integrity is maintained or improved  Outcome: Progressing  Note: Pt able to turn in bed independently. Pt's skin integrity is maintained.

## 2025-03-19 NOTE — PROGRESS NOTES
Pt presents to CT4. Pt was consented by MD at bedside, confirmed by this RN and consent at bedside. Pt transferred to CT table in prone position. Patient underwent a Non-Targeted Renal Biopsy by Dr. Cadena. Procedure site was marked by MD and verified using imaging guidance. Pt placed on monitor, prepped and draped in a sterile fashion. Vitals were taken every 5 minutes and remained stable during procedure (see doc flow sheet for results). CO2 waveform capnography was monitored and remained WNL throughout procedure.     1420: Report given to AIDEN Galeas RN. Pt transported via bed to IR3 for tunneled dialysis catheter placement prior to going back to her room.    Specimen: 3 slides assessed at bedside and taken to lab by pathologist     [employer]  EmboCube: Embolization Gelatin  REF: [id number]  LOT: [id number]  EXP: 11/18/2027

## 2025-03-19 NOTE — PROGRESS NOTES
Blood transfusion started at 1738 by this RN and Ranjana LAYNE. Vitals rechecked at 1753. Patient started vomiting. MD aware (See note by Ranjana Nicole RN for further info). Blood transfusion set at 100 mls due to vomiting reaction. Night shift is to stop blood transfusion and document vital signs. Will be passed off in report.

## 2025-03-19 NOTE — PROGRESS NOTES
Hospital Medicine Daily Progress Note    Date of Service  3/19/2025    Chief Complaint  Aviva Kenney is a 50 y.o. female admitted 3/15/2025 with altered mental status    Hospital Course  51yo PMHx DM, HTN, CKD IIIb.  Sent to us from [facility] in [place] with worsened renal function and altered mental status    Interval Problem Update  Pt states she feels a little better than when she first came to the hospital   Medically cleared: No   Pending: Nephro clearance, stable dialysis (ordered quantiferon)  Estimated Discharge Day:  Disposition: Home HHC?    Managing worsening renal function in the setting of diabetes, and hypertension. Currently on daily dialysis, has tunneled catheter.     Hyponatremic. Ordered free water restriction OK to salt food.  Hyperphosphatemic. Phosphate binders.  Hb 9.1. Iron panel and defer eoietin to Nephrology  He just had and tolerated dialysis. He also had renal biopsy TODAY. Pathology PENDING. Explained to patient management of biopsy result.   I have discussed this patient's plan of care and discharge plan at IDT rounds today with Case Management, Nursing, Nursing leadership, and other members of the IDT team.    Consultants/Specialty  nephrology    Code Status  Full Code    Disposition  Medically Cleared  I have placed the appropriate orders for post-discharge needs.    Review of Systems  Review of Systems   Constitutional:  Positive for malaise/fatigue. Negative for chills and fever.   Respiratory:  Negative for cough and shortness of breath.    Cardiovascular:  Negative for chest pain, palpitations and leg swelling.   Gastrointestinal:  Negative for abdominal pain, diarrhea, nausea and vomiting.   Genitourinary:  Negative for dysuria and urgency.   Musculoskeletal:  Negative for back pain.   Skin:  Negative for rash.   Neurological:  Negative for dizziness, loss of consciousness and headaches.        Physical Exam  Temp:  [35.9 °C (96.7 °F)-36.7 °C (98.1 °F)] 36.2 °C (97.2  °F)  Pulse:  [77-99] 99  Resp:  [9-36] 16  BP: (109-193)/(70-99) 130/99  SpO2:  [87 %-100 %] 96 %    Physical Exam  Constitutional:       General: She is not in acute distress.     Appearance: Normal appearance. She is well-developed. She is obese. She is not diaphoretic.   HENT:      Head: Normocephalic and atraumatic.   Neck:      Vascular: No JVD.   Cardiovascular:      Rate and Rhythm: Normal rate and regular rhythm.      Heart sounds: No murmur heard.  Pulmonary:      Effort: Pulmonary effort is normal. No respiratory distress.      Breath sounds: No stridor. No wheezing or rales.   Abdominal:      Palpations: Abdomen is soft.      Tenderness: There is no abdominal tenderness. There is no guarding or rebound.   Skin:     General: Skin is warm and dry.      Findings: No rash.   Neurological:      Mental Status: She is oriented to person, place, and time.   Psychiatric:         Mood and Affect: Mood normal.         Behavior: Behavior normal.         Thought Content: Thought content normal.         Fluids    Intake/Output Summary (Last 24 hours) at 3/19/2025 1658  Last data filed at 3/19/2025 1121  Gross per 24 hour   Intake 828.33 ml   Output 1600 ml   Net -771.67 ml        Laboratory  Recent Labs     03/17/25  0351 03/18/25  0209 03/18/25  2302   WBC 4.9 5.6 6.5   RBC 2.28* 2.44* 3.03*   HEMOGLOBIN 7.0* 7.6* 9.1*   HEMATOCRIT 20.2* 23.0* 28.0*   MCV 88.6 94.3 92.4   MCH 30.7 31.1 30.0   MCHC 34.7 33.0 32.5   RDW 42.8 46.3 44.5   PLATELETCT 354 314 381   MPV 10.3 10.5 9.8     Recent Labs     03/18/25  2302 03/19/25  0709 03/19/25  0820   SODIUM 126* 126* 126*   POTASSIUM 4.2 4.2 4.3   CHLORIDE 91* 92* 90*   CO2 26 26 26   GLUCOSE 88 88 88   BUN 34* 37* 38*   CREATININE 5.39* 5.79* 5.88*   CALCIUM 7.9* 7.7* 7.9*     Recent Labs     03/17/25  0351 03/18/25  0209   INR 1.49* 1.48*               Imaging  IR-CVC WITH TUNNEL W/O PORT EXCHANGE   Final Result      Successful image guided tunneled dialysis catheter  placement.      Plan: The catheter is available for immediate use.         CT-NEEDLE CORE BX-RENAL   Final Result      CT-guided LEFT renal parenchymal biopsy      US-RENAL   Final Result         1.  Echogenic bilateral kidneys, a nonspecific finding commonly associated with medical renal disease      EC-ECHOCARDIOGRAM COMPLETE W/O CONT   Final Result      DX-CHEST-FOR LINE PLACEMENT Perform procedure in: Patient's Room   Final Result         1. Right jugular dialysis line in place without complication.      2. Shallow breath. Mild pulmonary edema suggested.                  DX-FOOT-COMPLETE 3+ LEFT   Final Result      1.  Prior amputation of the fourth toe.      2.  No evidence of fracture or bony destructive change.      CT-RENAL COLIC EVALUATION(A/P W/O)   Final Result      1.  No evidence of bowel obstruction or focal inflammatory change.      2.  No evidence of renal or ureteral stone or hydronephrosis.      3.  Small amount of ascites.      4.  Atherosclerotic vascular calcification.      5.  Anasarca.      6.  Bibasal atelectasis and minimal bilateral pleural effusions.      7.  Constipation.      8.  Prior gastric bypass procedure.      DX-OUTSIDE IMAGES-DX CHEST   Final Result      CT-OUTSIDE IMAGES-CT HEAD   Final Result           Assessment/Plan  * Acute renal failure superimposed on stage 3b chronic kidney disease (HCC)- (present on admission)  Assessment & Plan  Patient has chronic kidney secondary to diabetes mellitis  following outpatient with nephrology with baseline creatinine 1.1  Patient presented  with creatinine 12.5 and .  Nephrology consult, hemodialysis catheter placement, currently tolerating hemodialysis  Renal biopsy suggested to further delineate the patient's disease process, prognostication  Daily BMP  Renally dose medications as appropriate      Acute encephalopathy- (present on admission)  Assessment & Plan  Acute metabolic encephalopathy  Resolved with HD  Follow neuro  exam      Osteomyelitis of left foot (HCC)  Assessment & Plan  Patient has recent history of admission for osteomyelitis of her left foot S/P left 4th toe amputated 03/01/2024  On examination the wound is dry and now signs of infection  Will continue monitor    Anemia  Assessment & Plan  Possibly secondary to renal disease  We will repeat iron studies, reticulocyte count, currently no evidence of blood loss  Monitor hemoglobin  Transfuse to keep Hb>7    Elevated troponin  Assessment & Plan  Patient has troponin 129, BNP 92089 likely type II injury  Patient denied any chest pain or CP equivalent  EKG:  Sinus rhythm, no acute ST-T changes  ECHO, without regional wall motion abnormality, normal EF      Hyponatremia- (present on admission)  Assessment & Plan  Patient presented with sodium 117  Hypervolemic hypoNa  Volume and electrolyte correction with hemodialysis  Daily BMP  Recent Labs     03/18/25 2302 03/19/25  0709 03/19/25  0820   SODIUM 126* 126* 126*   POTASSIUM 4.2 4.2 4.3   CHLORIDE 91* 92* 90*   CO2 26 26 26   GLUCOSE 88 88 88   BUN 34* 37* 38*   CREATININE 5.39* 5.79* 5.88*     OK to salt food  Free water avoidance    Hyperlipidemia- (present on admission)  Assessment & Plan  Consider future statin therapy    Primary hypertension- (present on admission)  Assessment & Plan  Patient on amlodipine  5 mg: increase dose to 10mg daily  Monitor closely, adjust as indicated, as needed medication available  Vitals:    03/19/25 1655   BP: (!) 130/99   Pulse: 99   Resp: 16   Temp: 36.2 °C (97.2 °F)   SpO2: 96%         Hyperkalemia- (present on admission)  Assessment & Plan  Now resolved on HD  Follow daily  Recent Labs     03/18/25 2302 03/19/25  0709 03/19/25  0820   SODIUM 126* 126* 126*   POTASSIUM 4.2 4.2 4.3   CHLORIDE 91* 92* 90*   CO2 26 26 26   GLUCOSE 88 88 88   BUN 34* 37* 38*   CREATININE 5.39* 5.79* 5.88*         Type 2 diabetes mellitus (HCC)- (present on admission)  Assessment & Plan  Patient on  insulin lispro 100, 10 units per meal and Ozempic  Patient recent hemoglobin A1c 8.6 January 2025  Patient has complication related to diabetes mellitus include kidney disease, neuropathy  Patient has recent amputation of her left fourth toe secondary to diabetic ulcer  On current regimen BG is running low 100s         VTE prophylaxis: VTE Selection  Hepa ppx  I have performed a physical exam and reviewed and updated ROS and Plan today (3/19/2025). In review of yesterday's note (3/18/2025), there are no changes except as documented above.    Patient is has a high medical complexity, complex decision making and is at high risk for complication, morbidity, and mortality, thus requiring the highest level of my preparedness for sudden, emergent intervention. Medical decision making is therefore complex. I provided  services, which included ordering labs and/or imaging, and discussing the case with various consultants.medication orders, frequent reevaluations of the patient's condition and response to treatment. Time was also devoted to counseling and coordinating care including review of records, pertinent lab data and studies, as well as discussing diagnostic evaluation and work up, planned therapeutic interventions and future disposition of care. Where indicated, the assessment and plan reflect discussion of patient with consultants, other healthcare providers, family members, and additional research needed to obtain further information in formulating the plan of care for Aviva Kenney. Total time spent was 55 minutes.

## 2025-03-19 NOTE — PROGRESS NOTES
Pt presents to IR3 from CT4 with RN. Pt was consented by MD at bedside prior to CT procedure and any sedation given. Consent confirmed by this RN. Pt transferred to IR3 table in supine position. Patient underwent a temp to tunneled HD line placement by Dr. Cadena. Procedure site was marked by MD and verified using imaging guidance. Pt placed on monitor, prepped and draped in a sterile fashion. Vitals were taken every 5 minutes and remained stable during procedure (see doc flow sheet for results). CO2 waveform capnography was monitored and remained WNL throughout procedure. Report called to halina Mancia RN. Pt transported by stretcher with RN to S527.       BARD  Gl[facility]: Long-Term Hemodialysis Catheter  14.5F x 23cm  REF: [id number]  LOT: [id number]  EXP: 06/30/2026  Placed 3/19/25 @ 1450

## 2025-03-19 NOTE — PROGRESS NOTES
Called Adal (phlebotomist) but no answer. Sent msg. to follow-up on the scheduled labs. Notified patient has IJ for HD and no order allowing RN to draw blood from the central line.

## 2025-03-20 LAB
ALBUMIN SERPL BCP-MCNC: 2.5 G/DL (ref 3.2–4.9)
ANION GAP SERPL CALC-SCNC: 13 MMOL/L (ref 7–16)
ANION GAP SERPL CALC-SCNC: 9 MMOL/L (ref 7–16)
BACTERIA BLD CULT: NORMAL
BUN SERPL-MCNC: 24 MG/DL (ref 8–22)
BUN SERPL-MCNC: 30 MG/DL (ref 8–22)
C3 SERPL-MCNC: 84.6 MG/DL (ref 87–200)
C4 SERPL-MCNC: 21.6 MG/DL (ref 19–52)
CALCIUM ALBUM COR SERPL-MCNC: 9.3 MG/DL (ref 8.5–10.5)
CALCIUM SERPL-MCNC: 8.1 MG/DL (ref 8.5–10.5)
CALCIUM SERPL-MCNC: 8.5 MG/DL (ref 8.5–10.5)
CHLORIDE SERPL-SCNC: 90 MMOL/L (ref 96–112)
CHLORIDE SERPL-SCNC: 93 MMOL/L (ref 96–112)
CO2 SERPL-SCNC: 24 MMOL/L (ref 20–33)
CO2 SERPL-SCNC: 27 MMOL/L (ref 20–33)
CREAT SERPL-MCNC: 4.45 MG/DL (ref 0.5–1.4)
CREAT SERPL-MCNC: 5.28 MG/DL (ref 0.5–1.4)
ERYTHROCYTE [DISTWIDTH] IN BLOOD BY AUTOMATED COUNT: 45.5 FL (ref 35.9–50)
GFR SERPLBLD CREATININE-BSD FMLA CKD-EPI: 12 ML/MIN/1.73 M 2
GFR SERPLBLD CREATININE-BSD FMLA CKD-EPI: 9 ML/MIN/1.73 M 2
GLUCOSE BLD STRIP.AUTO-MCNC: 106 MG/DL (ref 65–99)
GLUCOSE BLD STRIP.AUTO-MCNC: 148 MG/DL (ref 65–99)
GLUCOSE BLD STRIP.AUTO-MCNC: 173 MG/DL (ref 65–99)
GLUCOSE BLD STRIP.AUTO-MCNC: 98 MG/DL (ref 65–99)
GLUCOSE SERPL-MCNC: 119 MG/DL (ref 65–99)
GLUCOSE SERPL-MCNC: 188 MG/DL (ref 65–99)
HCT VFR BLD AUTO: 25.7 % (ref 37–47)
HGB BLD-MCNC: 8.3 G/DL (ref 12–16)
MCH RBC QN AUTO: 30.3 PG (ref 27–33)
MCHC RBC AUTO-ENTMCNC: 32.3 G/DL (ref 32.2–35.5)
MCV RBC AUTO: 93.8 FL (ref 81.4–97.8)
PHOSPHATE SERPL-MCNC: 5 MG/DL (ref 2.5–4.5)
PLATELET # BLD AUTO: 308 K/UL (ref 164–446)
PMV BLD AUTO: 9.8 FL (ref 9–12.9)
POTASSIUM SERPL-SCNC: 4.1 MMOL/L (ref 3.6–5.5)
POTASSIUM SERPL-SCNC: 5.3 MMOL/L (ref 3.6–5.5)
RBC # BLD AUTO: 2.74 M/UL (ref 4.2–5.4)
SIGNIFICANT IND 70042: NORMAL
SITE SITE: NORMAL
SODIUM SERPL-SCNC: 127 MMOL/L (ref 135–145)
SODIUM SERPL-SCNC: 129 MMOL/L (ref 135–145)
SOURCE SOURCE: NORMAL
WBC # BLD AUTO: 8.1 K/UL (ref 4.8–10.8)

## 2025-03-20 PROCEDURE — 80069 RENAL FUNCTION PANEL: CPT

## 2025-03-20 PROCEDURE — 85027 COMPLETE CBC AUTOMATED: CPT

## 2025-03-20 PROCEDURE — 97597 DBRDMT OPN WND 1ST 20 CM/<: CPT

## 2025-03-20 PROCEDURE — A9270 NON-COVERED ITEM OR SERVICE: HCPCS | Performed by: INTERNAL MEDICINE

## 2025-03-20 PROCEDURE — 700102 HCHG RX REV CODE 250 W/ 637 OVERRIDE(OP): Performed by: INTERNAL MEDICINE

## 2025-03-20 PROCEDURE — 700102 HCHG RX REV CODE 250 W/ 637 OVERRIDE(OP): Performed by: HOSPITALIST

## 2025-03-20 PROCEDURE — 36415 COLL VENOUS BLD VENIPUNCTURE: CPT

## 2025-03-20 PROCEDURE — A9270 NON-COVERED ITEM OR SERVICE: HCPCS | Performed by: HOSPITALIST

## 2025-03-20 PROCEDURE — 83516 IMMUNOASSAY NONANTIBODY: CPT | Mod: 91

## 2025-03-20 PROCEDURE — 86060 ANTISTREPTOLYSIN O TITER: CPT

## 2025-03-20 PROCEDURE — 99223 1ST HOSP IP/OBS HIGH 75: CPT | Performed by: INTERNAL MEDICINE

## 2025-03-20 PROCEDURE — 770006 HCHG ROOM/CARE - MED/SURG/GYN SEMI*

## 2025-03-20 PROCEDURE — 700105 HCHG RX REV CODE 258: Performed by: INTERNAL MEDICINE

## 2025-03-20 PROCEDURE — 700111 HCHG RX REV CODE 636 W/ 250 OVERRIDE (IP): Mod: JZ,TB | Performed by: INTERNAL MEDICINE

## 2025-03-20 PROCEDURE — 82962 GLUCOSE BLOOD TEST: CPT | Mod: 91

## 2025-03-20 PROCEDURE — 80048 BASIC METABOLIC PNL TOTAL CA: CPT

## 2025-03-20 PROCEDURE — 86160 COMPLEMENT ANTIGEN: CPT

## 2025-03-20 RX ORDER — SODIUM HYPOCHLORITE 1.25 MG/ML
SOLUTION TOPICAL 2 TIMES DAILY
Status: DISCONTINUED | OUTPATIENT
Start: 2025-03-20 | End: 2025-03-22 | Stop reason: HOSPADM

## 2025-03-20 RX ADMIN — SEVELAMER CARBONATE 800 MG: 800 TABLET, FILM COATED ORAL at 17:33

## 2025-03-20 RX ADMIN — INSULIN LISPRO 2 UNITS: 100 INJECTION, SOLUTION INTRAVENOUS; SUBCUTANEOUS at 23:28

## 2025-03-20 RX ADMIN — SEVELAMER CARBONATE 800 MG: 800 TABLET, FILM COATED ORAL at 13:19

## 2025-03-20 RX ADMIN — OMEPRAZOLE 20 MG: 20 CAPSULE, DELAYED RELEASE ORAL at 05:43

## 2025-03-20 RX ADMIN — AMLODIPINE BESYLATE 10 MG: 10 TABLET ORAL at 05:43

## 2025-03-20 RX ADMIN — HYDROCODONE BITARTRATE AND ACETAMINOPHEN 1 TABLET: 10; 325 TABLET ORAL at 05:44

## 2025-03-20 RX ADMIN — SEVELAMER CARBONATE 800 MG: 800 TABLET, FILM COATED ORAL at 08:04

## 2025-03-20 RX ADMIN — CARVEDILOL 6.25 MG: 6.25 TABLET, FILM COATED ORAL at 08:04

## 2025-03-20 RX ADMIN — CARVEDILOL 6.25 MG: 6.25 TABLET, FILM COATED ORAL at 17:32

## 2025-03-20 RX ADMIN — SODIUM CHLORIDE 250 MG: 9 INJECTION, SOLUTION INTRAVENOUS at 15:15

## 2025-03-20 RX ADMIN — Medication 5000 UNITS: at 05:43

## 2025-03-20 ASSESSMENT — ENCOUNTER SYMPTOMS
ABDOMINAL PAIN: 0
FEVER: 0
SHORTNESS OF BREATH: 0
DIARRHEA: 0
NAUSEA: 0
CHILLS: 0
LOSS OF CONSCIOUSNESS: 0
HEADACHES: 0
BACK PAIN: 0
PALPITATIONS: 0
DIZZINESS: 0
VOMITING: 0
COUGH: 0

## 2025-03-20 ASSESSMENT — PAIN DESCRIPTION - PAIN TYPE
TYPE: ACUTE PAIN

## 2025-03-20 NOTE — PROGRESS NOTES
Assumed care of patient at 1900. Received bedside report from day RN Maria Elena. Patient A&Ox4, on 0.5L NC, Reporting a pain level of 7/10, prn oxy administered per MAR. Rodriguez catheter in place, HD catheter in place. Call light within reach, belongings within reach, fall precautions in place, bed in lowest position, bed alarm verified to be on. Patient does not have any other needs at this time.     POC was discussed with patient. All questions were answered. Patient verbalized understanding.

## 2025-03-20 NOTE — DISCHARGE PLANNING
Case Management Discharge Planning    Admission Date: 3/15/2025  GMLOS: 10.4  ALOS: 4    6-Clicks ADL Score: 23  6-Clicks Mobility Score: 21      Anticipated Discharge Dispo: Discharge Disposition: Discharged to home/self care (01)    DME Needed: No    Action(s) Taken: OTHER    Discussed patient's case with MD during IDT Unit Rounds. Per MD, patient is not medically clear.    SW requested an update regarding the dialysis chair from Amalia, Dialysis Coordinator.    Per Lynsey Holland. Approval for Outpatient Dialysis Chair is pending Clinic Acceptance.     1:30pm - Per Amalia, Outpatient Dialysis Chair is APPROVED.    [facility]  [address] [place] [address]     Ph: [phone]     Schedule: Mon, Wed, Fri   Time: 5:00 PM     Patient to start Mon, 3/24 at 4:30 PM    Escalations Completed: None    Medically Clear: No    Next Steps: Home    Barriers to Discharge: Medical clearance.     Is the patient up for discharge tomorrow:

## 2025-03-20 NOTE — CARE PLAN
The patient is Stable - Low risk of patient condition declining or worsening    Shift Goals  Clinical Goals: Pt's pain to decrease to 4/10 by end of shift.  Patient Goals: Rest, pain management  Family Goals: NOHEMY    Progress made toward(s) clinical / shift goals:    Pt's pain decreased from 7/10 to 5-6/10 following administration of prn pain medications. Pt able to rest with pain management. Pt turning and repositioning self frequently during the night. Pt up to commode. Pt remains free from falls, bedside commitment done, hourly rounding performed, bed alarm verified to be on, pt utilizing call light appropriately.     Problem: Knowledge Deficit - Standard  Goal: Patient and family/care givers will demonstrate understanding of plan of care, disease process/condition, diagnostic tests and medications  Outcome: Progressing     Problem: Skin Integrity  Goal: Skin integrity is maintained or improved  Outcome: Progressing     Problem: Pain Management  Goal: Pain level will decrease to patient's comfort goal  Outcome: Progressing     Problem: Safety  Goal: Will remain free from falls  Outcome: Progressing       Patient is not progressing towards the following goals:

## 2025-03-20 NOTE — CONSULTS
INFECTIOUS DISEASES INPATIENT CONSULT NOTE     Date of Service:3/20/25    Consult Requested By: ZACH Short    Reason for Consultation: concern for residual osteo    History of Present Illness:   Aviva Kenney is a 50 y.o. diabetic female originally admitted 3/15/2025  for renal failure  Transfer from [place] for acute on chronic kidney failure with hyperkalemia  She reported not feeling well  for several days with nausea, vomiting, feeling foggy, progressive weakness, generalized fatigue.  She underwent a fourth left toe amputation on 2/28 secondary to ischemic diabetic ulcer  Culture was positive for group A strep. Discharged on clindamycin. ID was not consulted   Creat at discharge was 2  No fever or leukocytosis on admission  WBC 7.4  sed rate 100 sodium 117 potassium 6.7 creatinine 12.5  phosphorus 10.4   CT renal performed showing no obstructive process  Initially admitted to ICU for urgent dialysis, dialysis catheter placed by the intensivist, nephrology consulted for initiation of hemodialysis.  Intermittently hypertensive  Renal biopsy showed crescents concerning for possble association with ongoing infection  Infectious Diseases consulted for antibiotic recommendation and management    Review Of Systems:  No pain  All other systems are reviewed and are negative     PMH:   Past Medical History:   Diagnosis Date    Cataract     Diabetes (HCC)     Type 2    Infectious disease 10/2022    Covid    Pre-op evaluation 12/13/2022         PSH:  Past Surgical History:   Procedure Laterality Date    TOE AMPUTATION Left 2/28/2025    Procedure: AMPUTATION, TOE-4TH;  Surgeon: Jamar Coronado M.D.;  Location: [facility];  Service: Orthopedics    MI UPPER GI ENDOSCOPY,DIAGNOSIS N/A 12/07/2022    Procedure: ESOPHAGOGASTRODUODENOSCOPY (EGD) WITH BRUSHINGS AND DILATATION VS OVER THE SCOPE CLIP PLACEMENT;  Surgeon: Bridger Gongora M.D.;  Location: SURGERY [facility];  Service: General    MI  UPPER GI ENDOSCOPY,CTRL BLEED N/A 12/07/2022    Procedure: EGD, WITH CLIP PLACEMENT;  Surgeon: Bridger Gongora M.D.;  Location: SURGERY [facility];  Service: General    ABDOMINAL HYSTERECTOMY TOTAL      CATARACT EXTRACTION WITH IOL Left     CHOLECYSTECTOMY      GASTRIC RESECTION      HYSTERECTOMY, TOTAL ABDOMINAL      OVARIAN CYSTECTOMY Bilateral     PRIMARY C SECTION      x 2    TONSILLECTOMY         FAMILY HX:  Family History   Problem Relation Age of Onset    Breast Cancer Mother     Diabetes Mother     Heart Disease Mother     Hypertension Mother     Hypertension Father     Heart Disease Father     Diabetes Father     Diabetes Brother        SOCIAL HX:  Social History     Socioeconomic History    Marital status:      Spouse name: Not on file    Number of children: Not on file    Years of education: Not on file    Highest education level: Some college, no degree   Occupational History    Occupation: disabled for lower back   Tobacco Use    Smoking status: Never    Smokeless tobacco: Never   Vaping Use    Vaping status: Never Used   Substance and Sexual Activity    Alcohol use: Not Currently     Comment: occ    Drug use: Yes     Types: Marijuana, Oral     Comment: occ    Sexual activity: Not on file   Other Topics Concern    Not on file   Social History Narrative    Not on file     Social Drivers of Health     Financial Resource Strain: Medium Risk (6/23/2024)    Overall Financial Resource Strain (CARDIA)     Difficulty of Paying Living Expenses: Somewhat hard   Food Insecurity: Food Insecurity Present (2/27/2025)    Hunger Vital Sign     Worried About Running Out of Food in the Last Year: Sometimes true     Ran Out of Food in the Last Year: Never true   Transportation Needs: No Transportation Needs (2/27/2025)    PRAPARE - Transportation     Lack of Transportation (Medical): No     Lack of Transportation (Non-Medical): No   Physical Activity: Sufficiently Active (6/23/2024)    Exercise Vital Sign     Days  of Exercise per Week: 3 days     Minutes of Exercise per Session: 50 min   Stress: Stress Concern Present (6/23/2024)    [employer] [place] [employer] - Occupational Stress Questionnaire     Feeling of Stress : To some extent   Social Connections: Moderately Isolated (6/23/2024)    Social Connection and Isolation Panel [NHANES]     Frequency of Communication with Friends and Family: More than three times a week     Frequency of Social Gatherings with Friends and Family: More than three times a week     Attends Spiritism Services: Never     Active Member of Clubs or Organizations: No     Attends Club or Organization Meetings: Patient declined     Marital Status:    Intimate Partner Violence: Not At Risk (2/27/2025)    Humiliation, Afraid, Rape, and Kick questionnaire     Fear of Current or Ex-Partner: No     Emotionally Abused: No     Physically Abused: No     Sexually Abused: No   Housing Stability: Low Risk  (2/27/2025)    Housing Stability Vital Sign     Unable to Pay for Housing in the Last Year: No     Number of Times Moved in the Last Year: 0     Homeless in the Last Year: No     Social History     Tobacco Use   Smoking Status Never   Smokeless Tobacco Never     Social History     Substance and Sexual Activity   Alcohol Use Not Currently    Comment: occ       Allergies/Intolerances:  No Known Allergies      Other Current Medications:    Current Facility-Administered Medications:     sevelamer carbonate (Renvela) tablet 800 mg, 800 mg, Oral, TID WITH MEALS, Bryan Martin M.D., 800 mg at 03/20/25 0804    HYDROmorphone (Dilaudid) injection 1 mg, 1 mg, Intravenous, Q4HRS PRN, Bryan Martin M.D., 1 mg at 03/19/25 1740    amLODIPine (Norvasc) tablet 10 mg, 10 mg, Oral, DAILY, Justin Fortune D.O., 10 mg at 03/20/25 0543    oxyCODONE immediate-release (Roxicodone) tablet 5-10 mg, 5-10 mg, Oral, Q4HRS PRN, Justin Fortune D.O., 10 mg at 03/19/25 2038    hydrALAZINE (Apresoline)  injection 20 mg, 20 mg, Intravenous, Q4HRS PRN, Justin Fortune D.O.    carvedilol (Coreg) tablet 6.25 mg, 6.25 mg, Oral, BID WITH MEALS, Justin Fortune D.O., 6.25 mg at 03/20/25 0804    HYDROcodone/acetaminophen (Norco)  MG per tablet 1 Tablet, 1 Tablet, Oral, Q6HRS PRN, Andre Ellis M.D., 1 Tablet at 03/20/25 0544    [Held by provider] heparin injection 5,000 Units, 5,000 Units, Subcutaneous, Q8HRS, Alf Virgen M.D., 5,000 Units at 03/17/25 2112    senna-docusate (Pericolace Or Senokot S) 8.6-50 MG per tablet 2 Tablet, 2 Tablet, Oral, Q EVENING, 2 Tablet at 03/19/25 1800 **AND** polyethylene glycol/lytes (Miralax) Packet 1 Packet, 1 Packet, Oral, QDAY PRN, Alf Virgen M.D., 1 Packet at 03/16/25 1713    [Held by provider] gabapentin (Neurontin) capsule 300 mg, 300 mg, Oral, Q EVENING, Alf Virgen M.D.    epoetin (Retacrit) 5,000 Units injection (Dialysis Use Only), 5,000 Units, Intravenous, MO, WE + FR, Theo Cox M.D., 5,000 Units at 03/19/25 1108    NS (Bolus) 0.9 % infusion 100 mL, 100 mL, Intravenous, DIALYSIS PRN, Theo Cox M.D.    insulin lispro (HumaLOG,AdmeLOG) subcutaneous injection, 2-9 Units, Subcutaneous, 4X/DAY ACHS, 2 Units at 03/19/25 1710 **AND** POC blood glucose manual result, , , Q AC AND BEDTIME(S) **AND** NOTIFY MD and PharmD, , , Once **AND** Administer 20 grams of glucose (approximately 8 ounces of fruit juice) every 15 minutes PRN FSBG less than 70 mg/dL, , , PRN **AND** dextrose 50 % (D50W) injection 25 g, 25 g, Intravenous, Q15 MIN PRN, Lyle Arreola M.D.    omeprazole (PriLOSEC) capsule 20 mg, 20 mg, Oral, DAILY, Lyle Arreola M.D., 20 mg at 03/20/25 0543    vitamin D3 (Cholecalciferol) tablet 5,000 Units, 5,000 Units, Oral, DAILY, Lyle Arreola M.D., 5,000 Units at 03/20/25 0543    heparin intracatheter (for DIALYSIS USE ONLY) 1,200 Units, 1,200 Units, Intracatheter, DIALYSIS PRN, Theo Cox M.D., 1,200 Units at  "25 1124    heparin intracatheter (for DIALYSIS USE ONLY) 1,200 Units, 1,200 Units, Intracatheter, DIALYSIS PRN, Theo Cox M.D., 1,200 Units at 25 1124    ondansetron (Zofran) syringe/vial injection 4 mg, 4 mg, Intravenous, Q4HRS PRN, Andre Ellis M.D., 4 mg at 25 7191      Most Recent Vital Signs:  BP (!) 140/76   Pulse 81   Temp 36.3 °C (97.4 °F) (Temporal)   Resp 17   Ht 1.753 m (5' 9\")   Wt 99.1 kg (218 lb 7.6 oz)   SpO2 93%   Breastfeeding No   BMI 32.26 kg/m²   Temp  Av.4 °C (97.6 °F)  Min: 35.8 °C (96.5 °F)  Max: 37.1 °C (98.7 °F)    Physical Exam:  General: well-appearing, well nourished no acute distress  HEENT: NCAT, PERRLA, sclera anicteric, PERRL, EOMI-dysconjugate gaze,  no oral lesions edentulous  Neck: supple, no JVD  Chest: CTAB, unlabored.  Cardiac: RRR,    Abdomen:  non-distended  Extremities: No cyanosis, surgical site 4th toe with sutures and what appears to be superficial eschar-skin appears  Skin: multiple scars BLE petechia ecchymosis  Neuro: Alert and oriented times 3, Speech fluent CN intact BROWN  Psych: Mood normal Affect normal    Pertinent Lab Results:  Recent Labs     25  0209 25  2302 25  0703   WBC 5.6 6.5 8.1      Recent Labs     25  0209 25  2302 25  0703   HEMOGLOBIN 7.6* 9.1* 8.3*   HEMATOCRIT 23.0* 28.0* 25.7*   MCV 94.3 92.4 93.8   MCH 31.1 30.0 30.3   PLATELETCT 314 381 308         Recent Labs     25  0820 25  2242 25  0703   SODIUM 126* 133* 129*   POTASSIUM 4.3 4.1 4.1   CHLORIDE 90* 96 93*   CO2 26 28 27   CREATININE 5.88* 4.10* 4.45*        Recent Labs     25  0209 25  0703   ALBUMIN 2.3* 2.5*        Pertinent Micro:  Results       Procedure Component Value Units Date/Time    BLOOD CULTURE [[id number]] Collected: 03/15/25 2310    Order Status: Completed Specimen: Blood from Peripheral Updated: 25 1107     Significant Indicator NEG     Source BLD     Site PERIPHERAL    "  Culture Result No growth after 5 days of incubation.    URINALYSIS [[id number]]  (Abnormal) Collected: 03/19/25 0500    Order Status: Completed Updated: 03/19/25 0829     Color Brown     Character Turbid     Specific Gravity 1.020     Ph 6.5     Glucose Negative mg/dL      Ketones Negative mg/dL      Protein 300 mg/dL      Bilirubin Negative     Urobilinogen, Urine 0.2 EU/dL      Nitrite Negative     Leukocyte Esterase Large     Occult Blood Large     Micro Urine Req Microscopic    URINE CULTURE (ADD ON) [[id number]] Collected: 03/15/25 2310    Order Status: Completed Specimen: Urine, [facility] Updated: 03/18/25 0614     Significant Indicator NEG     Source UR     Site URINE, [facility] CATH     Culture Result No growth at 48 hours.    URINALYSIS [[id number]] Collected: 03/16/25 1429    Order Status: Canceled Specimen: Urine, [facility] Cath     BLOOD CULTURE [[id number]] Collected: 03/15/25 2300    Order Status: Completed Specimen: Blood from Peripheral Updated: 03/16/25 0208     Significant Indicator NEG     Source BLD     Site PERIPHERAL     Culture Result No Growth  Note: Blood cultures are incubated for 5 days and  are monitored continuously.Positive blood cultures  are called to the RN and reported as soon as  they are identified.      BLOOD CULTURE [[id number]] Collected: 03/16/25 0000    Order Status: Canceled Specimen: Other from Peripheral     BLOOD CULTURE [[id number]] Collected: 03/16/25 0000    Order Status: Canceled Specimen: Other from Peripheral     URINALYSIS (UA) [[id number]]  (Abnormal) Collected: 03/15/25 2310    Order Status: Completed Specimen: Blood Updated: 03/15/25 2344     Color Dark Yellow     Character Cloudy     Specific Gravity 1.023     Ph 5.0     Glucose Negative mg/dL      Ketones Trace mg/dL      Protein >=1000 mg/dL      Bilirubin Negative     Urobilinogen, Urine 1.0 EU/dL      Nitrite Negative     Leukocyte Esterase Small     Occult Blood Large     Micro Urine Req Microscopic          No results  "found for: \"BLOODCULTU\", \"BLDCULT\", \"BCHOLD\"     Studies:  1.  Prior amputation of the fourth toe.     2.  No evidence of fracture or bony destructive change.  IMPRESSION:   Renal failure  Recent 4th toe amputation 2/28/25 for Group A strep associated osteomyelitis  -sutures still in place  -Xray neg for osteo  -tinea pedis likely portal of entry for prior infection  Erythema right 2nd and 3rd digit-no ulceration but fissure under 3rd toe  DM  Electrolyte derangements  Neg TTE 3/16    PLAN:   Xray neg for osteo  Unfortunately MRI will not be useful in her situation due to recent surgery  Recommend LPS/Ortho eval  Suture removal per Ortho  Hold IV/PO antibiotics for now given hemodynamic stability and unclear if underlying infection present  Consider topical antifungal feet    Plan of care discussed with ZACH Short and Dr Martin. Will continue to follow     Jes Bryan M.D.    "

## 2025-03-20 NOTE — DISCHARGE PLANNING
Outpatient Dialysis Note    Patient has been placed and confirmed at:    [facility]  [address] [place] [address]    Ph: [phone]    Schedule: Mon, Wed, Fri   Time: 5:00 PM    Patient to start Mon, 3/24 at 4:30 PM    RAEGAN Christina and nephrologist Dr. Cox notified of placement confirmation.   Provided patient dialysis schedule welcome letter.     Xitlaly Reyes   Dialysis Coordinator / [facility]   Ph: [phone]     details…

## 2025-03-20 NOTE — PROGRESS NOTES
"Nephrology Daily Progress Note    Date of Service  3/20/2025    Chief Complaint  50 y.o. 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    Interval Problem Update  3/17 - patient had HD #2 this AM with 2L UF tolerated well. Much more alert today, but says she does not remember why she went to the hospital. Her  Bharath says she was increasingly confused for the week leading up to admission, moaning \"it hurts it hurts.\" But she could not localize pain. He finally brought her to the hospital where she was found in kidney failure. I discussed the need for a kidney biopsy, for both kidney diagnosis and prognosis, and she agrees.   3/20 -patient had kidney biopsy and tunneled dialysis catheter placement yesterday.  No urine output documented in the last 24 hours, but there is scant muddy brown urine in his Rodriguez bag.  Patient had dialysis yesterday with 1 L removed.  Denies chest pain, shortness of breath.  Complained of back spasms after kidney biopsy yesterday.    Review of Systems  Review of Systems   Constitutional:  Negative for fever.   Respiratory:  Negative for shortness of breath.    Cardiovascular:  Negative for chest pain.   Gastrointestinal:  Negative for abdominal pain.   All other systems reviewed and are negative.       Physical Exam  Temp:  [36.2 °C (97.2 °F)-37.1 °C (98.7 °F)] 36.3 °C (97.4 °F)  Pulse:  [78-99] 81  Resp:  [16-18] 17  BP: ()/() 140/76  SpO2:  [87 %-99 %] 93 %    Physical Exam  Constitutional:       General: She is not in acute distress.     Appearance: She is well-developed. She is obese.   HENT:      Head: Normocephalic and atraumatic.      Mouth/Throat:      Mouth: Mucous membranes are moist.      Pharynx: Oropharynx is clear. No oropharyngeal exudate.   Eyes:      General: No scleral icterus.     Extraocular Movements: " Extraocular movements intact.   Neck:      Trachea: No tracheal deviation.   Cardiovascular:      Rate and Rhythm: Normal rate and regular rhythm.      Heart sounds: Normal heart sounds. No murmur heard.  Pulmonary:      Effort: Pulmonary effort is normal.      Breath sounds: No stridor. No rales.   Abdominal:      Palpations: Abdomen is soft.      Tenderness: There is no abdominal tenderness.   Musculoskeletal:         General: Normal range of motion.      Cervical back: Normal range of motion. No rigidity.      Right lower leg: No edema.      Left lower leg: No edema.   Skin:     General: Skin is warm and dry.   Neurological:      General: No focal deficit present.      Mental Status: She is alert and oriented to person, place, and time.   Psychiatric:         Mood and Affect: Mood normal.         Behavior: Behavior normal.     Dialysis access: R IJ tunneled dialysis catheter    Fluids    Intake/Output Summary (Last 24 hours) at 3/20/2025 1458  Last data filed at 3/20/2025 1357  Gross per 24 hour   Intake 420 ml   Output --   Net 420 ml       Laboratory  Labs reviewed, pertinent labs below.  Recent Labs     03/18/25  0209 03/18/25  2302 03/20/25  0703   WBC 5.6 6.5 8.1   RBC 2.44* 3.03* 2.74*   HEMOGLOBIN 7.6* 9.1* 8.3*   HEMATOCRIT 23.0* 28.0* 25.7*   MCV 94.3 92.4 93.8   MCH 31.1 30.0 30.3   MCHC 33.0 32.5 32.3   RDW 46.3 44.5 45.5   PLATELETCT 314 381 308   MPV 10.5 9.8 9.8     Recent Labs     03/19/25  0820 03/19/25  2242 03/20/25  0703   SODIUM 126* 133* 129*   POTASSIUM 4.3 4.1 4.1   CHLORIDE 90* 96 93*   CO2 26 28 27   GLUCOSE 88 135* 119*   BUN 38* 22 24*   CREATININE 5.88* 4.10* 4.45*   CALCIUM 7.9* 7.9* 8.1*     Recent Labs     03/18/25  0209   INR 1.48*           URINALYSIS:  Lab Results   Component Value Date/Time    COLORURINE Brown (A) 03/19/2025 0500    CLARITY Turbid (A) 03/19/2025 0500    SPECGRAVITY 1.020 03/19/2025 0500    PHURINE 6.5 03/19/2025 0500    KETONES Negative 03/19/2025 0500     PROTEINURIN 300 (A) 03/19/2025 0500    BILIRUBINUR Negative 03/19/2025 0500    UROBILU 0.2 03/19/2025 0500    NITRITE Negative 03/19/2025 0500    LEUKESTERAS Large (A) 03/19/2025 0500    OCCULTBLOOD Large (A) 03/19/2025 0500       UP  Lab Results   Component Value Date/Time    TOTPROTUR >600.0 (H) 03/16/2025 1429      Lab Results   Component Value Date/Time    CREATININEU 206.00 03/16/2025 1429           Imaging interpreted by radiologist. Imaging reports reviewed with pertinent findings below  IR-CVC WITH TUNNEL W/O PORT EXCHANGE   Final Result      Successful image guided tunneled dialysis catheter placement.      Plan: The catheter is available for immediate use.         CT-NEEDLE CORE BX-RENAL   Final Result      CT-guided LEFT renal parenchymal biopsy      US-RENAL   Final Result         1.  Echogenic bilateral kidneys, a nonspecific finding commonly associated with medical renal disease      EC-ECHOCARDIOGRAM COMPLETE W/O CONT   Final Result      DX-CHEST-FOR LINE PLACEMENT Perform procedure in: Patient's Room   Final Result         1. Right jugular dialysis line in place without complication.      2. Shallow breath. Mild pulmonary edema suggested.                  DX-FOOT-COMPLETE 3+ LEFT   Final Result      1.  Prior amputation of the fourth toe.      2.  No evidence of fracture or bony destructive change.      CT-RENAL COLIC EVALUATION(A/P W/O)   Final Result      1.  No evidence of bowel obstruction or focal inflammatory change.      2.  No evidence of renal or ureteral stone or hydronephrosis.      3.  Small amount of ascites.      4.  Atherosclerotic vascular calcification.      5.  Anasarca.      6.  Bibasal atelectasis and minimal bilateral pleural effusions.      7.  Constipation.      8.  Prior gastric bypass procedure.      DX-OUTSIDE IMAGES-DX CHEST   Final Result      CT-OUTSIDE IMAGES-CT HEAD   Final Result            Current Facility-Administered Medications   Medication Dose Route Frequency  Provider Last Rate Last Admin    methylPREDNISolone sod succ (Solu-Medrol) 250 mg in NS 50 mL ivpb  250 mg Intravenous Daily-0600 Theo Cox M.D.        sevelamer carbonate (Renvela) tablet 800 mg  800 mg Oral TID WITH MEALS Bryan Martin M.D.   800 mg at 03/20/25 1319    HYDROmorphone (Dilaudid) injection 1 mg  1 mg Intravenous Q4HRS PRN Bryan Martin M.D.   1 mg at 03/19/25 1740    amLODIPine (Norvasc) tablet 10 mg  10 mg Oral DAILY Justin Fortune D.O.   10 mg at 03/20/25 0543    oxyCODONE immediate-release (Roxicodone) tablet 5-10 mg  5-10 mg Oral Q4HRS PRN Justin Fortune D.O.   10 mg at 03/19/25 2038    hydrALAZINE (Apresoline) injection 20 mg  20 mg Intravenous Q4HRS PRN Justin Fortune D.OAj        carvedilol (Coreg) tablet 6.25 mg  6.25 mg Oral BID WITH MEALS SANDY Donald.OAj   6.25 mg at 03/20/25 0804    HYDROcodone/acetaminophen (Norco)  MG per tablet 1 Tablet  1 Tablet Oral Q6HRS PRN Andre Ellis M.D.   1 Tablet at 03/20/25 0544    [Held by provider] heparin injection 5,000 Units  5,000 Units Subcutaneous Q8HRS Alf Virgen M.D.   5,000 Units at 03/17/25 2112    senna-docusate (Pericolace Or Senokot S) 8.6-50 MG per tablet 2 Tablet  2 Tablet Oral Q EVENING Alf Virgen M.D.   2 Tablet at 03/19/25 1800    And    polyethylene glycol/lytes (Miralax) Packet 1 Packet  1 Packet Oral QDAY PRN Alf Virgen M.D.   1 Packet at 03/16/25 1713    [Held by provider] gabapentin (Neurontin) capsule 300 mg  300 mg Oral Q EVENING Alf Virgen M.D.        epoetin (Retacrit) 5,000 Units injection (Dialysis Use Only)  5,000 Units Intravenous MO, WE + FR Theo Cox M.D.   5,000 Units at 03/19/25 1108    NS (Bolus) 0.9 % infusion 100 mL  100 mL Intravenous DIALYSIS PRN Theo Cox M.D.        insulin lispro (HumaLOG,AdmeLOG) subcutaneous injection  2-9 Units Subcutaneous 4X/DAY JAMES Arreola M.D.   2 Units at 03/19/25 1710    And     dextrose 50 % (D50W) injection 25 g  25 g Intravenous Q15 MIN PRN Lyle Arreola M.D.        omeprazole (PriLOSEC) capsule 20 mg  20 mg Oral DAILY Lyle Arreola M.D.   20 mg at 03/20/25 0543    vitamin D3 (Cholecalciferol) tablet 5,000 Units  5,000 Units Oral DAILY Lyle Arreola M.D.   5,000 Units at 03/20/25 0543    heparin intracatheter (for DIALYSIS USE ONLY) 1,200 Units  1,200 Units Intracatheter DIALYSIS PRN Theo Cox M.D.   1,200 Units at 03/19/25 1124    heparin intracatheter (for DIALYSIS USE ONLY) 1,200 Units  1,200 Units Intracatheter DIALYSIS PRN Theo Cox M.D.   1,200 Units at 03/19/25 1124    ondansetron (Zofran) syringe/vial injection 4 mg  4 mg Intravenous Q4HRS PRN Andre Ellis M.D.   4 mg at 03/19/25 2321       Kidney biopsy 3/19/2025  Interpret[facility] Dr[facility] Brand at [facility]  Diagnosis: Focal proliferative, necrotizing, and crescentic glomerulonephritis with C3 dominant deposits.  Nodular diabetic glomerulosclerosis, class III.  Comment: 518 nonsclerotic glomeruli available show necrosis and cellular/fibrocellular crescent formation.  There is C3 dominant immune complex deposition in glomeruli, with lesser amounts of IgG.  The biopsy findings are favored to represent an infection associated glomerulonephritis such as endocarditis, osteomyelitis, or other occult infections.  Although much less likely, the differential includes ANCA associated glomerulonephritis with concurrent mild immune deposition.      Assessment/Plan  50 y.o. 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     1.  BUTCH on CKD 3B to 4.  Oliguric.  BUTCH due to biopsy proven infection associated glomerulonephritis, on top of background of diabetic nephropathy.  Recommend workup as below.  As she remains oliguric, recommend continuing dialysis on a  Monday Wednesday Friday schedule.  Hopeful for renal recovery.  Check renal function panel daily.       2.  Dialysis access: Right IJ tunneled dialysis catheter.     3.  Infection associated glomerulonephritis.  Seen on kidney biopsy 3/19/2025.  Recommend aggressive workup for osteomyelitis, or muscle abscess.  Blood cultures from 3/15/2025 with no growth to date, making endocarditis unlikely.  Given crescent formation on kidney biopsy, recommend treatment with steroids, methylprednisolone 250 mg IV for 3 days, safe to administer steroids per infectious disease consult.    4.  Hyponatremia, persistent.  Limit hypotonic fluids.  Do not order salt tabs.  Check serum sodium daily.  Recommend 1 L fluid restriction daily.     5.  Metabolic acidosis, resolved with dialysis.  Continue dialysis as above.  Check renal function panel daily.      6.  Hyperphosphatemia, at goal.  Goal less than 5.5.  Continue Renvela 800 mg p.o. 3 times daily with meals.  Check renal function panel daily.     7.  Normocytic anemia.  Persistent.  Continue LULY's 3 times weekly with dialysis.  Check CBC daily.     Discussed with Dr. Bryan Martin and Dr. Jes Bryan of infectious disease.    Theo Cox MD  Nephrology  [facility]

## 2025-03-20 NOTE — CARE PLAN
The patient is Stable - Low risk of patient condition declining or worsening    Shift Goals  Clinical Goals: Pain management  Patient Goals: Rest, pain management  Family Goals: NOHEMY    Progress made toward(s) clinical / shift goals:      Problem: Skin Integrity  Goal: Skin integrity is maintained or improved  Outcome: Progressing  Note: Pt able to turn independently. Pt has maintained skin integrity.      Problem: Mobility  Goal: Risk for activity intolerance will decrease  Outcome: Progressing  Note: Pt able to ambulate to chair for meals. Pt using walker for longer distances for balance due to left 4th toe amputation.

## 2025-03-20 NOTE — PROGRESS NOTES
Assumed care of patient after receiving report from Peggy. Pt is A&Ox4. Bed is low/locked. Discussed diabetic diet. No further needs at this time.

## 2025-03-21 LAB
ALBUMIN SERPL BCP-MCNC: 2.6 G/DL (ref 3.2–4.9)
ANION GAP SERPL CALC-SCNC: 13 MMOL/L (ref 7–16)
ANION GAP SERPL CALC-SCNC: 14 MMOL/L (ref 7–16)
BACTERIA BLD CULT: NORMAL
BUN SERPL-MCNC: 18 MG/DL (ref 8–22)
BUN SERPL-MCNC: 35 MG/DL (ref 8–22)
CALCIUM ALBUM COR SERPL-MCNC: 9.5 MG/DL (ref 8.5–10.5)
CALCIUM SERPL-MCNC: 8.4 MG/DL (ref 8.5–10.5)
CALCIUM SERPL-MCNC: 8.6 MG/DL (ref 8.5–10.5)
CHLORIDE SERPL-SCNC: 91 MMOL/L (ref 96–112)
CHLORIDE SERPL-SCNC: 93 MMOL/L (ref 96–112)
CO2 SERPL-SCNC: 21 MMOL/L (ref 20–33)
CO2 SERPL-SCNC: 23 MMOL/L (ref 20–33)
CREAT SERPL-MCNC: 3.38 MG/DL (ref 0.5–1.4)
CREAT SERPL-MCNC: 5.77 MG/DL (ref 0.5–1.4)
ERYTHROCYTE [DISTWIDTH] IN BLOOD BY AUTOMATED COUNT: 43.9 FL (ref 35.9–50)
GFR SERPLBLD CREATININE-BSD FMLA CKD-EPI: 16 ML/MIN/1.73 M 2
GFR SERPLBLD CREATININE-BSD FMLA CKD-EPI: 8 ML/MIN/1.73 M 2
GLUCOSE BLD STRIP.AUTO-MCNC: 155 MG/DL (ref 65–99)
GLUCOSE BLD STRIP.AUTO-MCNC: 156 MG/DL (ref 65–99)
GLUCOSE BLD STRIP.AUTO-MCNC: 162 MG/DL (ref 65–99)
GLUCOSE BLD STRIP.AUTO-MCNC: 202 MG/DL (ref 65–99)
GLUCOSE BLD STRIP.AUTO-MCNC: 247 MG/DL (ref 65–99)
GLUCOSE SERPL-MCNC: 170 MG/DL (ref 65–99)
GLUCOSE SERPL-MCNC: 178 MG/DL (ref 65–99)
HCG SERPL QL: NEGATIVE
HCT VFR BLD AUTO: 24.4 % (ref 37–47)
HGB BLD-MCNC: 8.2 G/DL (ref 12–16)
MAGNESIUM SERPL-MCNC: 2 MG/DL (ref 1.5–2.5)
MCH RBC QN AUTO: 31.1 PG (ref 27–33)
MCHC RBC AUTO-ENTMCNC: 33.6 G/DL (ref 32.2–35.5)
MCV RBC AUTO: 92.4 FL (ref 81.4–97.8)
PHOSPHATE SERPL-MCNC: 7.1 MG/DL (ref 2.5–4.5)
PLATELET # BLD AUTO: 335 K/UL (ref 164–446)
PMV BLD AUTO: 10.3 FL (ref 9–12.9)
POTASSIUM SERPL-SCNC: 4.3 MMOL/L (ref 3.6–5.5)
POTASSIUM SERPL-SCNC: 5 MMOL/L (ref 3.6–5.5)
RBC # BLD AUTO: 2.64 M/UL (ref 4.2–5.4)
SCCMEC + MECA PNL NOSE NAA+PROBE: NEGATIVE
SIGNIFICANT IND 70042: NORMAL
SITE SITE: NORMAL
SODIUM SERPL-SCNC: 127 MMOL/L (ref 135–145)
SODIUM SERPL-SCNC: 128 MMOL/L (ref 135–145)
SOURCE SOURCE: NORMAL
WBC # BLD AUTO: 8.8 K/UL (ref 4.8–10.8)

## 2025-03-21 PROCEDURE — A9270 NON-COVERED ITEM OR SERVICE: HCPCS | Performed by: INTERNAL MEDICINE

## 2025-03-21 PROCEDURE — 700101 HCHG RX REV CODE 250: Performed by: INTERNAL MEDICINE

## 2025-03-21 PROCEDURE — 700102 HCHG RX REV CODE 250 W/ 637 OVERRIDE(OP): Performed by: HOSPITALIST

## 2025-03-21 PROCEDURE — 700111 HCHG RX REV CODE 636 W/ 250 OVERRIDE (IP): Mod: JZ,TB | Performed by: INTERNAL MEDICINE

## 2025-03-21 PROCEDURE — 87040 BLOOD CULTURE FOR BACTERIA: CPT | Mod: 91

## 2025-03-21 PROCEDURE — 82962 GLUCOSE BLOOD TEST: CPT

## 2025-03-21 PROCEDURE — 700102 HCHG RX REV CODE 250 W/ 637 OVERRIDE(OP): Performed by: INTERNAL MEDICINE

## 2025-03-21 PROCEDURE — 80048 BASIC METABOLIC PNL TOTAL CA: CPT

## 2025-03-21 PROCEDURE — 700105 HCHG RX REV CODE 258: Performed by: INTERNAL MEDICINE

## 2025-03-21 PROCEDURE — 90935 HEMODIALYSIS ONE EVALUATION: CPT

## 2025-03-21 PROCEDURE — 99233 SBSQ HOSP IP/OBS HIGH 50: CPT | Performed by: INTERNAL MEDICINE

## 2025-03-21 PROCEDURE — 36415 COLL VENOUS BLD VENIPUNCTURE: CPT

## 2025-03-21 PROCEDURE — 87641 MR-STAPH DNA AMP PROBE: CPT

## 2025-03-21 PROCEDURE — 83735 ASSAY OF MAGNESIUM: CPT

## 2025-03-21 PROCEDURE — 770006 HCHG ROOM/CARE - MED/SURG/GYN SEMI*

## 2025-03-21 PROCEDURE — 700111 HCHG RX REV CODE 636 W/ 250 OVERRIDE (IP)

## 2025-03-21 PROCEDURE — A9270 NON-COVERED ITEM OR SERVICE: HCPCS | Performed by: HOSPITALIST

## 2025-03-21 PROCEDURE — 85027 COMPLETE CBC AUTOMATED: CPT

## 2025-03-21 PROCEDURE — 84703 CHORIONIC GONADOTROPIN ASSAY: CPT

## 2025-03-21 PROCEDURE — 80069 RENAL FUNCTION PANEL: CPT

## 2025-03-21 RX ORDER — HEPARIN SODIUM 1000 [USP'U]/ML
1800 INJECTION, SOLUTION INTRAVENOUS; SUBCUTANEOUS
Status: DISCONTINUED | OUTPATIENT
Start: 2025-03-21 | End: 2025-03-22 | Stop reason: HOSPADM

## 2025-03-21 RX ORDER — HEPARIN SODIUM 1000 [USP'U]/ML
INJECTION, SOLUTION INTRAVENOUS; SUBCUTANEOUS
Status: COMPLETED
Start: 2025-03-21 | End: 2025-03-21

## 2025-03-21 RX ORDER — SODIUM CHLORIDE, SODIUM LACTATE, POTASSIUM CHLORIDE, AND CALCIUM CHLORIDE .6; .31; .03; .02 G/100ML; G/100ML; G/100ML; G/100ML
500 INJECTION, SOLUTION INTRAVENOUS
Status: DISCONTINUED | OUTPATIENT
Start: 2025-03-21 | End: 2025-03-22 | Stop reason: HOSPADM

## 2025-03-21 RX ADMIN — AMLODIPINE BESYLATE 10 MG: 10 TABLET ORAL at 05:32

## 2025-03-21 RX ADMIN — INSULIN LISPRO 3 UNITS: 100 INJECTION, SOLUTION INTRAVENOUS; SUBCUTANEOUS at 17:59

## 2025-03-21 RX ADMIN — SODIUM HYPOCHLORITE 5 ML: 1.25 SOLUTION TOPICAL at 05:33

## 2025-03-21 RX ADMIN — Medication 5000 UNITS: at 05:32

## 2025-03-21 RX ADMIN — HEPARIN SODIUM 1800 UNITS: 1000 INJECTION, SOLUTION INTRAVENOUS; SUBCUTANEOUS at 11:06

## 2025-03-21 RX ADMIN — CARVEDILOL 6.25 MG: 6.25 TABLET, FILM COATED ORAL at 17:54

## 2025-03-21 RX ADMIN — SEVELAMER CARBONATE 800 MG: 800 TABLET, FILM COATED ORAL at 07:47

## 2025-03-21 RX ADMIN — SEVELAMER CARBONATE 800 MG: 800 TABLET, FILM COATED ORAL at 17:54

## 2025-03-21 RX ADMIN — OMEPRAZOLE 20 MG: 20 CAPSULE, DELAYED RELEASE ORAL at 05:32

## 2025-03-21 RX ADMIN — SEVELAMER CARBONATE 800 MG: 800 TABLET, FILM COATED ORAL at 12:35

## 2025-03-21 RX ADMIN — INSULIN LISPRO 2 UNITS: 100 INJECTION, SOLUTION INTRAVENOUS; SUBCUTANEOUS at 12:43

## 2025-03-21 RX ADMIN — EPOETIN ALFA-EPBX 5000 UNITS: 3000 INJECTION, SOLUTION INTRAVENOUS; SUBCUTANEOUS at 11:01

## 2025-03-21 RX ADMIN — OXYCODONE 10 MG: 5 TABLET ORAL at 13:49

## 2025-03-21 RX ADMIN — INSULIN LISPRO 2 UNITS: 100 INJECTION, SOLUTION INTRAVENOUS; SUBCUTANEOUS at 07:46

## 2025-03-21 RX ADMIN — SODIUM HYPOCHLORITE 1 ML: 1.25 SOLUTION TOPICAL at 14:39

## 2025-03-21 RX ADMIN — SODIUM CHLORIDE 250 MG: 9 INJECTION, SOLUTION INTRAVENOUS at 12:41

## 2025-03-21 RX ADMIN — INSULIN LISPRO 3 UNITS: 100 INJECTION, SOLUTION INTRAVENOUS; SUBCUTANEOUS at 23:34

## 2025-03-21 ASSESSMENT — ENCOUNTER SYMPTOMS
VOMITING: 0
PALPITATIONS: 0
NAUSEA: 0
BACK PAIN: 0
DIZZINESS: 0
CHILLS: 0
DIARRHEA: 0
COUGH: 0
LOSS OF CONSCIOUSNESS: 0
FEVER: 0
SHORTNESS OF BREATH: 0
HEADACHES: 0
ABDOMINAL PAIN: 0

## 2025-03-21 ASSESSMENT — PAIN DESCRIPTION - PAIN TYPE
TYPE: ACUTE PAIN

## 2025-03-21 ASSESSMENT — PATIENT HEALTH QUESTIONNAIRE - PHQ9
SUM OF ALL RESPONSES TO PHQ9 QUESTIONS 1 AND 2: 0
1. LITTLE INTEREST OR PLEASURE IN DOING THINGS: NOT AT ALL

## 2025-03-21 NOTE — PROGRESS NOTES
Hemodialysis ordered by Dr. Cox. Treatment started at 0820 and ended at 1120. Pt stable, vss, no c/o post tx. Net UF 2.0 L. Report to RELL Sinlcair RN. See flow sheets for details in chart review media.

## 2025-03-21 NOTE — PROGRESS NOTES
Assumed care of patient at 1900. Received bedside report from day RN Maria Elena. Patient A&Ox4, on RA, Reporting a pain level of 0/10. Call light within reach, belongings within reach, fall precautions in place, bed in lowest position. Patient does not have any other needs at this time.     POC was discussed with patient. All questions were answered. Patient verbalized understanding.

## 2025-03-21 NOTE — PROGRESS NOTES
"    Orthopedic PA Progress Note    Interval changes:  Patient doing better medically.  L 4th toe with qtip probe to bone per wound team [facility] medicine requesting ortho consult for possible I&D  Packing in place to small 4th toe void  3rd toe red and swollen with concern for possible osteo as well  Discussed options with patient, she is amenable to surgery tomorrow as long as she can be discharged postop  Discussed case with Dr. Martin, he will plan for dc postop tomorrow  Plan for OR tomorrow with Dr. Zuleta for revision amp/I&D L 4th toe and possible 3rd toe amp  NPO mn tonight    ROS -  Denies any numbness or tingling. Pain controlled.    /74   Pulse 75   Temp 36.1 °C (96.9 °F) (Temporal)   Resp 16   Ht 1.753 m (5' 9\")   Wt 99.1 kg (218 lb 7.6 oz)   SpO2 93%     Patient seen and examined  No acute distress  Breathing non labored  RRR  L foot:  4th toe with thick yellow drainage to packing. 3rd toe red and swollen with fissuring at base. Other toes without concern.     Recent Labs     03/18/25  2302 03/20/25  0703 03/21/25  0456   WBC 6.5 8.1 8.8   RBC 3.03* 2.74* 2.64*   HEMOGLOBIN 9.1* 8.3* 8.2*   HEMATOCRIT 28.0* 25.7* 24.4*   MCV 92.4 93.8 92.4   MCH 30.0 30.3 31.1   MCHC 32.5 32.3 33.6   RDW 44.5 45.5 43.9   PLATELETCT 381 308 335   MPV 9.8 9.8 10.3       Active Hospital Problems    Diagnosis     Acute encephalopathy [G93.40]      Priority: Medium    Hyponatremia [E87.1]     Elevated troponin [R79.89]     Anemia [D64.9]     Osteomyelitis of left foot (HCC) [M86.9]     Hyperlipidemia [E78.5]     Primary hypertension [I10]     Type 2 diabetes mellitus (HCC) [E11.9]     Acute renal failure superimposed on stage 3b chronic kidney disease (HCC) [N17.9, N18.32]     Hyperkalemia [E87.5]        IR-CVC WITH TUNNEL W/O PORT EXCHANGE   Final Result      Successful image guided tunneled dialysis catheter placement.      Plan: The catheter is available for immediate use.         CT-NEEDLE CORE " BX-RENAL   Final Result      CT-guided LEFT renal parenchymal biopsy      US-RENAL   Final Result         1.  Echogenic bilateral kidneys, a nonspecific finding commonly associated with medical renal disease      EC-ECHOCARDIOGRAM COMPLETE W/O CONT   Final Result      DX-CHEST-FOR LINE PLACEMENT Perform procedure in: Patient's Room   Final Result         1. Right jugular dialysis line in place without complication.      2. Shallow breath. Mild pulmonary edema suggested.                  DX-FOOT-COMPLETE 3+ LEFT   Final Result      1.  Prior amputation of the fourth toe.      2.  No evidence of fracture or bony destructive change.      CT-RENAL COLIC EVALUATION(A/P W/O)   Final Result      1.  No evidence of bowel obstruction or focal inflammatory change.      2.  No evidence of renal or ureteral stone or hydronephrosis.      3.  Small amount of ascites.      4.  Atherosclerotic vascular calcification.      5.  Anasarca.      6.  Bibasal atelectasis and minimal bilateral pleural effusions.      7.  Constipation.      8.  Prior gastric bypass procedure.      DX-OUTSIDE IMAGES-DX CHEST   Final Result      CT-OUTSIDE IMAGES-CT HEAD   Final Result          Assessment/Plan:  Patient doing better medically.  L 4th toe with qtip probe to bone per wound team eval  ID requesting ortho I&D  Packing in place to small 4th toe void  3rd toe red and swollen with concern for possible osteo as well  Discussed options with patient, she is amenable to surgery tomorrow as long as she can be discharged postop  Discussed case with Dr. Martin, he will plan for dc postop tomorrow  Plan for OR tomorrow with Dr. Zuleta for revision amp/I&D L 4th toe and possible 3rd toe amp  NPO mn tonight    POD#21 S/p  Left 4th toe amputation  Wt bearing status - Heel WB LLE  Future Procedures - 3/22  Wound care/Drains - Dressings to be changed every other day by nursing. Or PRN for saturation starting POD#2  Sutures/Staples out- 6 WEEK POSTOP per   Cliff  DVT Prophylaxis outpatient: ASA 81 mg PO BID x4 weeks  PT/OT-initiated  Antibiotics:  Perioperative ; per ID  DVT Prophylaxis- TEDS/SCDs/Foot pumps  Case Coordination for Discharge Planning - Disposition per therapy recs.   Follow up with [facility] trauma clinic 3 weeks following final surgery for wound check. (Est follow up date 4/12)

## 2025-03-21 NOTE — PROGRESS NOTES
Hospital Medicine Daily Progress Note    Date of Service  3/21/2025    Chief Complaint  Aviva Kenney is a 50 y.o. female admitted 3/15/2025 with altered mental status    Hospital Course  49yo PMHx DM, HTN, CKD IIIb.  Sent to us from [facility] in [place] with worsened renal function and altered mental status    Interval Problem Update  Pt states she feels a little better than when she first came to the hospital   Medically cleared: No   Pending: Nephro clearance, stable dialysis (ordered quantiferon)  Estimated Discharge Day:  Disposition: Home HHC?    Managing worsening renal function in the setting of diabetes, and hypertension. Currently on daily dialysis, has tunneled catheter.     Na 133-129. Hyponatremic. Ordered free water restriction OK to salt food.  Hyperphosphatemic. Ordered sevalamer  Cr- 4.45 K and bicarb WNL  Hb 9.1-8.3. Iron panel reviewed more likely ACKD. On epoietin.  She tolerated dialysis. He also had renal biopsy TODAY. Pathology PENDING. Explained to patient management of biopsy result.    Small murmur echo ordered reviwed no vegetations  L foot amp ordered XR  ID recs ordered ASO titer  I spoke with Dr. Cox who had also called Dr. Bryan for ID consultation. Infectious GN on pathology. I called Ortho PA and they said OK to remove sutures (toe amp done by Dr. Coronado). Wound Team consulted but wound looks clean no purulence.   I reviewed Nephrology note. SHould be cleared TOMORROW from a Nephrology standpoint, she will return to [facility] dialysis. I spoke with Dr. Coronado, Orthopedics. Plan to do OR InD TOMORROW but patient wants to go home after that. Per Ortho expect to remain heel weight bearing. AT this time no indication for antibiotics per ID, defer perioperative antibiotics to Ortho and redicuss postoperative antibiotics with ID.   I have discussed this patient's plan of care and discharge plan at IDT rounds today with Case Management, Nursing, Nursing leadership, and other  members of the IDT team.    Consultants/Specialty  nephrology    Code Status  Full Code    Disposition  Medically Cleared  I have placed the appropriate orders for post-discharge needs.    Review of Systems  Review of Systems   Constitutional:  Positive for malaise/fatigue. Negative for chills and fever.   Respiratory:  Negative for cough and shortness of breath.    Cardiovascular:  Negative for chest pain, palpitations and leg swelling.   Gastrointestinal:  Negative for abdominal pain, diarrhea, nausea and vomiting.   Genitourinary:  Negative for dysuria and urgency.   Musculoskeletal:  Negative for back pain.   Skin:  Negative for rash.   Neurological:  Negative for dizziness, loss of consciousness and headaches.        Physical Exam  Temp:  [35.9 °C (96.7 °F)-37 °C (98.6 °F)] 35.9 °C (96.7 °F)  Pulse:  [75-85] 84  Resp:  [16-20] 20  BP: (113-156)/(66-87) 156/84  SpO2:  [88 %-97 %] 95 %    Physical Exam  Constitutional:       General: She is not in acute distress.     Appearance: Normal appearance. She is well-developed. She is obese. She is not diaphoretic.   HENT:      Head: Normocephalic and atraumatic.   Neck:      Vascular: No JVD.   Cardiovascular:      Rate and Rhythm: Normal rate and regular rhythm.      Heart sounds: No murmur heard.  Pulmonary:      Effort: Pulmonary effort is normal. No respiratory distress.      Breath sounds: No stridor. No wheezing or rales.   Abdominal:      Palpations: Abdomen is soft.      Tenderness: There is no abdominal tenderness. There is no guarding or rebound.   Skin:     General: Skin is warm and dry.      Findings: No rash.   Neurological:      Mental Status: She is oriented to person, place, and time.   Psychiatric:         Mood and Affect: Mood normal.         Behavior: Behavior normal.         Thought Content: Thought content normal.         Fluids    Intake/Output Summary (Last 24 hours) at 3/21/2025 1656  Last data filed at 3/21/2025 1608  Gross per 24 hour   Intake  860 ml   Output 2600 ml   Net -1740 ml        Laboratory  Recent Labs     03/18/25  2302 03/20/25  0703 03/21/25  0456   WBC 6.5 8.1 8.8   RBC 3.03* 2.74* 2.64*   HEMOGLOBIN 9.1* 8.3* 8.2*   HEMATOCRIT 28.0* 25.7* 24.4*   MCV 92.4 93.8 92.4   MCH 30.0 30.3 31.1   MCHC 32.5 32.3 33.6   RDW 44.5 45.5 43.9   PLATELETCT 381 308 335   MPV 9.8 9.8 10.3     Recent Labs     03/20/25  2107 03/21/25  0456 03/21/25  1158   SODIUM 127* 127* 128*   POTASSIUM 5.3 5.0 4.3   CHLORIDE 90* 91* 93*   CO2 24 23 21   GLUCOSE 188* 178* 170*   BUN 30* 35* 18   CREATININE 5.28* 5.77* 3.38*   CALCIUM 8.5 8.4* 8.6                     Imaging  IR-CVC WITH TUNNEL W/O PORT EXCHANGE   Final Result      Successful image guided tunneled dialysis catheter placement.      Plan: The catheter is available for immediate use.         CT-NEEDLE CORE BX-RENAL   Final Result      CT-guided LEFT renal parenchymal biopsy      US-RENAL   Final Result         1.  Echogenic bilateral kidneys, a nonspecific finding commonly associated with medical renal disease      EC-ECHOCARDIOGRAM COMPLETE W/O CONT   Final Result      DX-CHEST-FOR LINE PLACEMENT Perform procedure in: Patient's Room   Final Result         1. Right jugular dialysis line in place without complication.      2. Shallow breath. Mild pulmonary edema suggested.                  DX-FOOT-COMPLETE 3+ LEFT   Final Result      1.  Prior amputation of the fourth toe.      2.  No evidence of fracture or bony destructive change.      CT-RENAL COLIC EVALUATION(A/P W/O)   Final Result      1.  No evidence of bowel obstruction or focal inflammatory change.      2.  No evidence of renal or ureteral stone or hydronephrosis.      3.  Small amount of ascites.      4.  Atherosclerotic vascular calcification.      5.  Anasarca.      6.  Bibasal atelectasis and minimal bilateral pleural effusions.      7.  Constipation.      8.  Prior gastric bypass procedure.      DX-OUTSIDE IMAGES-DX CHEST   Final Result       CT-OUTSIDE IMAGES-CT HEAD   Final Result           Assessment/Plan  * Acute renal failure superimposed on stage 3b chronic kidney disease (HCC)- (present on admission)  Assessment & Plan  Patient has chronic kidney secondary to diabetes mellitis  following outpatient with nephrology with baseline creatinine 1.1  Patient presented  with creatinine 12.5 and .  Nephrology consult, hemodialysis catheter placement, currently tolerating hemodialysis  Renal biopsy suggested to further delineate the patient's disease process, prognostication  Daily BMP  Renally dose medications as appropriate      Acute encephalopathy- (present on admission)  Assessment & Plan  Acute metabolic encephalopathy  Resolved with HD  Follow neuro exam      Osteomyelitis of left foot (HCC)  Assessment & Plan  Patient has recent history of admission for osteomyelitis of her left foot S/P left 4th toe amputated 03/01/2024  On examination the wound is dry and now signs of infection  Will continue monitor    Anemia  Assessment & Plan  Possibly secondary to renal disease  We will repeat iron studies, reticulocyte count, currently no evidence of blood loss  Monitor hemoglobin  Transfuse to keep Hb>7  Recent Labs     03/18/25  2302 03/20/25  0703 03/21/25  0456   HEMOGLOBIN 9.1* 8.3* 8.2*   HEMATOCRIT 28.0* 25.7* 24.4*   MCV 92.4 93.8 92.4   MCH 30.0 30.3 31.1   PLATELETCT 381 308 335         Elevated troponin  Assessment & Plan  Patient has troponin 129, BNP 48470 likely type II injury  Patient denied any chest pain or CP equivalent  EKG:  Sinus rhythm, no acute ST-T changes  ECHO, without regional wall motion abnormality, normal EF      Hyponatremia- (present on admission)  Assessment & Plan  Patient presented with sodium 117  Hypervolemic hypoNa  Volume and electrolyte correction with hemodialysis  Daily BMP  Recent Labs     03/20/25  2107 03/21/25  0456 03/21/25  1158   SODIUM 127* 127* 128*   POTASSIUM 5.3 5.0 4.3   CHLORIDE 90* 91* 93*    CO2 24 23 21   GLUCOSE 188* 178* 170*   BUN 30* 35* 18   CREATININE 5.28* 5.77* 3.38*     OK to salt food  Free water avoidance    Hyperlipidemia- (present on admission)  Assessment & Plan  Consider future statin therapy    Primary hypertension- (present on admission)  Assessment & Plan  Patient on amlodipine  5 mg: increase dose to 10mg daily  Monitor closely, adjust as indicated, as needed medication available  Vitals:    03/21/25 1535   BP: (!) 156/84   Pulse: 84   Resp: 20   Temp: 35.9 °C (96.7 °F)   SpO2: 95%         Hyperkalemia- (present on admission)  Assessment & Plan  Now resolved on HD  Follow daily  Recent Labs     03/20/25  2107 03/21/25  0456 03/21/25  1158   SODIUM 127* 127* 128*   POTASSIUM 5.3 5.0 4.3   CHLORIDE 90* 91* 93*   CO2 24 23 21   GLUCOSE 188* 178* 170*   BUN 30* 35* 18   CREATININE 5.28* 5.77* 3.38*         Type 2 diabetes mellitus (HCC)- (present on admission)  Assessment & Plan  Patient on insulin lispro 100, 10 units per meal and Ozempic  Patient recent hemoglobin A1c 8.6 January 2025  Patient has complication related to diabetes mellitus include kidney disease, neuropathy  Patient has recent amputation of her left fourth toe secondary to diabetic ulcer  On current regimen BG is running low 100s         VTE prophylaxis: VTE Selection  Hepa ppx  I have performed a physical exam and reviewed and updated ROS and Plan today (3/21/2025). In review of yesterday's note (3/20/2025), there are no changes except as documented above.    Patient is has a high medical complexity, complex decision making and is at high risk for complication, morbidity, and mortality, thus requiring the highest level of my preparedness for sudden, emergent intervention. Medical decision making is therefore complex. I provided  services, which included ordering labs and/or imaging, and discussing the case with various consultants.medication orders, frequent reevaluations of the patient's condition and response to  treatment. Time was also devoted to counseling and coordinating care including review of records, pertinent lab data and studies, as well as discussing diagnostic evaluation and work up, planned therapeutic interventions and future disposition of care. Where indicated, the assessment and plan reflect discussion of patient with consultants, other healthcare providers, family members, and additional research needed to obtain further information in formulating the plan of care for Aviva Kenney. Total time spent was 65 minutes.

## 2025-03-21 NOTE — PROGRESS NOTES
Infectious Disease Progress Note    Author: Jes Bryan M.D. Date & Time of service: 3/21/2025  11:15 AM    Chief Complaint:  Diabetic foot infections    Interval History:  50 y.o. diabetic female originally admitted 3/15/2025  for renal failure  3/21 AF in dialysis no acute events Wound pictures reviewed  Labs Reviewed and Medications Reviewed.      Review of Systems:  Review of Systems   Constitutional:  Negative for fever.       Hemodynamics:  Temp (24hrs), Av.6 °C (97.9 °F), Min:36.1 °C (96.9 °F), Max:37 °C (98.6 °F)  Temperature: 36.1 °C (96.9 °F)  Pulse  Av.5  Min: 70  Max: 121   Blood Pressure: 113/74       Physical Exam:  Physical Exam  Vitals and nursing note reviewed.   Cardiovascular:      Rate and Rhythm: Normal rate.   Pulmonary:      Effort: Pulmonary effort is normal. No respiratory distress.   Musculoskeletal:         General: Swelling present.      Comments: 3rd digit with erythema  Soft tissue defect/dehiscence at prior amputation site   Skin:     Findings: Erythema present.         Meds:    Current Facility-Administered Medications:     heparin    heparin    epoetin    epoetin    methylPREDNISolone    dakins 0.125% (1/4 strength)    sevelamer carbonate    HYDROmorphone    amLODIPine    oxyCODONE immediate-release    hydrALAZINE    carvedilol    HYDROcodone/acetaminophen    [Held by provider] heparin    senna-docusate **AND** polyethylene glycol/lytes    [Held by provider] gabapentin    epoetin    NS    insulin lispro **AND** POC blood glucose manual result **AND** NOTIFY MD and PharmD **AND** Administer 20 grams of glucose (approximately 8 ounces of fruit juice) every 15 minutes PRN FSBG less than 70 mg/dL **AND** dextrose bolus    omeprazole    vitamin D3    ondansetron    Labs:  Recent Labs     25  2302 25  0703 25  0456   WBC 6.5 8.1 8.8   RBC 3.03* 2.74* 2.64*   HEMOGLOBIN 9.1* 8.3* 8.2*   HEMATOCRIT 28.0* 25.7* 24.4*   MCV 92.4 93.8 92.4   MCH 30.0 30.3 31.1    RDW 44.5 45.5 43.9   PLATELETCT 381 308 335   MPV 9.8 9.8 10.3     Recent Labs     03/20/25  0703 03/20/25  2107 03/21/25  0456   SODIUM 129* 127* 127*   POTASSIUM 4.1 5.3 5.0   CHLORIDE 93* 90* 91*   CO2 27 24 23   GLUCOSE 119* 188* 178*   BUN 24* 30* 35*     Recent Labs     03/20/25  0703 03/20/25  2107 03/21/25  0456   ALBUMIN 2.5*  --  2.6*   CREATININE 4.45* 5.28* 5.77*       Imaging:  IR-CVC WITH TUNNEL W/O PORT EXCHANGE  Result Date: 3/19/2025  HISTORY/REASON FOR EXAM:  Renal failure. TECHNIQUE/EXAM DESCRIPTION AND NUMBER OF VIEWS: Image guided tunneled central hemodialysis catheter placement COMPARISON:  None. Contrast: None FLUOROSCOPIC IMAGES: 3 fluoroscopic image(s) obtained. Fluoroscopic guidance was used during the procedure. FLUOROSCOPY TIME: 0.2 minutes FLUOROSCOPY DOSE(kerma): 3 mGy MEDICATIONS: Moderate sedation was provided. Pulse oximetry and continuous cardiac monitoring by the nurse was performed throughout the exam. Intraservice time was 10 minutes. PROCEDURE:     The risks, benefits, goals and objectives and alternatives were discussed. Risks were specified as including but not limited to bleeding, infection, damage to vessels or nerves, pain and discomfort. Issues related to catheter care were discussed. The patient's questions were answered. Informed oral and written consent were obtained. The patient was placed supine on the angiography table. The skin of the  RIGHT neck was prepped and draped in the usual sterile manner. Maximum sterile barrier technique was used. A timeout was performed. Patency of the vein was documented with real-time  ultrasound and the recorded image was entered into the patient?s medical record. Local anesthetic result was achieved with administration of 1% lidocaine. A small skin nick was made with an 11 blade scalpel. Using real-time sonographic guidance and sterile technique a micro-access needle was advanced into the  RIGHT internal jugular vein. Fluoroscopic  guidance was then utilized. Access was secured with a wire. 1% lidocaine was administered over the tunnel site in the ipsilateral chest wall, a tunnel was created in the usual manner and the catheter pulled through the tunnel and flushed. The tract was dilated to accommodate a 14.5 [place] 23 cm tip to cuff Glidepath hemodialysis catheter which was put in place with its tip at the right atrium. All ports aspirated and flushed appropriately and were terminally flushed with heparinized saline. The venotomy site was closed with Dermabond. The catheter was secured to the skin in the usual manner and is available for immediate use. The skin was cleaned and a dressing was applied. The patient remained comfortable throughout the procedure and there were no complications. FINDINGS: Internal jugular vein patent by ultrasound.     Successful image guided tunneled dialysis catheter placement. Plan: The catheter is available for immediate use.     CT-NEEDLE CORE BX-RENAL  Result Date: 3/19/2025  HISTORY/REASON FOR EXAM:  BUTCH on CKD 3-4. Nephrotic range proteinuria. Renal dysfunction TECHNIQUE/EXAM DESCRIPTION: CT-guided renal parenchymal biopsy Low dose optimization technique was utilized for this CT exam including automated exposure control and adjustment of the mA and/or kV according to patient size. COMPARISON: None available. MEDICATIONS: Moderate sedation was provided. Pulse oximetry and continuous cardiac monitoring by the nurse was performed throughout the exam. Intraservice time was 12 minutes. PROCEDURE: The risks, benefits, goals and objectives and alternatives were discussed. Risks were specified as including but not limited to bleeding, infection, damage to vessels or nerves, pain and discomfort as well as nondiagnosis. The patient's questions were answered. Informed oral and written consent were obtained. The patient was appropriately positioned on the CT gantry. Initial CT imaging was performed and a site for  percutaneously accessing the target lesion was selected and marked. The skin was prepped and draped in the usual sterile manner. A timeout was performed. Local anesthetic result was achieved with administration of1% lidocaine. Using CT fluoroscopic guidance a 17-gauge guiding needle was advanced to the target location. 2 core specimens were obtained with a coaxially placed 18-gauge biopsy device. Specimen  adequacy was confirmed with with the attending pathologist.  The needle was removed and observed to be intact. A Gelfoam slurry was injected as the coaxial guide needle was withdrawn. Completion scanning was performed. No evidence of clinically significant complication seen. The patient tolerated the procedure well with no evidence of complication.  The skin was cleaned and a dressing was applied. FINDINGS: Needle tip in appropriate position. No hematoma at conclusion.     CT-guided LEFT renal parenchymal biopsy    US-RENAL  Result Date: 3/17/2025  3/17/2025 9:10 PM HISTORY/REASON FOR EXAM:  Abnormal Labs TECHNIQUE/EXAM DESCRIPTION:  Renal ultrasound. COMPARISON:  None FINDINGS: The right kidney measures 10.68 cm.  The left kidney measures 11.20 cm. There is no hydronephrosis. There are no abnormal calcifications. Bilateral kidneys appear echogenic. The bladder is decompressed around a Rodriguez catheter.     1.  Echogenic bilateral kidneys, a nonspecific finding commonly associated with medical renal disease    EC-ECHOCARDIOGRAM COMPLETE W/O CONT  Result Date: 3/17/2025  Transthoracic Echo Report Echocardiography Laboratory CONCLUSIONS No prior study is available for comparison. Normal left ventricular chamber size. Normal left ventricular wall thickness. Normal left ventricular systolic function. The left ventricular ejection fraction is visually estimated to be 55-60%. Normal regional wall motion. Normal diastolic function. No significant valvular disease Elevated RA pressure CALLIE FOWLER Exam Date:          2025                    17:42 Exam Location:     Inpatient Priority:          Routine Ordering Physician:        LAURA RABAGO Referring Physician: Sonographer:               Freda Dent RDCS Age:    50     Gender:    F MRN:    [id number] :    1975 BSA:    2.11   Ht (in):    69     Wt (lb):    210 Exam Type:     Complete Indications:     Abnormal EKG ICD Codes:       794.31 CPT Codes:       47650 BP:   165    /   78     HR:   85 Technical Quality:       Fair MEASUREMENTS  (Male / Female) Normal Values 2D ECHO LV Diastolic Diameter PLAX        4.7 cm                4.2 - 5.9 / 3.9 - 5.3 cm LV Systolic Diameter PLAX         3 cm                  2.1 - 4.0 cm IVS Diastolic Thickness           0.8 cm                LVPW Diastolic Thickness          0.8 cm                LVOT Diameter                     1.9 cm                LV Ejection Fraction MOD BP       68.2 %                >= 55  % LV Ejection Fraction MOD 4C       63.9 %                LV Ejection Fraction MOD 2C       71.9 %                LV Ejection Fraction 4C AL        61.9 %                LV Ejection Fraction 2C AL        71.2 %                LA Volume Index                   17.2 cm3/m2           16 - 28 cm3/m2 DOPPLER AV Peak Velocity                  1.7 m/s               AV Peak Gradient                  11.2 mmHg             AV Mean Gradient                  5 mmHg                LVOT Peak Velocity                1.4 m/s               AV Area Cont Eq vti               2.5 cm2               Mitral E Point Velocity           1.1 m/s               Mitral E to A Ratio               0.9                   MV Pressure Half Time             48 ms                 MV Area PHT                       4.6 cm2               MV Deceleration Time              165 ms                PV Peak Velocity                  1 m/s                 PV Peak Gradient                  4.2 mmHg              RVOT Peak Velocity                 0.93 m/s              LV E' Lateral Velocity            11.5 cm/s             Mitral E to LV E' Lateral Ratio   9.9                   LV E' Septal Velocity             10.7 cm/s             Mitral E to LV E' Septal Ratio    10.7                  * Indicates values subject to auto-interpretation LV EF:        % FINDINGS Left Ventricle Normal left ventricular chamber size. Normal left ventricular wall thickness. Normal left ventricular systolic function. The left ventricular ejection fraction is visually estimated to be 55-60%. Normal regional wall motion. Normal diastolic function. Right Ventricle The right ventricle is normal in size and systolic function. Right Atrium The right atrium is normal in size. Dilated inferior vena cava without inspiratory collapse. Left Atrium The left atrium is normal in size. Left atrial volume index is 16 mL/sq m. Mitral Valve Structurally normal mitral valve without significant stenosis or regurgitation. Aortic Valve Structurally normal aortic valve without significant stenosis or regurgitation. Tricuspid Valve Structurally normal tricuspid valve without significant stenosis or regurgitation. Unable to estimate right ventricular systolic pressure due to an inadequate tricuspid regurgitant jet. Pulmonic Valve Structurally normal pulmonic valve without significant stenosis or regurgitation. Pericardium No pericardial effusion. Aorta Normal aortic root for body surface area. The ascending aorta diameter is 3.1 cm. Alan Mesa DO (Electronically Signed) Final Date:     17 March 2025                 08:08    DX-CHEST-FOR LINE PLACEMENT Perform procedure in: Patient's Room  Result Date: 3/16/2025  3/16/2025 7:12 AM HISTORY/REASON FOR EXAM:  Abnormal finding of lung field. TECHNIQUE/EXAM DESCRIPTION AND NUMBER OF VIEWS: Single view of the chest. COMPARISON: None FINDINGS: Film from earlier today. Right jugular catheter in SVC. No pneumothorax or complication evident. Mild  pulmonary edema infiltrates. The left CP angle just off the film. No visible effusion. Shallow breath.     1. Right jugular dialysis line in place without complication. 2. Shallow breath. Mild pulmonary edema suggested.     DX-FOOT-COMPLETE 3+ LEFT  Result Date: 3/16/2025  3/16/2025 1:00 AM HISTORY/REASON FOR EXAM: Left foot pain. TECHNIQUE/EXAM DESCRIPTION AND NUMBER OF VIEWS: 3 nonweightbearing views of the LEFT foot. COMPARISON: FINDINGS: Bone mineralization is normal. There are surgical changes consistent with prior amputation of the fourth toe. No evidence of fracture or bony destructive change. There is vascular calcification. There is a plantar calcaneal spur.     1.  Prior amputation of the fourth toe. 2.  No evidence of fracture or bony destructive change.    CT-RENAL COLIC EVALUATION(A/P W/O)  Result Date: 3/16/2025  3/16/2025 12:16 AM HISTORY/REASON FOR EXAM:  Acute renal failure and elevated potassium. TECHNIQUE/EXAM DESCRIPTION AND NUMBER OF VIEWS: CT scan renal/colic without contrast. 5 mm helical images of the abdomen and pelvis were obtained from the diaphragmatic domes through the pubic symphysis. Low dose optimization technique was utilized for this CT exam including automated exposure control and adjustment of the mA and/or kV according to patient size. COMPARISON: FINDINGS: Abdomen: There is bibasilar atelectasis, left greater than right. There is anasarca. Minimal bilateral pleural effusion. Surgical changes consistent with prior gastric bypass procedure. Prior cholecystectomy. Stents of atherosclerotic vascular calcification. There is no evidence of renal or ureteral stone or evidence of hydronephrosis. Pelvis: There is moderate constipation. There is no evidence of inflammatory change seen in the region of the bowel. No evidence of bowel obstruction. There is a small amount of ascites dependently within the pelvis.     1.  No evidence of bowel obstruction or focal inflammatory change. 2.  No  evidence of renal or ureteral stone or hydronephrosis. 3.  Small amount of ascites. 4.  Atherosclerotic vascular calcification. 5.  Anasarca. 6.  Bibasal atelectasis and minimal bilateral pleural effusions. 7.  Constipation. 8.  Prior gastric bypass procedure.    MR-FOOT-W/O LEFT  Result Date: 2/28/2025 2/27/2025 5:35 PM HISTORY/REASON FOR EXAM: Diabetic foot infection. TECHNIQUE/EXAM DESCRIPTION: MRI of the LEFT foot without contrast. Using a [email or web address] 1.5 Omayra MRI scanner, T1 axial, sagittal, and coronal, fast spin-echo T2 fat-suppressed axial and coronal, and fast inversion recovery sagittal images were obtained. COMPARISON: None. FINDINGS: Osseous structures/Cartilaginous surfaces: There is marrow edema involving the fourth proximal and middle phalanges centered in the area of the PIP joint likely representing with osteomyelitis. No other evidence of marrow edema. There is a plantar calcaneal spur. There is degenerative change of the first MTP and first MTP joints characterized by cartilage loss and osteophytic spurring. Miscellaneous: There is soft tissue edema/induration involving the fourth toe and extending into the forefoot and midfoot. No evidence of soft tissue abscess.     1.  Marrow edema involving the fourth proximal and middle phalanges consistent with osteomyelitis. 2.  Cellulitis in involving the fourth toe extending through the mid and forefoot. 3.  Degenerative change of the first MTP and first IP joints.      Micro:  Results       Procedure Component Value Units Date/Time    BLOOD CULTURE [[id number]] Collected: 03/15/25 2300    Order Status: Completed Specimen: Blood from Peripheral Updated: 03/21/25 0100     Significant Indicator NEG     Source BLD     Site PERIPHERAL     Culture Result No growth after 5 days of incubation.    BLOOD CULTURE [[id number]] Collected: 03/15/25 2310    Order Status: Completed Specimen: Blood from Peripheral Updated: 03/20/25 1107     Significant Indicator NEG      Source BLD     Site PERIPHERAL     Culture Result No growth after 5 days of incubation.    URINALYSIS [[id number]]  (Abnormal) Collected: 03/19/25 0500    Order Status: Completed Updated: 03/19/25 0829     Color Brown     Character Turbid     Specific Gravity 1.020     Ph 6.5     Glucose Negative mg/dL      Ketones Negative mg/dL      Protein 300 mg/dL      Bilirubin Negative     Urobilinogen, Urine 0.2 EU/dL      Nitrite Negative     Leukocyte Esterase Large     Occult Blood Large     Micro Urine Req Microscopic    URINE CULTURE (ADD ON) [[id number]] Collected: 03/15/25 2310    Order Status: Completed Specimen: Urine, [facility] Updated: 03/18/25 0614     Significant Indicator NEG     Source UR     Site URINE, [facility]     Culture Result No growth at 48 hours.    URINALYSIS [[id number]] Collected: 03/16/25 1429    Order Status: Canceled Specimen: Urine, [facility]     BLOOD CULTURE [[id number]] Collected: 03/16/25 0000    Order Status: Canceled Specimen: Other from Peripheral     BLOOD CULTURE [[id number]] Collected: 03/16/25 0000    Order Status: Canceled Specimen: Other from Peripheral     URINALYSIS (UA) [[id number]]  (Abnormal) Collected: 03/15/25 2310    Order Status: Completed Specimen: Blood Updated: 03/15/25 2344     Color Dark Yellow     Character Cloudy     Specific Gravity 1.023     Ph 5.0     Glucose Negative mg/dL      Ketones Trace mg/dL      Protein >=1000 mg/dL      Bilirubin Negative     Urobilinogen, Urine 1.0 EU/dL      Nitrite Negative     Leukocyte Esterase Small     Occult Blood Large     Micro Urine Req Microscopic            Assessment:  Active Hospital Problems    Diagnosis     *Acute renal failure superimposed on stage 3b chronic kidney disease (HCC) [N17.9, N18.32]     Hyponatremia [E87.1]     Elevated troponin [R79.89]     Anemia [D64.9]     Osteomyelitis of left foot (HCC) [M86.9]     Acute encephalopathy [G93.40]     Hyperlipidemia [E78.5]     Primary hypertension [I10]     Type 2 diabetes  mellitus (HCC) [E11.9]     Hyperkalemia [E87.5]      Renal failure  Recent 4th toe amputation 2/28/25 for Group A strep associated osteomyelitis  -sutures still in place on admit  -Concern for underlying osteo given failure to heal and probes to bone  -Xray neg for osteo but low sensitivity  -tinea pedis likely portal of entry for prior infection  Erythema right 2nd and 3rd digit-no ulceration but fissure under 3rd toe  DM  Electrolyte derangements  Neg TTE 3/16     PLAN:   Xray neg for osteo  Unfortunately MRI will not be useful in her situation due to recent surgery  Recommend Ortho eval for debridement  Chronic osteo at amp site possible: antibiotics have poor efficacy vs chronic osteomyelitis in the absence of debridement of any necrotic tissues that will serve as a continued nidus of infection.    Empiric antibiotics and prolonged antibiotics are not recommended  Surgery is needed to guide antimicrobial therapy    High risk for additional amputation    Hold IV/PO antibiotics for now given hemodynamic stability  Consider topical antifungal feet

## 2025-03-21 NOTE — WOUND TEAM
"[facility]  Inpatient Services  Initial Wound and Skin Care Evaluation    Admission Date: 3/15/2025     Last order of IP CONSULT TO WOUND CARE was found on 3/20/2025 from Hospital Encounter on 3/15/2025     HPI, PMH, SH: Reviewed    Past Surgical History:   Procedure Laterality Date    TOE AMPUTATION Left 2/28/2025    Procedure: AMPUTATION, TOE-4TH;  Surgeon: Jamar Coronado M.D.;  Location: SURGERY [facility];  Service: Orthopedics    OH UPPER GI ENDOSCOPY,DIAGNOSIS N/A 12/07/2022    Procedure: ESOPHAGOGASTRODUODENOSCOPY (EGD) WITH BRUSHINGS AND DILATATION VS OVER THE SCOPE CLIP PLACEMENT;  Surgeon: Bridger Gongora M.D.;  Location: SURGERY [facility];  Service: General    OH UPPER GI ENDOSCOPY,CTRL BLEED N/A 12/07/2022    Procedure: EGD, WITH CLIP PLACEMENT;  Surgeon: Birdger Gongora M.D.;  Location: SURGERY [facility];  Service: General    ABDOMINAL HYSTERECTOMY TOTAL      CATARACT EXTRACTION WITH IOL Left     CHOLECYSTECTOMY      GASTRIC RESECTION      HYSTERECTOMY, TOTAL ABDOMINAL      OVARIAN CYSTECTOMY Bilateral     PRIMARY C SECTION      x 2    TONSILLECTOMY       Social History     Tobacco Use    Smoking status: Never    Smokeless tobacco: Never   Substance Use Topics    Alcohol use: Not Currently     Comment: occ     Chief Complaint   Patient presents with    Other     Pt transferred from [facility] for acute renal failure and elevated potassium.  Pt reported she went to hospital coz she was feeling confused and \"out of it \" started 2 weeks back. H/o DM and HTN     Diagnosis: BUTCH (acute kidney injury) (HCC) [N17.9]  Hyponatremia [E87.1]    Unit where seen by Wound Team: S527/01     WOUND CONSULT RELATED TO:  L 4th Toe    WOUND TEAM PLAN OF CARE - Frequency of Follow-up:   Nursing to follow dressing orders written for wound care. Contact wound team if area fails to progress, deteriorates or with any questions/concerns if something comes up before next scheduled follow up (See below " as to whether wound is following and frequency of wound follow up)   Weekly - L 4th Toe amputation site    WOUND HISTORY:   50 y.o. female with history of R 4th toe amputation with Dr. Coronado on 2/28/25. She has been following up with him on an out-patient basis.  Patient does have diabetic neuropathy.       WOUND ASSESSMENT/LDA  Wound 02/28/25 Incision Toe, 4th Left (Active)   Date First Assessed/Time First Assessed: 02/28/25 1402   Primary Wound Type: Incision  Location: Toe, 4th  Laterality: Left      Assessments 3/20/2025  5:00 PM   Wound Image       Site Assessment Slough   Periwound Assessment Red   Margins Defined edges;Unattached edges   Closure Secondary intention   Drainage Amount Scant   Drainage Description Serosanguineous   Treatments Cleansed;CSWD - Conservative Sharp Wound Debridement   Wound Cleansing Normal Saline Irrigation   Periwound Protectant Skin Protectant Wipes to Periwound   Dressing Status Clean;Dry;Intact   Dressing Changed New   Dressing Cleansing/Solutions Not Applicable   Dressing Options Plain Strip Packing;Silicone Adhesive Foam   Dressing Change/Treatment Frequency Every Shift, and As Needed   NEXT Dressing Change/Treatment Date 03/21/25   NEXT Weekly Photo (Inpatient Only) 03/27/25   Wound Team Following Weekly   Non-staged Wound Description Full thickness   Wound Length (cm) 1.1 cm   Wound Width (cm) 1 cm   Wound Depth (cm) 0.6 cm   Wound Surface Area (cm^2) 1.1 cm^2   Wound Volume (cm^3) 0.66 cm^3   Shape [place]   Wound Odor None   Pulses 2+;Left;DP   Exposed Structures Other (Comments)        Vascular:    TONJA:   No results found.    Lab Values:    Lab Results   Component Value Date/Time    WBC 8.1 03/20/2025 07:03 AM    RBC 2.74 (L) 03/20/2025 07:03 AM    HEMOGLOBIN 8.3 (L) 03/20/2025 07:03 AM    HEMATOCRIT 25.7 (L) 03/20/2025 07:03 AM    CREACTPROT 5.58 (H) 03/15/2025 10:18 PM    SEDRATEWES 100 (H) 03/15/2025 10:18 PM    HBA1C 8.6 (H) 01/21/2025 01:40 PM         Culture  "Results show:  Recent Results (from the past 720 hours)   CULTURE WOUND W/ GRAM STAIN    Collection Time: 02/27/25  1:40 PM    Specimen: Left Foot; Wound   Result Value Ref Range    Significant Indicator POS (POS)     Source WND     Site LEFT FOOT     Culture Result Light growth usual skin sanaz. (A)     Gram Stain Result No organisms seen.     Culture Result (A)      Streptococcus pyogenes (Group A)  Moderate growth         Pain Level/Medicated:  None, Tolerated without pain medication       INTERVENTIONS BY WOUND TEAM:  Chart and images reviewed. Discussed with bedside RN. All areas of concern (based on picture review, LDA review and discussion with bedside RN) have been thoroughly assessed. Documentation of areas based on significant findings. This RN in to assess patient. Performed standard wound care which includes appropriate positioning, dressing removal and non-selective debridement. Pictures and measurements obtained weekly if/when required.    Wound:  L 4th Toe Amputation site  Cleansed/Non-selectively Debrided with:  Normal Saline and Gauze  Non-Excisional Conservative Sharp debridement: Slough debrided away using scissors and forceps < 20cm2 debrided down to slough.  No Bleeding noted.  Elizabeth wound: Cleansed with Normal Saline and Gauze, Prepped with No Sting  Primary Dressing:  NS moistened 1/2\" plain strip packing  Secondary (Outer) Dressing: Silicone adhesive foam    Advanced Wound Care Discharge Planning  Number of Clinicians necessary to complete wound care: 1  Is patient requiring IV pain medications for dressing changes:  No   Length of time for dressing change 15 min. (This does not include chart review, pre-medication time, set up, clean up or time spent charting.)    Interdisciplinary consultation: Patient, Bedside RN, Dr. Martin    EVALUATION / RATIONALE FOR TREATMENT:     Date:  03/20/25  Wound Status:  Initial evaluation    L 4th Toe amputation with sutures take out today by " nursing.  Dehiscence noted with overlying semi-adhered slough. Some of this was debrided away, still difficult to visualize true wound base. Wound depth does probe to bone. There is an X-ray of the L foot from 3/16.  Dakins wet to dry ordered to chemically debride nonviable tissue, decrease bioburden and odor. Recommend Ortho consult.         Goals: Steady decrease in wound area and depth weekly.    NURSING PLAN OF CARE ORDERS:  Dressing changes: See Dressing Care orders    NUTRITION RECOMMENDATIONS   Wound Team Recommendations:  N/A    DIET ORDERS (From admission to next 24h)       Start     Ordered    03/19/25 1558  Diet Order Diet: Renal (OK TO SALT FOOD); Second Modifier: (optional): Consistent CHO (Diabetic); Fluid modifications: (optional): Free Water Restrictions Only  ALL MEALS        Question Answer Comment   Diet: Renal OK TO SALT FOOD   Second Modifier: (optional) Consistent CHO (Diabetic)    Fluid modifications: (optional) Free Water Restrictions Only        03/19/25 1557                    PREVENTATIVE INTERVENTIONS:    Q shift Rj - performed per nursing policy  Q shift pressure point assessments - performed per nursing policy    Surface/Positioning  Standard/trauma mattress - Currently in Place    Anticipated discharge plans:  TBD        Vac Discharge Needs:  Vac Discharge plan is purely a recommendation from wound team and not a requirement for discharge unless otherwise stated by physician.  Not Applicable Pt not on a wound vac

## 2025-03-21 NOTE — PROGRESS NOTES
0730: Assumed care of patient from NOC RN. Patient assessment completed. Patient is A&Ox4, on RA, and has no complaint of pain at this time. Patient educated on use of call light when assistance needed. Patient bed is locked and in the lowest position. Patient bedside table in reach. Patient has no further needs at this time.

## 2025-03-21 NOTE — CARE PLAN
The patient is Stable - Low risk of patient condition declining or worsening    Shift Goals  Clinical Goals: Pt will report pain 5/10 or ess after prn pain med admin. Strict I&O monitoring.  Patient Goals: Rest  Family Goals: NOHEMY    Progress made toward(s) clinical / shift goals:  Pt has maintained pain of 5/10 or less throughout shift. Strict I&O monitored throughout shift with no signs of fluid imbalance.       Problem: Pain Management  Goal: Pain level will decrease to patient's comfort goal  Outcome: Progressing     Problem: Fluid Volume  Goal: Fluid volume balance will be maintained  Outcome: Progressing       Patient is not progressing towards the following goals:

## 2025-03-21 NOTE — PROGRESS NOTES
Hospital Medicine Daily Progress Note    Date of Service  3/20/2025    Chief Complaint  Aviva Kenney is a 50 y.o. female admitted 3/15/2025 with altered mental status    Hospital Course  51yo PMHx DM, HTN, CKD IIIb.  Sent to us from [facility] in [place] with worsened renal function and altered mental status    Interval Problem Update  Pt states she feels a little better than when she first came to the hospital   Medically cleared: No   Pending: Nephro clearance, stable dialysis (ordered quantiferon)  Estimated Discharge Day:  Disposition: Home HHC?    Managing worsening renal function in the setting of diabetes, and hypertension. Currently on daily dialysis, has tunneled catheter.     Na 133-129. Hyponatremic. Ordered free water restriction OK to salt food.  Hyperphosphatemic. Ordered sevalamer  Cr- 4.45 K and bicarb WNL  Hb 9.1-8.3. Iron panel reviewed more likely ACKD. On epoietin.  She tolerated dialysis. He also had renal biopsy TODAY. Pathology PENDING. Explained to patient management of biopsy result.      Small murmur echo ordered no vegetations  L foot amp will check XR  ID recs ASO titer  I spoke with Dr. Cox who had also called Dr. Bryan for ID consultation. Infectious GN on pathology. I called Ortho PA and they said OK to remove sutures (toe amp done by Dr. Coronado). Wound Team consulted but wound looks clean no purulence.   I have discussed this patient's plan of care and discharge plan at IDT rounds today with Case Management, Nursing, Nursing leadership, and other members of the IDT team.    Consultants/Specialty  nephrology    Code Status  Full Code    Disposition  Medically Cleared  I have placed the appropriate orders for post-discharge needs.    Review of Systems  Review of Systems   Constitutional:  Positive for malaise/fatigue. Negative for chills and fever.   Respiratory:  Negative for cough and shortness of breath.    Cardiovascular:  Negative for chest pain, palpitations and  leg swelling.   Gastrointestinal:  Negative for abdominal pain, diarrhea, nausea and vomiting.   Genitourinary:  Negative for dysuria and urgency.   Musculoskeletal:  Negative for back pain.   Skin:  Negative for rash.   Neurological:  Negative for dizziness, loss of consciousness and headaches.        Physical Exam  Temp:  [36.3 °C (97.4 °F)-37.1 °C (98.7 °F)] 36.8 °C (98.3 °F)  Pulse:  [78-86] 83  Resp:  [16-18] 16  BP: (134-165)/(64-85) 150/69  SpO2:  [87 %-99 %] 89 %    Physical Exam  Constitutional:       General: She is not in acute distress.     Appearance: Normal appearance. She is well-developed. She is obese. She is not diaphoretic.   HENT:      Head: Normocephalic and atraumatic.   Neck:      Vascular: No JVD.   Cardiovascular:      Rate and Rhythm: Normal rate and regular rhythm.      Heart sounds: No murmur heard.  Pulmonary:      Effort: Pulmonary effort is normal. No respiratory distress.      Breath sounds: No stridor. No wheezing or rales.   Abdominal:      Palpations: Abdomen is soft.      Tenderness: There is no abdominal tenderness. There is no guarding or rebound.   Skin:     General: Skin is warm and dry.      Findings: No rash.   Neurological:      Mental Status: She is oriented to person, place, and time.   Psychiatric:         Mood and Affect: Mood normal.         Behavior: Behavior normal.         Thought Content: Thought content normal.         Fluids    Intake/Output Summary (Last 24 hours) at 3/20/2025 1737  Last data filed at 3/20/2025 1357  Gross per 24 hour   Intake 420 ml   Output --   Net 420 ml        Laboratory  Recent Labs     03/18/25  0209 03/18/25  2302 03/20/25  0703   WBC 5.6 6.5 8.1   RBC 2.44* 3.03* 2.74*   HEMOGLOBIN 7.6* 9.1* 8.3*   HEMATOCRIT 23.0* 28.0* 25.7*   MCV 94.3 92.4 93.8   MCH 31.1 30.0 30.3   MCHC 33.0 32.5 32.3   RDW 46.3 44.5 45.5   PLATELETCT 314 381 308   MPV 10.5 9.8 9.8     Recent Labs     03/19/25  0820 03/19/25  2242 03/20/25  0703   SODIUM 126* 133*  129*   POTASSIUM 4.3 4.1 4.1   CHLORIDE 90* 96 93*   CO2 26 28 27   GLUCOSE 88 135* 119*   BUN 38* 22 24*   CREATININE 5.88* 4.10* 4.45*   CALCIUM 7.9* 7.9* 8.1*     Recent Labs     03/18/25  0209   INR 1.48*               Imaging  IR-CVC WITH TUNNEL W/O PORT EXCHANGE   Final Result      Successful image guided tunneled dialysis catheter placement.      Plan: The catheter is available for immediate use.         CT-NEEDLE CORE BX-RENAL   Final Result      CT-guided LEFT renal parenchymal biopsy      US-RENAL   Final Result         1.  Echogenic bilateral kidneys, a nonspecific finding commonly associated with medical renal disease      EC-ECHOCARDIOGRAM COMPLETE W/O CONT   Final Result      DX-CHEST-FOR LINE PLACEMENT Perform procedure in: Patient's Room   Final Result         1. Right jugular dialysis line in place without complication.      2. Shallow breath. Mild pulmonary edema suggested.                  DX-FOOT-COMPLETE 3+ LEFT   Final Result      1.  Prior amputation of the fourth toe.      2.  No evidence of fracture or bony destructive change.      CT-RENAL COLIC EVALUATION(A/P W/O)   Final Result      1.  No evidence of bowel obstruction or focal inflammatory change.      2.  No evidence of renal or ureteral stone or hydronephrosis.      3.  Small amount of ascites.      4.  Atherosclerotic vascular calcification.      5.  Anasarca.      6.  Bibasal atelectasis and minimal bilateral pleural effusions.      7.  Constipation.      8.  Prior gastric bypass procedure.      DX-OUTSIDE IMAGES-DX CHEST   Final Result      CT-OUTSIDE IMAGES-CT HEAD   Final Result           Assessment/Plan  * Acute renal failure superimposed on stage 3b chronic kidney disease (HCC)- (present on admission)  Assessment & Plan  Patient has chronic kidney secondary to diabetes mellitis  following outpatient with nephrology with baseline creatinine 1.1  Patient presented  with creatinine 12.5 and .  Nephrology consult,  hemodialysis catheter placement, currently tolerating hemodialysis  Renal biopsy suggested to further delineate the patient's disease process, prognostication  Daily BMP  Renally dose medications as appropriate      Acute encephalopathy- (present on admission)  Assessment & Plan  Acute metabolic encephalopathy  Resolved with HD  Follow neuro exam      Osteomyelitis of left foot (HCC)  Assessment & Plan  Patient has recent history of admission for osteomyelitis of her left foot S/P left 4th toe amputated 03/01/2024  On examination the wound is dry and now signs of infection  Will continue monitor    Anemia  Assessment & Plan  Possibly secondary to renal disease  We will repeat iron studies, reticulocyte count, currently no evidence of blood loss  Monitor hemoglobin  Transfuse to keep Hb>7  Recent Labs     03/18/25  0209 03/18/25  2302 03/20/25  0703   HEMOGLOBIN 7.6* 9.1* 8.3*   HEMATOCRIT 23.0* 28.0* 25.7*   MCV 94.3 92.4 93.8   MCH 31.1 30.0 30.3   PLATELETCT 314 381 308         Elevated troponin  Assessment & Plan  Patient has troponin 129, BNP 24503 likely type II injury  Patient denied any chest pain or CP equivalent  EKG:  Sinus rhythm, no acute ST-T changes  ECHO, without regional wall motion abnormality, normal EF      Hyponatremia- (present on admission)  Assessment & Plan  Patient presented with sodium 117  Hypervolemic hypoNa  Volume and electrolyte correction with hemodialysis  Daily BMP  Recent Labs     03/19/25  0820 03/19/25  2242 03/20/25  0703   SODIUM 126* 133* 129*   POTASSIUM 4.3 4.1 4.1   CHLORIDE 90* 96 93*   CO2 26 28 27   GLUCOSE 88 135* 119*   BUN 38* 22 24*   CREATININE 5.88* 4.10* 4.45*     OK to salt food  Free water avoidance    Hyperlipidemia- (present on admission)  Assessment & Plan  Consider future statin therapy    Primary hypertension- (present on admission)  Assessment & Plan  Patient on amlodipine  5 mg: increase dose to 10mg daily  Monitor closely, adjust as indicated, as needed  medication available  Vitals:    03/19/25 1655   BP: (!) 130/99   Pulse: 99   Resp: 16   Temp: 36.2 °C (97.2 °F)   SpO2: 96%         Hyperkalemia- (present on admission)  Assessment & Plan  Now resolved on HD  Follow daily  Recent Labs     03/19/25  0820 03/19/25  2242 03/20/25  0703   SODIUM 126* 133* 129*   POTASSIUM 4.3 4.1 4.1   CHLORIDE 90* 96 93*   CO2 26 28 27   GLUCOSE 88 135* 119*   BUN 38* 22 24*   CREATININE 5.88* 4.10* 4.45*         Type 2 diabetes mellitus (HCC)- (present on admission)  Assessment & Plan  Patient on insulin lispro 100, 10 units per meal and Ozempic  Patient recent hemoglobin A1c 8.6 January 2025  Patient has complication related to diabetes mellitus include kidney disease, neuropathy  Patient has recent amputation of her left fourth toe secondary to diabetic ulcer  On current regimen BG is running low 100s         VTE prophylaxis: VTE Selection  Hepa ppx  I have performed a physical exam and reviewed and updated ROS and Plan today (3/20/2025). In review of yesterday's note (3/19/2025), there are no changes except as documented above.    Patient is has a high medical complexity, complex decision making and is at high risk for complication, morbidity, and mortality, thus requiring the highest level of my preparedness for sudden, emergent intervention. Medical decision making is therefore complex. I provided  services, which included ordering labs and/or imaging, and discussing the case with various consultants.medication orders, frequent reevaluations of the patient's condition and response to treatment. Time was also devoted to counseling and coordinating care including review of records, pertinent lab data and studies, as well as discussing diagnostic evaluation and work up, planned therapeutic interventions and future disposition of care. Where indicated, the assessment and plan reflect discussion of patient with consultants, other healthcare providers, family members, and additional  research needed to obtain further information in formulating the plan of care for Aviva Kenney. Total time spent was 65 minutes.

## 2025-03-21 NOTE — PROCEDURES
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 Patient seen and examined on hemodialysis during treatment. Patient is stable, tolerating hemodialysis. Denies chest pain and shortness of breath. Orders updated as needed. Please refer to flowsheet for full details.    Access: Right IJ permacath  UF goal: 1 to 2 L as tolerated    Plan: As the patient remains oliguric, continue dialysis on a Monday Wednesday Friday schedule.  Regarding her glomerulonephritis, continue IV methylprednisolone 250 mg daily for 3 days.  Today is steroid dose #2.  No plans for chronic steroids after 3 IV doses given the concern for underlying infection.  Continue workup for underlying ongoing infection.  Appreciate infectious disease assistance.        Theo Cox MD  Nephrology   [facility]

## 2025-03-21 NOTE — DISCHARGE PLANNING
Case Management Discharge Planning    Admission Date: 3/15/2025  GMLOS: 10.4  ALOS: 5    6-Clicks ADL Score: 23  6-Clicks Mobility Score: 21      Anticipated Discharge Dispo: Discharge Disposition: Discharged to home/self care (01)    DME Needed: No    Action(s) Taken: OTHER    Discussed patient's case with MD during IDT Rounds. Per MD, patient is a candidate for C.    DEBORAH obtained VC from patient to submit referrals to C providers in [place], [place].     DEBORAH faxed HHC choice form to [facility].     2:30pm - Per SANDY, [facility] ACCEPTED.     Escalations Completed: None    Medically Clear: No    Next Steps: Home with HHC and O/P Dialysis.     Barriers to Discharge: Medical clearance    Is the patient up for discharge tomorrow:

## 2025-03-22 ENCOUNTER — ANESTHESIA EVENT (OUTPATIENT)
Dept: SURGERY | Facility: MEDICAL CENTER | Age: 50
DRG: 673 | End: 2025-03-22
Payer: MEDICARE

## 2025-03-22 ENCOUNTER — ANESTHESIA (OUTPATIENT)
Dept: SURGERY | Facility: MEDICAL CENTER | Age: 50
DRG: 673 | End: 2025-03-22
Payer: MEDICARE

## 2025-03-22 VITALS
TEMPERATURE: 96.9 F | HEART RATE: 79 BPM | HEIGHT: 69 IN | RESPIRATION RATE: 17 BRPM | SYSTOLIC BLOOD PRESSURE: 143 MMHG | OXYGEN SATURATION: 94 % | DIASTOLIC BLOOD PRESSURE: 84 MMHG | WEIGHT: 218.48 LBS | BODY MASS INDEX: 32.36 KG/M2

## 2025-03-22 LAB
ALBUMIN SERPL BCP-MCNC: 2.4 G/DL (ref 3.2–4.9)
ANION GAP SERPL CALC-SCNC: 10 MMOL/L (ref 7–16)
ASO AB SERPL-ACNC: 1350 IU/ML
BUN SERPL-MCNC: 30 MG/DL (ref 8–22)
BUN SERPL-MCNC: 30 MG/DL (ref 8–22)
CALCIUM ALBUM COR SERPL-MCNC: 9.7 MG/DL (ref 8.5–10.5)
CALCIUM SERPL-MCNC: 8.3 MG/DL (ref 8.5–10.5)
CALCIUM SERPL-MCNC: 8.4 MG/DL (ref 8.5–10.5)
CHLORIDE SERPL-SCNC: 94 MMOL/L (ref 96–112)
CHLORIDE SERPL-SCNC: 95 MMOL/L (ref 96–112)
CO2 SERPL-SCNC: 22 MMOL/L (ref 20–33)
CO2 SERPL-SCNC: 22 MMOL/L (ref 20–33)
CREAT SERPL-MCNC: 4.14 MG/DL (ref 0.5–1.4)
CREAT SERPL-MCNC: 4.17 MG/DL (ref 0.5–1.4)
ERYTHROCYTE [DISTWIDTH] IN BLOOD BY AUTOMATED COUNT: 45 FL (ref 35.9–50)
GFR SERPLBLD CREATININE-BSD FMLA CKD-EPI: 13 ML/MIN/1.73 M 2
GFR SERPLBLD CREATININE-BSD FMLA CKD-EPI: 13 ML/MIN/1.73 M 2
GLUCOSE BLD STRIP.AUTO-MCNC: 238 MG/DL (ref 65–99)
GLUCOSE BLD STRIP.AUTO-MCNC: 295 MG/DL (ref 65–99)
GLUCOSE SERPL-MCNC: 269 MG/DL (ref 65–99)
GLUCOSE SERPL-MCNC: 271 MG/DL (ref 65–99)
HCT VFR BLD AUTO: 24.5 % (ref 37–47)
HGB BLD-MCNC: 8.1 G/DL (ref 12–16)
MAGNESIUM SERPL-MCNC: 2 MG/DL (ref 1.5–2.5)
MCH RBC QN AUTO: 30.8 PG (ref 27–33)
MCHC RBC AUTO-ENTMCNC: 33.1 G/DL (ref 32.2–35.5)
MCV RBC AUTO: 93.2 FL (ref 81.4–97.8)
MYELOPEROXIDASE AB SER-ACNC: 0 AU/ML (ref 0–19)
PHOSPHATE SERPL-MCNC: 4.3 MG/DL (ref 2.5–4.5)
PLATELET # BLD AUTO: 332 K/UL (ref 164–446)
PMV BLD AUTO: 10.1 FL (ref 9–12.9)
POTASSIUM SERPL-SCNC: 4.1 MMOL/L (ref 3.6–5.5)
POTASSIUM SERPL-SCNC: 4.1 MMOL/L (ref 3.6–5.5)
PROTEINASE3 AB SER-ACNC: 0 AU/ML (ref 0–19)
RBC # BLD AUTO: 2.63 M/UL (ref 4.2–5.4)
SODIUM SERPL-SCNC: 127 MMOL/L (ref 135–145)
SODIUM SERPL-SCNC: 128 MMOL/L (ref 135–145)
WBC # BLD AUTO: 10.5 K/UL (ref 4.8–10.8)

## 2025-03-22 PROCEDURE — 80069 RENAL FUNCTION PANEL: CPT

## 2025-03-22 PROCEDURE — 700111 HCHG RX REV CODE 636 W/ 250 OVERRIDE (IP): Mod: JZ | Performed by: ANESTHESIOLOGY

## 2025-03-22 PROCEDURE — A9270 NON-COVERED ITEM OR SERVICE: HCPCS | Performed by: INTERNAL MEDICINE

## 2025-03-22 PROCEDURE — 99233 SBSQ HOSP IP/OBS HIGH 50: CPT | Performed by: INTERNAL MEDICINE

## 2025-03-22 PROCEDURE — 160048 HCHG OR STATISTICAL LEVEL 1-5: Performed by: ORTHOPAEDIC SURGERY

## 2025-03-22 PROCEDURE — 160027 HCHG SURGERY MINUTES - 1ST 30 MINS LEVEL 2: Performed by: ORTHOPAEDIC SURGERY

## 2025-03-22 PROCEDURE — 160035 HCHG PACU - 1ST 60 MINS PHASE I: Performed by: ORTHOPAEDIC SURGERY

## 2025-03-22 PROCEDURE — 160015 HCHG STAT PREOP MINUTES: Performed by: ORTHOPAEDIC SURGERY

## 2025-03-22 PROCEDURE — 83735 ASSAY OF MAGNESIUM: CPT

## 2025-03-22 PROCEDURE — 160002 HCHG RECOVERY MINUTES (STAT): Performed by: ORTHOPAEDIC SURGERY

## 2025-03-22 PROCEDURE — A9270 NON-COVERED ITEM OR SERVICE: HCPCS | Performed by: HOSPITALIST

## 2025-03-22 PROCEDURE — 700102 HCHG RX REV CODE 250 W/ 637 OVERRIDE(OP): Performed by: INTERNAL MEDICINE

## 2025-03-22 PROCEDURE — 80048 BASIC METABOLIC PNL TOTAL CA: CPT

## 2025-03-22 PROCEDURE — 85027 COMPLETE CBC AUTOMATED: CPT

## 2025-03-22 PROCEDURE — 700102 HCHG RX REV CODE 250 W/ 637 OVERRIDE(OP): Performed by: HOSPITALIST

## 2025-03-22 PROCEDURE — 700101 HCHG RX REV CODE 250: Performed by: ANESTHESIOLOGY

## 2025-03-22 PROCEDURE — 700111 HCHG RX REV CODE 636 W/ 250 OVERRIDE (IP): Performed by: INTERNAL MEDICINE

## 2025-03-22 PROCEDURE — 82962 GLUCOSE BLOOD TEST: CPT | Mod: 91

## 2025-03-22 PROCEDURE — 700105 HCHG RX REV CODE 258: Performed by: INTERNAL MEDICINE

## 2025-03-22 PROCEDURE — 0KBW0ZZ EXCISION OF LEFT FOOT MUSCLE, OPEN APPROACH: ICD-10-PCS | Performed by: ORTHOPAEDIC SURGERY

## 2025-03-22 PROCEDURE — 160009 HCHG ANES TIME/MIN: Performed by: ORTHOPAEDIC SURGERY

## 2025-03-22 PROCEDURE — 36415 COLL VENOUS BLD VENIPUNCTURE: CPT

## 2025-03-22 PROCEDURE — 13160 SEC CLSR SURG WND/DEHSN XTN: CPT | Mod: 78 | Performed by: ORTHOPAEDIC SURGERY

## 2025-03-22 RX ORDER — AMOXICILLIN 250 MG
2 CAPSULE ORAL EVERY EVENING
COMMUNITY
Start: 2025-03-22

## 2025-03-22 RX ORDER — LIDOCAINE HYDROCHLORIDE 20 MG/ML
INJECTION, SOLUTION EPIDURAL; INFILTRATION; INTRACAUDAL; PERINEURAL PRN
Status: DISCONTINUED | OUTPATIENT
Start: 2025-03-22 | End: 2025-03-22 | Stop reason: SURG

## 2025-03-22 RX ORDER — ONDANSETRON 2 MG/ML
4 INJECTION INTRAMUSCULAR; INTRAVENOUS
Status: DISCONTINUED | OUTPATIENT
Start: 2025-03-22 | End: 2025-03-22 | Stop reason: HOSPADM

## 2025-03-22 RX ORDER — CARVEDILOL 6.25 MG/1
6.25 TABLET ORAL 2 TIMES DAILY WITH MEALS
Qty: 60 TABLET | Refills: 0 | Status: SHIPPED | OUTPATIENT
Start: 2025-03-22

## 2025-03-22 RX ORDER — DIPHENHYDRAMINE HYDROCHLORIDE 50 MG/ML
12.5 INJECTION, SOLUTION INTRAMUSCULAR; INTRAVENOUS
Status: DISCONTINUED | OUTPATIENT
Start: 2025-03-22 | End: 2025-03-22 | Stop reason: HOSPADM

## 2025-03-22 RX ORDER — POLYETHYLENE GLYCOL 3350 17 G/17G
17 POWDER, FOR SOLUTION ORAL
COMMUNITY
Start: 2025-03-22

## 2025-03-22 RX ORDER — MEPERIDINE HYDROCHLORIDE 25 MG/ML
12.5 INJECTION INTRAMUSCULAR; INTRAVENOUS; SUBCUTANEOUS
Status: DISCONTINUED | OUTPATIENT
Start: 2025-03-22 | End: 2025-03-22 | Stop reason: HOSPADM

## 2025-03-22 RX ORDER — OXYCODONE HCL 5 MG/5 ML
5 SOLUTION, ORAL ORAL
Status: DISCONTINUED | OUTPATIENT
Start: 2025-03-22 | End: 2025-03-22 | Stop reason: HOSPADM

## 2025-03-22 RX ORDER — HYDROMORPHONE HYDROCHLORIDE 1 MG/ML
0.2 INJECTION, SOLUTION INTRAMUSCULAR; INTRAVENOUS; SUBCUTANEOUS
Status: DISCONTINUED | OUTPATIENT
Start: 2025-03-22 | End: 2025-03-22 | Stop reason: HOSPADM

## 2025-03-22 RX ORDER — HYDROMORPHONE HYDROCHLORIDE 1 MG/ML
0.4 INJECTION, SOLUTION INTRAMUSCULAR; INTRAVENOUS; SUBCUTANEOUS
Status: DISCONTINUED | OUTPATIENT
Start: 2025-03-22 | End: 2025-03-22 | Stop reason: HOSPADM

## 2025-03-22 RX ORDER — METOCLOPRAMIDE HYDROCHLORIDE 5 MG/ML
INJECTION INTRAMUSCULAR; INTRAVENOUS PRN
Status: DISCONTINUED | OUTPATIENT
Start: 2025-03-22 | End: 2025-03-22 | Stop reason: SURG

## 2025-03-22 RX ORDER — ONDANSETRON 2 MG/ML
INJECTION INTRAMUSCULAR; INTRAVENOUS PRN
Status: DISCONTINUED | OUTPATIENT
Start: 2025-03-22 | End: 2025-03-22 | Stop reason: SURG

## 2025-03-22 RX ORDER — HYDROMORPHONE HYDROCHLORIDE 1 MG/ML
0.1 INJECTION, SOLUTION INTRAMUSCULAR; INTRAVENOUS; SUBCUTANEOUS
Status: DISCONTINUED | OUTPATIENT
Start: 2025-03-22 | End: 2025-03-22 | Stop reason: HOSPADM

## 2025-03-22 RX ORDER — CEFEPIME HYDROCHLORIDE 2 G/1
2 INJECTION, POWDER, FOR SOLUTION INTRAVENOUS ONCE
Status: DISCONTINUED | OUTPATIENT
Start: 2025-03-22 | End: 2025-03-22

## 2025-03-22 RX ORDER — CEFAZOLIN SODIUM 1 G/3ML
INJECTION, POWDER, FOR SOLUTION INTRAMUSCULAR; INTRAVENOUS PRN
Status: DISCONTINUED | OUTPATIENT
Start: 2025-03-22 | End: 2025-03-22 | Stop reason: SURG

## 2025-03-22 RX ORDER — SEVELAMER CARBONATE 800 MG/1
800 TABLET, FILM COATED ORAL
Qty: 270 TABLET | Refills: 0 | Status: SHIPPED | OUTPATIENT
Start: 2025-03-22

## 2025-03-22 RX ORDER — ALBUTEROL SULFATE 5 MG/ML
2.5 SOLUTION RESPIRATORY (INHALATION)
Status: DISCONTINUED | OUTPATIENT
Start: 2025-03-22 | End: 2025-03-22 | Stop reason: HOSPADM

## 2025-03-22 RX ORDER — DEXAMETHASONE SODIUM PHOSPHATE 4 MG/ML
INJECTION, SOLUTION INTRA-ARTICULAR; INTRALESIONAL; INTRAMUSCULAR; INTRAVENOUS; SOFT TISSUE PRN
Status: DISCONTINUED | OUTPATIENT
Start: 2025-03-22 | End: 2025-03-22 | Stop reason: SURG

## 2025-03-22 RX ORDER — HALOPERIDOL 5 MG/ML
1 INJECTION INTRAMUSCULAR
Status: DISCONTINUED | OUTPATIENT
Start: 2025-03-22 | End: 2025-03-22 | Stop reason: HOSPADM

## 2025-03-22 RX ORDER — GAUZE BANDAGE 2" X 2"
100 BANDAGE TOPICAL DAILY
Status: DISCONTINUED | OUTPATIENT
Start: 2025-03-22 | End: 2025-03-22 | Stop reason: HOSPADM

## 2025-03-22 RX ORDER — SODIUM HYPOCHLORITE 1.25 MG/ML
100 SOLUTION TOPICAL 2 TIMES DAILY
COMMUNITY
Start: 2025-03-22

## 2025-03-22 RX ORDER — LANOLIN ALCOHOL/MO/W.PET/CERES
100 CREAM (GRAM) TOPICAL DAILY
COMMUNITY
Start: 2025-03-22

## 2025-03-22 RX ORDER — OXYCODONE HCL 5 MG/5 ML
10 SOLUTION, ORAL ORAL
Status: DISCONTINUED | OUTPATIENT
Start: 2025-03-22 | End: 2025-03-22 | Stop reason: HOSPADM

## 2025-03-22 RX ADMIN — CEFEPIME 2 G: 2 INJECTION, POWDER, FOR SOLUTION INTRAVENOUS at 19:43

## 2025-03-22 RX ADMIN — INSULIN LISPRO 3 UNITS: 100 INJECTION, SOLUTION INTRAVENOUS; SUBCUTANEOUS at 08:17

## 2025-03-22 RX ADMIN — VANCOMYCIN HYDROCHLORIDE 2500 MG: 5 INJECTION, POWDER, LYOPHILIZED, FOR SOLUTION INTRAVENOUS at 16:23

## 2025-03-22 RX ADMIN — SODIUM HYPOCHLORITE 5 ML: 1.25 SOLUTION TOPICAL at 05:23

## 2025-03-22 RX ADMIN — Medication 5000 UNITS: at 05:23

## 2025-03-22 RX ADMIN — OXYCODONE 10 MG: 5 TABLET ORAL at 13:17

## 2025-03-22 RX ADMIN — METOCLOPRAMIDE HYDROCHLORIDE 10 MG: 5 INJECTION INTRAMUSCULAR; INTRAVENOUS at 11:35

## 2025-03-22 RX ADMIN — INSULIN LISPRO 5 UNITS: 100 INJECTION, SOLUTION INTRAVENOUS; SUBCUTANEOUS at 17:08

## 2025-03-22 RX ADMIN — LIDOCAINE HYDROCHLORIDE 100 MG: 20 INJECTION, SOLUTION EPIDURAL; INFILTRATION; INTRACAUDAL; PERINEURAL at 11:35

## 2025-03-22 RX ADMIN — AMLODIPINE BESYLATE 10 MG: 10 TABLET ORAL at 05:23

## 2025-03-22 RX ADMIN — SEVELAMER CARBONATE 800 MG: 800 TABLET, FILM COATED ORAL at 08:15

## 2025-03-22 RX ADMIN — CARVEDILOL 6.25 MG: 6.25 TABLET, FILM COATED ORAL at 17:04

## 2025-03-22 RX ADMIN — SEVELAMER CARBONATE 800 MG: 800 TABLET, FILM COATED ORAL at 17:04

## 2025-03-22 RX ADMIN — ONDANSETRON 4 MG: 2 INJECTION INTRAMUSCULAR; INTRAVENOUS at 11:35

## 2025-03-22 RX ADMIN — Medication 100 MG: at 16:15

## 2025-03-22 RX ADMIN — CARVEDILOL 6.25 MG: 6.25 TABLET, FILM COATED ORAL at 08:15

## 2025-03-22 RX ADMIN — CEFAZOLIN 2 G: 1 INJECTION, POWDER, FOR SOLUTION INTRAMUSCULAR; INTRAVENOUS at 11:35

## 2025-03-22 RX ADMIN — OMEPRAZOLE 20 MG: 20 CAPSULE, DELAYED RELEASE ORAL at 05:23

## 2025-03-22 RX ADMIN — MULTIVITAMIN TABLET 1 TABLET: TABLET at 16:15

## 2025-03-22 RX ADMIN — PROPOFOL 120 MG: 10 INJECTION, EMULSION INTRAVENOUS at 11:35

## 2025-03-22 RX ADMIN — DEXAMETHASONE SODIUM PHOSPHATE 4 MG: 4 INJECTION INTRA-ARTICULAR; INTRALESIONAL; INTRAMUSCULAR; INTRAVENOUS; SOFT TISSUE at 11:35

## 2025-03-22 ASSESSMENT — ENCOUNTER SYMPTOMS: FEVER: 0

## 2025-03-22 ASSESSMENT — PAIN DESCRIPTION - PAIN TYPE
TYPE: ACUTE PAIN
TYPE: ACUTE PAIN

## 2025-03-22 NOTE — CARE PLAN
The patient is Stable - Low risk of patient condition declining or worsening    Shift Goals  Clinical Goals: Patient will have strict I&O monitoring, patient will have daily weights, and continue IV steroids  Patient Goals: Rest  Family Goals: NOHEMY    Progress made toward(s) clinical / shift goals:    Problem: Skin Integrity  Goal: Skin integrity is maintained or improved  Outcome: Progressing  Note: Pt able to turn self in bed independently. Pt sitting up in bed for meals.      Problem: Pain Management  Goal: Pain level will decrease to patient's comfort goal  Outcome: Progressing  Note: Pt using pain scale appropriately. Pt communicated severe pain and requested medications.

## 2025-03-22 NOTE — DISCHARGE SUMMARY
"Discharge Summary    CHIEF COMPLAINT ON ADMISSION  Chief Complaint   Patient presents with    Other     Pt transferred from [facility] for acute renal failure and elevated potassium.  Pt reported she went to hospital coz she was feeling confused and \"out of it \" started 2 weeks back. H/o DM and HTN       Reason for Admission  Medical (Acute Renal Failure)     Admission Date  3/15/2025    CODE STATUS  Full Code    HPI & HOSPITAL COURSE  This is a 50 y.o. female here with Other (Pt transferred from [facility] for acute renal failure and elevated potassium./Pt reported she went to hospital coz she was feeling confused and \"out of it \" started 2 weeks back. H/o DM and HTN)  Managing worsening renal function in the setting of diabetes, and hypertension. Currently on daily dialysis, has tunneled catheter.  Mild hyponatremia. Ordered free water restriction OK to salt food.  Hyperphosphatemic. Ordered sevalamer  Cr- 4.45 K and bicarb WNL. Nephrology following. 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 Steroid course started. Nephrology clearing patient after steroids. Hb 9.1-8.3. Iron panel reviewed more likely ACKD. On epoietin.She tolerated dialysis.  She also had recent foot surgery and given the pathology of renal biopsy looking for potential infectious source. Small murmur echo ordered reviwed no vegetations  L foot amp ordered XR which showed no osteomyelitis or gas under tissue. I called Ortho PA and they said OK to remove sutures (toe amp done by Dr. Coronado). Wound Team consulted but wound looks clean no purulence. However on probing they were able to touch bone. They recommend Orthopedics consult. I spoke with Dr. Coronado, Orthopedics. S/p InD by Dr. Zuleta on 3/22.. Per Ortho expect to remain heel weight bearing. AT this time no indication for preoperatiove antibiotics per ID. After the InD, ID recommended vanco and cefepime to be given disalysis STOP " 5/2/25, will give doses before discharge TODAY. Home Health Care arranged.     At discharge date, Aviva Kenney afebrile and hemodynamically stable.  Aviva Kenney wanted to be discharged today.      Imaging  IR-CVC WITH TUNNEL W/O PORT EXCHANGE   Final Result      Successful image guided tunneled dialysis catheter placement.      Plan: The catheter is available for immediate use.         CT-NEEDLE CORE BX-RENAL   Final Result      CT-guided LEFT renal parenchymal biopsy      US-RENAL   Final Result         1.  Echogenic bilateral kidneys, a nonspecific finding commonly associated with medical renal disease      EC-ECHOCARDIOGRAM COMPLETE W/O CONT   Final Result      DX-CHEST-FOR LINE PLACEMENT Perform procedure in: Patient's Room   Final Result         1. Right jugular dialysis line in place without complication.      2. Shallow breath. Mild pulmonary edema suggested.                  DX-FOOT-COMPLETE 3+ LEFT   Final Result      1.  Prior amputation of the fourth toe.      2.  No evidence of fracture or bony destructive change.      CT-RENAL COLIC EVALUATION(A/P W/O)   Final Result      1.  No evidence of bowel obstruction or focal inflammatory change.      2.  No evidence of renal or ureteral stone or hydronephrosis.      3.  Small amount of ascites.      4.  Atherosclerotic vascular calcification.      5.  Anasarca.      6.  Bibasal atelectasis and minimal bilateral pleural effusions.      7.  Constipation.      8.  Prior gastric bypass procedure.      DX-OUTSIDE IMAGES-DX CHEST   Final Result      CT-OUTSIDE IMAGES-CT HEAD   Final Result                Therefore, she is discharged in fair and stable condition to home with organized home healthcare and close outpatient follow-up.    The patient met 2-midnight criteria for an inpatient stay at the time of discharge.    Discharge Date  3/22/2025    FOLLOW UP ITEMS POST DISCHARGE      DISCHARGE DIAGNOSES  Principal Problem:    Acute renal  failure superimposed on stage 3b chronic kidney disease (HCC) (POA: Yes)  Active Problems:    Type 2 diabetes mellitus (HCC) (POA: Yes)    Hyperkalemia (POA: Yes)    Primary hypertension (POA: Yes)    Hyperlipidemia (POA: Yes)    Hyponatremia (POA: Yes)    Elevated troponin (POA: Unknown)    Anemia (POA: Unknown)    Osteomyelitis of left foot (HCC) (POA: Unknown)    Acute encephalopathy (POA: Yes)      FOLLOW UP  Future Appointments   Date Time Provider Department Center   3/26/2025  2:00 PM Jamar Coronado M.D. ROCMTR [facility]   4/10/2025  1:30 PM Christina Gan M.D. NEPH [facility]   6/10/2025  3:20 PM Barry Higgins P.A.-C. [facility] [place]  [id number]  [facility] [id number]  [phone]      Follow up with Barry Higgins P.A.-C. In 1 week  Follow up with Dr. 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   Follow up with with ID clinic in 1 week for foot infection. Will be on vancomycin and cefepime to be given at and after dialysis STOP DATE 5/2/25  Follow up with Dr. Zuleta, Orthopedics in 1 week postop visit  NURSING provide resources/pamphlets for  Postoperative instructions from Orthopedics, wound care, diabetes (Advise Aviva Kenney to check blood glucoses at home and have a log for primary provider to review. Follow diabetic diet. Ask for and review [facility] hand[facility] and pamphlets.), antibiotic use, dialysis, Recommended weight loss advised, 5% through reduced calorie, low carb diet and 150 mins of exercise a week once better  Recommend bariatric surgery evaluation if morbidly obese  Educated on the increase of morbidity and mortality associated with excess weight including DM, Heart Disease, HTN, stroke, and sleep apnea. Recommended outpatient monitoring  of blood sugars, lipid panel, sleep study evaluation for metabolic  syndrome. FEEL FREE TO SALT YOUR FOOD and AVOID FREE WATER if you continue to be hyponatremic.     MEDICATIONS ON DISCHARGE     Medication List        START taking these medications        Instructions   carvedilol 6.25 MG Tabs  Commonly known as: Coreg   Take 1 Tablet by mouth 2 times a day with meals.  Dose: 6.25 mg     dakins 0.125% (1/4 strength) 0.125 % Soln   Apply 100 mL topically 2 times a day.  Dose: 100 mL     epoetin 3000 UNIT/ML SOLN 3,000 Units, epoetin 2000 UNIT/ML SOLN 2,000 Units  Start taking on: March 24, 2025   Inject 5,000 Units under the skin every Monday, Wednesday, and Friday.  Dose: 5,000 Units     multivitamin Tabs   Take 1 Tablet by mouth every day.  Dose: 1 Tablet     polyethylene glycol/lytes Pack  Commonly known as: Miralax   Take 1 Packet by mouth 1 time a day as needed (if no bowel movement in last 2 days).  Dose: 17 g     senna-docusate 8.6-50 MG Tabs  Commonly known as: Pericolace Or Senokot S   Take 2 Tablets by mouth every evening.  Dose: 2 Tablet     sevelamer carbonate 800 MG Tabs tablet  Commonly known as: Renvela   Take 1 Tablet by mouth 3 times a day with meals.  Dose: 800 mg     thiamine 100 MG tablet  Commonly known as: Thiamine   Take 1 Tablet by mouth every day.  Dose: 100 mg            CONTINUE taking these medications        Instructions   amLODIPine 5 MG Tabs  Commonly known as: Norvasc   Take 1 Tablet by mouth every day.  Dose: 5 mg     furosemide 20 MG Tabs  Commonly known as: Lasix   Take 1 Tablet by mouth 2 times a day.  Dose: 20 mg     HYDROcodone/acetaminophen  MG Tabs  Commonly known as: Norco   Take 1-2 Tablets by mouth every 6 hours as needed.  Dose: 1-2 Tablet     insulin lispro 100 UNIT/ML  Commonly known as: HumaLOG   Doctor's comments: Pt indicates her insurance didn't cover the pen ; this is a 3 month supply  Inject 10 units under skin 3 times a day before meals     omeprazole 20 MG delayed-release capsule  Commonly known as: PriLOSEC   Take 20 mg by  mouth every day.  Dose: 20 mg     Ozempic (1 MG/DOSE) 4 MG/3ML Sopn  Generic drug: Semaglutide (1 MG/DOSE)   Inject 1 mg under the skin every 7 days.  Dose: 1 mg     Vitamin D-3 125 MCG (5000 UT) Tabs   Take 5,000 Units by mouth every day.  Dose: 5,000 Units            STOP taking these medications      clindamycin 300 MG Caps  Commonly known as: Cleocin     gabapentin 300 MG Caps  Commonly known as: Neurontin              Allergies  No Known Allergies    DIET  Orders Placed This Encounter   Procedures    Diet Order Diet: Renal (OK TO SALT FOOD); Second Modifier: (optional): Consistent CHO (Diabetic); Fluid modifications: (optional): Free Water Restrictions Only     Standing Status:   Standing     Number of Occurrences:   1     Diet::   Renal [8]              OK TO SALT FOOD     Second Modifier: (optional):   Consistent CHO (Diabetic) [4]     Fluid modifications: (optional):   Free Water Restrictions Only [12]       ACTIVITY  As per C    CONSULTATIONS  Ortho  ID  Nephro  PROCEDURES  Dr. Zuleta OP NOTE    LABORATORY  Lab Results   Component Value Date    SODIUM 127 (L) 03/22/2025    SODIUM 128 (L) 03/22/2025    POTASSIUM 4.1 03/22/2025    POTASSIUM 4.1 03/22/2025    CHLORIDE 95 (L) 03/22/2025    CHLORIDE 94 (L) 03/22/2025    CO2 22 03/22/2025    CO2 22 03/22/2025    GLUCOSE 269 (H) 03/22/2025    GLUCOSE 271 (H) 03/22/2025    BUN 30 (H) 03/22/2025    BUN 30 (H) 03/22/2025    CREATININE 4.17 (H) 03/22/2025    CREATININE 4.14 (H) 03/22/2025        Lab Results   Component Value Date    WBC 10.5 03/22/2025    HEMOGLOBIN 8.1 (L) 03/22/2025    HEMATOCRIT 24.5 (L) 03/22/2025    PLATELETCT 332 03/22/2025        Total time of the discharge process exceeds 65 minutes.

## 2025-03-22 NOTE — ANESTHESIA TIME REPORT
Anesthesia Start and Stop Event Times       Date Time Event    3/22/2025 1132 Anesthesia Start     1159 Anesthesia Stop          Responsible Staff  03/22/25      Name Role Begin End    Jean Reyes M.D. Anesth 1132 1159          Overtime Reason:  no overtime (within assigned shift)    Comments:

## 2025-03-22 NOTE — ANESTHESIA PREPROCEDURE EVALUATION
Case: [id number] Date/Time: 03/22/25 0948    Procedures:       IRRIGATION AND DEBRIDEMENT, WOUND (Left: Fourth Toe)      AMPUTATION, TOE (Left: Third Toe)    Location: [place] [address] / SURGERY [facility]    Surgeons: Barry Zuleta M.D.            Relevant Problems   CARDIAC   (positive) Primary hypertension         (positive) Acute renal failure superimposed on stage 3b chronic kidney disease (HCC)   (positive) Pyelonephritis      ENDO   (positive) Type 2 diabetes mellitus (HCC)      Other   (positive) Osteomyelitis of left foot (HCC)       Physical Exam    Airway   Mallampati: II  TM distance: >3 FB  Neck ROM: full       Cardiovascular - normal exam  Rhythm: regular  Rate: normal  (-) murmur     Dental - normal exam           Pulmonary - normal exam  Breath sounds clear to auscultation     Abdominal    Neurological - normal exam                   Anesthesia Plan    ASA 3       Plan - general               Induction: intravenous    Postoperative Plan: Postoperative administration of opioids is intended.    Pertinent diagnostic labs and testing reviewed    Informed Consent:    Anesthetic plan and risks discussed with patient.    Use of blood products discussed with: patient whom consented to blood products.

## 2025-03-22 NOTE — OR NURSING
Pt's VSS. Pt on 2L NC. Pt A&Ox4. Pt denies nausea and pain. Pt surgical dressing CDI. Pt returning to S527.     Pt's spouse, Bharath, called and updated.

## 2025-03-22 NOTE — OP REPORT
DATE: 3/22/2025    PREOPERATIVE DIAGNOSIS: Left foot 2 cm wound dehiscence    POSTOPERATIVE DIAGNOSIS: Same    PROCEDURE PERFORMED:  Irrigation and debridement and secondary closure of 2 cm wound dehiscence                                                    SURGEON: Barry Zuleta M.D.     ASSISTANT: None    ANESTHESIA: General    ESTIMATED BLOOD LOSS: 0 mL    INDICATIONS: The patient is a 50 y.o. female with a left foot wound dehiscence after toe amputation.  I discussed the risks and benefits of the procedure, including the risks of infection, wound healing complication, neurovascular injury, need for repeat irrigation and debridement and the medical risks of anesthesia including DVT, PE, MI, stroke, and death.  Benefits include eradication of infection and closure of wound.  Alternatives to surgery were also discussed, including non-operative management.  The patient signed the informed consent and the operative extremity was marked.      PROCEDURE:  The patient underwent anesthesia, and was positioned supine on the operating room table and all bony prominences were well padded.  Preoperative antibiotics were held until cultures could be obtained. Sequential compression devices were employed. The correct operative site was prepped and draped into a sterile field. A procedural pause was conducted to verify correct patient, correct extremity, presence of the surgeons initials on the operative extremity.    The wound dehiscence was debrided in excisional fashion with knife and rongeur of skin, subcutaneous tissue, and underlying muscle down to healthy tissue. Deep cultures were taken and sent to lab for analysis. The  wound was irrigated with saline solution. The wound was closed in layers with 2-0 Vicryl and subcutaneous tissues and 2-0 Nylon in the skin.  Sterile dressings were applied.     The patient tolerated the procedure well. There were no apparent complications. All sponge, needle, and instrument counts  were correct on two separate occasions. They were awakened, extubated, and transferred to the recovery room in satisfactory condition.       Post-Operative Plan:    1.  The patient should be full weightbearing on their operative extremity.    2.  IV antibiotics - will be given postoperatively and adjusted by the Infectious Disease service as dictated by culture results.    ____________________________________   Barry Zuleta M.D.   DD: 3/22/2025  11:48 AM

## 2025-03-22 NOTE — PROGRESS NOTES
Assumed care of patient; received bedside report from Leah LAYEN; all questions answered. Patient voices no needs at this time.

## 2025-03-22 NOTE — ANESTHESIA PROCEDURE NOTES
Airway    Date/Time: 3/22/2025 11:35 AM    Performed by: Jean Reyes M.D.  Authorized by: Jean Reyes M.D.    Location:  OR  Urgency:  Elective  Indications for Airway Management:  Anesthesia      Spontaneous Ventilation: absent    Sedation Level:  Deep  Preoxygenated: Yes    Final Airway Type:  Supraglottic airway  Final Supraglottic Airway:  Standard LMA    SGA Size:  3  Number of Attempts at Approach:  1

## 2025-03-22 NOTE — CARE PLAN
The patient is Stable - Low risk of patient condition declining or worsening    Shift Goals  Clinical Goals: Pt will report pain level of 4/10 or less throughout shift. Pt will remain free from falls. Pt will reieve IV steroids. Pt will be NPO at midnight.  Patient Goals: Rest  Family Goals: NOHEMY    Progress made toward(s) clinical / shift goals:  Pt austin has remained less than 4/10 throughout shift remained free from falls. NPO since midnight.    Problem: Knowledge Deficit - Standard  Goal: Patient and family/care givers will demonstrate understanding of plan of care, disease process/condition, diagnostic tests and medications  Outcome: Progressing     Problem: Pain Management  Goal: Pain level will decrease to patient's comfort goal  Outcome: Progressing     Problem: Safety  Goal: Will remain free from falls  Outcome: Progressing       Patient is not progressing towards the following goals:

## 2025-03-22 NOTE — CARE PLAN
The patient is Stable - Low risk of patient condition declining or worsening    Shift Goals  Clinical Goals: Patient will remain free from falls this shift; surgery today  Patient Goals: Rest  Family Goals: NOHEMY    Progress made toward(s) clinical / shift goals:  Patient will remain free from falls this shift; surgery today    Patient is not progressing towards the following goals:

## 2025-03-22 NOTE — DISCHARGE PLANNING
Case Management Discharge Planning    Admission Date: 3/15/2025  GMLOS: 10.4  ALOS: 6    6-Clicks ADL Score: 23  6-Clicks Mobility Score: 21      Anticipated Discharge Dispo: Discharge Disposition: D/T to home under [facility] care in anticipation of covered skilled care (06)    DME Needed: No    Action(s) Taken: Updated Provider/Nurse on Discharge Plan    Pt has rachel accepted by Sushma SCHREIBER. Per note Pt has dialysis chair confirmed at [facility], MWF 1700, to start on 03/24 Monday. Dr. Martin informed. Per Dr. Martin, Pt will  need vancomycin and cefepime to be given at dialysis stop date 5/2/2025.    1540- RN CM spoke with Dr. Gan. Per Dr. Gan, she will follow up to [facility] in Monday morning regarding the antibiotic. Dr Gan called [facility] clinic today  but no answer. Dr. Martin and beside nurse updated.    IMM delivered. Received verbal understanding from Pt.    Escalations Completed: Provider    Medically Clear: No    Next Steps: RN CM to continue to assist in Pt's discharge needs.     Barriers to Discharge: None    Is the patient up for discharge tomorrow: No

## 2025-03-22 NOTE — PROGRESS NOTES
Assumed care of patient at 1900. Received bedside report from day RN Maria Elena. Patient A&Ox4, on 2lmp via NC, Reporting a pain level of 0/10. Call light within reach, belongings within reach, fall precautions in place, bed in lowest position. Patient does not have any other needs at this time.     POC was discussed with patient. All questions were answered. Patient verbalized understanding.

## 2025-03-22 NOTE — PROGRESS NOTES
Infectious Disease Progress Note    Author: Jes Bryan M.D. Date & Time of service: 3/22/2025  1:26 PM    Chief Complaint:  Diabetic foot infections    Interval History:  50 y.o. diabetic female originally admitted 3/15/2025  for renal failure  3/21 AF in dialysis no acute events Wound pictures reviewed  3/22 AF WBC 10.5 in OR at time of rounds-op note reviewed  Labs Reviewed and Medications Reviewed.      Review of Systems:  Review of Systems   Constitutional:  Negative for fever.       Hemodynamics:  Temp (24hrs), Av.3 °C (97.3 °F), Min:35.9 °C (96.7 °F), Max:36.8 °C (98.2 °F)  Temperature: 36.3 °C (97.3 °F)  Pulse  Av.1  Min: 70  Max: 121   Blood Pressure: (!) 145/81       Physical Exam:  Physical Exam  Vitals and nursing note reviewed.   Cardiovascular:      Rate and Rhythm: Normal rate.         Meds:    Current Facility-Administered Medications:     heparin    heparin    LR    methylPREDNISolone    dakins 0.125% (1/4 strength)    sevelamer carbonate    HYDROmorphone    amLODIPine    oxyCODONE immediate-release    hydrALAZINE    carvedilol    HYDROcodone/acetaminophen    [Held by provider] heparin    senna-docusate **AND** polyethylene glycol/lytes    [Held by provider] gabapentin    epoetin    NS    insulin lispro **AND** POC blood glucose manual result **AND** NOTIFY MD and PharmD **AND** Administer 20 grams of glucose (approximately 8 ounces of fruit juice) every 15 minutes PRN FSBG less than 70 mg/dL **AND** dextrose bolus    omeprazole    vitamin D3    ondansetron    Labs:  Recent Labs     25  0703 25  0456 25  0114   WBC 8.1 8.8 10.5   RBC 2.74* 2.64* 2.63*   HEMOGLOBIN 8.3* 8.2* 8.1*   HEMATOCRIT 25.7* 24.4* 24.5*   MCV 93.8 92.4 93.2   MCH 30.3 31.1 30.8   RDW 45.5 43.9 45.0   PLATELETCT 308 335 332   MPV 9.8 10.3 10.1     Recent Labs     25  0456 25  1158 25  0114   SODIUM 127* 128* 128*  127*   POTASSIUM 5.0 4.3 4.1  4.1   CHLORIDE 91* 93* 94*   95*   CO2 23 21 22  22   GLUCOSE 178* 170* 271*  269*   BUN 35* 18 30*  30*     Recent Labs     03/20/25  0703 03/20/25  2107 03/21/25  0456 03/21/25  1158 03/22/25  0114   ALBUMIN 2.5*  --  2.6*  --  2.4*   CREATININE 4.45*   < > 5.77* 3.38* 4.14*  4.17*    < > = values in this interval not displayed.       Imaging:  IR-CVC WITH TUNNEL W/O PORT EXCHANGE  Result Date: 3/19/2025  HISTORY/REASON FOR EXAM:  Renal failure. TECHNIQUE/EXAM DESCRIPTION AND NUMBER OF VIEWS: Image guided tunneled central hemodialysis catheter placement COMPARISON:  None. Contrast: None FLUOROSCOPIC IMAGES: 3 fluoroscopic image(s) obtained. Fluoroscopic guidance was used during the procedure. FLUOROSCOPY TIME: 0.2 minutes FLUOROSCOPY DOSE(kerma): 3 mGy MEDICATIONS: Moderate sedation was provided. Pulse oximetry and continuous cardiac monitoring by the nurse was performed throughout the exam. Intraservice time was 10 minutes. PROCEDURE:     The risks, benefits, goals and objectives and alternatives were discussed. Risks were specified as including but not limited to bleeding, infection, damage to vessels or nerves, pain and discomfort. Issues related to catheter care were discussed. The patient's questions were answered. Informed oral and written consent were obtained. The patient was placed supine on the angiography table. The skin of the  RIGHT neck was prepped and draped in the usual sterile manner. Maximum sterile barrier technique was used. A timeout was performed. Patency of the vein was documented with real-time  ultrasound and the recorded image was entered into the patient?s medical record. Local anesthetic result was achieved with administration of 1% lidocaine. A small skin nick was made with an 11 blade scalpel. Using real-time sonographic guidance and sterile technique a micro-access needle was advanced into the  RIGHT internal jugular vein. Fluoroscopic guidance was then utilized. Access was secured with a wire. 1% lidocaine  was administered over the tunnel site in the ipsilateral chest wall, a tunnel was created in the usual manner and the catheter pulled through the tunnel and flushed. The tract was dilated to accommodate a 14.5 Somali 23 cm tip to cuff Glidepath hemodialysis catheter which was put in place with its tip at the right atrium. All ports aspirated and flushed appropriately and were terminally flushed with heparinized saline. The venotomy site was closed with Dermabond. The catheter was secured to the skin in the usual manner and is available for immediate use. The skin was cleaned and a dressing was applied. The patient remained comfortable throughout the procedure and there were no complications. FINDINGS: Internal jugular vein patent by ultrasound.     Successful image guided tunneled dialysis catheter placement. Plan: The catheter is available for immediate use.     CT-NEEDLE CORE BX-RENAL  Result Date: 3/19/2025  HISTORY/REASON FOR EXAM:  BUTCH on CKD 3-4. Nephrotic range proteinuria. Renal dysfunction TECHNIQUE/EXAM DESCRIPTION: CT-guided renal parenchymal biopsy Low dose optimization technique was utilized for this CT exam including automated exposure control and adjustment of the mA and/or kV according to patient size. COMPARISON: None available. MEDICATIONS: Moderate sedation was provided. Pulse oximetry and continuous cardiac monitoring by the nurse was performed throughout the exam. Intraservice time was 12 minutes. PROCEDURE: The risks, benefits, goals and objectives and alternatives were discussed. Risks were specified as including but not limited to bleeding, infection, damage to vessels or nerves, pain and discomfort as well as nondiagnosis. The patient's questions were answered. Informed oral and written consent were obtained. The patient was appropriately positioned on the CT gantry. Initial CT imaging was performed and a site for percutaneously accessing the target lesion was selected and marked. The skin  was prepped and draped in the usual sterile manner. A timeout was performed. Local anesthetic result was achieved with administration of1% lidocaine. Using CT fluoroscopic guidance a 17-gauge guiding needle was advanced to the target location. 2 core specimens were obtained with a coaxially placed 18-gauge biopsy device. Specimen  adequacy was confirmed with with the attending pathologist.  The needle was removed and observed to be intact. A Gelfoam slurry was injected as the coaxial guide needle was withdrawn. Completion scanning was performed. No evidence of clinically significant complication seen. The patient tolerated the procedure well with no evidence of complication.  The skin was cleaned and a dressing was applied. FINDINGS: Needle tip in appropriate position. No hematoma at conclusion.     CT-guided LEFT renal parenchymal biopsy    US-RENAL  Result Date: 3/17/2025  3/17/2025 9:10 PM HISTORY/REASON FOR EXAM:  Abnormal Labs TECHNIQUE/EXAM DESCRIPTION:  Renal ultrasound. COMPARISON:  None FINDINGS: The right kidney measures 10.68 cm.  The left kidney measures 11.20 cm. There is no hydronephrosis. There are no abnormal calcifications. Bilateral kidneys appear echogenic. The bladder is decompressed around a Rodriguez catheter.     1.  Echogenic bilateral kidneys, a nonspecific finding commonly associated with medical renal disease    EC-ECHOCARDIOGRAM COMPLETE W/O CONT  Result Date: 3/17/2025  Transthoracic Echo Report Echocardiography Laboratory CONCLUSIONS No prior study is available for comparison. Normal left ventricular chamber size. Normal left ventricular wall thickness. Normal left ventricular systolic function. The left ventricular ejection fraction is visually estimated to be 55-60%. Normal regional wall motion. Normal diastolic function. No significant valvular disease Elevated RA pressure CALLIE FOWLER Exam Date:         03/16/2025                    17:42 Exam Location:     Inpatient Priority:           Routine Ordering Physician:        LAURA RABAGO Referring Physician: Sonographer:               Freda Dent RDCS Age:    50     Gender:    F MRN:    [id number] :    1975 BSA:    2.11   Ht (in):    69     Wt (lb):    210 Exam Type:     Complete Indications:     Abnormal EKG ICD Codes:       794.31 CPT Codes:       00059 BP:   165    /   78     HR:   85 Technical Quality:       Fair MEASUREMENTS  (Male / Female) Normal Values 2D ECHO LV Diastolic Diameter PLAX        4.7 cm                4.2 - 5.9 / 3.9 - 5.3 cm LV Systolic Diameter PLAX         3 cm                  2.1 - 4.0 cm IVS Diastolic Thickness           0.8 cm                LVPW Diastolic Thickness          0.8 cm                LVOT Diameter                     1.9 cm                LV Ejection Fraction MOD BP       68.2 %                >= 55  % LV Ejection Fraction MOD 4C       63.9 %                LV Ejection Fraction MOD 2C       71.9 %                LV Ejection Fraction 4C AL        61.9 %                LV Ejection Fraction 2C AL        71.2 %                LA Volume Index                   17.2 cm3/m2           16 - 28 cm3/m2 DOPPLER AV Peak Velocity                  1.7 m/s               AV Peak Gradient                  11.2 mmHg             AV Mean Gradient                  5 mmHg                LVOT Peak Velocity                1.4 m/s               AV Area Cont Eq vti               2.5 cm2               Mitral E Point Velocity           1.1 m/s               Mitral E to A Ratio               0.9                   MV Pressure Half Time             48 ms                 MV Area PHT                       4.6 cm2               MV Deceleration Time              165 ms                PV Peak Velocity                  1 m/s                 PV Peak Gradient                  4.2 mmHg              RVOT Peak Velocity                0.93 m/s              LV E' Lateral Velocity            11.5 cm/s              Mitral E to LV E' Lateral Ratio   9.9                   LV E' Septal Velocity             10.7 cm/s             Mitral E to LV E' Septal Ratio    10.7                  * Indicates values subject to auto-interpretation LV EF:        % FINDINGS Left Ventricle Normal left ventricular chamber size. Normal left ventricular wall thickness. Normal left ventricular systolic function. The left ventricular ejection fraction is visually estimated to be 55-60%. Normal regional wall motion. Normal diastolic function. Right Ventricle The right ventricle is normal in size and systolic function. Right Atrium The right atrium is normal in size. Dilated inferior vena cava without inspiratory collapse. Left Atrium The left atrium is normal in size. Left atrial volume index is 16 mL/sq m. Mitral Valve Structurally normal mitral valve without significant stenosis or regurgitation. Aortic Valve Structurally normal aortic valve without significant stenosis or regurgitation. Tricuspid Valve Structurally normal tricuspid valve without significant stenosis or regurgitation. Unable to estimate right ventricular systolic pressure due to an inadequate tricuspid regurgitant jet. Pulmonic Valve Structurally normal pulmonic valve without significant stenosis or regurgitation. Pericardium No pericardial effusion. Aorta Normal aortic root for body surface area. The ascending aorta diameter is 3.1 cm. Alan Mesa DO (Electronically Signed) Final Date:     17 March 2025                 08:08    DX-CHEST-FOR LINE PLACEMENT Perform procedure in: Patient's Room  Result Date: 3/16/2025  3/16/2025 7:12 AM HISTORY/REASON FOR EXAM:  Abnormal finding of lung field. TECHNIQUE/EXAM DESCRIPTION AND NUMBER OF VIEWS: Single view of the chest. COMPARISON: None FINDINGS: Film from earlier today. Right jugular catheter in SVC. No pneumothorax or complication evident. Mild pulmonary edema infiltrates. The left CP angle just off the film. No visible  effusion. Shallow breath.     1. Right jugular dialysis line in place without complication. 2. Shallow breath. Mild pulmonary edema suggested.     DX-FOOT-COMPLETE 3+ LEFT  Result Date: 3/16/2025  3/16/2025 1:00 AM HISTORY/REASON FOR EXAM: Left foot pain. TECHNIQUE/EXAM DESCRIPTION AND NUMBER OF VIEWS: 3 nonweightbearing views of the LEFT foot. COMPARISON: FINDINGS: Bone mineralization is normal. There are surgical changes consistent with prior amputation of the fourth toe. No evidence of fracture or bony destructive change. There is vascular calcification. There is a plantar calcaneal spur.     1.  Prior amputation of the fourth toe. 2.  No evidence of fracture or bony destructive change.    CT-RENAL COLIC EVALUATION(A/P W/O)  Result Date: 3/16/2025  3/16/2025 12:16 AM HISTORY/REASON FOR EXAM:  Acute renal failure and elevated potassium. TECHNIQUE/EXAM DESCRIPTION AND NUMBER OF VIEWS: CT scan renal/colic without contrast. 5 mm helical images of the abdomen and pelvis were obtained from the diaphragmatic domes through the pubic symphysis. Low dose optimization technique was utilized for this CT exam including automated exposure control and adjustment of the mA and/or kV according to patient size. COMPARISON: FINDINGS: Abdomen: There is bibasilar atelectasis, left greater than right. There is anasarca. Minimal bilateral pleural effusion. Surgical changes consistent with prior gastric bypass procedure. Prior cholecystectomy. Stents of atherosclerotic vascular calcification. There is no evidence of renal or ureteral stone or evidence of hydronephrosis. Pelvis: There is moderate constipation. There is no evidence of inflammatory change seen in the region of the bowel. No evidence of bowel obstruction. There is a small amount of ascites dependently within the pelvis.     1.  No evidence of bowel obstruction or focal inflammatory change. 2.  No evidence of renal or ureteral stone or hydronephrosis. 3.  Small amount of  ascites. 4.  Atherosclerotic vascular calcification. 5.  Anasarca. 6.  Bibasal atelectasis and minimal bilateral pleural effusions. 7.  Constipation. 8.  Prior gastric bypass procedure.    MR-FOOT-W/O LEFT  Result Date: 2/28/2025 2/27/2025 5:35 PM HISTORY/REASON FOR EXAM: Diabetic foot infection. TECHNIQUE/EXAM DESCRIPTION: MRI of the LEFT foot without contrast. Using a [email or web address] Signa 1.5 Omayra MRI scanner, T1 axial, sagittal, and coronal, fast spin-echo T2 fat-suppressed axial and coronal, and fast inversion recovery sagittal images were obtained. COMPARISON: None. FINDINGS: Osseous structures/Cartilaginous surfaces: There is marrow edema involving the fourth proximal and middle phalanges centered in the area of the PIP joint likely representing with osteomyelitis. No other evidence of marrow edema. There is a plantar calcaneal spur. There is degenerative change of the first MTP and first MTP joints characterized by cartilage loss and osteophytic spurring. Miscellaneous: There is soft tissue edema/induration involving the fourth toe and extending into the forefoot and midfoot. No evidence of soft tissue abscess.     1.  Marrow edema involving the fourth proximal and middle phalanges consistent with osteomyelitis. 2.  Cellulitis in involving the fourth toe extending through the mid and forefoot. 3.  Degenerative change of the first MTP and first IP joints.      Micro:  Results       Procedure Component Value Units Date/Time    Blood Culture [[id number]] Collected: 03/21/25 2326    Order Status: Completed Specimen: Blood from Peripheral Updated: 03/22/25 0208     Significant Indicator NEG     Source BLD     Site PERIPHERAL     Culture Result No Growth  Note: Blood cultures are incubated for 5 days and  are monitored continuously.Positive blood cultures  are called to the RN and reported as soon as  they are identified.      Blood Culture [[id number]] Collected: 03/21/25 2326    Order Status: Completed Specimen: Blood from  Peripheral Updated: 03/22/25 0208     Significant Indicator NEG     Source BLD     Site PERIPHERAL     Culture Result No Growth  Note: Blood cultures are incubated for 5 days and  are monitored continuously.Positive blood cultures  are called to the RN and reported as soon as  they are identified.      MRSA By PCR (Amp) [[id number]] Collected: 03/21/25 2127    Order Status: Completed Updated: 03/21/25 2307     MRSA by PCR Negative    Blood Culture [[id number]]     Order Status: Canceled Specimen: Blood from Peripheral     Blood Culture [[id number]]     Order Status: Canceled Specimen: Blood from Line     Urinalysis [[id number]]     Order Status: No result Specimen: Urine     MRSA By PCR (Amp) [[id number]]     Order Status: No result Specimen: Respirate from Nares     BLOOD CULTURE [[id number]] Collected: 03/15/25 2300    Order Status: Completed Specimen: Blood from Peripheral Updated: 03/21/25 0100     Significant Indicator NEG     Source BLD     Site PERIPHERAL     Culture Result No growth after 5 days of incubation.    BLOOD CULTURE [[id number]] Collected: 03/15/25 2310    Order Status: Completed Specimen: Blood from Peripheral Updated: 03/20/25 1107     Significant Indicator NEG     Source BLD     Site PERIPHERAL     Culture Result No growth after 5 days of incubation.    URINALYSIS [[id number]]  (Abnormal) Collected: 03/19/25 0500    Order Status: Completed Updated: 03/19/25 0829     Color Brown     Character Turbid     Specific Gravity 1.020     Ph 6.5     Glucose Negative mg/dL      Ketones Negative mg/dL      Protein 300 mg/dL      Bilirubin Negative     Urobilinogen, Urine 0.2 EU/dL      Nitrite Negative     Leukocyte Esterase Large     Occult Blood Large     Micro Urine Req Microscopic    URINE CULTURE (ADD ON) [[id number]] Collected: 03/15/25 2310    Order Status: Completed Specimen: Urine, [place] Cath Updated: 03/18/25 0614     Significant Indicator NEG     Source UR     Site URINE, [facility] CATH     Culture Result No  "growth at 48 hours.    URINALYSIS [[id number]] Collected: 03/16/25 1429    Order Status: Canceled Specimen: Urine, Rodriguez Cath     BLOOD CULTURE [[id number]] Collected: 03/16/25 0000    Order Status: Canceled Specimen: Other from Peripheral     BLOOD CULTURE [[id number]] Collected: 03/16/25 0000    Order Status: Canceled Specimen: Other from Peripheral     URINALYSIS (UA) [[id number]]  (Abnormal) Collected: 03/15/25 2310    Order Status: Completed Specimen: Blood Updated: 03/15/25 2344     Color Dark Yellow     Character Cloudy     Specific Gravity 1.023     Ph 5.0     Glucose Negative mg/dL      Ketones Trace mg/dL      Protein >=1000 mg/dL      Bilirubin Negative     Urobilinogen, Urine 1.0 EU/dL      Nitrite Negative     Leukocyte Esterase Small     Occult Blood Large     Micro Urine Req Microscopic            Assessment:  Active Hospital Problems    Diagnosis     *Acute renal failure superimposed on stage 3b chronic kidney disease (HCC) [N17.9, N18.32]     Hyponatremia [E87.1]     Elevated troponin [R79.89]     Anemia [D64.9]     Osteomyelitis of left foot (HCC) [M86.9]     Acute encephalopathy [G93.40]     Hyperlipidemia [E78.5]     Primary hypertension [I10]     Type 2 diabetes mellitus (HCC) [E11.9]     Hyperkalemia [E87.5]      Renal failure  Recent 4th toe amputation 2/28/25 for Group A strep associated osteomyelitis  -sutures still in place on admit  -Concern for underlying osteo given failure to heal and probes to bone  -Xray neg for osteo but low sensitivity  -tinea pedis likely portal of entry for prior infection  Erythema right 2nd and 3rd digit-no ulceration but fissure under 3rd toe  DM  Electrolyte derangements  Neg TTE 3/16     PLAN:   Xray neg for osteo  Unfortunately MRI will not be useful in her situation due to recent surgery  Appreciate Ortho eval for debridement  -OR 3/22 \"wound dehiscence was debrided in excisional fashion with knife and rongeur of skin, subcutaneous tissue, and underlying muscle " "down to healthy tissue. Deep cultures were taken and sent to lab for analysis \"    Remains at risk for additional amputation  Start vanco and cefepime-both can be dosed after dialysis pending culture results  Stop date 5/2/25  Recommend topical antifungal feet    PICC no-antibiotics after dialysis  Wound care per Ortho  "

## 2025-03-22 NOTE — ANESTHESIA POSTPROCEDURE EVALUATION
Patient: Aviva Kenney    Procedure Summary       Date: 03/22/25 Room / Location: [place] [address] / SURGERY [facility]    Anesthesia Start: 1132 Anesthesia Stop: 1159    Procedure: IRRIGATION AND DEBRIDEMENT, WOUND AND WOUND CLOSURE (Left: Fourth Toe) Diagnosis: (wound dehiscence)    Surgeons: Barry Zuleta M.D. Responsible Provider: Jean Reyes M.D.    Anesthesia Type: general ASA Status: 3            Final Anesthesia Type: general  Last vitals  BP   Blood Pressure: (!) 140/69    Temp   36.2 °C (97.2 °F)    Pulse   82   Resp   14    SpO2   92 %      Anesthesia Post Evaluation    Patient location during evaluation: PACU  Patient participation: complete - patient participated  Level of consciousness: awake and alert    Airway patency: patent  Anesthetic complications: no  Cardiovascular status: hemodynamically stable  Respiratory status: acceptable  Hydration status: euvolemic    PONV: none          No notable events documented.     Nurse Pain Score: 0 (NPRS)

## 2025-03-23 NOTE — PROGRESS NOTES
Discharge instructions given and discussed, signed copy in chart. Pt verbalized understanding and all questions answered.  Pt discharged  in stable condition on 3/22/25 2030 hrs escorted by . Personal belongings taken by  patient. IV removed and tolerated well. Charge nurse aware.

## 2025-03-24 ENCOUNTER — PATIENT OUTREACH (OUTPATIENT)
Dept: MEDICAL GROUP | Facility: LAB | Age: 50
End: 2025-03-24
Payer: MEDICARE

## 2025-03-25 NOTE — PROGRESS NOTES
Transitional Care Management  TCM Outreach Date and Time: Filed (3/24/2025  1:25 PM)    Discharge Questions  Actual Discharge Date: 03/22/25  Now that you are home, how are you feeling?: Fair (Feeling better than when  admitted but having  hard time adjusting to RD and the N/V that accompanies that. Discussed these side effects and to speak with RD RN about management. She wants a RX for Zofran and will speak to [facility]. Maintaing BS at 133.)  Did you receive any new prescriptions?: Yes  Were you able to get them filled?: No (She hasn't picked them up yet. Discussed how important they are and she states she will ensure they will be picked up by tomorrow.)  Reason prescriptions not filled?: Other (please specify) (Hasn't gone to [facility] to pick them up yet.  States she will do so.)  Do you have any questions about your current medications or new medications (Review Med Rec)?: Yes (Please explain) (Some confusion about new meds.  Reviewed AVS and Med Rec.  That helped the patient understand more. Sushma BARILLAS will open the patient's care on 3/27 and will also ensure all meds are understood and in process.)  Did you have any durable medical equipment ordered?: No  Do you have a follow up appointment scheduled with your PCP?: Yes  Appointment Date: 04/02/25  Appointment Time: 1320  Any issues or paperwork you wish to discuss with your PCP?: Yes (Please specify) (Needs to have Zofran on stand by)  Are you (patient) able to get to the appointment?: Yes  If Home Health was ordered, have they contacted you (Patient): Yes (Sushma BARILLAS: #[phone])  Did you have enough support after your last discharge?: Yes (Family)  Does this patient qualify for the CCM program?: No    Transitional Care  Number of attempts made to contact patient: 1  Current or previous attempts completed within two business days of discharge? : Yes  Provided education regarding treatment plan, medications, self-management, ADLs?: Yes (Med Rec completed.  Discussed methods to manage N/V. S/S to report to ER.)  Has patient completed an Advanced Directive?: No  Has the Care Manager's phone number provided?: No  Is there anything else I can help you with?: No (Pt found information helpful. 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

## 2025-03-26 ENCOUNTER — TELEPHONE (OUTPATIENT)
Dept: NEPHROLOGY | Facility: MEDICAL CENTER | Age: 50
End: 2025-03-26
Payer: MEDICARE

## 2025-03-26 NOTE — TELEPHONE ENCOUNTER
Pt called stated she is on her 2nd week of dialysis and is having bad systems: vomiting/lethargic want to ask questions. [phone]    Thank you.

## 2025-03-27 LAB
BACTERIA BLD CULT: NORMAL
BACTERIA BLD CULT: NORMAL
SIGNIFICANT IND 70042: NORMAL
SIGNIFICANT IND 70042: NORMAL
SITE SITE: NORMAL
SITE SITE: NORMAL
SOURCE SOURCE: NORMAL
SOURCE SOURCE: NORMAL

## 2025-03-28 ENCOUNTER — HOSPITAL ENCOUNTER (INPATIENT)
Facility: MEDICAL CENTER | Age: 50
LOS: 1 days | End: 2025-03-29
Attending: EMERGENCY MEDICINE | Admitting: STUDENT IN AN ORGANIZED HEALTH CARE EDUCATION/TRAINING PROGRAM
Payer: MEDICARE

## 2025-03-28 ENCOUNTER — HOSPITAL ENCOUNTER (OUTPATIENT)
Dept: RADIOLOGY | Facility: MEDICAL CENTER | Age: 50
End: 2025-03-28

## 2025-03-28 DIAGNOSIS — M86.8X7 OTHER OSTEOMYELITIS OF LEFT FOOT (HCC): ICD-10-CM

## 2025-03-28 DIAGNOSIS — G47.30 SLEEP APNEA, UNSPECIFIED TYPE: ICD-10-CM

## 2025-03-28 DIAGNOSIS — S37.019A PERINEPHRIC HEMATOMA: ICD-10-CM

## 2025-03-28 PROBLEM — D63.1 ANEMIA DUE TO CHRONIC KIDNEY DISEASE, ON CHRONIC DIALYSIS (HCC): Status: ACTIVE | Noted: 2025-03-16

## 2025-03-28 PROBLEM — Z79.4 TYPE 2 DIABETES MELLITUS WITH HYPERGLYCEMIA, WITH LONG-TERM CURRENT USE OF INSULIN (HCC): Status: ACTIVE | Noted: 2022-10-03

## 2025-03-28 PROBLEM — E11.65 TYPE 2 DIABETES MELLITUS WITH HYPERGLYCEMIA, WITH LONG-TERM CURRENT USE OF INSULIN (HCC): Status: ACTIVE | Noted: 2022-10-03

## 2025-03-28 PROBLEM — Z99.2 ANEMIA DUE TO CHRONIC KIDNEY DISEASE, ON CHRONIC DIALYSIS (HCC): Status: ACTIVE | Noted: 2025-03-16

## 2025-03-28 PROBLEM — N18.6 ANEMIA DUE TO CHRONIC KIDNEY DISEASE, ON CHRONIC DIALYSIS (HCC): Status: ACTIVE | Noted: 2025-03-16

## 2025-03-28 LAB
ALBUMIN SERPL BCP-MCNC: 2.7 G/DL (ref 3.2–4.9)
ALBUMIN/GLOB SERPL: 0.7 G/DL
ALP SERPL-CCNC: 264 U/L (ref 30–99)
ALT SERPL-CCNC: <5 U/L (ref 2–50)
ANION GAP SERPL CALC-SCNC: 12 MMOL/L (ref 7–16)
AST SERPL-CCNC: 43 U/L (ref 12–45)
BASOPHILS # BLD AUTO: 0.1 % (ref 0–1.8)
BASOPHILS # BLD: 0.01 K/UL (ref 0–0.12)
BILIRUB SERPL-MCNC: 0.4 MG/DL (ref 0.1–1.5)
BUN SERPL-MCNC: 27 MG/DL (ref 8–22)
CALCIUM ALBUM COR SERPL-MCNC: 9 MG/DL (ref 8.5–10.5)
CALCIUM SERPL-MCNC: 8 MG/DL (ref 8.5–10.5)
CHLORIDE SERPL-SCNC: 95 MMOL/L (ref 96–112)
CO2 SERPL-SCNC: 23 MMOL/L (ref 20–33)
CREAT SERPL-MCNC: 4.92 MG/DL (ref 0.5–1.4)
EOSINOPHIL # BLD AUTO: 0.16 K/UL (ref 0–0.51)
EOSINOPHIL NFR BLD: 1.9 % (ref 0–6.9)
ERYTHROCYTE [DISTWIDTH] IN BLOOD BY AUTOMATED COUNT: 49.5 FL (ref 35.9–50)
GFR SERPLBLD CREATININE-BSD FMLA CKD-EPI: 10 ML/MIN/1.73 M 2
GLOBULIN SER CALC-MCNC: 3.8 G/DL (ref 1.9–3.5)
GLUCOSE BLD STRIP.AUTO-MCNC: 123 MG/DL (ref 65–99)
GLUCOSE BLD STRIP.AUTO-MCNC: 133 MG/DL (ref 65–99)
GLUCOSE BLD STRIP.AUTO-MCNC: 72 MG/DL (ref 65–99)
GLUCOSE SERPL-MCNC: 134 MG/DL (ref 65–99)
HAV IGM SERPL QL IA: NORMAL
HBV CORE IGM SER QL: NORMAL
HBV SURFACE AB SERPL IA-ACNC: 29.7 MIU/ML (ref 0–10)
HBV SURFACE AG SER QL: NORMAL
HCT VFR BLD AUTO: 26 % (ref 37–47)
HCV AB SER QL: NORMAL
HGB BLD-MCNC: 8.2 G/DL (ref 12–16)
IMM GRANULOCYTES # BLD AUTO: 0.1 K/UL (ref 0–0.11)
IMM GRANULOCYTES NFR BLD AUTO: 1.2 % (ref 0–0.9)
LYMPHOCYTES # BLD AUTO: 1.53 K/UL (ref 1–4.8)
LYMPHOCYTES NFR BLD: 17.8 % (ref 22–41)
MCH RBC QN AUTO: 30.6 PG (ref 27–33)
MCHC RBC AUTO-ENTMCNC: 31.5 G/DL (ref 32.2–35.5)
MCV RBC AUTO: 97 FL (ref 81.4–97.8)
MONOCYTES # BLD AUTO: 1.02 K/UL (ref 0–0.85)
MONOCYTES NFR BLD AUTO: 11.9 % (ref 0–13.4)
NEUTROPHILS # BLD AUTO: 5.77 K/UL (ref 1.82–7.42)
NEUTROPHILS NFR BLD: 67.1 % (ref 44–72)
NRBC # BLD AUTO: 0 K/UL
NRBC BLD-RTO: 0 /100 WBC (ref 0–0.2)
PLATELET # BLD AUTO: 252 K/UL (ref 164–446)
PMV BLD AUTO: 10.1 FL (ref 9–12.9)
POTASSIUM SERPL-SCNC: 4.5 MMOL/L (ref 3.6–5.5)
PROT SERPL-MCNC: 6.5 G/DL (ref 6–8.2)
RBC # BLD AUTO: 2.68 M/UL (ref 4.2–5.4)
SODIUM SERPL-SCNC: 130 MMOL/L (ref 135–145)
VANCOMYCIN SERPL-MCNC: 23.6 UG/ML
WBC # BLD AUTO: 8.6 K/UL (ref 4.8–10.8)

## 2025-03-28 PROCEDURE — 36415 COLL VENOUS BLD VENIPUNCTURE: CPT

## 2025-03-28 PROCEDURE — A9270 NON-COVERED ITEM OR SERVICE: HCPCS | Performed by: STUDENT IN AN ORGANIZED HEALTH CARE EDUCATION/TRAINING PROGRAM

## 2025-03-28 PROCEDURE — 99285 EMERGENCY DEPT VISIT HI MDM: CPT

## 2025-03-28 PROCEDURE — 700111 HCHG RX REV CODE 636 W/ 250 OVERRIDE (IP): Performed by: STUDENT IN AN ORGANIZED HEALTH CARE EDUCATION/TRAINING PROGRAM

## 2025-03-28 PROCEDURE — 90935 HEMODIALYSIS ONE EVALUATION: CPT

## 2025-03-28 PROCEDURE — 700111 HCHG RX REV CODE 636 W/ 250 OVERRIDE (IP)

## 2025-03-28 PROCEDURE — 86706 HEP B SURFACE ANTIBODY: CPT

## 2025-03-28 PROCEDURE — 5A1D70Z PERFORMANCE OF URINARY FILTRATION, INTERMITTENT, LESS THAN 6 HOURS PER DAY: ICD-10-PCS | Performed by: INTERNAL MEDICINE

## 2025-03-28 PROCEDURE — 770006 HCHG ROOM/CARE - MED/SURG/GYN SEMI*

## 2025-03-28 PROCEDURE — 80053 COMPREHEN METABOLIC PANEL: CPT

## 2025-03-28 PROCEDURE — 700111 HCHG RX REV CODE 636 W/ 250 OVERRIDE (IP): Performed by: INTERNAL MEDICINE

## 2025-03-28 PROCEDURE — 700102 HCHG RX REV CODE 250 W/ 637 OVERRIDE(OP): Performed by: STUDENT IN AN ORGANIZED HEALTH CARE EDUCATION/TRAINING PROGRAM

## 2025-03-28 PROCEDURE — 97602 WOUND(S) CARE NON-SELECTIVE: CPT

## 2025-03-28 PROCEDURE — 99223 1ST HOSP IP/OBS HIGH 75: CPT | Performed by: STUDENT IN AN ORGANIZED HEALTH CARE EDUCATION/TRAINING PROGRAM

## 2025-03-28 PROCEDURE — 85025 COMPLETE CBC W/AUTO DIFF WBC: CPT

## 2025-03-28 PROCEDURE — 700105 HCHG RX REV CODE 258: Performed by: STUDENT IN AN ORGANIZED HEALTH CARE EDUCATION/TRAINING PROGRAM

## 2025-03-28 PROCEDURE — 80074 ACUTE HEPATITIS PANEL: CPT

## 2025-03-28 PROCEDURE — 82962 GLUCOSE BLOOD TEST: CPT | Mod: 91

## 2025-03-28 PROCEDURE — 80202 ASSAY OF VANCOMYCIN: CPT

## 2025-03-28 PROCEDURE — 99222 1ST HOSP IP/OBS MODERATE 55: CPT | Performed by: INTERNAL MEDICINE

## 2025-03-28 RX ORDER — INSULIN LISPRO 100 [IU]/ML
0.2 INJECTION, SOLUTION INTRAVENOUS; SUBCUTANEOUS
Status: DISCONTINUED | OUTPATIENT
Start: 2025-03-28 | End: 2025-03-29

## 2025-03-28 RX ORDER — CEFEPIME HYDROCHLORIDE 2 G/1
2-3 INJECTION, POWDER, FOR SOLUTION INTRAVENOUS SEE ADMIN INSTRUCTIONS
COMMUNITY

## 2025-03-28 RX ORDER — SEVELAMER CARBONATE 800 MG/1
800 TABLET, FILM COATED ORAL
Status: DISCONTINUED | OUTPATIENT
Start: 2025-03-28 | End: 2025-03-29 | Stop reason: HOSPADM

## 2025-03-28 RX ORDER — CLINDAMYCIN HYDROCHLORIDE 300 MG/1
300 CAPSULE ORAL 4 TIMES DAILY
COMMUNITY
Start: 2025-03-13

## 2025-03-28 RX ORDER — HYDRALAZINE HYDROCHLORIDE 20 MG/ML
10 INJECTION INTRAMUSCULAR; INTRAVENOUS EVERY 4 HOURS PRN
Status: DISCONTINUED | OUTPATIENT
Start: 2025-03-28 | End: 2025-03-29 | Stop reason: HOSPADM

## 2025-03-28 RX ORDER — OMEPRAZOLE 20 MG/1
20 CAPSULE, DELAYED RELEASE ORAL DAILY
Status: DISCONTINUED | OUTPATIENT
Start: 2025-03-28 | End: 2025-03-29 | Stop reason: HOSPADM

## 2025-03-28 RX ORDER — HEPARIN SODIUM 1000 [USP'U]/ML
INJECTION, SOLUTION INTRAVENOUS; SUBCUTANEOUS
Status: COMPLETED
Start: 2025-03-28 | End: 2025-03-28

## 2025-03-28 RX ORDER — DEXTROSE MONOHYDRATE 25 G/50ML
25 INJECTION, SOLUTION INTRAVENOUS
Status: DISCONTINUED | OUTPATIENT
Start: 2025-03-28 | End: 2025-03-29 | Stop reason: HOSPADM

## 2025-03-28 RX ORDER — ACETAMINOPHEN 325 MG/1
650 TABLET ORAL EVERY 6 HOURS PRN
Status: DISCONTINUED | OUTPATIENT
Start: 2025-03-28 | End: 2025-03-29 | Stop reason: HOSPADM

## 2025-03-28 RX ORDER — VANCOMYCIN HYDROCHLORIDE 1 G/20ML
1000 INJECTION, POWDER, LYOPHILIZED, FOR SOLUTION INTRAVENOUS
COMMUNITY

## 2025-03-28 RX ORDER — SODIUM CHLORIDE 9 MG/ML
100 INJECTION, SOLUTION INTRAVENOUS
Status: DISCONTINUED | OUTPATIENT
Start: 2025-03-28 | End: 2025-03-29 | Stop reason: HOSPADM

## 2025-03-28 RX ORDER — AMLODIPINE BESYLATE 5 MG/1
5 TABLET ORAL DAILY
Status: DISCONTINUED | OUTPATIENT
Start: 2025-03-28 | End: 2025-03-29 | Stop reason: HOSPADM

## 2025-03-28 RX ORDER — HEPARIN SODIUM 1000 [USP'U]/ML
1800 INJECTION, SOLUTION INTRAVENOUS; SUBCUTANEOUS
Status: DISCONTINUED | OUTPATIENT
Start: 2025-03-28 | End: 2025-03-29 | Stop reason: HOSPADM

## 2025-03-28 RX ORDER — CYCLOBENZAPRINE HCL 10 MG
10 TABLET ORAL 3 TIMES DAILY PRN
COMMUNITY

## 2025-03-28 RX ORDER — HYDROCODONE BITARTRATE AND ACETAMINOPHEN 10; 325 MG/1; MG/1
1 TABLET ORAL EVERY 6 HOURS PRN
Refills: 0 | Status: DISCONTINUED | OUTPATIENT
Start: 2025-03-28 | End: 2025-03-29 | Stop reason: HOSPADM

## 2025-03-28 RX ORDER — INSULIN LISPRO 100 [IU]/ML
2-9 INJECTION, SOLUTION INTRAVENOUS; SUBCUTANEOUS
Status: DISCONTINUED | OUTPATIENT
Start: 2025-03-28 | End: 2025-03-29 | Stop reason: HOSPADM

## 2025-03-28 RX ORDER — GABAPENTIN 300 MG/1
300 CAPSULE ORAL 3 TIMES DAILY
COMMUNITY

## 2025-03-28 RX ORDER — HYDROCODONE BITARTRATE AND ACETAMINOPHEN 10; 325 MG/1; MG/1
1-2 TABLET ORAL EVERY 6 HOURS PRN
Refills: 0 | Status: DISCONTINUED | OUTPATIENT
Start: 2025-03-28 | End: 2025-03-28

## 2025-03-28 RX ORDER — MORPHINE SULFATE 4 MG/ML
4 INJECTION INTRAVENOUS EVERY 4 HOURS PRN
Status: DISCONTINUED | OUTPATIENT
Start: 2025-03-28 | End: 2025-03-29 | Stop reason: HOSPADM

## 2025-03-28 RX ORDER — CARVEDILOL 6.25 MG/1
6.25 TABLET ORAL 2 TIMES DAILY WITH MEALS
Status: DISCONTINUED | OUTPATIENT
Start: 2025-03-28 | End: 2025-03-29 | Stop reason: HOSPADM

## 2025-03-28 RX ORDER — GAUZE BANDAGE 2" X 2"
100 BANDAGE TOPICAL DAILY
Status: DISCONTINUED | OUTPATIENT
Start: 2025-03-29 | End: 2025-03-29 | Stop reason: HOSPADM

## 2025-03-28 RX ADMIN — HEPARIN SODIUM 1800 UNITS: 1000 INJECTION, SOLUTION INTRAVENOUS; SUBCUTANEOUS at 15:43

## 2025-03-28 RX ADMIN — CEFEPIME 1 G: 1 INJECTION, POWDER, FOR SOLUTION INTRAMUSCULAR; INTRAVENOUS at 17:34

## 2025-03-28 RX ADMIN — INSULIN LISPRO 7 UNITS: 100 INJECTION, SOLUTION INTRAVENOUS; SUBCUTANEOUS at 08:09

## 2025-03-28 RX ADMIN — VANCOMYCIN HYDROCHLORIDE 1000 MG: 5 INJECTION, POWDER, LYOPHILIZED, FOR SOLUTION INTRAVENOUS at 17:32

## 2025-03-28 RX ADMIN — AMLODIPINE BESYLATE 5 MG: 5 TABLET ORAL at 08:09

## 2025-03-28 RX ADMIN — HYDROCODONE BITARTRATE AND ACETAMINOPHEN 1 TABLET: 10; 325 TABLET ORAL at 23:01

## 2025-03-28 RX ADMIN — MORPHINE SULFATE 4 MG: 4 INJECTION INTRAVENOUS at 08:00

## 2025-03-28 RX ADMIN — SEVELAMER CARBONATE 800 MG: 800 TABLET, FILM COATED ORAL at 08:09

## 2025-03-28 RX ADMIN — HYDROCODONE BITARTRATE AND ACETAMINOPHEN 1 TABLET: 10; 325 TABLET ORAL at 16:18

## 2025-03-28 RX ADMIN — HYDROCODONE BITARTRATE AND ACETAMINOPHEN 1 TABLET: 10; 325 TABLET ORAL at 10:08

## 2025-03-28 RX ADMIN — CARVEDILOL 6.25 MG: 6.25 TABLET, FILM COATED ORAL at 17:34

## 2025-03-28 RX ADMIN — OMEPRAZOLE 20 MG: 20 CAPSULE, DELAYED RELEASE ORAL at 08:09

## 2025-03-28 RX ADMIN — SEVELAMER CARBONATE 800 MG: 800 TABLET, FILM COATED ORAL at 17:35

## 2025-03-28 RX ADMIN — MORPHINE SULFATE 4 MG: 4 INJECTION INTRAVENOUS at 19:39

## 2025-03-28 RX ADMIN — SEVELAMER CARBONATE 800 MG: 800 TABLET, FILM COATED ORAL at 12:17

## 2025-03-28 RX ADMIN — CARVEDILOL 6.25 MG: 6.25 TABLET, FILM COATED ORAL at 08:09

## 2025-03-28 ASSESSMENT — COGNITIVE AND FUNCTIONAL STATUS - GENERAL
SUGGESTED CMS G CODE MODIFIER MOBILITY: CI
SUGGESTED CMS G CODE MODIFIER DAILY ACTIVITY: CH
CLIMB 3 TO 5 STEPS WITH RAILING: A LITTLE
MOBILITY SCORE: 23
DAILY ACTIVITIY SCORE: 24

## 2025-03-28 ASSESSMENT — ENCOUNTER SYMPTOMS
ABDOMINAL PAIN: 0
COUGH: 0
CHILLS: 0
NAUSEA: 0
SHORTNESS OF BREATH: 0
FLANK PAIN: 1
FEVER: 0
VOMITING: 0
DIARRHEA: 0

## 2025-03-28 ASSESSMENT — PATIENT HEALTH QUESTIONNAIRE - PHQ9
1. LITTLE INTEREST OR PLEASURE IN DOING THINGS: NOT AT ALL
2. FEELING DOWN, DEPRESSED, IRRITABLE, OR HOPELESS: NOT AT ALL
SUM OF ALL RESPONSES TO PHQ9 QUESTIONS 1 AND 2: 0

## 2025-03-28 ASSESSMENT — PAIN DESCRIPTION - PAIN TYPE
TYPE: ACUTE PAIN

## 2025-03-28 ASSESSMENT — FIBROSIS 4 INDEX: FIB4 SCORE: 1.27

## 2025-03-28 NOTE — PROGRESS NOTES
Received report and assumed care of patient at change of shift. Patient is A&Ox4, on RA, and reports 9/10 flank pain at this time. Medication administered per MAR. . Toe amputation on L foot. Patient assessment completed, bed in lowest position, and call light and personal belongings are within reach. Patient expressed no further needs at this time.

## 2025-03-28 NOTE — ED PROVIDER NOTES
ED Provider Note    CHIEF COMPLAINT  Chief Complaint   Patient presents with    Flank Pain     BIBA as a transfer from [place] for left flank pain radiating since yesterday. Pt had a renal biopsy done .   Imaging showed subcapsular hematoma 2.6 x 12 cm superior to inferior. Pt on dialysis M/W/F, due today.     Received 100 fent en route 0500, 4mg zofran IV.        EXTERNAL RECORDS REVIEWED  Other reviewed transfer records from [facility]    HPI/THANH    Aviva Kenney is a 50 y.o. female who presents as a transfer from [facility] after the patient was found to have a left subcapsular renal hematoma.  The patient was recently started on dialysis and she had a renal biopsy 2 weeks ago to try to determine the source of her end stage renal disease.  She does have a known history of hypertension and diabetes as well.  Patient did have some pain initially after the procedure which then subsided.  Last night the pain returned and was severe in intensity going down into her left groin.  The patient is CT scan that showed a subcapsular hematoma and she was transferred here for higher level of care.  The patient does not make much urine.  She does not have any dysuria.  She does go to dialysis on Monday, Wednesday, and Friday.  She does have some continued discomfort.  She has not had any associated fevers.    PAST MEDICAL HISTORY   has a past medical history of Cataract, Diabetes (HCC), Infectious disease (10/2022), and Pre-op evaluation (12/13/2022).    SURGICAL HISTORY   has a past surgical history that includes cataract extraction with iol (Left); tonsillectomy; hysterectomy, total abdominal; gastric resection; cholecystectomy; primary c section; ovarian cystectomy (Bilateral); upper gi endoscopy,diagnosis (N/A, 12/07/2022); upper gi endoscopy,ctrl bleed (N/A, 12/07/2022); abdominal hysterectomy total; toe amputation (Left, 2/28/2025); and irrigation & debridement general (Left,  "3/22/2025).    FAMILY HISTORY  Family History   Problem Relation Age of Onset    Breast Cancer Mother     Diabetes Mother     Heart Disease Mother     Hypertension Mother     Hypertension Father     Heart Disease Father     Diabetes Father     Diabetes Brother        SOCIAL HISTORY  Social History     Tobacco Use    Smoking status: Never    Smokeless tobacco: Never   Vaping Use    Vaping status: Never Used   Substance and Sexual Activity    Alcohol use: Not Currently     Comment: occ    Drug use: Yes     Types: Marijuana, Oral     Comment: occ    Sexual activity: Not on file       CURRENT MEDICATIONS  Home Medications       Reviewed by Nichole LOGAN R.N. (Registered Nurse) on 03/28/25 at 0539  Med List Status: Not Addressed     Medication Last Dose Status   amLODIPine (NORVASC) 5 MG Tab  Active   carvedilol (COREG) 6.25 MG Tab  Active   Cholecalciferol (VITAMIN D-3) 125 MCG (5000 UT) Tab  Active   epoetin 3000 UNIT/ML SOLN 3,000 Units, epoetin 2000 UNIT/ML SOLN 2,000 Units  Active   furosemide (LASIX) 20 MG Tab  Active   HYDROcodone/acetaminophen (NORCO)  MG Tab  Active   insulin lispro (HUMALOG) 100 UNIT/ML INJ  Active   multivitamin Tab  Active   omeprazole (PRILOSEC) 20 MG delayed-release capsule  Active   polyethylene glycol/lytes (MIRALAX) Pack  Active   Semaglutide, 1 MG/DOSE, (OZEMPIC, 1 MG/DOSE,) 4 MG/3ML Solution Pen-injector  Active   senna-docusate (PERICOLACE OR SENOKOT S) 8.6-50 MG Tab  Active   sevelamer carbonate (RENVELA) 800 MG Tab tablet  Active   Sodium Hypochlorite (DAKINS 0.125%, 1/4 STRENGTH,) 0.125 % Solution  Active   thiamine (THIAMINE) 100 MG tablet  Active                    ALLERGIES  No Known Allergies    PHYSICAL EXAM  VITAL SIGNS: BP (!) 183/95   Pulse 94   Temp 36.8 °C (98.3 °F) (Temporal)   Resp 15   Ht 1.753 m (5' 9\")   Wt 97.5 kg (215 lb)   SpO2 95%   BMI 31.75 kg/m²    In general the patient appears chronically ill    HEENT unremarkable    Pulmonary the " patient's lungs are clear to auscultation bilaterally     cardiovascular S1-S2 with a regular rate and rhythm    GI abdomen soft    Skin puncture site to the left flank appears intact with no surrounding erythema nor induration    Neurologic examination is grossly intact    Extremities no distal edema patient does have an amputation of the left fourth toe with the sutures in place with no purulent drainage    COURSE & MEDICAL DECISION MAKING    This is a 50-year-old female who presents as a transfer after she was found to have a perinephric hematoma status post left renal biopsy.  I did review the imaging studies with radiology.  There is no active extravasation at this time.  Therefore there is no indication for emergent IR consultation.  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  She will require dialysis today.  Therefore admit the patient to the hospitalist with nephrology in consultation.  The patient's labs from [facility] were reviewed and she did have a slight leukocytosis with a white blood cell count of 11,200 as well as a stable anemia with a hemoglobin of 10.6 and hematocrit of 32.6.  The patient's the patient's metabolic panel shows a BUN of 24 and a creatinine of 4.5 and his laboratory values will be repeated.  The patient's pain is controlled at this time.  She will be admitted to the hospitalist with nephrology in consultation.    Of note the patient also had the amputation to the left fourth toe mentioned above.  The patient does receive cefepime and vancomycin after dialysis.    FINAL DIAGNOSIS  1.  Left perinephric hematoma status post renal biopsy  2.  Dialysis dependent renal failure  3.  Stable anemia  4.  Status post left fourth toe amputation    Disposition  The patient will be admitted to the hospitalist in stable condition     Electronically signed by: Nico Bullock M.D., 3/28/2025 5:51 AM

## 2025-03-28 NOTE — PROGRESS NOTES
Med rec is complete per Patient at bedside, [facility] , and [facility].     Reconcile outside Information was available?Y  Allergies reviewed.    Has patient had any outside antibiotics in the last 30 days? Y Pt completed Augmentin course. Pt only had 2 days of Clindamycin prior to last hospital visit 3/15/25. Pt on Vancomycin and Cefepime at Dialysis.    Any Anticoagulants (rivaroxaban, apixaban, edoxaban, dabigatran, warfarin, enoxaparin) taken in the last 14 days? N

## 2025-03-28 NOTE — PROGRESS NOTES
Pharmacy Vancomycin Kinetics Note for 3/28/2025     50 y.o. female on Vancomycin day # 1 (Continuation from PTA)       Vancomycin Indication (Trough based Dosing): Osteomyelitis (goal trough 10-15)    Provider specified end date: 05/02/25    Active Antibiotics (From admission, onward)      Ordered     Ordering Provider       Fri Mar 28, 2025  2:55 PM    03/28/25 1455  vancomycin (Vancocin) 1,000 mg in  mL IVPB  (vancomycin (VANCOCIN) IV (LD + Maintenance))  Once per day on Monday Wednesday Friday        Note to Pharmacy: Per P&T Kinetics Protocol    Elías Garzon M.D.       Fri Mar 28, 2025  8:01 AM    03/28/25 0801  cefepime (Maxipime) 1 g in  mL IVPB  EVERY EVENING        Note to Pharmacy: Per P&T Renal Dose Adjustment Protocol.    Elías Garzon M.D.       Fri Mar 28, 2025  6:38 AM    03/28/25 0638  MD Alert...Vancomycin per Pharmacy  PHARMACY TO DOSE        Question:  Indication(s) for vancomycin?  Answer:  Osteomyelitis    Elías Garzon M.D.            Dosing Weight: 97.5 kg (214 lb 15.2 oz)      Admission History: Admitted on 3/28/2025 for Perinephric hematoma [S37.019A]  Pertinent history: Patient admitted for perinephrotic hematoma from OSH. Patient previously admitted and treated for osteo per ID with cefepime 2 g IV twice weekly after dialysis along with vancomycin 1000 mg IV MWF. Continued inpatient.    Allergies:     Patient has no known allergies.     Pertinent cultures to date:     Results       ** No results found for the last 336 hours. **            Labs:     Estimated Creatinine Clearance: 17.4 mL/min (A) (by C-G formula based on SCr of 4.92 mg/dL (HH)).  Recent Labs     03/28/25  0632   WBC 8.6   NEUTSPOLYS 67.10     Recent Labs     03/28/25  0611   BUN 27*   CREATININE 4.92*   ALBUMIN 2.7*       Intake/Output Summary (Last 24 hours) at 3/28/2025 1531  Last data filed at 3/28/2025 0958  Gross per 24 hour   Intake 250 ml   Output --   Net 250 ml      BP (!) 154/99   Pulse 86  "  Temp 36.6 °C (97.9 °F) (Temporal)   Resp 16   Ht 1.753 m (5' 9\")   Wt 97.5 kg (215 lb)   SpO2 94%  Temp (24hrs), Av.8 °C (98.2 °F), Min:36.6 °C (97.9 °F), Max:36.8 °C (98.3 °F)      List concerns for Vancomycin clearance:     ESRD;Obesity    Pharmacokinetics:     Trough kinetics:   Recent Labs     25  0807   VANCORANDOM 23.6       A/P:     -  Vancomycin dose: Vancomycin 1000 mg IV MWF after dialysis    -  Next vancomycin level(s):    - Will check with AM labs on Monday if patient remains inpatient *Not ordered*    -  Comments: Vancomycin continued from [facility] that she received on Wednesday. Level somewhat supra-therapeutic today but with patient getting dialysis, most likely will decrease. Will give 1000 mg IV today and monitor with a level Monday if patient remains inpatient.    Thank you!    Haritha Ramirez, PharmD, BCPS  "

## 2025-03-28 NOTE — ASSESSMENT & PLAN NOTE
S/p left fourth toe amputation currently on vancomycin and cefepime with dialysis per ID until 5/25  I discussed with pharmacy regarding dosing and order placement for cefepime with dialysis, he recommended discussing with infectious disease

## 2025-03-28 NOTE — PROGRESS NOTES
3hr HD started @ 1239 and ended @ 1539,tx well tolerated,VSS. Net UF = 3000ml,RIJ TDC dressing changed,CDI.Post tx V/S: 147/80,77,16,96.5F,94% RA,report given to Cirilo Fraser RN.

## 2025-03-28 NOTE — ASSESSMENT & PLAN NOTE
Complication of recent renal biopsy on 3/19.  CT scan outside facility reviewed and shows a a perinephric hematoma  Per EDP, he reviewed imaging with radiology and there were no signs of extravasation  Pain management and monitoring at this time.  If pain worsens or she becomes more anemic would probably need repeat imaging to evaluate for any signs of extravasation

## 2025-03-28 NOTE — PROGRESS NOTES
Assumed pt care with RN. Pt is A&OX4 and transferred to unit via gurney on 2L nasal cannula, and walked steadily to bed with SBA. Plan of care discussed, no further questions. Personal belongings and call light within reach.

## 2025-03-28 NOTE — CARE PLAN
The patient is Stable - Low risk of patient condition declining or worsening    Shift Goals  Clinical Goals: Pt will remain free from falls and mnaage kidney pain  Patient Goals: Pt wants to rest and be updated with POC    Progress made toward(s) clinical / shift goals:      Pt remained Aox4 and received IV abx during shift.  Pt had dialysis treatment and no further questions during shift and all needs met.    Problem: Pain - Standard  Goal: Alleviation of pain or a reduction in pain to the patient’s comfort goal  Outcome: Progressing  Note: Pt L flank pain was managed with medications as needed per MAR.     Problem: Knowledge Deficit - Standard  Goal: Patient and family/care givers will demonstrate understanding of plan of care, disease process/condition, diagnostic tests and medications  Outcome: Progressing  Note: Pt updated with POC and expressed their understanding with verbal acknowledgement. All needs met and all questions answered during shift.     Problem: Fall Risk  Goal: Patient will remain free from falls  Outcome: Progressing  Note: Pt remained free from falls during shift.       Patient is not progressing towards the following goals:

## 2025-03-28 NOTE — ASSESSMENT & PLAN NOTE
Baseline hemoglobin 10-12, recent diagnosis of BUTCH now requiring dialysis  Will be admitted for postbiopsy perinephric hematoma.  CBC ordered and pending, transfuse if less than 7

## 2025-03-28 NOTE — WOUND TEAM
Attempted to see patient. Patient in dialysis at this time.    Chart and photos reviewed and discussed with bedside RN Cirilo. Orders placed for Xeroform, dry gauze and tape at this time.     Please re-consult for advanced wound care needs if they arise in the future. Thank you.

## 2025-03-28 NOTE — H&P
"Hospital Medicine History & Physical Note    Date of Service  3/28/2025    Primary Care Physician  Barry Higgins P.A.-C.    Consultants  nephrology    Specialist Names: Dr Gan    Code Status  Full Code    Chief Complaint  Chief Complaint   Patient presents with    Flank Pain     BIBA as a transfer from [place] for left flank pain radiating since yesterday. Pt had a renal biopsy done .   Imaging showed subcapsular hematoma 2.6 x 12 cm superior to inferior. Pt on dialysis M/W/F, due today.     Received 100 fent en route 0500, 4mg zofran IV.        History of Presenting Illness  Aviva Kenney is a 50 y.o. female who was transferred from outside facility 3/28/2025 with left perinephric.  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 She presented to outside hospital with L flank pain, CT scan showed \"subscapular hematoma 2.6 x 12 cm superior to inferior \".  Denies fever/chills, any urinary symptoms.  Says she has been trying to take care of her amputation surgical wound denies any recent purulent discharge.    In the ED afebrile, hemodynamically stable.  Still has left flank pain.    I discussed the plan of care with patient, bedside RN, and edp .    Review of Systems  Review of Systems   Constitutional:  Negative for chills and fever.   Respiratory:  Negative for cough and shortness of breath.    Cardiovascular:  Negative for chest pain.   Gastrointestinal:  Negative for abdominal pain, diarrhea, nausea and vomiting.   Genitourinary:  Positive for flank pain. Negative for dysuria and urgency.       Past Medical History   has a past medical history of Cataract, Diabetes (HCC), Infectious disease (10/2022), and Pre-op evaluation (12/13/2022).    Surgical History   has a past surgical " history that includes cataract extraction with iol (Left); tonsillectomy; hysterectomy, total abdominal; gastric resection; cholecystectomy; primary c section; ovarian cystectomy (Bilateral); pr upper gi endoscopy,diagnosis (N/A, 12/07/2022); pr upper gi endoscopy,ctrl bleed (N/A, 12/07/2022); abdominal hysterectomy total; toe amputation (Left, 2/28/2025); and irrigation & debridement general (Left, 3/22/2025).     Family History  family history includes Breast Cancer in her mother; Diabetes in her brother, father, and mother; Heart Disease in her father and mother; Hypertension in her father and mother.   Family history reviewed with patient. There is no family history that is pertinent to the chief complaint.     Social History   reports that she has never smoked. She has never used smokeless tobacco. She reports that she does not currently use alcohol. She reports current drug use. Drugs: Marijuana and Oral.    Allergies  No Known Allergies    Medications  Prior to Admission Medications   Prescriptions Last Dose Informant Patient Reported? Taking?   Cholecalciferol (VITAMIN D-3) 125 MCG (5000 UT) Tab  Patient Yes No   Sig: Take 5,000 Units by mouth every day.   HYDROcodone/acetaminophen (NORCO)  MG Tab  Patient Yes No   Sig: Take 1-2 Tablets by mouth every 6 hours as needed.   Semaglutide, 1 MG/DOSE, (OZEMPIC, 1 MG/DOSE,) 4 MG/3ML Solution Pen-injector  Patient No No   Sig: Inject 1 mg under the skin every 7 days.   Sodium Hypochlorite (DAKINS 0.125%, 1/4 STRENGTH,) 0.125 % Solution   No No   Sig: Apply 100 mL topically 2 times a day.   Patient not taking: Reported on 3/25/2025   amLODIPine (NORVASC) 5 MG Tab  Patient No No   Sig: Take 1 Tablet by mouth every day.   carvedilol (COREG) 6.25 MG Tab   No No   Sig: Take 1 Tablet by mouth 2 times a day with meals.   Patient not taking: Reported on 3/25/2025   epoetin 3000 UNIT/ML SOLN 3,000 Units, epoetin 2000 UNIT/ML SOLN 2,000 Units   No No   Sig: Inject 5,000  Units under the skin every Monday, Wednesday, and Friday.   furosemide (LASIX) 20 MG Tab  Patient No No   Sig: Take 1 Tablet by mouth 2 times a day.   insulin lispro (HUMALOG) 100 UNIT/ML INJ  Patient No No   Sig: Inject 10 units under skin 3 times a day before meals   multivitamin Tab   No No   Sig: Take 1 Tablet by mouth every day.   Patient not taking: Reported on 3/25/2025   omeprazole (PRILOSEC) 20 MG delayed-release capsule  Patient Yes No   Sig: Take 20 mg by mouth every day.   polyethylene glycol/lytes (MIRALAX) Pack   No No   Sig: Take 1 Packet by mouth 1 time a day as needed (if no bowel movement in last 2 days).   Patient not taking: Reported on 3/25/2025   senna-docusate (PERICOLACE OR SENOKOT S) 8.6-50 MG Tab   No No   Sig: Take 2 Tablets by mouth every evening.   sevelamer carbonate (RENVELA) 800 MG Tab tablet   No No   Sig: Take 1 Tablet by mouth 3 times a day with meals.   Patient not taking: Reported on 3/25/2025   thiamine (THIAMINE) 100 MG tablet   No No   Sig: Take 1 Tablet by mouth every day.   Patient not taking: Reported on 3/25/2025      Facility-Administered Medications: None       Physical Exam  Temp:  [36.8 °C (98.3 °F)] 36.8 °C (98.3 °F)  Pulse:  [90-99] 93  Resp:  [15-19] 19  BP: (154-187)/() 154/105  SpO2:  [89 %-100 %] 96 %  Blood Pressure: (!) 187/82   Temperature: 36.8 °C (98.3 °F)   Pulse: 90   Respiration: 18   Pulse Oximetry: 100 %       Physical Exam  Constitutional:       Appearance: She is ill-appearing.   HENT:      Head: Normocephalic and atraumatic.      Mouth/Throat:      Mouth: Mucous membranes are moist.      Pharynx: No oropharyngeal exudate or posterior oropharyngeal erythema.   Cardiovascular:      Rate and Rhythm: Normal rate and regular rhythm.      Pulses: Normal pulses.      Heart sounds: Normal heart sounds. No murmur heard.  Pulmonary:      Effort: Pulmonary effort is normal. No respiratory distress.      Breath sounds: Normal breath sounds.   Abdominal:     "  Tenderness: There is left CVA tenderness.   Musculoskeletal:         General: No swelling or tenderness. Normal range of motion.   Skin:     General: Skin is warm and dry.   Neurological:      General: No focal deficit present.      Mental Status: She is alert and oriented to person, place, and time.   Psychiatric:         Mood and Affect: Mood normal.         Laboratory:  Recent Labs     03/28/25  0632   WBC 8.6   RBC 2.68*   HEMOGLOBIN 8.2*   HEMATOCRIT 26.0*   MCV 97.0   MCH 30.6   MCHC 31.5*   RDW 49.5   PLATELETCT 252   MPV 10.1         No results for input(s): \"ALTSGPT\", \"ASTSGOT\", \"ALKPHOSPHAT\", \"TBILIRUBIN\", \"DBILIRUBIN\", \"GAMMAGT\", \"AMYLASE\", \"LIPASE\", \"ALB\", \"PREALBUMIN\", \"GLUCOSE\" in the last 72 hours.      No results for input(s): \"NTPROBNP\" in the last 72 hours.      No results for input(s): \"TROPONINT\" in the last 72 hours.    Imaging:  No orders to display     Assessment/Plan:  Justification for Admission Status  I anticipate this patient will require at least two midnights for appropriate medical management, necessitating inpatient admission because perinephric hematoma    * Perinephric hematoma- (present on admission)  Assessment & Plan  Complication of recent renal biopsy on 3/19.  CT scan outside facility reviewed and shows a a perinephric hematoma  Per EDP, he reviewed imaging with radiology and there were no signs of extravasation  Pain management and monitoring at this time.  If pain worsens or she becomes more anemic would probably need repeat imaging to evaluate for any signs of extravasation    Osteomyelitis of left foot (HCC)- (present on admission)  Assessment & Plan  S/p left fourth toe amputation currently on vancomycin and cefepime with dialysis per ID until 5/25    Anemia due to chronic kidney disease, on chronic dialysis (HCC)- (present on admission)  Assessment & Plan  Baseline hemoglobin 10-12, recent diagnosis of BUTCH now requiring dialysis  Will be admitted for postbiopsy " perinephric hematoma.  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  EDP discussed with Dr. Gan who will evaluate patient  BMP ordered continue monitoring, avoid nephrotoxic agents    Primary hypertension- (present on admission)  Assessment & Plan  Continue amlodipine and carvedilol    Type 2 diabetes mellitus with hyperglycemia, with long-term current use of insulin (HCC)- (present on admission)  Assessment & Plan  Continue insulin        VTE prophylaxis: SCDs/TEDs

## 2025-03-28 NOTE — WOUND TEAM
[facility]  Inpatient Services  Initial Wound and Skin Care Evaluation    Admission Date: 3/28/2025     Last order of IP CONSULT TO WOUND CARE was found on 3/20/2025 from Hospital Encounter on 3/15/2025     HPI, PMH, SH: Reviewed    Past Surgical History:   Procedure Laterality Date    IRRIGATION & DEBRIDEMENT GENERAL Left 3/22/2025    Procedure: IRRIGATION AND DEBRIDEMENT, WOUND AND WOUND CLOSURE;  Surgeon: Barry Zuleta M.D.;  Location: SURGERY [facility];  Service: Orthopedics    TOE AMPUTATION Left 2/28/2025    Procedure: AMPUTATION, TOE-4TH;  Surgeon: Jamar Coronado M.D.;  Location: SURGERY [facility];  Service: Orthopedics    LA UPPER GI ENDOSCOPY,DIAGNOSIS N/A 12/07/2022    Procedure: ESOPHAGOGASTRODUODENOSCOPY (EGD) WITH BRUSHINGS AND DILATATION VS OVER THE SCOPE CLIP PLACEMENT;  Surgeon: Bridger Gongora M.D.;  Location: SURGERY [facility];  Service: General    LA UPPER GI ENDOSCOPY,CTRL BLEED N/A 12/07/2022    Procedure: EGD, WITH CLIP PLACEMENT;  Surgeon: Bridger Gongora M.D.;  Location: SURGERY [facility];  Service: General    ABDOMINAL HYSTERECTOMY TOTAL      CATARACT EXTRACTION WITH IOL Left     CHOLECYSTECTOMY      GASTRIC RESECTION      HYSTERECTOMY, TOTAL ABDOMINAL      OVARIAN CYSTECTOMY Bilateral     PRIMARY C SECTION      x 2    TONSILLECTOMY       Social History     Tobacco Use    Smoking status: Never    Smokeless tobacco: Never   Substance Use Topics    Alcohol use: Not Currently     Comment: occ     Chief Complaint   Patient presents with    Flank Pain     BIBA as a transfer from [place] for left flank pain radiating since yesterday. Pt had a renal biopsy done .   Imaging showed subcapsular hematoma 2.6 x 12 cm superior to inferior. Pt on dialysis M/W/F, due today.     Received 100 fent en route 0500, 4mg zofran IV.      Diagnosis: Perinephric hematoma [S37.019A]    Unit where seen by Wound Team: S530/01     WOUND CONSULT RELATED TO:  Left 4th toe    WOUND TEAM PLAN  OF CARE - Frequency of Follow-up:   Nursing to follow dressing orders written for wound care. Contact wound team if area fails to progress, deteriorates or with any questions/concerns if something comes up before next scheduled follow up (See below as to whether wound is following and frequency of wound follow up)   Not following, consult as needed  - left 4th toe    WOUND HISTORY:  2/28 - left 4th amputation by Dr. Coronado   3/22 - I&D with 2nd closure of 4th toe by Dr. Zuleta       WOUND ASSESSMENT/LDA      Wound 02/28/25 Incision Toe, 4th Left (Active)   Wound Image   03/28/25 1600   Site Assessment Dry;Scabbed;[place] 03/28/25 1600   Periwound Assessment Pink;Intact 03/28/25 1600   Margins Attached edges;Defined edges 03/28/25 1600   Closure Sutures 03/28/25 1600   Drainage Amount Scant 03/28/25 1600   Drainage Description Serous 03/28/25 1600   Treatments Cleansed;Site care 03/28/25 1600   Wound Cleansing Approved Wound Cleanser 03/28/25 1600   Periwound Protectant Barrier Paste 03/22/25 0500   Dressing Status Open to Air 03/28/25 1600   Dressing Changed New 03/28/25 1600   Dressing Cleansing/Solutions Not Applicable 03/28/25 1600   Dressing Options Petrolatum Gauze (yellow);Dry Gauze;Hypafix Tape 03/28/25 1600   Dressing Change/Treatment Frequency Daily, and As Needed 03/28/25 1600   NEXT Dressing Change/Treatment Date 03/29/25 03/28/25 1600   NEXT Weekly Photo (Inpatient Only) 04/02/25 03/28/25 1600   Wound Team Following Not following 03/28/25 1600   Non-staged Wound Description Full thickness 03/20/25 1700   Wound Length (cm) 1.1 cm 03/20/25 1700   Wound Width (cm) 1 cm 03/20/25 1700   Wound Depth (cm) 0.6 cm 03/20/25 1700   Wound Surface Area (cm^2) 1.1 cm^2 03/20/25 1700   Wound Volume (cm^3) 0.66 cm^3 03/20/25 1700   Shape [place] 03/20/25 1700   Wound Odor None 03/20/25 1700   Pulses 2+;Left;DP 03/20/25 1700   Exposed Structures Other (Comments) 03/20/25 9312         Vascular:    TONJA:   No results  found.    Lab Values:    Lab Results   Component Value Date/Time    WBC 8.6 03/28/2025 06:32 AM    RBC 2.68 (L) 03/28/2025 06:32 AM    HEMOGLOBIN 8.2 (L) 03/28/2025 06:32 AM    HEMATOCRIT 26.0 (L) 03/28/2025 06:32 AM    CREACTPROT 5.58 (H) 03/15/2025 10:18 PM    SEDRATEWES 100 (H) 03/15/2025 10:18 PM    HBA1C 8.6 (H) 01/21/2025 01:40 PM         Culture Results show:  Recent Results (from the past 720 hours)   CULTURE WOUND W/ GRAM STAIN    Collection Time: 02/27/25  1:40 PM    Specimen: Left Foot; Wound   Result Value Ref Range    Significant Indicator POS (POS)     Source WND     Site LEFT FOOT     Culture Result Light growth usual skin sanaz. (A)     Gram Stain Result No organisms seen.     Culture Result (A)      Streptococcus pyogenes (Group A)  Moderate growth         Pain Level/Medicated:  None, Tolerated without pain medication       INTERVENTIONS BY WOUND TEAM:  Chart and images reviewed. Discussed with bedside RN. All areas of concern (based on picture review, LDA review and discussion with bedside RN) have been thoroughly assessed. Documentation of areas based on significant findings. This RN in to assess patient. Performed standard wound care which includes appropriate positioning, dressing removal and non-selective debridement. Pictures and measurements obtained weekly if/when required.    Wound:  Left 4th toe  Preparation for Dressing removal: Open to air  Cleansed/Non-selectively Debrided with:  Wound cleanser and Gauze  Elizabeth wound: Cleansed with Wound cleanser and Gauze, Prepped with N/A  Primary Dressing:  Xeroform  Secondary (Outer) Dressing: gauze and hypafix tape    Advanced Wound Care Discharge Planning  Number of Clinicians necessary to complete wound care: 1  Is patient requiring IV pain medications for dressing changes:  No   Length of time for dressing change 10 min. (This does not include chart review, pre-medication time, set up, clean up or time spent charting.)    Interdisciplinary  consultation: Patient, Bedside RN (Cirilo), N/A.  Pressure injury and staging reviewed with N/A.    EVALUATION / RATIONALE FOR TREATMENT:     Date:  03/28/25  Wound Status:  Initial evaluation    Sutures intact with scabbing along thick dry skin surrounding incisional line. A couple of moist areas towards the joint of the toe. Xeroform (yellow) applied to provide an occlusive dressing that incorporates bacteriostatic protection.          Goals: Steady decrease in wound area and depth weekly.    NURSING PLAN OF CARE ORDERS:  Dressing changes: See Dressing Care orders    NUTRITION RECOMMENDATIONS   Wound Team Recommendations:  N/A    DIET ORDERS (From admission to next 24h)       Start     Ordered    03/28/25 0616  Diet Order Diet: Consistent CHO (Diabetic); Second Modifier: (optional): 2 Gram Sodium  ALL MEALS        Question Answer Comment   Diet: Consistent CHO (Diabetic)    Second Modifier: (optional) 2 Gram Sodium        03/28/25 0619                    PREVENTATIVE INTERVENTIONS:    Q shift Rj - performed per nursing policy  Q shift pressure point assessments - performed per nursing policy    Surface/Positioning  Standard/trauma mattress - Currently in Place    Mobilization      Turns self without assistance.       Anticipated discharge plans:  Home Health Care        Vac Discharge Needs:  Vac Discharge plan is purely a recommendation from wound team and not a requirement for discharge unless otherwise stated by physician.  Not Applicable Pt not on a wound vac

## 2025-03-28 NOTE — ED TRIAGE NOTES
"Chief Complaint   Patient presents with    Flank Pain     BIBA as a transfer from [place] for left flank pain radiating since yesterday. Pt had a renal biopsy done .   Imaging showed subcapsular hematoma 2.6 x 12 cm superior to inferior. Pt on dialysis M/W/F, due today.     Received 100 fent en route 0500, 4mg zofran IV.      Pt alert and oriented    Medications given en route: 100 fentanyl IV    BP (!) 183/95   Pulse 94   Temp 36.8 °C (98.3 °F) (Temporal)   Resp 15   Ht 1.753 m (5' 9\")   Wt 97.5 kg (215 lb)   SpO2 89%   BMI 31.75 kg/m²    "

## 2025-03-28 NOTE — PROGRESS NOTES
Nurse from [facility] called to touch base on need for patient dialysis chair today. Pt will be receiving dialysis here; per dialysis RN pt has been receiving her vanco and cefepime after dialysis.

## 2025-03-28 NOTE — PROGRESS NOTES
4 Eyes Skin Assessment Completed by ROVERTO Kerr and ROVERTO Regalado.    Head WDL  Ears WDL  Nose WDL  Mouth Missing teeth  Neck WDL  Breast/Chest Scab on L breast. Pt stated from dog.  Shoulder Blades WDL  Spine WDL  (R) Arm/Elbow/Hand WDL  (L) Arm/Elbow/Hand WDL  Abdomen WDL  Groin Mole biopsy site  Scrotum/Coccyx/Buttocks WDL  (R) Leg WDL  (L) Leg WDL  (R) Heel/Foot/Toe WDL  (L) Heel/Foot/Toe L 4th toe amputation          Devices In Places Subclavian port R side      Interventions In Place N/A    Possible Skin Injury No    Pictures Uploaded Into Epic Yes  Wound Consult Placed N/A  RN Wound Prevention Protocol Ordered No

## 2025-03-28 NOTE — ASSESSMENT & PLAN NOTE
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  EDP discussed with Dr. Gan who will evaluate patient  BMP ordered continue monitoring, avoid nephrotoxic agents

## 2025-03-29 ENCOUNTER — PHARMACY VISIT (OUTPATIENT)
Dept: PHARMACY | Facility: MEDICAL CENTER | Age: 50
End: 2025-03-29
Payer: COMMERCIAL

## 2025-03-29 VITALS
DIASTOLIC BLOOD PRESSURE: 86 MMHG | HEIGHT: 69 IN | WEIGHT: 215 LBS | BODY MASS INDEX: 31.84 KG/M2 | TEMPERATURE: 97.6 F | OXYGEN SATURATION: 95 % | RESPIRATION RATE: 17 BRPM | HEART RATE: 86 BPM | SYSTOLIC BLOOD PRESSURE: 146 MMHG

## 2025-03-29 LAB
ANION GAP SERPL CALC-SCNC: 9 MMOL/L (ref 7–16)
BASOPHILS # BLD AUTO: 0.1 % (ref 0–1.8)
BASOPHILS # BLD: 0.01 K/UL (ref 0–0.12)
BUN SERPL-MCNC: 23 MG/DL (ref 8–22)
CALCIUM SERPL-MCNC: 8.1 MG/DL (ref 8.5–10.5)
CHLORIDE SERPL-SCNC: 95 MMOL/L (ref 96–112)
CO2 SERPL-SCNC: 25 MMOL/L (ref 20–33)
CREAT SERPL-MCNC: 4.44 MG/DL (ref 0.5–1.4)
EOSINOPHIL # BLD AUTO: 0.15 K/UL (ref 0–0.51)
EOSINOPHIL NFR BLD: 1.9 % (ref 0–6.9)
ERYTHROCYTE [DISTWIDTH] IN BLOOD BY AUTOMATED COUNT: 51.7 FL (ref 35.9–50)
GFR SERPLBLD CREATININE-BSD FMLA CKD-EPI: 12 ML/MIN/1.73 M 2
GLUCOSE BLD STRIP.AUTO-MCNC: 135 MG/DL (ref 65–99)
GLUCOSE BLD STRIP.AUTO-MCNC: 148 MG/DL (ref 65–99)
GLUCOSE BLD STRIP.AUTO-MCNC: 201 MG/DL (ref 65–99)
GLUCOSE SERPL-MCNC: 216 MG/DL (ref 65–99)
HCT VFR BLD AUTO: 25.5 % (ref 37–47)
HGB BLD-MCNC: 8.1 G/DL (ref 12–16)
IMM GRANULOCYTES # BLD AUTO: 0.09 K/UL (ref 0–0.11)
IMM GRANULOCYTES NFR BLD AUTO: 1.1 % (ref 0–0.9)
LYMPHOCYTES # BLD AUTO: 1.15 K/UL (ref 1–4.8)
LYMPHOCYTES NFR BLD: 14.7 % (ref 22–41)
MCH RBC QN AUTO: 31 PG (ref 27–33)
MCHC RBC AUTO-ENTMCNC: 31.8 G/DL (ref 32.2–35.5)
MCV RBC AUTO: 97.7 FL (ref 81.4–97.8)
MONOCYTES # BLD AUTO: 0.64 K/UL (ref 0–0.85)
MONOCYTES NFR BLD AUTO: 8.2 % (ref 0–13.4)
NEUTROPHILS # BLD AUTO: 5.8 K/UL (ref 1.82–7.42)
NEUTROPHILS NFR BLD: 74 % (ref 44–72)
NRBC # BLD AUTO: 0 K/UL
NRBC BLD-RTO: 0 /100 WBC (ref 0–0.2)
PLATELET # BLD AUTO: 219 K/UL (ref 164–446)
PMV BLD AUTO: 10.5 FL (ref 9–12.9)
POTASSIUM SERPL-SCNC: 4.8 MMOL/L (ref 3.6–5.5)
RBC # BLD AUTO: 2.61 M/UL (ref 4.2–5.4)
SODIUM SERPL-SCNC: 129 MMOL/L (ref 135–145)
WBC # BLD AUTO: 7.8 K/UL (ref 4.8–10.8)

## 2025-03-29 PROCEDURE — 700102 HCHG RX REV CODE 250 W/ 637 OVERRIDE(OP): Performed by: STUDENT IN AN ORGANIZED HEALTH CARE EDUCATION/TRAINING PROGRAM

## 2025-03-29 PROCEDURE — 82962 GLUCOSE BLOOD TEST: CPT

## 2025-03-29 PROCEDURE — RXMED WILLOW AMBULATORY MEDICATION CHARGE: Performed by: STUDENT IN AN ORGANIZED HEALTH CARE EDUCATION/TRAINING PROGRAM

## 2025-03-29 PROCEDURE — A9270 NON-COVERED ITEM OR SERVICE: HCPCS | Performed by: STUDENT IN AN ORGANIZED HEALTH CARE EDUCATION/TRAINING PROGRAM

## 2025-03-29 PROCEDURE — 99239 HOSP IP/OBS DSCHRG MGMT >30: CPT | Performed by: STUDENT IN AN ORGANIZED HEALTH CARE EDUCATION/TRAINING PROGRAM

## 2025-03-29 PROCEDURE — 80048 BASIC METABOLIC PNL TOTAL CA: CPT

## 2025-03-29 PROCEDURE — 85025 COMPLETE CBC W/AUTO DIFF WBC: CPT

## 2025-03-29 RX ORDER — PROCHLORPERAZINE EDISYLATE 5 MG/ML
10 INJECTION INTRAMUSCULAR; INTRAVENOUS EVERY 6 HOURS PRN
Status: DISCONTINUED | OUTPATIENT
Start: 2025-03-29 | End: 2025-03-29 | Stop reason: HOSPADM

## 2025-03-29 RX ORDER — ONDANSETRON 2 MG/ML
4 INJECTION INTRAMUSCULAR; INTRAVENOUS EVERY 4 HOURS PRN
Status: DISCONTINUED | OUTPATIENT
Start: 2025-03-29 | End: 2025-03-29 | Stop reason: HOSPADM

## 2025-03-29 RX ORDER — ONDANSETRON 4 MG/1
4 TABLET, FILM COATED ORAL EVERY 4 HOURS PRN
Qty: 30 TABLET | Refills: 1 | Status: SHIPPED | OUTPATIENT
Start: 2025-03-29

## 2025-03-29 RX ADMIN — CARVEDILOL 6.25 MG: 6.25 TABLET, FILM COATED ORAL at 07:55

## 2025-03-29 RX ADMIN — INSULIN LISPRO 3 UNITS: 100 INJECTION, SOLUTION INTRAVENOUS; SUBCUTANEOUS at 12:27

## 2025-03-29 RX ADMIN — OMEPRAZOLE 20 MG: 20 CAPSULE, DELAYED RELEASE ORAL at 05:18

## 2025-03-29 RX ADMIN — AMLODIPINE BESYLATE 5 MG: 5 TABLET ORAL at 05:18

## 2025-03-29 RX ADMIN — SEVELAMER CARBONATE 800 MG: 800 TABLET, FILM COATED ORAL at 07:55

## 2025-03-29 RX ADMIN — HYDROCODONE BITARTRATE AND ACETAMINOPHEN 1 TABLET: 10; 325 TABLET ORAL at 06:15

## 2025-03-29 RX ADMIN — SEVELAMER CARBONATE 800 MG: 800 TABLET, FILM COATED ORAL at 13:05

## 2025-03-29 RX ADMIN — Medication 100 MG: at 05:18

## 2025-03-29 ASSESSMENT — PAIN DESCRIPTION - PAIN TYPE
TYPE: ACUTE PAIN

## 2025-03-29 NOTE — PROGRESS NOTES
Assumed care of patient at 2015. Received bedside report from day RN Amanda. Patient A&Ox4, on RA, Reporting a pain level of 0/10. Call light within reach, belongings within reach, fall precautions in place, bed in lowest position. Patient does not have any other needs at this time.     POC was discussed with patient. All questions were answered. Patient verbalized understanding.

## 2025-03-29 NOTE — CONSULTS
DATE OF SERVICE:  2025     NEPHROLOGY CONSULTATION     REQUESTING PHYSICIAN:  Nico Bullock MD     REASON FOR CONSULTATION:  To evaluate and provide dialysis for patient with   end-stage renal disease.     HISTORY OF PRESENT ILLNESS:  The patient is a very pleasant 50-year-old female   with long-term history of hypertension, diabetes mellitus type 2, diabetic   nephropathy, advanced chronic kidney disease, who was recently started on   chronic dialysis in [facility], also 2 weeks ago underwent   diagnostic kidney biopsy, presented to the emergency room with severe left   flank pain with radiation to left groin. CT scan revealed a subcapsular   hematoma.  Currently, the patient feels better, pain well controlled with   medications, scheduled for dialysis today.     REVIEW OF SYSTEMS:  GENERAL:  Positive for malaise and fatigue.  No fever or chills.  HEENT:  No nosebleeds, no sore throat, no sinus pain.  NECK:  No pain, no stiffness.  RESPIRATORY:  No shortness of breath, no cough, hemoptysis.  CARDIOVASCULAR:  No dyspnea, no chest pain, palpitation.  All other systems reviewed, all negative.     PAST MEDICAL HISTORY:  Diabetes mellitus type 2, hypertension, chronic kidney   disease, diabetic nephropathy, osteomyelitis, infectious glomerulonephritis.     SURGICAL HISTORY:  Cataract surgery, tonsillectomy, hysterectomy, gastric   resection, cholecystectomy, primary , ovarian cystectomy, tunneled   dialysis catheter placement, abdominal hysterectomy, orthopedic surgery.     SOCIAL HISTORY:  No tobacco, alcohol.  Positive for marijuana.     ALLERGIES:  No known drug allergies.     MEDICATIONS:  Outpatient medications reviewed.     PHYSICAL EXAMINATION:  VITAL SIGNS:  Blood pressure 154/99, temperature 36.6 Celsius, pulse 86.  GENERAL APPEARANCE:  Well-nourished female, in no acute distress.  HEENT:  Normocephalic, atraumatic.  Pupils equal, round, reactive to light.    Extraocular  movement intact.  Nares patent.  Oropharynx clear, moist mucosa.   No erythema or exudate.  NECK:  Supple.  No lymphadenopathy, no thyromegaly appreciated.  LUNGS:  Clear to auscultation bilaterally.  No rales, wheezes, no rhonchi.  HEART:  Regular rhythm, no rub or gallop.  ABDOMEN:  Soft, nontender, nondistended.  Bowel sounds present.  Left   costovertebral area tender to palpation.  EXTREMITIES: No edema.  NEUROLOGIC:  Alert, oriented x3, no focal deficit.  Cranial nerves II-XII   grossly intact.     LABORATORY DATA:  Laboratory results reviewed, revealed hemoglobin level 8.2.    Sodium 130, potassium 4.5, BUN 27, creatinine 4.9.     ASSESSMENT AND PLAN:  The patient is a 50-year-old female with multiple   medical problems, end-stage renal disease, on hemodialysis, admitted with left   subcapsular hematoma post-kidney biopsy.  1.  End-stage renal disease.  We will dialyze the patient today and continue   per her schedule Monday, Wednesday and Friday.  2.  Electrolytes, mild hyponatremia, to monitor.  3.  Limit hypotonic solutions.  4.  Hypertension.  Elevated blood pressure.  To monitor closely.  Continue   outpatient medical management.  5.  Anemia, drop in hemoglobin level, to monitor closely.  6.  Left renal hematoma.  To monitor hemoglobin level.  7.  Volume.  Ultrafiltration with hemodialysis as blood pressure tolerates.  8.  Recent osteomyelitis.  Continue cefepime and vancomycin.     RECOMMENDATIONS:  1.  Hemodialysis today, then Monday, Wednesday and Friday.  2.  Continue current treatment.  3.  Adjust all medications to renal doses.  4.  Provide renal diet.  Daily CBC, basic metabolic panel.  We will follow the   patient closely.     Thank you for the consult.        ______________________________  MD SENAIT KEATING/JOSLYN    DD:  03/28/2025 13:45  DT:  03/28/2025 17:32    Job#:  [id number]

## 2025-03-29 NOTE — PROGRESS NOTES
Received report and assumed care of patient at change of shift. Patient is A&Ox4, on RA, and reports 4/10 kidney pain at this time. 4th toe amputation on L foot. Dressing CDI. Patient assessment completed, bed in lowest position, and call light and personal belongings are within reach. Patient expressed no further needs at this time.

## 2025-03-29 NOTE — PROGRESS NOTES
Admitted earlier this morning with my colleague for flank pain, found to have subcapsular hematoma and sent here for further evaluation.  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  Nephrology was consulted and will evaluate patient.  Nephrology following for ESRD management.    Elías Garzon M.D.

## 2025-03-29 NOTE — CARE PLAN
The patient is Stable - Low risk of patient condition declining or worsening    Shift Goals  Clinical Goals: remain free from falls, manage pain to comfort level  Patient Goals: rest  Family Goals: adelita    Progress made toward(s) clinical / shift goals:    Problem: Pain - Standard  Goal: Alleviation of pain or a reduction in pain to the patient’s comfort goal  Outcome: Progressing  Note: Patients pain has remained controlled throughout shift through pharmacological and nonpharmacological methods of pain management. Patients pain will continue to be assessed throughout shift and controlled appropriately.      Problem: Knowledge Deficit - Standard  Goal: Patient and family/care givers will demonstrate understanding of plan of care, disease process/condition, diagnostic tests and medications  Outcome: Progressing  Note: Patient has remained involved in their plan of care with all questions and concerns addressed at this time. Patient will continue to be involved in their plan of care throughout hospital stay.      Problem: Fall Risk  Goal: Patient will remain free from falls  Outcome: Progressing  Note: Patient has remained free of falls throughout shift. Patient has proper fall preventions and protocols in place at this time. Patients fall risk will continue to be assessed each shift. Patient will continue to remain free of falls and will call appropriately.        Patient is not progressing towards the following goals:

## 2025-03-29 NOTE — PROGRESS NOTES
D/c instructions given, educated on worsening s/s. Pt understands and questions answered. D/c home with , meds to bed given and verified.

## 2025-03-31 ENCOUNTER — PATIENT OUTREACH (OUTPATIENT)
Dept: MEDICAL GROUP | Facility: PHYSICIAN GROUP | Age: 50
End: 2025-03-31
Payer: MEDICARE

## 2025-04-01 NOTE — PROGRESS NOTES
03/31/25: First attempt to reach pt for TCM outreach. LVM with contact information for pt to call back.  04/01/25: Second attempt to reach pt for TCM outreach. LVM with contact information for pt to call back.

## 2025-04-02 ENCOUNTER — OFFICE VISIT (OUTPATIENT)
Dept: MEDICAL GROUP | Facility: PHYSICIAN GROUP | Age: 50
End: 2025-04-02
Payer: MEDICARE

## 2025-04-02 VITALS
BODY MASS INDEX: 31.7 KG/M2 | WEIGHT: 214 LBS | OXYGEN SATURATION: 98 % | HEIGHT: 69 IN | TEMPERATURE: 97.2 F | RESPIRATION RATE: 12 BRPM | HEART RATE: 85 BPM

## 2025-04-02 DIAGNOSIS — M86.8X7 OTHER OSTEOMYELITIS OF LEFT FOOT (HCC): ICD-10-CM

## 2025-04-02 DIAGNOSIS — D63.1 ANEMIA DUE TO CHRONIC KIDNEY DISEASE, ON CHRONIC DIALYSIS (HCC): ICD-10-CM

## 2025-04-02 DIAGNOSIS — Z99.2 ANEMIA DUE TO CHRONIC KIDNEY DISEASE, ON CHRONIC DIALYSIS (HCC): ICD-10-CM

## 2025-04-02 DIAGNOSIS — N18.6 ANEMIA DUE TO CHRONIC KIDNEY DISEASE, ON CHRONIC DIALYSIS (HCC): ICD-10-CM

## 2025-04-02 DIAGNOSIS — Z99.2 DIALYSIS PATIENT (HCC): ICD-10-CM

## 2025-04-02 DIAGNOSIS — R11.0 NAUSEA: ICD-10-CM

## 2025-04-02 RX ORDER — ONDANSETRON 8 MG/1
8 TABLET, ORALLY DISINTEGRATING ORAL EVERY 8 HOURS PRN
Qty: 15 TABLET | Refills: 0 | Status: SHIPPED | OUTPATIENT
Start: 2025-04-02

## 2025-04-02 ASSESSMENT — FIBROSIS 4 INDEX: FIB4 SCORE: 4.44

## 2025-04-02 NOTE — PROGRESS NOTES
CC:  Chief Complaint   Patient presents with    [facility] Hospital Follow-up       HISTORY OF PRESENT ILLNESS: Patient is a 50 y.o. female established patient presenting with issues below  Pt hospitalized in late February 2025 for osteomyelitis for her L 4th toe. Her foot healing well now.   Seen in hospital on 3/15/25 for not feeling well, transferred to [facility] from [place]. Found to be in kidney failure and infectious type autoimmune glomerulonephritis. Currently on dialysis and will most likely be on peremanent dialysis. PICC line currently in place and will be getting vein mapping for AV access next week.   Pt getting abx through her dialysis currently and will be on them for another month. Managed by ID.   Most recent A1C at 8.6% when checked in January 26 2025.   Pt very anemic. Had to get a transfusion in hospital about two weeks ago.     Current Outpatient Medications   Medication Sig Dispense Refill    ondansetron (ZOFRAN) 4 MG Tab tablet Take 1 Tablet by mouth every four hours as needed for Nausea/Vomiting. 30 Tablet 1    cyclobenzaprine (FLEXERIL) 10 mg Tab Take 10 mg by mouth 3 times a day as needed for Muscle Spasms.      clindamycin (CLEOCIN) 300 MG Cap Take 300 mg by mouth 4 times a day. X 10 days =Did not finish=hospitalized      Probiotic Product (PROBIOTIC DAILY PO) Take 1 Tablet by mouth every day.      Methoxy PEG-Epoetin Beta (MIRCERA) 30 MCG/0.3ML Solution Prefilled Syringe Inject 60 mcg as directed every 14 days. [place] [phone]      cefepime (MAXIPIME) 2 GM Recon Soln Infuse 2-3 g into a venous catheter see administration instructions. 2 gms on Mondays and Wednesdays , 3 gm on Fridays at [place] [phone]      vancomycin (VANCOCIN) 1 g Recon Soln 1,000 mg every Monday, Wednesday, and Friday. At [place] [phone]      carvedilol (COREG) 6.25 MG Tab Take 1 Tablet by mouth 2 times a day with meals. 60 Tablet 0    Sodium Hypochlorite (DAKINS 0.125%,  "1/4 STRENGTH,) 0.125 % Solution Apply 100 mL topically 2 times a day.      senna-docusate (PERICOLACE OR SENOKOT S) 8.6-50 MG Tab Take 2 Tablets by mouth every evening.      polyethylene glycol/lytes (MIRALAX) Pack Take 1 Packet by mouth 1 time a day as needed (if no bowel movement in last 2 days).      sevelamer carbonate (RENVELA) 800 MG Tab tablet Take 1 Tablet by mouth 3 times a day with meals. 270 Tablet 0    thiamine (THIAMINE) 100 MG tablet Take 1 Tablet by mouth every day.      multivitamin Tab Take 1 Tablet by mouth every day.      omeprazole (PRILOSEC) 20 MG delayed-release capsule Take 20 mg by mouth 1 time a day as needed.      HYDROcodone/acetaminophen (NORCO)  MG Tab Take 1 Tablet by mouth 3 times a day as needed for Moderate Pain.      Semaglutide, 1 MG/DOSE, (OZEMPIC, 1 MG/DOSE,) 4 MG/3ML Solution Pen-injector Inject 1 mg under the skin every 7 days. 3 mL 2    Cholecalciferol (VITAMIN D-3) 125 MCG (5000 UT) Tab Take 5,000 Units by mouth every day.      furosemide (LASIX) 20 MG Tab Take 1 Tablet by mouth 2 times a day. (Patient taking differently: Take 20 mg by mouth 2 times a day as needed.) 180 Tablet 3    insulin lispro (HUMALOG) 100 UNIT/ML INJ Inject 10 units under skin 3 times a day before meals (Patient taking differently: Inject  under the skin 3 times a day before meals.  Unknown Sliding scale) 30 mL 3    gabapentin (NEURONTIN) 300 MG Cap Take 300 mg by mouth 3 times a day. (Patient not taking: Reported on 4/2/2025)      amLODIPine (NORVASC) 5 MG Tab Take 1 Tablet by mouth every day. (Patient not taking: Reported on 4/2/2025) 100 Tablet 3     No current facility-administered medications for this visit.        ROS:     ROS    Exam:    Pulse 85   Temp 36.2 °C (97.2 °F) (Temporal)   Resp 12   Ht 1.753 m (5' 9\")   Wt 97.1 kg (214 lb)   SpO2 98%  Body mass index is 31.6 kg/m².    General:  Well nourished, well developed female in NAD  Head is grossly normal.  Neck: Supple.   Pulmonary: " Clear to auscultation. No ronchi, wheezing or rales  Cardiac: Regular rate and rhythm. No murmurs.  Skin: Warm and dry. No obvious lesions  Neuro: Normal muscle tone. Ambulates with walker. Alert and oriented.  Psych: Normal mood and affect      Please note that this dictation was created using voice recognition software. I have made every reasonable attempt to correct obvious errors, but I expect that there are errors of grammar and possibly content that I did not discover before finalizing the note.        Assessment/Plan:  1. Anemia due to chronic kidney disease, on chronic dialysis ([facility])      2. Other osteomyelitis of left foot ([facility])  F/u with ID    3. Dialysis patient ([facility])  F/u with nephrology    4. Nausea  Increase zofran to 8mg  - ondansetron (ZOFRAN ODT) 8 MG TABLET DISPERSIBLE; Take 1 Tablet by mouth every 8 hours as needed for Nausea.  Dispense: 15 Tablet; Refill: 0

## 2025-04-04 NOTE — Clinical Note
REFERRAL APPROVAL NOTICE         Sent on April 4, 2025                   Aviva Kenney  [address]  [place] [address]                   Dear Ms. Kenney,    After a careful review of the medical information and benefit coverage, [facility] has processed your referral. See below for additional details.    If applicable, you must be actively enrolled with your insurance for coverage of the authorized service. If you have any questions regarding your coverage, please contact your insurance directly.    REFERRAL INFORMATION   Referral #:  [id number]  Referred-To Department    Referred-By Provider:  Pulmonary and Sleep Medicine    Elías Garzon M.D.   Pulmonary/sleep [facility]      [address]   [place] [address]  [phone] [address]  [place] [address]  [phone]    Referral Start Date:  03/29/2025  Referral End Date:   03/29/2026           SCHEDULING  If you do not already have an appointment, please call [phone] to make an appointment.   MORE INFORMATION  As a reminder, [facility] ownership has changed, meaning this location is now owned and operated by [facility]. As such, we want to clarify that our patients should expect to receive two separate bills for the services received at [facility] - one representing the [facility] facility fees as the owner of the establishment, and the other to represent the physician's services and subsequent fees. You can speak with your insurance carrier for a pricing estimate by calling the customer service number on the back of your card and ask about charges for a hospital outpatient visit.  If you do not already have a [employer] account, sign up at: [email or web address]  You can access your medical information, make appointments, see lab results, billing  information, and more.  If you have questions regarding this referral, please contact  the [facility] department at:             [phone]. Monday - Friday 7:30AM - 5:00PM.      Sincerely,  [facility]

## 2025-04-10 ENCOUNTER — APPOINTMENT (OUTPATIENT)
Dept: NEPHROLOGY | Facility: MEDICAL CENTER | Age: 50
End: 2025-04-10
Payer: MEDICARE

## 2025-04-21 NOTE — DISCHARGE SUMMARY
"Discharge Summary    CHIEF COMPLAINT ON ADMISSION  Chief Complaint   Patient presents with    Flank Pain     BIBA as a transfer from [place] for left flank pain radiating since yesterday. Pt had a renal biopsy done .   Imaging showed subcapsular hematoma 2.6 x 12 cm superior to inferior. Pt on dialysis M/W/F, due today.     Received 100 fent en route 0500, 4mg zofran IV.        Reason for Admission  ems     Admission Date  3/28/2025    CODE STATUS  Prior    HPI & HOSPITAL COURSE  Per H&P: \"Aviva Kenney is a 50 y.o. female who was transferred from outside facility 3/28/2025 with left perinephric.  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 She presented to outside hospital with L flank pain, CT scan showed \"subscapular hematoma 2.6 x 12 cm superior to inferior \".  Denies fever/chills, any urinary symptoms.  Says she has been trying to take care of her amputation surgical wound denies any recent purulent discharge.   In the ED afebrile, hemodynamically stable.  Still has left flank pain.\"    Evaluated by nephrology, continue hemodialysis schedule.  Advised to monitor hemoglobin and renal function overnight, if stable can likely discharge in the morning.  No significant hemoglobin drops, stable vitals and labs, tolerating p.o. intake, pain controlled.    Therefore, she is discharged in good and stable condition to home with close outpatient follow-up.    The patient met 2-midnight criteria for an inpatient stay at the time of discharge.    Discharge Date  3/29/2025    FOLLOW UP ITEMS POST DISCHARGE  Will follow-up with nephrology, PCP for postdischarge hospital care.    DISCHARGE DIAGNOSES  Principal Problem:    Perinephric hematoma (POA: Yes)  Active Problems:    Type 2 diabetes " mellitus with hyperglycemia, with long-term current use of insulin (HCC) (POA: Yes)    Acute renal failure superimposed on stage 3b chronic kidney disease (HCC) (POA: Yes)    Primary hypertension (POA: Yes)    Anemia due to chronic kidney disease, on chronic dialysis (HCC) (POA: Yes)    Osteomyelitis of left foot (HCC) (POA: Yes)  Resolved Problems:    * No resolved hospital problems. *      FOLLOW UP  Future Appointments   Date Time Provider Department Center   4/22/2025  2:30 PM Carlos Kong P.A.-C. [facility]   5/6/2025  3:20 PM [facility]     Barry Higgins P.A.-C.  [address] [address]  [phone]    Follow up in 1 week(s)        MEDICATIONS ON DISCHARGE     Medication List        START taking these medications        Instructions   ondansetron 4 MG Tabs tablet  Commonly known as: Zofran   Take 1 Tablet by mouth every four hours as needed for Nausea/Vomiting.  Dose: 4 mg            CONTINUE taking these medications        Instructions   amLODIPine 5 MG Tabs  Commonly known as: Norvasc   Take 1 Tablet by mouth every day.  Dose: 5 mg     carvedilol 6.25 MG Tabs  Commonly known as: Coreg   Take 1 Tablet by mouth 2 times a day with meals.  Dose: 6.25 mg     cefepime 2 GM Solr  Commonly known as: Maxipime   Infuse 2-3 g into a venous catheter see administration instructions. 2 gms on Mondays and Wednesdays , 3 gm on Fridays at [facility] [phone]  Dose: 2-3 g     clindamycin 300 MG Caps  Commonly known as: Cleocin   Take 300 mg by mouth 4 times a day. X 10 days =Did not finish=hospitalized  Dose: 300 mg     cyclobenzaprine 10 MG Tabs  Commonly known as: Flexeril   Take 10 mg by mouth 3 times a day as needed for Muscle Spasms.  Dose: 10 mg     dakins 0.125% (1/4 strength) 0.125 % Soln   Apply 100 mL topically 2 times a day.  Dose: 100 mL     furosemide 20 MG Tabs  Commonly known as: Lasix   Take 1 Tablet by mouth 2 times a day.  Dose:  20 mg     gabapentin 300 MG Caps  Commonly known as: Neurontin   Take 300 mg by mouth 3 times a day.  Dose: 300 mg     HYDROcodone/acetaminophen  MG Tabs  Commonly known as: Norco   Take 1 Tablet by mouth 3 times a day as needed for Moderate Pain.  Dose: 1 Tablet     insulin lispro 100 UNIT/ML  Commonly known as: HumaLOG   Doctor's comments: Pt indicates her insurance didn't cover the pen ; this is a 3 month supply  Inject 10 units under skin 3 times a day before meals     Mircera 30 MCG/0.3ML Sosy  Generic drug: Methoxy PEG-Epoetin Beta   Inject 60 mcg as directed every 14 days. [facility] [phone]  Dose: 60 mcg     multivitamin Tabs   Take 1 Tablet by mouth every day.  Dose: 1 Tablet     omeprazole 20 MG delayed-release capsule  Commonly known as: PriLOSEC   Take 20 mg by mouth 1 time a day as needed.  Dose: 20 mg     Ozempic (1 MG/DOSE) 4 MG/3ML Sopn  Generic drug: Semaglutide (1 MG/DOSE)   Inject 1 mg under the skin every 7 days.  Dose: 1 mg     polyethylene glycol/lytes 17 g Pack  Commonly known as: Miralax   Take 1 Packet by mouth 1 time a day as needed (if no bowel movement in last 2 days).  Dose: 17 g     PROBIOTIC DAILY PO   Take 1 Tablet by mouth every day.  Dose: 1 Tablet     senna-docusate 8.6-50 MG Tabs  Commonly known as: Pericolace Or Senokot S   Take 2 Tablets by mouth every evening.  Dose: 2 Tablet     sevelamer carbonate 800 MG Tabs tablet  Commonly known as: Renvela   Take 1 Tablet by mouth 3 times a day with meals.  Dose: 800 mg     thiamine 100 MG tablet  Commonly known as: Thiamine   Take 1 Tablet by mouth every day.  Dose: 100 mg     vancomycin 1 g Solr  Commonly known as: Vancocin   1,000 mg every Monday, Wednesday, and Friday. At [facility] [phone]  Dose: 1,000 mg     Vitamin D-3 125 MCG (5000 UT) Tabs   Take 5,000 Units by mouth every day.  Dose: 5,000 Units              Allergies  No Known Allergies    DIET  No orders of the defined types were placed in this  encounter.      ACTIVITY  As tolerated.  Weight bearing as tolerated    CONSULTATIONS  Nephrology    PROCEDURES  None    LABORATORY  Lab Results   Component Value Date    SODIUM 129 (L) 03/29/2025    POTASSIUM 4.8 03/29/2025    CHLORIDE 95 (L) 03/29/2025    CO2 25 03/29/2025    GLUCOSE 216 (H) 03/29/2025    BUN 23 (H) 03/29/2025    CREATININE 4.44 (HH) 03/29/2025        Lab Results   Component Value Date    WBC 7.8 03/29/2025    HEMOGLOBIN 8.1 (L) 03/29/2025    HEMATOCRIT 25.5 (L) 03/29/2025    PLATELETCT 219 03/29/2025        Total time of the discharge process exceeds 35 minutes.

## 2025-04-28 NOTE — DOCUMENTATION QUERY
[facility]                                                                       Query Response Note      PATIENT:               CALLIE FOWLER  ACCT #:                  [id number]  MRN:                     [id number]  :                      1975  ADMIT DATE:       3/28/2025 5:32 AM  DISCH DATE:        3/29/2025 3:01 PM  RESPONDING  PROVIDER #:        [id number]           QUERY TEXT:    Patient currently on hemodialysis admitted for L subcapsular renal hematoma. There is conflicting documentation in the medical record regarding ESRD vs Acute renal failure superimposed on stage 3b CKD.  -2025 Cr/GFR ranged from 1.37-2.46 & 24-48.  Since recent admission 3/15/2025 Cr/GFR ranged from 3.38-12.70 & 3-13.    Based on the clinical indicators documented, can this documentation be further clarified?     Note: If you agree with a diagnosis listed, please remember to make an addendum to the Discharge Summary.    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

## 2025-05-06 ENCOUNTER — APPOINTMENT (OUTPATIENT)
Dept: MEDICAL GROUP | Facility: PHYSICIAN GROUP | Age: 50
End: 2025-05-06
Payer: MEDICARE

## 2025-05-09 ENCOUNTER — TELEPHONE (OUTPATIENT)
Dept: HEALTH INFORMATION MANAGEMENT | Facility: OTHER | Age: 50
End: 2025-05-09

## 2025-05-12 ENCOUNTER — APPOINTMENT (OUTPATIENT)
Dept: MEDICAL GROUP | Facility: PHYSICIAN GROUP | Age: 50
End: 2025-05-12
Payer: MEDICARE

## 2025-05-27 ENCOUNTER — OFFICE VISIT (OUTPATIENT)
Dept: MEDICAL GROUP | Facility: PHYSICIAN GROUP | Age: 50
End: 2025-05-27
Payer: MEDICARE

## 2025-05-27 VITALS
WEIGHT: 186 LBS | RESPIRATION RATE: 12 BRPM | BODY MASS INDEX: 27.55 KG/M2 | OXYGEN SATURATION: 95 % | TEMPERATURE: 96.5 F | SYSTOLIC BLOOD PRESSURE: 118 MMHG | HEIGHT: 69 IN | DIASTOLIC BLOOD PRESSURE: 80 MMHG | HEART RATE: 89 BPM

## 2025-05-27 DIAGNOSIS — Z79.4 TYPE 2 DIABETES MELLITUS WITH OTHER DIABETIC KIDNEY COMPLICATION, WITH LONG-TERM CURRENT USE OF INSULIN (HCC): Primary | ICD-10-CM

## 2025-05-27 DIAGNOSIS — Z99.2 DIALYSIS PATIENT (HCC): ICD-10-CM

## 2025-05-27 DIAGNOSIS — E11.29 TYPE 2 DIABETES MELLITUS WITH OTHER DIABETIC KIDNEY COMPLICATION, WITH LONG-TERM CURRENT USE OF INSULIN (HCC): Primary | ICD-10-CM

## 2025-05-27 DIAGNOSIS — L29.9 ITCHY SKIN: ICD-10-CM

## 2025-05-27 DIAGNOSIS — Z12.11 SCREENING FOR COLON CANCER: ICD-10-CM

## 2025-05-27 DIAGNOSIS — Z12.31 ENCOUNTER FOR SCREENING MAMMOGRAM FOR BREAST CANCER: ICD-10-CM

## 2025-05-27 LAB
HBA1C MFR BLD: 5.5 % (ref ?–5.8)
POCT INT CON NEG: NEGATIVE
POCT INT CON POS: POSITIVE

## 2025-05-27 PROCEDURE — 99214 OFFICE O/P EST MOD 30 MIN: CPT | Performed by: PHYSICIAN ASSISTANT

## 2025-05-27 PROCEDURE — 83036 HEMOGLOBIN GLYCOSYLATED A1C: CPT | Performed by: PHYSICIAN ASSISTANT

## 2025-05-27 PROCEDURE — 3079F DIAST BP 80-89 MM HG: CPT | Performed by: PHYSICIAN ASSISTANT

## 2025-05-27 PROCEDURE — 3074F SYST BP LT 130 MM HG: CPT | Performed by: PHYSICIAN ASSISTANT

## 2025-05-27 RX ORDER — HYDROXYZINE HYDROCHLORIDE 25 MG/1
TABLET, FILM COATED ORAL
Qty: 90 TABLET | Refills: 3 | Status: SHIPPED | OUTPATIENT
Start: 2025-05-27

## 2025-05-27 ASSESSMENT — FIBROSIS 4 INDEX: FIB4 SCORE: 4.44

## 2025-05-27 NOTE — PROGRESS NOTES
"CC:  Chief Complaint   Patient presents with    Diabetes       HISTORY OF PRESENT ILLNESS: Patient is a 50 y.o. female established patient presenting with issues below  Patient's hemoglobin A1c of 5.5%. Currently on ozempic and short acting insulin before meals.   Pt's nephrologist told her she will likely be able to discontinue dialysis. Just had AV fistula placed.   Had to have her L toe debrided again because it did not heal correctly and pus pocket formed. Has been home health and wound care managing this for her.   Has been having problems with her L hand since having AV fistula placed.     Current Medications[1]     ROS:     ROS    Exam:    /80   Pulse 89   Temp 35.8 °C (96.5 °F) (Temporal)   Resp 12   Ht 1.753 m (5' 9\")   Wt 84.4 kg (186 lb)   SpO2 95%  Body mass index is 27.47 kg/m².    General:  Well nourished, well developed female in NAD  Head is grossly normal.  Neck: Supple.   Pulmonary: Clear to auscultation. No ronchi, wheezing or rales  Cardiac: Regular rate and rhythm. No murmurs.  Skin: Warm and dry. No obvious lesions  Neuro: Normal muscle tone. Gait normal. Alert and oriented.  Psych: Normal mood and affect      Please note that this dictation was created using voice recognition software. I have made every reasonable attempt to correct obvious errors, but I expect that there are errors of grammar and possibly content that I did not discover before finalizing the note.    Monofilament testing with a 10 gram force: sensation intact: decreased on right  Visual Inspection: Feet with maceration, ulcers, fissures.  Pedal pulses: intact bilaterally      Assessment/Plan:    1. Type 2 diabetes mellitus with other diabetic kidney complication, with long-term current use of insulin (HCC) (Primary)  Much improved.  Continue to monitor  - POCT Hemoglobin A1C  - Diabetic Monofilament LE Exam    2. Itchy skin  Refill sent in  - hydrOXYzine HCl (ATARAX) 25 MG Tab; 1/2-1 tab up to tid prn itching  " Dispense: 90 Tablet; Refill: 3    3. Encounter for screening mammogram for breast cancer    - MA-SCREENING MAMMO BILAT W/TOMOSYNTHESIS W/CAD; Future    4. Screening for colon cancer    - Cologuard® colon cancer screening    5. Dialysis patient (HCC)  Follow-up with nephrology                  [1]   Current Outpatient Medications   Medication Sig Dispense Refill    ondansetron (ZOFRAN ODT) 8 MG TABLET DISPERSIBLE Take 1 Tablet by mouth every 8 hours as needed for Nausea. 15 Tablet 0    ondansetron (ZOFRAN) 4 MG Tab tablet Take 1 Tablet by mouth every four hours as needed for Nausea/Vomiting. 30 Tablet 1    cyclobenzaprine (FLEXERIL) 10 mg Tab Take 10 mg by mouth 3 times a day as needed for Muscle Spasms.      clindamycin (CLEOCIN) 300 MG Cap Take 300 mg by mouth 4 times a day. X 10 days =Did not finish=hospitalized      gabapentin (NEURONTIN) 300 MG Cap Take 300 mg by mouth 3 times a day.      Probiotic Product (PROBIOTIC DAILY PO) Take 1 Tablet by mouth every day.      Methoxy PEG-Epoetin Beta (MIRCERA) 30 MCG/0.3ML Solution Prefilled Syringe Inject 60 mcg as directed every 14 days. [address]      cefepime (MAXIPIME) 2 GM Recon Soln Infuse 2-3 g into a venous catheter see administration instructions. 2 gms on Mondays and Wednesdays , 3 gm on Fridays at [address]      vancomycin (VANCOCIN) 1 g Recon Soln 1,000 mg every Monday, Wednesday, and Friday. At [address]      carvedilol (COREG) 6.25 MG Tab Take 1 Tablet by mouth 2 times a day with meals. 60 Tablet 0    Sodium Hypochlorite (DAKINS 0.125%, 1/4 STRENGTH,) 0.125 % Solution Apply 100 mL topically 2 times a day.      senna-docusate (PERICOLACE OR SENOKOT S) 8.6-50 MG Tab Take 2 Tablets by mouth every evening.      polyethylene glycol/lytes (MIRALAX) Pack Take 1 Packet by mouth 1 time a day as needed (if no bowel movement in last 2 days).      sevelamer carbonate (RENVELA) 800 MG Tab tablet Take 1 Tablet by mouth 3  times a day with meals. 270 Tablet 0    thiamine (THIAMINE) 100 MG tablet Take 1 Tablet by mouth every day.      multivitamin Tab Take 1 Tablet by mouth every day.      omeprazole (PRILOSEC) 20 MG delayed-release capsule Take 20 mg by mouth 1 time a day as needed.      HYDROcodone/acetaminophen (NORCO)  MG Tab Take 1 Tablet by mouth 3 times a day as needed for Moderate Pain.      Semaglutide, 1 MG/DOSE, (OZEMPIC, 1 MG/DOSE,) 4 MG/3ML Solution Pen-injector Inject 1 mg under the skin every 7 days. 3 mL 2    Cholecalciferol (VITAMIN D-3) 125 MCG (5000 UT) Tab Take 5,000 Units by mouth every day.      furosemide (LASIX) 20 MG Tab Take 1 Tablet by mouth 2 times a day. (Patient taking differently: Take 20 mg by mouth 2 times a day as needed.) 180 Tablet 3    insulin lispro (HUMALOG) 100 UNIT/ML INJ Inject 10 units under skin 3 times a day before meals (Patient taking differently: Inject  under the skin 3 times a day before meals.  Unknown Sliding scale) 30 mL 3    amLODIPine (NORVASC) 5 MG Tab Take 1 Tablet by mouth every day. (Patient not taking: Reported on 5/27/2025) 100 Tablet 3     No current facility-administered medications for this visit.

## 2025-06-10 ENCOUNTER — APPOINTMENT (OUTPATIENT)
Dept: MEDICAL GROUP | Facility: PHYSICIAN GROUP | Age: 50
End: 2025-06-10
Payer: MEDICARE

## 2025-06-17 ENCOUNTER — HOSPITAL ENCOUNTER (INPATIENT)
Facility: MEDICAL CENTER | Age: 50
LOS: 3 days | DRG: 255 | End: 2025-06-20
Attending: HOSPITALIST | Admitting: HOSPITALIST
Payer: MEDICARE

## 2025-06-17 DIAGNOSIS — R93.89 IMAGING ABNORMALITY: ICD-10-CM

## 2025-06-17 DIAGNOSIS — I10 PRIMARY HYPERTENSION: Primary | ICD-10-CM

## 2025-06-17 DIAGNOSIS — L03.039 CELLULITIS OF TOE, UNSPECIFIED LATERALITY: ICD-10-CM

## 2025-06-17 PROBLEM — I16.0 HYPERTENSIVE URGENCY: Status: ACTIVE | Noted: 2025-06-17

## 2025-06-17 PROBLEM — L03.90 CELLULITIS: Status: ACTIVE | Noted: 2025-06-17

## 2025-06-17 PROBLEM — N18.6 ESRD (END STAGE RENAL DISEASE) (HCC): Status: ACTIVE | Noted: 2025-06-17

## 2025-06-17 PROBLEM — E11.52 TYPE 2 DIABETES MELLITUS WITH DIABETIC PERIPHERAL ANGIOPATHY AND GANGRENE, WITH LONG-TERM CURRENT USE OF INSULIN (HCC): Status: ACTIVE | Noted: 2022-10-03

## 2025-06-17 LAB
ALBUMIN SERPL BCP-MCNC: 3.3 G/DL (ref 3.2–4.9)
ALBUMIN/GLOB SERPL: 0.9 G/DL
ALP SERPL-CCNC: 1226 U/L (ref 30–99)
ALT SERPL-CCNC: 131 U/L (ref 2–50)
ANION GAP SERPL CALC-SCNC: 13 MMOL/L (ref 7–16)
AST SERPL-CCNC: 203 U/L (ref 12–45)
BASOPHILS # BLD AUTO: 0.3 % (ref 0–1.8)
BASOPHILS # BLD: 0.02 K/UL (ref 0–0.12)
BILIRUB SERPL-MCNC: 2.1 MG/DL (ref 0.1–1.5)
BUN SERPL-MCNC: 24 MG/DL (ref 8–22)
CALCIUM ALBUM COR SERPL-MCNC: 10.2 MG/DL (ref 8.5–10.5)
CALCIUM SERPL-MCNC: 9.6 MG/DL (ref 8.5–10.5)
CHLORIDE SERPL-SCNC: 95 MMOL/L (ref 96–112)
CO2 SERPL-SCNC: 26 MMOL/L (ref 20–33)
CREAT SERPL-MCNC: 3.6 MG/DL (ref 0.5–1.4)
EOSINOPHIL # BLD AUTO: 0.23 K/UL (ref 0–0.51)
EOSINOPHIL NFR BLD: 3.9 % (ref 0–6.9)
ERYTHROCYTE [DISTWIDTH] IN BLOOD BY AUTOMATED COUNT: 56.8 FL (ref 35.9–50)
GFR SERPLBLD CREATININE-BSD FMLA CKD-EPI: 15 ML/MIN/1.73 M 2
GLOBULIN SER CALC-MCNC: 3.6 G/DL (ref 1.9–3.5)
GLUCOSE BLD STRIP.AUTO-MCNC: 115 MG/DL (ref 65–99)
GLUCOSE SERPL-MCNC: 122 MG/DL (ref 65–99)
HCT VFR BLD AUTO: 33.1 % (ref 37–47)
HGB BLD-MCNC: 11 G/DL (ref 12–16)
IMM GRANULOCYTES # BLD AUTO: 0.02 K/UL (ref 0–0.11)
IMM GRANULOCYTES NFR BLD AUTO: 0.3 % (ref 0–0.9)
LACTATE SERPL-SCNC: 0.9 MMOL/L (ref 0.5–2)
LYMPHOCYTES # BLD AUTO: 2.1 K/UL (ref 1–4.8)
LYMPHOCYTES NFR BLD: 35.6 % (ref 22–41)
MCH RBC QN AUTO: 30.2 PG (ref 27–33)
MCHC RBC AUTO-ENTMCNC: 33.2 G/DL (ref 32.2–35.5)
MCV RBC AUTO: 90.9 FL (ref 81.4–97.8)
MONOCYTES # BLD AUTO: 0.56 K/UL (ref 0–0.85)
MONOCYTES NFR BLD AUTO: 9.5 % (ref 0–13.4)
NEUTROPHILS # BLD AUTO: 2.97 K/UL (ref 1.82–7.42)
NEUTROPHILS NFR BLD: 50.4 % (ref 44–72)
NRBC # BLD AUTO: 0 K/UL
NRBC BLD-RTO: 0 /100 WBC (ref 0–0.2)
NT-PROBNP SERPL IA-MCNC: 5228 PG/ML (ref 0–125)
PLATELET # BLD AUTO: 289 K/UL (ref 164–446)
PMV BLD AUTO: 11.4 FL (ref 9–12.9)
POTASSIUM SERPL-SCNC: 4.2 MMOL/L (ref 3.6–5.5)
PROCALCITONIN SERPL-MCNC: 0.93 NG/ML
PROT SERPL-MCNC: 6.9 G/DL (ref 6–8.2)
RBC # BLD AUTO: 3.64 M/UL (ref 4.2–5.4)
SODIUM SERPL-SCNC: 134 MMOL/L (ref 135–145)
TROPONIN T SERPL-MCNC: 137 NG/L (ref 6–19)
WBC # BLD AUTO: 5.9 K/UL (ref 4.8–10.8)

## 2025-06-17 PROCEDURE — 85025 COMPLETE CBC W/AUTO DIFF WBC: CPT

## 2025-06-17 PROCEDURE — 94760 N-INVAS EAR/PLS OXIMETRY 1: CPT

## 2025-06-17 PROCEDURE — 99223 1ST HOSP IP/OBS HIGH 75: CPT | Mod: AI | Performed by: HOSPITALIST

## 2025-06-17 PROCEDURE — 87641 MR-STAPH DNA AMP PROBE: CPT

## 2025-06-17 PROCEDURE — 36415 COLL VENOUS BLD VENIPUNCTURE: CPT

## 2025-06-17 PROCEDURE — 93005 ELECTROCARDIOGRAM TRACING: CPT | Mod: TC | Performed by: HOSPITALIST

## 2025-06-17 PROCEDURE — 700102 HCHG RX REV CODE 250 W/ 637 OVERRIDE(OP): Performed by: HOSPITALIST

## 2025-06-17 PROCEDURE — 84145 PROCALCITONIN (PCT): CPT

## 2025-06-17 PROCEDURE — G0318 PR PROLONG HOME E&M ADDL 15 MIN: HCPCS | Performed by: HOSPITALIST

## 2025-06-17 PROCEDURE — 83880 ASSAY OF NATRIURETIC PEPTIDE: CPT

## 2025-06-17 PROCEDURE — A9270 NON-COVERED ITEM OR SERVICE: HCPCS | Performed by: HOSPITALIST

## 2025-06-17 PROCEDURE — 83605 ASSAY OF LACTIC ACID: CPT

## 2025-06-17 PROCEDURE — 770020 HCHG ROOM/CARE - TELE (206)

## 2025-06-17 PROCEDURE — 700111 HCHG RX REV CODE 636 W/ 250 OVERRIDE (IP): Mod: JZ | Performed by: HOSPITALIST

## 2025-06-17 PROCEDURE — 80053 COMPREHEN METABOLIC PANEL: CPT

## 2025-06-17 PROCEDURE — 84484 ASSAY OF TROPONIN QUANT: CPT

## 2025-06-17 PROCEDURE — 82962 GLUCOSE BLOOD TEST: CPT | Performed by: HOSPITALIST

## 2025-06-17 RX ORDER — CYCLOBENZAPRINE HCL 10 MG
10 TABLET ORAL 3 TIMES DAILY PRN
Status: DISCONTINUED | OUTPATIENT
Start: 2025-06-17 | End: 2025-06-20 | Stop reason: HOSPADM

## 2025-06-17 RX ORDER — GABAPENTIN 100 MG/1
100 CAPSULE ORAL 3 TIMES DAILY
Status: DISCONTINUED | OUTPATIENT
Start: 2025-06-17 | End: 2025-06-20 | Stop reason: HOSPADM

## 2025-06-17 RX ORDER — AMOXICILLIN 250 MG
2 CAPSULE ORAL EVERY EVENING
Status: DISCONTINUED | OUTPATIENT
Start: 2025-06-17 | End: 2025-06-20 | Stop reason: HOSPADM

## 2025-06-17 RX ORDER — ONDANSETRON 4 MG/1
4 TABLET, ORALLY DISINTEGRATING ORAL EVERY 4 HOURS PRN
Status: DISCONTINUED | OUTPATIENT
Start: 2025-06-17 | End: 2025-06-20 | Stop reason: HOSPADM

## 2025-06-17 RX ORDER — LINEZOLID 600 MG/1
600 TABLET, FILM COATED ORAL EVERY 12 HOURS
Status: DISCONTINUED | OUTPATIENT
Start: 2025-06-18 | End: 2025-06-18

## 2025-06-17 RX ORDER — VITAMIN B COMPLEX
5000 TABLET ORAL DAILY
Status: DISCONTINUED | OUTPATIENT
Start: 2025-06-18 | End: 2025-06-20 | Stop reason: HOSPADM

## 2025-06-17 RX ORDER — OXYCODONE HYDROCHLORIDE 5 MG/1
2.5 TABLET ORAL
Status: DISCONTINUED | OUTPATIENT
Start: 2025-06-17 | End: 2025-06-20 | Stop reason: HOSPADM

## 2025-06-17 RX ORDER — ONDANSETRON 2 MG/ML
4 INJECTION INTRAMUSCULAR; INTRAVENOUS EVERY 4 HOURS PRN
Status: DISCONTINUED | OUTPATIENT
Start: 2025-06-17 | End: 2025-06-20 | Stop reason: HOSPADM

## 2025-06-17 RX ORDER — HYDROMORPHONE HYDROCHLORIDE 1 MG/ML
0.25 INJECTION, SOLUTION INTRAMUSCULAR; INTRAVENOUS; SUBCUTANEOUS
Status: DISCONTINUED | OUTPATIENT
Start: 2025-06-17 | End: 2025-06-20 | Stop reason: HOSPADM

## 2025-06-17 RX ORDER — OXYCODONE HYDROCHLORIDE 5 MG/1
5 TABLET ORAL
Status: DISCONTINUED | OUTPATIENT
Start: 2025-06-17 | End: 2025-06-20 | Stop reason: HOSPADM

## 2025-06-17 RX ORDER — ACETAMINOPHEN 325 MG/1
650 TABLET ORAL EVERY 6 HOURS PRN
Status: DISCONTINUED | OUTPATIENT
Start: 2025-06-17 | End: 2025-06-20 | Stop reason: HOSPADM

## 2025-06-17 RX ORDER — SEVELAMER CARBONATE 800 MG/1
800 TABLET, FILM COATED ORAL
Status: DISCONTINUED | OUTPATIENT
Start: 2025-06-18 | End: 2025-06-20 | Stop reason: HOSPADM

## 2025-06-17 RX ORDER — INSULIN LISPRO 100 [IU]/ML
1-6 INJECTION, SOLUTION INTRAVENOUS; SUBCUTANEOUS
Status: DISCONTINUED | OUTPATIENT
Start: 2025-06-17 | End: 2025-06-20 | Stop reason: HOSPADM

## 2025-06-17 RX ORDER — AMLODIPINE BESYLATE 5 MG/1
5 TABLET ORAL DAILY
Status: DISCONTINUED | OUTPATIENT
Start: 2025-06-18 | End: 2025-06-19

## 2025-06-17 RX ORDER — OMEPRAZOLE 20 MG/1
20 CAPSULE, DELAYED RELEASE ORAL
Status: DISCONTINUED | OUTPATIENT
Start: 2025-06-17 | End: 2025-06-20 | Stop reason: HOSPADM

## 2025-06-17 RX ORDER — PROMETHAZINE HYDROCHLORIDE 25 MG/1
12.5-25 TABLET ORAL EVERY 4 HOURS PRN
Status: DISCONTINUED | OUTPATIENT
Start: 2025-06-17 | End: 2025-06-20 | Stop reason: HOSPADM

## 2025-06-17 RX ORDER — PROCHLORPERAZINE EDISYLATE 5 MG/ML
5-10 INJECTION INTRAMUSCULAR; INTRAVENOUS EVERY 4 HOURS PRN
Status: DISCONTINUED | OUTPATIENT
Start: 2025-06-17 | End: 2025-06-20 | Stop reason: HOSPADM

## 2025-06-17 RX ORDER — PROMETHAZINE HYDROCHLORIDE 25 MG/1
12.5-25 SUPPOSITORY RECTAL EVERY 4 HOURS PRN
Status: DISCONTINUED | OUTPATIENT
Start: 2025-06-17 | End: 2025-06-20 | Stop reason: HOSPADM

## 2025-06-17 RX ORDER — POLYETHYLENE GLYCOL 3350 17 G/17G
1 POWDER, FOR SOLUTION ORAL
Status: DISCONTINUED | OUTPATIENT
Start: 2025-06-17 | End: 2025-06-20 | Stop reason: HOSPADM

## 2025-06-17 RX ORDER — HYDROXYZINE HYDROCHLORIDE 25 MG/1
25 TABLET, FILM COATED ORAL EVERY 6 HOURS PRN
Status: DISCONTINUED | OUTPATIENT
Start: 2025-06-17 | End: 2025-06-20 | Stop reason: HOSPADM

## 2025-06-17 RX ORDER — LINEZOLID 600 MG/1
600 TABLET, FILM COATED ORAL EVERY 12 HOURS
Status: DISCONTINUED | OUTPATIENT
Start: 2025-06-17 | End: 2025-06-17

## 2025-06-17 RX ORDER — DEXTROSE MONOHYDRATE 25 G/50ML
25 INJECTION, SOLUTION INTRAVENOUS
Status: DISCONTINUED | OUTPATIENT
Start: 2025-06-17 | End: 2025-06-20 | Stop reason: HOSPADM

## 2025-06-17 RX ORDER — HYDRALAZINE HYDROCHLORIDE 20 MG/ML
10 INJECTION INTRAMUSCULAR; INTRAVENOUS EVERY 4 HOURS PRN
Status: DISCONTINUED | OUTPATIENT
Start: 2025-06-17 | End: 2025-06-20 | Stop reason: HOSPADM

## 2025-06-17 RX ADMIN — HYDRALAZINE HYDROCHLORIDE 10 MG: 20 INJECTION INTRAMUSCULAR; INTRAVENOUS at 21:01

## 2025-06-17 RX ADMIN — ONDANSETRON 4 MG: 2 INJECTION INTRAMUSCULAR; INTRAVENOUS at 22:18

## 2025-06-17 RX ADMIN — LINEZOLID 600 MG: 600 TABLET, FILM COATED ORAL at 21:00

## 2025-06-17 RX ADMIN — GABAPENTIN 100 MG: 100 CAPSULE ORAL at 21:01

## 2025-06-17 ASSESSMENT — ENCOUNTER SYMPTOMS
FOCAL WEAKNESS: 0
VOMITING: 0
EYE DISCHARGE: 0
BRUISES/BLEEDS EASILY: 0
CHILLS: 0
MYALGIAS: 0
COUGH: 0
SHORTNESS OF BREATH: 0
NERVOUS/ANXIOUS: 0
EYE REDNESS: 0
STRIDOR: 0
FLANK PAIN: 0
FEVER: 0
ABDOMINAL PAIN: 0

## 2025-06-17 ASSESSMENT — FIBROSIS 4 INDEX: FIB4 SCORE: 2.12

## 2025-06-17 ASSESSMENT — PAIN DESCRIPTION - PAIN TYPE: TYPE: ACUTE PAIN

## 2025-06-18 ENCOUNTER — APPOINTMENT (OUTPATIENT)
Dept: RADIOLOGY | Facility: MEDICAL CENTER | Age: 50
DRG: 255 | End: 2025-06-18
Attending: INTERNAL MEDICINE
Payer: MEDICARE

## 2025-06-18 PROBLEM — R79.89 ELEVATED LFTS: Status: ACTIVE | Noted: 2025-06-18

## 2025-06-18 LAB
ALBUMIN SERPL BCP-MCNC: 2.9 G/DL (ref 3.2–4.9)
ALBUMIN/GLOB SERPL: 0.8 G/DL
ALP SERPL-CCNC: 1111 U/L (ref 30–99)
ALT SERPL-CCNC: 112 U/L (ref 2–50)
ANION GAP SERPL CALC-SCNC: 14 MMOL/L (ref 7–16)
AST SERPL-CCNC: 165 U/L (ref 12–45)
BILIRUB SERPL-MCNC: 2.1 MG/DL (ref 0.1–1.5)
BUN SERPL-MCNC: 26 MG/DL (ref 8–22)
CALCIUM ALBUM COR SERPL-MCNC: 10.3 MG/DL (ref 8.5–10.5)
CALCIUM SERPL-MCNC: 9.4 MG/DL (ref 8.5–10.5)
CHLORIDE SERPL-SCNC: 97 MMOL/L (ref 96–112)
CK SERPL-CCNC: 35 U/L (ref 0–154)
CO2 SERPL-SCNC: 23 MMOL/L (ref 20–33)
CREAT SERPL-MCNC: 3.79 MG/DL (ref 0.5–1.4)
ERYTHROCYTE [DISTWIDTH] IN BLOOD BY AUTOMATED COUNT: 58.1 FL (ref 35.9–50)
GFR SERPLBLD CREATININE-BSD FMLA CKD-EPI: 14 ML/MIN/1.73 M 2
GLOBULIN SER CALC-MCNC: 3.5 G/DL (ref 1.9–3.5)
GLUCOSE BLD STRIP.AUTO-MCNC: 105 MG/DL (ref 65–99)
GLUCOSE BLD STRIP.AUTO-MCNC: 110 MG/DL (ref 65–99)
GLUCOSE BLD STRIP.AUTO-MCNC: 112 MG/DL (ref 65–99)
GLUCOSE BLD STRIP.AUTO-MCNC: 122 MG/DL (ref 65–99)
GLUCOSE SERPL-MCNC: 150 MG/DL (ref 65–99)
HAV IGM SERPL QL IA: NORMAL
HBV CORE IGM SER QL: NORMAL
HBV SURFACE AG SER QL: NORMAL
HCT VFR BLD AUTO: 33.3 % (ref 37–47)
HCV AB SER QL: NORMAL
HGB BLD-MCNC: 11 G/DL (ref 12–16)
MAGNESIUM SERPL-MCNC: 1.9 MG/DL (ref 1.5–2.5)
MCH RBC QN AUTO: 30.1 PG (ref 27–33)
MCHC RBC AUTO-ENTMCNC: 33 G/DL (ref 32.2–35.5)
MCV RBC AUTO: 91 FL (ref 81.4–97.8)
PHOSPHATE SERPL-MCNC: 3.8 MG/DL (ref 2.5–4.5)
PLATELET # BLD AUTO: 280 K/UL (ref 164–446)
PMV BLD AUTO: 11.6 FL (ref 9–12.9)
POTASSIUM SERPL-SCNC: 4.4 MMOL/L (ref 3.6–5.5)
PROT SERPL-MCNC: 6.4 G/DL (ref 6–8.2)
RBC # BLD AUTO: 3.66 M/UL (ref 4.2–5.4)
SCCMEC + MECA PNL NOSE NAA+PROBE: NEGATIVE
SODIUM SERPL-SCNC: 134 MMOL/L (ref 135–145)
WBC # BLD AUTO: 6.2 K/UL (ref 4.8–10.8)

## 2025-06-18 PROCEDURE — 76705 ECHO EXAM OF ABDOMEN: CPT

## 2025-06-18 PROCEDURE — 700105 HCHG RX REV CODE 258: Performed by: HOSPITALIST

## 2025-06-18 PROCEDURE — 5A1D70Z PERFORMANCE OF URINARY FILTRATION, INTERMITTENT, LESS THAN 6 HOURS PER DAY: ICD-10-PCS | Performed by: INTERNAL MEDICINE

## 2025-06-18 PROCEDURE — 700111 HCHG RX REV CODE 636 W/ 250 OVERRIDE (IP): Mod: JZ | Performed by: INTERNAL MEDICINE

## 2025-06-18 PROCEDURE — 84075 ASSAY ALKALINE PHOSPHATASE: CPT

## 2025-06-18 PROCEDURE — 93922 UPR/L XTREMITY ART 2 LEVELS: CPT

## 2025-06-18 PROCEDURE — 700111 HCHG RX REV CODE 636 W/ 250 OVERRIDE (IP): Mod: JZ | Performed by: HOSPITALIST

## 2025-06-18 PROCEDURE — 94760 N-INVAS EAR/PLS OXIMETRY 1: CPT

## 2025-06-18 PROCEDURE — 700111 HCHG RX REV CODE 636 W/ 250 OVERRIDE (IP): Performed by: INTERNAL MEDICINE

## 2025-06-18 PROCEDURE — 80074 ACUTE HEPATITIS PANEL: CPT

## 2025-06-18 PROCEDURE — 83735 ASSAY OF MAGNESIUM: CPT

## 2025-06-18 PROCEDURE — 700102 HCHG RX REV CODE 250 W/ 637 OVERRIDE(OP): Performed by: HOSPITALIST

## 2025-06-18 PROCEDURE — 84080 ASSAY ALKALINE PHOSPHATASES: CPT

## 2025-06-18 PROCEDURE — 84100 ASSAY OF PHOSPHORUS: CPT

## 2025-06-18 PROCEDURE — 700105 HCHG RX REV CODE 258: Performed by: INTERNAL MEDICINE

## 2025-06-18 PROCEDURE — 85027 COMPLETE CBC AUTOMATED: CPT

## 2025-06-18 PROCEDURE — 770001 HCHG ROOM/CARE - MED/SURG/GYN PRIV*

## 2025-06-18 PROCEDURE — 80053 COMPREHEN METABOLIC PANEL: CPT

## 2025-06-18 PROCEDURE — 93925 LOWER EXTREMITY STUDY: CPT

## 2025-06-18 PROCEDURE — 73720 MRI LWR EXTREMITY W/O&W/DYE: CPT | Mod: LT

## 2025-06-18 PROCEDURE — 90935 HEMODIALYSIS ONE EVALUATION: CPT

## 2025-06-18 PROCEDURE — 36415 COLL VENOUS BLD VENIPUNCTURE: CPT

## 2025-06-18 PROCEDURE — 97602 WOUND(S) CARE NON-SELECTIVE: CPT

## 2025-06-18 PROCEDURE — 82550 ASSAY OF CK (CPK): CPT

## 2025-06-18 PROCEDURE — 82962 GLUCOSE BLOOD TEST: CPT | Performed by: INTERNAL MEDICINE

## 2025-06-18 PROCEDURE — 99233 SBSQ HOSP IP/OBS HIGH 50: CPT | Performed by: INTERNAL MEDICINE

## 2025-06-18 PROCEDURE — A9270 NON-COVERED ITEM OR SERVICE: HCPCS | Performed by: HOSPITALIST

## 2025-06-18 PROCEDURE — 99222 1ST HOSP IP/OBS MODERATE 55: CPT | Performed by: INTERNAL MEDICINE

## 2025-06-18 PROCEDURE — 700117 HCHG RX CONTRAST REV CODE 255: Mod: JZ | Performed by: INTERNAL MEDICINE

## 2025-06-18 PROCEDURE — A9579 GAD-BASE MR CONTRAST NOS,1ML: HCPCS | Mod: JZ | Performed by: INTERNAL MEDICINE

## 2025-06-18 PROCEDURE — 82977 ASSAY OF GGT: CPT

## 2025-06-18 RX ORDER — GADOTERIDOL 279.3 MG/ML
17 INJECTION INTRAVENOUS ONCE
Status: ACTIVE | OUTPATIENT
Start: 2025-06-18 | End: 2025-06-19

## 2025-06-18 RX ORDER — SODIUM CHLORIDE 9 MG/ML
100 INJECTION, SOLUTION INTRAVENOUS
Status: DISCONTINUED | OUTPATIENT
Start: 2025-06-18 | End: 2025-06-20 | Stop reason: HOSPADM

## 2025-06-18 RX ORDER — OXYCODONE HYDROCHLORIDE 10 MG/1
5 TABLET ORAL EVERY 4 HOURS PRN
COMMUNITY

## 2025-06-18 RX ORDER — HEPARIN SODIUM 1000 [USP'U]/ML
1800 INJECTION, SOLUTION INTRAVENOUS; SUBCUTANEOUS
Status: DISCONTINUED | OUTPATIENT
Start: 2025-06-18 | End: 2025-06-20 | Stop reason: HOSPADM

## 2025-06-18 RX ORDER — GADOTERIDOL 279.3 MG/ML
17 INJECTION INTRAVENOUS ONCE
Status: COMPLETED | OUTPATIENT
Start: 2025-06-18 | End: 2025-06-18

## 2025-06-18 RX ORDER — FUROSEMIDE 20 MG/1
20 TABLET ORAL 2 TIMES DAILY PRN
Status: DISCONTINUED | OUTPATIENT
Start: 2025-06-18 | End: 2025-06-18

## 2025-06-18 RX ORDER — SODIUM HYPOCHLORITE 1.25 MG/ML
SOLUTION TOPICAL 2 TIMES DAILY
Status: DISCONTINUED | OUTPATIENT
Start: 2025-06-18 | End: 2025-06-20 | Stop reason: HOSPADM

## 2025-06-18 RX ORDER — HEPARIN SODIUM 5000 [USP'U]/ML
5000 INJECTION, SOLUTION INTRAVENOUS; SUBCUTANEOUS EVERY 8 HOURS
Status: DISCONTINUED | OUTPATIENT
Start: 2025-06-18 | End: 2025-06-20 | Stop reason: HOSPADM

## 2025-06-18 RX ADMIN — GABAPENTIN 100 MG: 100 CAPSULE ORAL at 15:52

## 2025-06-18 RX ADMIN — OXYCODONE 5 MG: 5 TABLET ORAL at 19:59

## 2025-06-18 RX ADMIN — HYDRALAZINE HYDROCHLORIDE 10 MG: 20 INJECTION INTRAMUSCULAR; INTRAVENOUS at 05:58

## 2025-06-18 RX ADMIN — OXYCODONE 5 MG: 5 TABLET ORAL at 11:35

## 2025-06-18 RX ADMIN — HYDROXYZINE HYDROCHLORIDE 25 MG: 25 TABLET, FILM COATED ORAL at 08:29

## 2025-06-18 RX ADMIN — SEVELAMER CARBONATE 800 MG: 800 TABLET, FILM COATED ORAL at 17:04

## 2025-06-18 RX ADMIN — HEPARIN SODIUM 5000 UNITS: 5000 INJECTION, SOLUTION INTRAVENOUS; SUBCUTANEOUS at 21:06

## 2025-06-18 RX ADMIN — POLYETHYLENE GLYCOL 3350 1 PACKET: 17 POWDER, FOR SOLUTION ORAL at 08:25

## 2025-06-18 RX ADMIN — AMPICILLIN AND SULBACTAM 3 G: 1; 2 INJECTION, POWDER, FOR SOLUTION INTRAMUSCULAR; INTRAVENOUS at 06:32

## 2025-06-18 RX ADMIN — GABAPENTIN 100 MG: 100 CAPSULE ORAL at 21:06

## 2025-06-18 RX ADMIN — LINEZOLID 600 MG: 600 TABLET, FILM COATED ORAL at 17:04

## 2025-06-18 RX ADMIN — AMPICILLIN AND SULBACTAM 3 G: 1; 2 INJECTION, POWDER, FOR SOLUTION INTRAMUSCULAR; INTRAVENOUS at 21:11

## 2025-06-18 RX ADMIN — HYDROMORPHONE HYDROCHLORIDE 0.25 MG: 1 INJECTION, SOLUTION INTRAMUSCULAR; INTRAVENOUS; SUBCUTANEOUS at 06:20

## 2025-06-18 RX ADMIN — LINEZOLID 600 MG: 600 TABLET, FILM COATED ORAL at 08:26

## 2025-06-18 RX ADMIN — HEPARIN SODIUM 1800 UNITS: 1000 INJECTION, SOLUTION INTRAVENOUS; SUBCUTANEOUS at 20:28

## 2025-06-18 RX ADMIN — Medication 5000 UNITS: at 08:27

## 2025-06-18 RX ADMIN — OMEPRAZOLE 20 MG: 20 CAPSULE, DELAYED RELEASE ORAL at 08:26

## 2025-06-18 RX ADMIN — GABAPENTIN 100 MG: 100 CAPSULE ORAL at 08:29

## 2025-06-18 RX ADMIN — GADOTERIDOL 17 ML: 279.3 INJECTION, SOLUTION INTRAVENOUS at 12:21

## 2025-06-18 RX ADMIN — AMLODIPINE BESYLATE 5 MG: 5 TABLET ORAL at 08:28

## 2025-06-18 ASSESSMENT — ENCOUNTER SYMPTOMS
HEADACHES: 0
MYALGIAS: 1
DOUBLE VISION: 0
FALLS: 0
COUGH: 0
CHILLS: 0
FEVER: 0
PALPITATIONS: 0
NERVOUS/ANXIOUS: 0
HEARTBURN: 0
VOMITING: 0
BLURRED VISION: 0
ABDOMINAL PAIN: 0
SHORTNESS OF BREATH: 0
DIZZINESS: 0

## 2025-06-18 ASSESSMENT — COGNITIVE AND FUNCTIONAL STATUS - GENERAL
SUGGESTED CMS G CODE MODIFIER DAILY ACTIVITY: CH
SUGGESTED CMS G CODE MODIFIER MOBILITY: CI
MOBILITY SCORE: 23
CLIMB 3 TO 5 STEPS WITH RAILING: A LITTLE
DAILY ACTIVITIY SCORE: 24

## 2025-06-18 ASSESSMENT — FIBROSIS 4 INDEX
FIB4 SCORE: 2.67
FIB4 SCORE: 2.67

## 2025-06-18 ASSESSMENT — LIFESTYLE VARIABLES: SUBSTANCE_ABUSE: 0

## 2025-06-18 ASSESSMENT — PAIN DESCRIPTION - PAIN TYPE
TYPE: ACUTE PAIN
TYPE: CHRONIC PAIN
TYPE: ACUTE PAIN

## 2025-06-18 NOTE — CONSULTS
Nephrology Consultation    Date of Service  6/18/2025    Referring Physician  Lyle Miller*    Consulting Physician  Theo Cox M.D.    Reason for Consultation  Management of ESRD on HD      History of Presenting Illness  50 y.o. female with a history of diabetes complicated by osteomyelitis and nephropathy, ESRD on dialysis since 3/16/2025 Monday Wednesday Friday who presented 6/17/2025 with concern for osteomyelitis.    This patient is well-known to me from outpatient dialysis at [facility].  She normally receives dialysis on Monday Wednesday Friday, and had her last full dialysis treatment on Monday, 6/16/2025.  She was recently transition from BUTCH status to ESRD in June 2025 as her kidney function had not yet recovered.  Patient had a right upper arm AV fistula created 4/24/2025 that was causing steal symptoms, so the right brachiobasilic AV fistula was ligated and a proximal radiocephalic AV fistula was created on the right side on 6/4/2025 by Dr. Rubio at [facility].    Patient says she came to the hospital in [facility] because her wound care nurse told her that her left foot second toe was concerning for infection.  She does not complain of pain in the foot, but also admits she does have some neuropathy.    Review of Systems  Review of Systems   Constitutional:  Negative for fever.   Respiratory:  Negative for shortness of breath.    Cardiovascular:  Negative for chest pain.   Gastrointestinal:  Negative for abdominal pain.   All other systems reviewed and are negative.      Past Medical History  Past Medical History:   Diagnosis Date    Cataract     Diabetes (HCC)     Type 2    Infectious disease 10/2022    Covid    Pre-op evaluation 12/13/2022       Surgical History  Past Surgical History[1]    Family History  Family History   Problem Relation Age of Onset    Breast Cancer Mother     Diabetes Mother     Heart Disease Mother     Hypertension Mother      Hypertension Father     Heart Disease Father     Diabetes Father     Diabetes Brother        Social History  Social History     Socioeconomic History    Marital status:      Spouse name: Not on file    Number of children: Not on file    Years of education: Not on file    Highest education level: Some college, no degree   Occupational History    Occupation: disabled for lower back   Tobacco Use    Smoking status: Never    Smokeless tobacco: Never   Vaping Use    Vaping status: Never Used   Substance and Sexual Activity    Alcohol use: Not Currently     Comment: occ    Drug use: Yes     Types: Marijuana, Oral     Comment: occ    Sexual activity: Not on file   Other Topics Concern    Not on file   Social History Narrative    Not on file     Social Drivers of Health     Financial Resource Strain: Medium Risk (6/23/2024)    Overall Financial Resource Strain (CARDIA)     Difficulty of Paying Living Expenses: Somewhat hard   Food Insecurity: Food Insecurity Present (2/27/2025)    Hunger Vital Sign     Worried About Running Out of Food in the Last Year: Sometimes true     Ran Out of Food in the Last Year: Never true   Transportation Needs: No Transportation Needs (2/27/2025)    PRAPARE - Transportation     Lack of Transportation (Medical): No     Lack of Transportation (Non-Medical): No   Physical Activity: Sufficiently Active (6/23/2024)    Exercise Vital Sign     Days of Exercise per Week: 3 days     Minutes of Exercise per Session: 50 min   Stress: Stress Concern Present (6/23/2024)    [employer] [place] [employer] - Occupational Stress Questionnaire     Feeling of Stress : To some extent   Social Connections: Moderately Isolated (6/23/2024)    Social Connection and Isolation Panel [NHANES]     Frequency of Communication with Friends and Family: More than three times a week     Frequency of Social Gatherings with Friends and Family: More than three times a week     Attends [employer] Services: Never      Active Member of Clubs or Organizations: No     Attends Club or Organization Meetings: Patient declined     Marital Status:    Intimate Partner Violence: Not At Risk (2/27/2025)    Humiliation, Afraid, Rape, and Kick questionnaire     Fear of Current or Ex-Partner: No     Emotionally Abused: No     Physically Abused: No     Sexually Abused: No   Housing Stability: Low Risk  (2/27/2025)    Housing Stability Vital Sign     Unable to Pay for Housing in the Last Year: No     Number of Times Moved in the Last Year: 0     Homeless in the Last Year: No       Medications  Current Medications[2]    Allergies  Allergies[3]    Physical Exam  Temp:  [36 °C (96.8 °F)-37.9 °C (100.2 °F)] 37.1 °C (98.7 °F)  Pulse:  [71-86] 84  Resp:  [16-18] 18  BP: (138-215)/() 138/65  SpO2:  [93 %-98 %] 98 %    Physical Exam  Constitutional:       General: She is not in acute distress.     Appearance: She is well-developed.   HENT:      Head: Normocephalic and atraumatic.      Mouth/Throat:      Mouth: Mucous membranes are moist.      Pharynx: Oropharynx is clear. No oropharyngeal exudate.   Eyes:      General: No scleral icterus.     Extraocular Movements: Extraocular movements intact.   Neck:      Trachea: No tracheal deviation.   Cardiovascular:      Rate and Rhythm: Normal rate and regular rhythm.      Heart sounds: Normal heart sounds. No murmur heard.  Pulmonary:      Effort: Pulmonary effort is normal.      Breath sounds: No stridor. No rales.   Abdominal:      Palpations: Abdomen is soft.      Tenderness: There is no abdominal tenderness.   Musculoskeletal:         General: Normal range of motion.      Cervical back: Normal range of motion. No rigidity.      Right lower leg: No edema.      Left lower leg: No edema.   Skin:     General: Skin is warm and dry.   Neurological:      General: No focal deficit present.      Mental Status: She is alert and oriented to person, place, and time.   Psychiatric:         Mood and Affect:  Mood normal.         Behavior: Behavior normal.     Dialysis access: Right IJ permacath.  Patient also has right radiocephalic AV fistula with barely audible bruit, no thrill    Fluids  Date 06/18/25 0700 - 06/19/25 0659   Shift 7109-4424 0020-7768 6234-8343 24 Hour Total   INTAKE   Other 240   240   Shift Total 240   240   OUTPUT   Shift Total       Weight (kg) 80.9 80.9 80.9 80.9       Laboratory  Labs reviewed, pertinent labs below.  Recent Labs     06/17/25 2031 06/18/25  0118   WBC 5.9 6.2   RBC 3.64* 3.66*   HEMOGLOBIN 11.0* 11.0*   HEMATOCRIT 33.1* 33.3*   MCV 90.9 91.0   MCH 30.2 30.1   MCHC 33.2 33.0   RDW 56.8* 58.1*   PLATELETCT 289 280   MPV 11.4 11.6     Recent Labs     06/17/25 2031 06/18/25  0118   SODIUM 134* 134*   POTASSIUM 4.2 4.4   CHLORIDE 95* 97   CO2 26 23   GLUCOSE 122* 150*   BUN 24* 26*   CREATININE 3.60* 3.79*   CALCIUM 9.6 9.4                URINALYSIS:  Lab Results   Component Value Date/Time    COLORURINE Brown (A) 03/19/2025 0500    CLARITY Turbid (A) 03/19/2025 0500    SPECGRAVITY 1.020 03/19/2025 0500    PHURINE 6.5 03/19/2025 0500    KETONES Negative 03/19/2025 0500    PROTEINURIN 300 (A) 03/19/2025 0500    BILIRUBINUR Negative 03/19/2025 0500    UROBILU 0.2 03/19/2025 0500    NITRITE Negative 03/19/2025 0500    LEUKESTERAS Large (A) 03/19/2025 0500    OCCULTBLOOD Large (A) 03/19/2025 0500     UPC  Lab Results   Component Value Date/Time    TOTPROTUR >600.0 (H) 03/16/2025 1429      Lab Results   Component Value Date/Time    CREATININEU 206.00 03/16/2025 1429       Imaging interpreted by radiologist. Imaging reports reviewed with pertinent findings below  US-EXTREMITY ARTERY LOWER BILAT   Final Result      US-TONJA SINGLE LEVEL BILAT   Final Result      US-RUQ    (Results Pending)   MR-FOOT-WITH & W/O LEFT    (Results Pending)         Assessment/Plan  50 y.o. female with a history of diabetes complicated by osteomyelitis and nephropathy, ESRD on dialysis since 3/16/2025 Monday  "Wednesday Friday who presented 6/17/2025 with concern for osteomyelitis.    1.  ESRD on hemodialysis Monday Wednesday Friday.  Oliguric.  Plan on dialysis today per usual schedule.  Daily weights. Avoid NSAIDs and other nephrotoxins as the patient still urinates.  Check renal function panel daily.    2.  Dialysis access: Right IJ permacath.  Patient also has right arm AV fistula created 6/4/2025, not yet mature, and threatening to clot based on exam.  Right arm precautions.    3.  Concern for left foot/toe osteomyelitis, reason for admission.   If MRI with gadolinium is done, we should plan on dialysis within 24 hours to help mitigate risk of nephrogenic systemic fibrosis    4.  Anemia of chronic disease.  Noted.  But there is no acute need for Epogen with hemoglobin at 11 or higher.  Check CBC at least 3 times per week on days of dialysis.    5.  Hypertension.  Uncontrolled.  Will continue ultrafiltration as tolerated by blood pressure, as high blood pressure usually improves with ultrafiltration with dialysis.  Recommend low-sodium diet.       6.  Hyperphosphatemia, uncontrolled as an outpatient.  Continue phosphorus binders, Renvela 800 mg p.o. 3 times daily with meals, but patient is on Velphoro as an outpatient (Velphoro not available on formulary).  Recommend low phosphorus diet.  Check \"renal function panel\" daily (includes phosphorus level).    7.  Secondary hyperparathyroidism of renal origin.  Patient is currently not prescribed any calcitriol or Cinacalcet with dialysis.    Discussed with Dr. Lyle Cox MD  Nephrology  [facility]               [1]   Past Surgical History:  Procedure Laterality Date    WOUND IRRIGATION & DEBRIDEMENT Left 3/22/2025    Procedure: IRRIGATION AND DEBRIDEMENT, WOUND AND WOUND CLOSURE;  Surgeon: Barry Zuleta M.D.;  Location: SURGERY [facility];  Service: Orthopedics    TOE AMPUTATION Left 2/28/2025    Procedure: AMPUTATION, TOE-4TH;  Surgeon: " Jamar Coronado M.D.;  Location: SURGERY [facility];  Service: Orthopedics    MT UPPER GI ENDOSCOPY,DIAGNOSIS N/A 12/07/2022    Procedure: ESOPHAGOGASTRODUODENOSCOPY (EGD) WITH BRUSHINGS AND DILATATION VS OVER THE SCOPE CLIP PLACEMENT;  Surgeon: Bridger Gongora M.D.;  Location: SURGERY [facility];  Service: General    MT UPPER GI ENDOSCOPY,CTRL BLEED N/A 12/07/2022    Procedure: EGD, WITH CLIP PLACEMENT;  Surgeon: Bridger Gongora M.D.;  Location: SURGERY [facility];  Service: General    ABDOMINAL HYSTERECTOMY TOTAL      CATARACT EXTRACTION WITH IOL Left     CHOLECYSTECTOMY      GASTRIC RESECTION      HYSTERECTOMY, TOTAL ABDOMINAL      OVARIAN CYSTECTOMY Bilateral     PRIMARY C SECTION      x 2    TONSILLECTOMY     [2]   Current Facility-Administered Medications   Medication Dose Route Frequency Provider Last Rate Last Admin    [Held by provider] epoetin (Retacrit) 5,000 Units injection  5,000 Units Intravenous MO, WE + FR Theo Cox M.D.        NS (Bolus) 0.9 % infusion 100 mL  100 mL Intravenous DIALYSIS PRN Theo Cox M.D.        heparin injection 5,000 Units  5,000 Units Subcutaneous Q8HRS Lyle Ogden M.D.        gadoteridol (Prohance) injection 17 mL  17 mL Intravenous Once Lyle Ogden M.D.        acetaminophen (Tylenol) tablet 650 mg  650 mg Oral Q6HRS PRN Asepatsy Hoffman M.D.        senna-docusate (Pericolace Or Senokot S) 8.6-50 MG per tablet 2 Tablet  2 Tablet Oral Q EVENING Asepatsy Hoffman M.D.        And    polyethylene glycol/lytes (Miralax) Packet 1 Packet  1 Packet Oral QDAY PRN Asem CHRISTIANO Hoffman M.D.   1 Packet at 06/18/25 0825    oxyCODONE immediate-release (Roxicodone) tablet 2.5 mg  2.5 mg Oral Q3HRS PRN Brielle Hoffman M.D.        Or    oxyCODONE immediate-release (Roxicodone) tablet 5 mg  5 mg Oral Q3HRS PRN Asem CHRISTIANO Hoffman M.D.   5 mg at 06/18/25 1135    Or    HYDROmorphone (Dilaudid) injection 0.25 mg  0.25 mg Intravenous Q3HRS PRN Brielle Hoffman M.D.    0.25 mg at 06/18/25 0620    ondansetron (Zofran) syringe/vial injection 4 mg  4 mg Intravenous Q4HRS PRN Asem CHRISTIANO Hoffman M.D.   4 mg at 06/17/25 2218    ondansetron (Zofran ODT) dispertab 4 mg  4 mg Oral Q4HRS PRN Asem CHRISTIANO Hoffman M.D.        promethazine (Phenergan) tablet 12.5-25 mg  12.5-25 mg Oral Q4HRS PRN Asem CHRISTIANO Hoffman M.D.        promethazine (Phenergan) suppository 12.5-25 mg  12.5-25 mg Rectal Q4HRS PRN Asem CHRISTIANO Hoffman M.D.        prochlorperazine (Compazine) injection 5-10 mg  5-10 mg Intravenous Q4HRS PRN Asem CHRISTIANO Hoffman M.D.        insulin lispro (HumaLOG,AdmeLOG) subcutaneous injection  1-6 Units Subcutaneous 4X/DAY ACHS Asem CHRISTIANO Hoffman M.D.        And    dextrose 50 % (D50W) injection 25 g  25 g Intravenous Q15 MIN PRN Asem CHRISTIANO Hoffman M.D.        ampicillin/sulbactam (Unasyn) 3 g in  mL IVPB  3 g Intravenous Q24HRS Asem CHRISTIANO Hoffman M.D.   Stopped at 06/18/25 0702    hydrALAZINE (Apresoline) injection 10 mg  10 mg Intravenous Q4HRS PRN Asem CHRISTIANO Hoffman M.D.   10 mg at 06/18/25 0558    amLODIPine (Norvasc) tablet 5 mg  5 mg Oral DAILY Asem CHRISTIANO Hoffman M.D.   5 mg at 06/18/25 0828    vitamin D3 (Cholecalciferol) tablet 5,000 Units  5,000 Units Oral DAILY Asem CHRISTIANO Hoffman M.D.   5,000 Units at 06/18/25 0827    cyclobenzaprine (Flexeril) tablet 10 mg  10 mg Oral TID PRN Asem CHRISTIANO Hoffman M.D.        gabapentin (Neurontin) capsule 100 mg  100 mg Oral TID Asem CHRISTIANO Hoffman M.D.   100 mg at 06/18/25 0829    hydrOXYzine HCl (Atarax) tablet 25 mg  25 mg Oral Q6HRS PRN Brielle Hoffman M.D.   25 mg at 06/18/25 0829    omeprazole (PriLOSEC) capsule 20 mg  20 mg Oral QDAY PRN Brielle Hoffman M.D.   20 mg at 06/18/25 0826    sevelamer carbonate (Renvela) tablet 800 mg  800 mg Oral TID WITH MEALS Brielle Hoffamn M.D.        linezolid (Zyvox) tablet 600 mg  600 mg Oral Q12HRS Brielle Hoffman M.D.   600 mg at 06/18/25 0826   [3] No Known Allergies

## 2025-06-18 NOTE — PROGRESS NOTES
"Arrived on the floor by EMS, alert and oriented. Walked to the to her room steady on her feet. Oriented to her room, call light given. BP (!) 215/100   Pulse 71   Resp 16   Ht 1.753 m (5' 9\")   Wt 80.6 kg (177 lb 11.1 oz)   SpO2 96%   BMI 26.24 kg/m²   Notified Dr. Hoffman. Non skid socks given. POC updated.   "

## 2025-06-18 NOTE — ASSESSMENT & PLAN NOTE
Patient to undergo left toe amputation today by orthopedic surgery  We appreciate further recommendations    6/20: I discussed findings of peripheral artery disease with the patient and discussed with the patient about consulting vascular surgery, however the patient would like to leave AMA and does not want to consult anybody else and she says that she already has a vascular surgeon as an outpatient.  However I have placed referral to vascular surgery if the patient does decide to leave AMA.

## 2025-06-18 NOTE — ASSESSMENT & PLAN NOTE
Unclear etiology  Patient denying abdominal pain.  I have ordered right upper quadrant ultrasound as well as hepatitis panel.    6/19: GI recommending MRCP, which has been ordered.    6/20: GI now recommending to hold off on MRCP until alkaline phosphatase isoenzymes come back

## 2025-06-18 NOTE — PROGRESS NOTES
4 Eyes Skin Assessment Completed by ROVERTO Pelayo and ROVERTO Mcallister.    Skin assessment is primarily focused on high risk bony prominences. Pay special attention to skin beneath and around medical devices, high risk bony prominences, skin to skin areas and areas where the patient lacks sensation to feel pain and areas where the patient previously had breakdown.     Head (Occipital):  WDL   Ears (Under Medical Devices): WDL   Nose (Under Medical Devices): WDL   Mouth:  WDL   Neck: Scratches left lateral   Breast/Chest:  WDL   Shoulder Blades:  WDL   Spine:   Blanching and Scratches   (R) Arm/Elbow/Hand: Incision, Scar, and scratches   (L) Arm/Elbow/Hand: WDL   Abdomen: WDL   Pannus/Groin:  WDL   Sacrum/Coccyx:   Red and Blanching   (R) Ischial Tuberosity (Sit Bones):  Red and Blanching   (L) Ischial Tuberosity (Sit Bones):  Red and Blanching   (R) Leg:  Bruising and scratches   (L) Leg:  Bruising and scratches   (R) Heel:  WDL and Blanching   (R) Foot/Toe: WDL   (L) Heel: WDL and Blanching   (L) Foot/Toe:  Open wound, Scab, Black, and 4th toe amputation and 2nd toe black gangrenous       DEVICES IN USE:   Respiratory Devices:  NA, patient on room air  Feeding Devices:  N/A   Lines & BP Monitoring Devices:  Peripheral IV, BP cuff, and Dialysis access right upper chest    Orthopedic Devices:  N/A  Miscellaneous Devices:  Telemetry monitor    PROTOCOL INTERVENTIONS:   Standard/Trauma Bed:  Applied this assessment    WOUND PHOTOS:   Completed and in EPIC     WOUND CONSULT:   Consult to be ordered for the following areas left foot 2nd and 4th toe

## 2025-06-18 NOTE — PROGRESS NOTES
Report received from Gita at bedside, rechecked bp after IV dose of hydralazine which was effective.  Pt was put strict npo at midnight.     Dr. Fernandes at bedside about 0730; diet changed to include sips with meds.  He also consulted nephrology and ortho.       Received a stat order for us and called department.  While doing morning assessment, pharmacy tech completed her med rec; medicated with atarax for itchy and miralax as she takes it daily.    US at bedside and while they were doing her study, new central dressing changed to her HD catheter.      Received stat order for an mri, screening done and pt asked that she get pain meds as laying flat makes her chronic back pain worse.  Tech called to says transport on their way. Medicated with oxy for this mri.  MRI tech called to say that contrast was given even though it was not ordered; Dr. Fernandes notified of this and he contact nephrology and new order was placed for with and without so imaging can be used.  Also reached out to dialysis of this event and that would let her know if surgery was happening so she could plan accordingly.  When pt returned fingerstick completed with no need for coverage.  Pt downgraded to a medical patient.      Pt remains npo as awaiting for ortho.

## 2025-06-18 NOTE — CARE PLAN
Problem: Knowledge Deficit - Standard  Goal: Patient and family/care givers will demonstrate understanding of plan of care, disease process/condition, diagnostic tests and medications  Outcome: Progressing  Note: Pt has been npo since midnight in anticipation of surgery.  Stat imaging has been ordered and completed; awaiting for ortho to set a time/procedure.      Problem: Pain Management  Goal: Pain level will decrease to patient's comfort goal  Outcome: Progressing  Note: Currently using prn oxydone as pt currently takes pain medication at home.      Problem: Pain Management  Goal: Pain level will decrease to patient's comfort goal  Outcome: Progressing  Note: Currently using prn oxydone as pt currently takes pain medication at home.    The patient is Stable - Low risk of patient condition declining or worsening    Shift Goals  Clinical Goals: Nephro and ortho consult, dialysis, ?Surgery  Patient Goals: To have surgery    Progress made toward(s) clinical / shift goals:  achieved    Patient is not progressing towards the following goals:

## 2025-06-18 NOTE — CARE PLAN
Problem: Safety  Goal: Will remain free from injury  Outcome: Progressing     Problem: Pain Management  Goal: Pain level will decrease to patient's comfort goal  Outcome: Progressing     Problem: Infection  Goal: Will remain free from infection  Outcome: Progressing     Problem: Venous Thromboembolism (VTW)/Deep Vein Thrombosis (DVT) Prevention:  Goal: Patient will participate in Venous Thrombosis (VTE)/Deep Vein Thrombosis (DVT)Prevention Measures  Outcome: Progressing     Problem: Skin Integrity  Goal: Risk for impaired skin integrity will decrease  Outcome: Progressing   The patient is Stable - Low risk of patient condition declining or worsening    Shift Goals  Clinical Goals: Monitor BP, skin integrity, possible surgery  Patient Goals: no more nausea and control BP    Progress made toward(s) clinical / shift goals:  BP went down from 205/82 to 147/67. Wound consult done for her left toe wound.    Patient is not progressing towards the following goals:

## 2025-06-18 NOTE — PROGRESS NOTES
Medication history reviewed with pt. Med rec is complete.  Allergies reviewed, per pt    Called Ange at at [phone] to verify MIRCERA 60MCG dose, last dose was given on 6/16/2025.  Pt receives dialysis on Mon, Wed, and Friday.  Last treatment was on 6/16/2025      Pt started AUGMENTIN 875-125MG on 6/7/2025 for 7 day course, per pt reports that she did finish course of antibiotic     Pt is not on any anticoagulants      Dispense history available in EPIC? Some medications

## 2025-06-18 NOTE — PROGRESS NOTES
"Hospital Medicine Daily Progress Note    Date of Service  6/18/2025    Chief Complaint  Aviva Kenney is a 50 y.o. female admitted 6/17/2025 with foot ulcer    Hospital Course  Per chart review:  \"50 years old female with a past medical history of end stage renal disease on dialysis, diabetes, history of fourth toe amputation Mar 2025 with Dr Zuleta presenting with worsening 2nd toe discoloration progressing over 3 weeks. The patient had debridement last week by her wound care team, then on a follow-up visit with a raised concern for osteomyelitis /gangrene. Imaging at the transferring facility shows gas on X-Ray. She has been started on Vancomycin, cefepime and clindamycin.  I evaluated the patient on telemetry floor the patient does not have fever, chills, or pain.\"      Interval Problem Update  6/18: Patient seen at bedside this morning.  We have consulted orthopedic surgery, pending MRI and vascular studies.  Patient also with unclear etiology of elevated liver function test, we have ordered ultrasound of the right upper quadrant as well as hepatitis panel.  We have consulted GI as well.  We appreciate further recommendations.  Continue antibiotics for cellulitis.  There is a possibility the patient might require amputation however pending MRI and vascular studies.  We appreciate further recommendations by orthopedic surgery.  -- I had initially ordered MRI without contrast for the left foot however it seems that the tech at the MRI did a mistake and gave her contrast, I did reach out to nephrology to see if we can continue with an MRI with and without contrast and they said that we could as the patient has not had dialysis so I have placed a new order for MRI with and without contrast of the foot.    I have discussed this patient's plan of care and discharge plan at IDT rounds today with Case Management, Nursing, Nursing leadership, and other members of the IDT " team.    Consultants/Specialty  GI, nephrology, and orthopedics    Code Status  Full Code    Disposition  The patient is not medically cleared for discharge to home or a post-acute facility.        Review of Systems  Review of Systems   Constitutional:  Positive for malaise/fatigue. Negative for chills and fever.   HENT:  Negative for hearing loss and nosebleeds.    Eyes:  Negative for blurred vision and double vision.   Respiratory:  Negative for cough and shortness of breath.    Cardiovascular:  Negative for chest pain and palpitations.   Gastrointestinal:  Negative for abdominal pain, heartburn and vomiting.   Genitourinary:  Negative for dysuria and urgency.   Musculoskeletal:  Positive for myalgias. Negative for falls.   Neurological:  Negative for dizziness and headaches.   Psychiatric/Behavioral:  Negative for substance abuse. The patient is not nervous/anxious.    All other systems reviewed and are negative.       Physical Exam  Temp:  [36 °C (96.8 °F)-37.9 °C (100.2 °F)] 37.1 °C (98.7 °F)  Pulse:  [71-86] 84  Resp:  [16-18] 18  BP: (138-215)/() 138/65  SpO2:  [93 %-98 %] 98 %    Physical Exam  Vitals and nursing note reviewed.   Constitutional:       General: She is not in acute distress.     Appearance: She is ill-appearing.   HENT:      Head: Normocephalic and atraumatic.      Right Ear: External ear normal.      Left Ear: External ear normal.      Mouth/Throat:      Pharynx: No oropharyngeal exudate or posterior oropharyngeal erythema.   Eyes:      General:         Right eye: No discharge.         Left eye: No discharge.   Cardiovascular:      Rate and Rhythm: Normal rate and regular rhythm.      Pulses: Normal pulses.      Heart sounds: No murmur heard.     No gallop.   Pulmonary:      Effort: No respiratory distress.   Abdominal:      General: Bowel sounds are normal. There is no distension.      Palpations: Abdomen is soft.      Tenderness: There is no abdominal tenderness. There is no guarding.    Musculoskeletal:         General: No swelling or tenderness. Normal range of motion.      Cervical back: Normal range of motion and neck supple. No tenderness.   Skin:     General: Skin is warm.      Findings: Erythema and lesion present.   Neurological:      General: No focal deficit present.      Mental Status: She is alert and oriented to person, place, and time. Mental status is at baseline.      Motor: No weakness.   Psychiatric:         Mood and Affect: Mood normal.         Behavior: Behavior normal.         Fluids    Intake/Output Summary (Last 24 hours) at 6/18/2025 1233  Last data filed at 6/18/2025 0830  Gross per 24 hour   Intake 440 ml   Output --   Net 440 ml        Laboratory  Recent Labs     06/17/25 2031 06/18/25  0118   WBC 5.9 6.2   RBC 3.64* 3.66*   HEMOGLOBIN 11.0* 11.0*   HEMATOCRIT 33.1* 33.3*   MCV 90.9 91.0   MCH 30.2 30.1   MCHC 33.2 33.0   RDW 56.8* 58.1*   PLATELETCT 289 280   MPV 11.4 11.6     Recent Labs     06/17/25 2031 06/18/25  0118   SODIUM 134* 134*   POTASSIUM 4.2 4.4   CHLORIDE 95* 97   CO2 26 23   GLUCOSE 122* 150*   BUN 24* 26*   CREATININE 3.60* 3.79*   CALCIUM 9.6 9.4                   Imaging  US-EXTREMITY ARTERY LOWER BILAT   Final Result      US-TONJA SINGLE LEVEL BILAT   Final Result      MR-FOOT-W/O LEFT    (Results Pending)   US-RUQ    (Results Pending)   MR-FOOT-WITH & W/O LEFT    (Results Pending)        Assessment/Plan  * 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  I have ordered right upper quadrant ultrasound as well as hepatitis panel.  Will consult GI.    Hypertensive urgency- (present on admission)  Assessment & Plan  Improved  Continue amlodipine  Continue home lasix  Nephrology consulted for dialysis    ESRD (end stage renal disease) ([facility])-  (present on admission)  Assessment & Plan  Nephrology consulted for dialysis while inpatient    Cellulitis- (present on admission)  Assessment & Plan  Continue antibiotics  monitor    Anemia due to chronic kidney disease, on chronic dialysis (HCC)- (present on admission)  Assessment & Plan  No signs of overt bleeding  monitor    Hyponatremia- (present on admission)  Assessment & Plan  Mild  Monitor  In the setting of ESRD    Primary hypertension- (present on admission)  Assessment & Plan  Continue amlodipine  Continue home dose of lasix    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 In review of yesterday's note (6/17/2025), there are no changes except as documented above.      I spend at least 51 minutes providing care for this patient.  This included face-to-face interview, physical examination.  Review of lab work including CBC, CMP, magnesium.  Discussing with multidisciplinary team including case management, nursing staff and pharmacy.  Creating plan of care, reviewing orders.

## 2025-06-18 NOTE — PROGRESS NOTES
Marisela from the lab called for high Troponin result @ 2150. High Troponin result read back to Marisela. Dr. Scott notified of high Troponin result at 2151 through voalte.

## 2025-06-18 NOTE — PROGRESS NOTES
Recheck BP (!) 147/67   Pulse 85   Temp 37.9 °C (100.2 °F) (Temporal)   Resp 18  , notified on call MD Scott, order to recheck in an hour.

## 2025-06-18 NOTE — PROGRESS NOTES
[facility] DIRECT ADMISSION REPORT    Transferring facility & physician: [facility], Richelle Diaz R.N.      Chief complaint: Foot gangrene.      Reason for Transfer: Doesn't have dialysis.      Pertinent history & patient course: 50 years old female with a past medical history of end stage renal toe amputation Mar 2025 presenting with worsening toe discoloration. The patient had debridement last week. She seen wound care last week who raised concern for osteomyelitis. Imaging at the transferring facility shows gas on X-Ray. She has been started on Vancomycin, cefepime and clindamycin. Dialysis is not available at the transferring facility is not available. Last dialysis was yesterday.          Consultants to be called upon arrival: Will     Code Status: FULL per transferring provider, I personally verified with the transferring provider patient's code status and the transferring provider has confirmed this with the patient.    Patient accepted for transfer: Yes    Accepting [facility] Facility: [facility] - Nursing to notify the admitting provider when patient arrives to the unit.    Admission status: Inpatient.     Floor requested: Tele    The admitting provider is the point of contact for questions or concerns regarding patient's care.

## 2025-06-18 NOTE — WOUND TEAM
"[facility]  Inpatient Services  Initial Wound and Skin Care Evaluation    Admission Date: 6/17/2025     Last order of IP CONSULT TO WOUND CARE was found on 6/18/2025 from Hospital Encounter on 6/17/2025     HPI, PMH, SH: Reviewed    Past Surgical History[1]  Social History     Tobacco Use    Smoking status: Never    Smokeless tobacco: Never   Substance Use Topics    Alcohol use: Not Currently     Comment: occ     No chief complaint on file.    Diagnosis: Cellulitis [L03.90]    Unit where seen by Wound Team: 1115/01     WOUND CONSULT RELATED TO:  Left 2nd and 4th toes    WOUND TEAM PLAN OF CARE - Frequency of Follow-up:   Nursing to follow dressing orders written for wound care. Contact wound team if area fails to progress, deteriorates or with any questions/concerns if something comes up before next scheduled follow up (See below as to whether wound is following and frequency of wound follow up)   Weekly - Left 2nd and 4th toes    WOUND HISTORY:   2/28 - amputation of 4th toe by Dr. Angeles  3/22 - revised of 4th toe by Dr. Zuleta  Beginning of June - patient reports that she had a \"black do\" to left 2nd toe, thought she banged it. Then said black dot grew into eschar. Home Health RN encourage patient to hospital for further work up on toes.        WOUND ASSESSMENT/LDA  Wound 02/28/25 Incision Toe, 4th Left (Active)   Date First Assessed/Time First Assessed: 02/28/25 1402   Primary Wound Type: Incision  Location: Toe, 4th  Laterality: Left      Assessments 6/18/2025  4:00 PM   Wound Image      Site Assessment Pink;Slough   Periwound Assessment Intact   Margins Defined edges;Unattached edges   Closure Secondary intention   Drainage Amount Scant   Drainage Description Serosanguineous   Treatments Cleansed;Site care;Nonselective debridement   Wound Cleansing Approved Wound Cleanser   Dressing Changed New   Dressing Cleansing/Solutions Normal Saline (Dakins - ordered for next change)   Dressing " Options Plain Strip Packing;Dry Gauze;Hypafix Tape   Dressing Change/Treatment Frequency Every Shift, and As Needed   NEXT Dressing Change/Treatment Date 25   NEXT Weekly Photo (Inpatient Only) 25   Wound Team Following Weekly   Non-staged Wound Description Full thickness   Wound Length (cm) 1.1 cm   Wound Width (cm) 0.7 cm   Wound Depth (cm) 1.2 cm   Wound Surface Area (cm^2) 0.6 cm^2   Wound Volume (cm^3) 0.484 cm^3   Wound Healing % 27   Wound Bed Slough (%) 50 %   Shape oval   Wound Odor None   Pulses Not palpable   WOUND NURSE ONLY - Time Spent with Patient (mins) 50       Wound 25 Diabetic Ulcer Toe, 2nd Plantar Left (Active)   Date First Assessed/Time First Assessed: 25 1614   Present on Original Admission: Yes  Hand Hygiene Completed: Yes  Primary Wound Type: Diabetic Ulcer  Location: Toe, 2nd  Wound Orientation: Plantar  Laterality: Left      Assessments 2025  4:00 PM   Wound Image      Site Assessment Dry;Intact;Black;Eschar   Periwound Assessment Blanchable erythema;Pink   Margins Defined edges;Attached edges   Closure Open to air   Drainage Amount None   Treatments Cleansed;Site care   Wound Cleansing Povidone-Iodine   Dressing Status Open to Air   NEXT Weekly Photo (Inpatient Only) 25   Wound Team Following Weekly   Non-staged Wound Description Full thickness   Wound Length (cm) 1.2 cm   Wound Width (cm) 1.8 cm   Wound Surface Area (cm^2) 1.7 cm^2   Wound Bed Eschar (%) 100 %   Shape oval   Wound Odor None   Pulses Not palpable        Vascular:    TONJA:   TONJA Results, Last 30 Days US-TONJA SINGLE LEVEL BILAT  Result Date: 2025  Narrative  Vascular Laboratory  Conclusions  Ankle-brachial indices could not be obtained due to calcification and  noncompressibility of the arteries.  Additonal evaluation on same day arterial ultrasound.  CALLIE FOWLER  Age:    50    Gender:     F  MRN:    [id number]  :    1975      BSA:  Exam Date:     2025 08:28  Room #:      Inpatient  Priority:     Routine  Ht (in):             Wt (lb):  Ordering Physician:        LAURA WALKER  Referring Physician:       [id number] López  Sonographer:               Carlos Fernandez                             RDMS, RVT  Study Type:                Complete Bilateral  Technical Quality:         Adequate  Indications:     Atherosclerosis of native arteries of left leg with                   ulceration of unspecified site  CPT Codes:       54354  ICD Codes:       I70.249  History:         Patient presents with dry gangrene of the left 2nd toe. Hx                   cellulitis and DMII. No prior study.  Limitations:     Right arm fistula. Noncompressible left arm arteries.                 RIGHT  Waveform            Systolic BPs (mmHg)                             0             Brachial                                           Common Femoral                                           Popliteal  Absent                                   Posterior Tibial  Hyperemic                                Dorsalis Pedis                                           Digit                                           TONJA                                           TBI                       LEFT  Waveform        Systolic BPs (mmHg)                                           Brachial                                           Common Femoral                                           Popliteal  Absent                                   Posterior Tibial  Hyperemic                                Dorsalis Pedis                                           Digit                                           TONJA                                           TBI  Findings  Ankle-brachial indices could not be obtained due to calcification and  noncompressibility of the arteries.  Right.  No Doppler signal could be detected from the posterior tibial artery.  The Doppler waveform  in the dorsalis pedis artery is hyperemic.  The 2nd, 4th, and 5th digit PPG waveforms are normal.  The 1st and 3rd digit PPG waveforms are flatlined.  Left.  No Doppler signal could be detected from the posterior tibial artery.  The Doppler waveform in the dorsalis pedis artery is hyperemic.  The 1st, 2nd, 3rd, and 5th digit waveforms are flatlined.  The 4th digit has been amputated.  Additional testing will be performed in accordance with lower extremity  arterial evaluation protocol.      Carlos Cadena MD  (Electronically Signed)  Final Date:      2025                   11:29    TONJA Results, Last 30 Days US-EXTREMITY ARTERY LOWER BILAT  Result Date: 2025  Narrative Lower Extremity  Arterial Duplex Report  Vascular Laboratory  CONCLUSIONS  Diffuse atherosclerotic changes.  Right lower extremity:  The posterior tibial artery is occluded throughout its length.  Segmental occlusion of the distal peroneal artery.  Single vessel runoff to the ankle with hyperemic flow.  Left lower extremity:  Segmental occlusion of the distal posterior tibial artery.  Two vessel runoff to the ankle with hyperemic flow.  CALLIE FOWLER  Exam Date:     2025 09:58  Room #:     Inpatient  Priority:     Routine  Ht (in):             Wt (lb):  Ordering Physician:        LAURA WALKER  Referring Physician:       [id number] López  Sonographer:               Carlos Fernandez RDMS, RVT  Study Type:                Complete Bilateral  Technical Quality:         Adequate  Age:    50    Gender:     F  MRN:    [id number]  :    1975      BSA:  Indications:     Atherosclerosis of native arteries of left leg with                   ulceration of unspecified site  CPT Codes:       20835  ICD Codes:       I70.249  History:         Patient presents with dry gangrene of the left 2nd toe. Hx                   cellulitis and DMII. No  prior study.  Limitations:                RIGHT  Waveform        Peak Systolic Velocity (cm/s)                  Prox    Prox-Mid  Mid    Mid-Dist  Distal  Triphasic                         93                       CFA  Triphasic       96                                         PFA  Triphasic       106               131              103     SFA  Triphasic                         60                       POP  Hyperemic       96                                 71      AT  Absent          0                                  0       PT  Absent          51                                 0       CODI                LEFT  Waveform        Peak Systolic Velocity (cm/s)                  Prox    Prox-Mid  Mid    Mid-Dist  Distal  Triphasic                         119                      CFA  Triphasic       83                                         PFA  Triphasic       129               128              125     SFA  Triphasic                         69                       POP  Hyperemic       107                                75      AT  Absent          51                                 0       PT  Hyperemic       72                                 65      CODI  FINDINGS  Right.  Heavily calcified vessel walls throughout the lower extremity arteries.  Multiphasic flow throughout the common femoral, superficial femoral, and  popliteal arteries with no hemodynamically significant stenosis.  The posterior tibial artery is occluded throughout its length.  Segmental occlusion of the distal peroneal artery.  Single vessel runoff to the ankle with hyperemic flow.  Left.  Heavily calcified vessel walls throughout the lower extremity arteries.  Multiphasic flow throughout the common femoral, superficial femoral, and  popliteal arteries with no hemodynamically significant stenosis.  Segmental occlusion of the distal posterior tibial artery.  Two vessel runoff to the ankle with hyperemic flow.  Carlos Cadena MD  (Electronically  "Signed)  Final Date:      2025                   11:17      Lab Values:    Lab Results   Component Value Date/Time    WBC 6.2 2025 01:18 AM    RBC 3.66 (L) 2025 01:18 AM    HEMOGLOBIN 11.0 (L) 2025 01:18 AM    HEMATOCRIT 33.3 (L) 2025 01:18 AM    CREACTPROT 5.58 (H) 03/15/2025 10:18 PM    SEDRATEWES 100 (H) 03/15/2025 10:18 PM    HBA1C 5.5 2025 03:11 PM    PLATELETCT 280 2025 01:18 AM         Culture Results show:  No results found for this or any previous visit (from the past 720 hours).    Pain Level/Medicated:  Patient denies pain       INTERVENTIONS BY WOUND TEAM:  Chart and images reviewed. Discussed with bedside RN. All areas of concern (based on picture review, LDA review and discussion with bedside RN) have been thoroughly assessed. Documentation of areas based on significant findings. This RN in to assess patient. Performed standard wound care which includes appropriate positioning, dressing removal and non-selective debridement. Pictures and measurements obtained weekly if/when required.    Wound:  Left 2nd toe  Preparation for Dressing removal: Open to air  Cleansed/Non-selectively Debrided with:  Normal Saline and Gauze  Elizabeth wound: Cleansed with Normal Saline and Gauze, Prepped with Open to Air  Primary Dressing:  Betadine and left ESTRELLITA     Wound:  Left 4th toe  Preparation for Dressing removal: Removed without difficulty  Cleansed/Non-selectively Debrided with:  Normal Saline and Gauze  Elizabeth wound: Cleansed with Normal Saline and Gauze, Prepped with No Sting  Primary Dressin/4\" plain packing moistened with normal saline (Dakins has been ordered for next wound dressing change)  Secondary (Outer) Dressing: dry gauze and hypafix tape    Advanced Wound Care Discharge Planning  Number of Clinicians necessary to complete wound care: 1  Is patient requiring IV pain medications for dressing changes:  No   Length of time for dressing change 15 min. (This does not " "include chart review, pre-medication time, set up, clean up or time spent charting.)    Interdisciplinary consultation: Patient, Bedside RN (Eliseo), Provider Dr. Fernandes.  Pressure injury and staging reviewed with N/A.    EVALUATION / RATIONALE FOR TREATMENT:     Date:  06/18/25  Wound Status:  Initial evaluation    Left 2nd toe - Dry, intact eschar with surrounding blanchable erythema. Betadine used for antiseptic cleansing of skin and dehydration of non-viable tissue. Possible surgery tomorrow for partial amputation per Dr. Fernandes. Imaging showing segmental occlusion to LLE and full occlusion to RLE posterior tibial artery. Ortho has been consulted. Discussed with Dr. Fernandes that patient would benefit from vascular consult as well due to blood flow.    Left 4th toe - patient reports that wound has been treated at home with collagen. Today wound presents with adherent slough covering about 50% of wound bed with other 60% with pink tissue that may have a biofilm within wound. Dakins ordered to chemically debride nonviable tissue, decrease bioburden and odor with 1/4\" packing strip.          Goals: Steady decrease in wound area and depth weekly.    NURSING PLAN OF CARE ORDERS:  Dressing changes: See Dressing Care orders    NUTRITION RECOMMENDATIONS   Wound Team Recommendations:  Dietary consult  Diabetic diet  Dialysis patient    DIET ORDERS (From admission to next 24h)       Start     Ordered    06/18/25 0930  Diet NPO Restrict to: Sips with Medications  AT MIDNIGHT      Question:  Diet NPO Restrict to:  Answer:  Sips with Medications    06/18/25 1444    06/18/25 1445  Diet Order Diet: Consistent CHO (Diabetic)  ALL MEALS        Question:  Diet:  Answer:  Consistent CHO (Diabetic)    06/18/25 1444                    PREVENTATIVE INTERVENTIONS:    Q shift Rj - performed per nursing policy  Q shift pressure point assessments - performed per nursing policy    Surface/Positioning  Standard/trauma mattress - Currently in " Place    Mobilization      Ambulate , Ambulating at Baseline, and patient is up self      Anticipated discharge plans:  Home Health Care        Vac Discharge Needs:  Vac Discharge plan is purely a recommendation from wound team and not a requirement for discharge unless otherwise stated by physician.  Not Applicable Pt not on a wound vac        [1]   Past Surgical History:  Procedure Laterality Date    WOUND IRRIGATION & DEBRIDEMENT Left 3/22/2025    Procedure: IRRIGATION AND DEBRIDEMENT, WOUND AND WOUND CLOSURE;  Surgeon: Barry Zuleta M.D.;  Location: [facility];  Service: Orthopedics    TOE AMPUTATION Left 2/28/2025    Procedure: AMPUTATION, TOE-4TH;  Surgeon: Jamar Coronado M.D.;  Location: [facility];  Service: Orthopedics    [facility] UPPER GI ENDOSCOPY,DIAGNOSIS N/A 12/07/2022    Procedure: ESOPHAGOGASTRODUODENOSCOPY (EGD) WITH BRUSHINGS AND DILATATION VS OVER THE SCOPE CLIP PLACEMENT;  Surgeon: Bridger Gongora M.D.;  Location: [facility];  Service: General    [facility] UPPER GI ENDOSCOPY,CTRL BLEED N/A 12/07/2022    Procedure: EGD, WITH CLIP PLACEMENT;  Surgeon: Bridger Gongora M.D.;  Location: [facility];  Service: General    ABDOMINAL HYSTERECTOMY TOTAL      CATARACT EXTRACTION WITH IOL Left     CHOLECYSTECTOMY      GASTRIC RESECTION      HYSTERECTOMY, TOTAL ABDOMINAL      OVARIAN CYSTECTOMY Bilateral     PRIMARY C SECTION      x 2    TONSILLECTOMY

## 2025-06-18 NOTE — H&P
Hospital Medicine History & Physical Note    Date of Service  6/17/2025    Primary Care Physician  Barry Higgins P.A.-C.    Consultants  None       Code Status  Full Code    Chief Complaint  Dry gangrene    History of Presenting Illness  50 years old female with a past medical history of end stage renal disease on dialysis, diabetes, history of fourth toe amputation Mar 2025 with Dr Zuleta presenting with worsening 2nd toe discoloration progressing over 3 weeks. The patient had debridement last week by her wound care team, then on a follow-up visit with a raised concern for osteomyelitis /gangrene. Imaging at the transferring facility shows gas on X-Ray. She has been started on Vancomycin, cefepime and clindamycin.  I evaluated the patient on telemetry floor the patient does not have fever, chills, or pain.        I discussed the plan of care with transferring physician, transfer center, the patient and telemetry nursing staff..    Review of Systems  Review of Systems   Constitutional:  Negative for chills and fever.   Eyes:  Negative for discharge and redness.   Respiratory:  Negative for cough, shortness of breath and stridor.    Cardiovascular:  Negative for chest pain and leg swelling.   Gastrointestinal:  Negative for abdominal pain and vomiting.   Genitourinary:  Negative for flank pain.   Musculoskeletal:  Negative for myalgias.        Blackish discoloration of the second toe   Skin: Negative.    Neurological:  Negative for focal weakness.   Endo/Heme/Allergies:  Does not bruise/bleed easily.   Psychiatric/Behavioral:  The patient is not nervous/anxious.      Past Medical History   has a past medical history of Cataract, Diabetes (HCC), Infectious disease (10/2022), and Pre-op evaluation (12/13/2022).    Surgical History   has a past surgical history that includes cataract extraction with iol (Left); tonsillectomy; hysterectomy, total abdominal; gastric resection; cholecystectomy; primary c section;  ovarian cystectomy (Bilateral); pr upper gi endoscopy,diagnosis (N/A, 12/07/2022); pr upper gi endoscopy,ctrl bleed (N/A, 12/07/2022); abdominal hysterectomy total; toe amputation (Left, 2/28/2025); and wound irrigation & debridement (Left, 3/22/2025).     Family History  Family History   Problem Relation Age of Onset    Breast Cancer Mother     Diabetes Mother     Heart Disease Mother     Hypertension Mother     Hypertension Father     Heart Disease Father     Diabetes Father     Diabetes Brother       Social History   reports that she has never smoked. She has never used smokeless tobacco. She reports that she does not currently use alcohol. She reports current drug use. Drugs: Marijuana and Oral.    Allergies  Allergies[1]    Medications  Prior to Admission Medications   Prescriptions Last Dose Informant Patient Reported? Taking?   Cholecalciferol (VITAMIN D-3) 125 MCG (5000 UT) Tab   Yes No   Sig: Take 5,000 Units by mouth every day.   HYDROcodone/acetaminophen (NORCO)  MG Tab  Patient, Patient's Home Pharmacy Yes No   Sig: Take 1 Tablet by mouth 3 times a day as needed for Moderate Pain.   Methoxy PEG-Epoetin Beta (MIRCERA) 30 MCG/0.3ML Solution Prefilled Syringe  Other Facility Yes No   Sig: Inject 60 mcg as directed every 14 days. Ange Pearson [phone]   Probiotic Product (PROBIOTIC DAILY PO)  Patient Yes No   Sig: Take 1 Tablet by mouth every day.   Semaglutide, 1 MG/DOSE, (OZEMPIC, 1 MG/DOSE,) 4 MG/3ML Solution Pen-injector  Patient, Patient's Home Pharmacy No No   Sig: Inject 1 mg under the skin every 7 days.   Sodium Hypochlorite (DAKINS 0.125%, 1/4 STRENGTH,) 0.125 % Solution  Patient, Historical No No   Sig: Apply 100 mL topically 2 times a day.   amLODIPine (NORVASC) 5 MG Tab  Patient, Patient's Home Pharmacy No No   Sig: Take 1 Tablet by mouth every day.   Patient not taking: Reported on 5/27/2025   carvedilol (COREG) 6.25 MG Tab  Patient, Patient's Home Pharmacy No No   Sig: Take 1 Tablet  by mouth 2 times a day with meals.   cefepime (MAXIPIME) 2 GM Recon Soln  Other Facility Yes No   Sig: Infuse 2-3 g into a venous catheter see administration instructions. 2 gms on  and  , 3 gm on  at [facility] [phone]   clindamycin (CLEOCIN) 300 MG Cap  Patient, Patient's Home Pharmacy Yes No   Sig: Take 300 mg by mouth 4 times a day. X 10 days =Did not finish=hospitalized   cyclobenzaprine (FLEXERIL) 10 mg Tab  Patient, Patient's Home Pharmacy Yes No   Sig: Take 10 mg by mouth 3 times a day as needed for Muscle Spasms.   furosemide (LASIX) 20 MG Tab  Patient, Patient's Home Pharmacy No No   Sig: Take 1 Tablet by mouth 2 times a day.   Patient taking differently: Take 20 mg by mouth 2 times a day as needed.   gabapentin (NEURONTIN) 300 MG Cap  Patient, Patient's Home Pharmacy Yes No   Sig: Take 300 mg by mouth 3 times a day.   hydrOXYzine HCl (ATARAX) 25 MG Tab   No No   Si/2-1 tab up to tid prn itching   insulin lispro (HUMALOG) 100 UNIT/ML INJ  Patient, Patient's Home Pharmacy No No   Sig: Inject 10 units under skin 3 times a day before meals   Patient taking differently: Inject  under the skin 3 times a day before meals.  Unknown Sliding scale   multivitamin Tab  Patient No No   Sig: Take 1 Tablet by mouth every day.   omeprazole (PRILOSEC) 20 MG delayed-release capsule  Patient, Patient's Home Pharmacy Yes No   Sig: Take 20 mg by mouth 1 time a day as needed.   ondansetron (ZOFRAN ODT) 8 MG TABLET DISPERSIBLE   No No   Sig: Take 1 Tablet by mouth every 8 hours as needed for Nausea.   ondansetron (ZOFRAN) 4 MG Tab tablet   No No   Sig: Take 1 Tablet by mouth every four hours as needed for Nausea/Vomiting.   polyethylene glycol/lytes (MIRALAX) Pack  Patient, Patient's Home Pharmacy No No   Sig: Take 1 Packet by mouth 1 time a day as needed (if no bowel movement in last 2 days).   senna-docusate (PERICOLACE OR SENOKOT S) 8.6-50 MG Tab  Patient, Patient's Home Pharmacy No No    Sig: Take 2 Tablets by mouth every evening.   sevelamer carbonate (RENVELA) 800 MG Tab tablet  Patient, Historical No No   Sig: Take 1 Tablet by mouth 3 times a day with meals.   thiamine (THIAMINE) 100 MG tablet  Patient, Historical No No   Sig: Take 1 Tablet by mouth every day.   vancomycin (VANCOCIN) 1 g Recon Soln  Other Facility Yes No   Si,000 mg every Monday, Wednesday, and Friday. At [facility] [phone]      Facility-Administered Medications: None     Physical Exam  Temp:  [36 °C (96.8 °F)] 36 °C (96.8 °F)  Pulse:  [71-74] 73  Resp:  [16] 16  BP: (189-215)/() 189/83  SpO2:  [96 %] 96 %                        Physical Exam  Constitutional:       General: She is not in acute distress.  HENT:      Head: Normocephalic and atraumatic.      Right Ear: External ear normal.      Left Ear: External ear normal.      Nose: No congestion or rhinorrhea.      Mouth/Throat:      Mouth: Mucous membranes are moist.      Pharynx: No oropharyngeal exudate or posterior oropharyngeal erythema.   Eyes:      General: No scleral icterus.        Right eye: No discharge.         Left eye: No discharge.      Conjunctiva/sclera: Conjunctivae normal.      Pupils: Pupils are equal, round, and reactive to light.   Cardiovascular:      Rate and Rhythm: Normal rate and regular rhythm.      Heart sounds:      No friction rub. No gallop.   Pulmonary:      Effort: Pulmonary effort is normal.   Abdominal:      General: Abdomen is flat. There is no distension.      Tenderness: There is no guarding.   Musculoskeletal:      Cervical back: Neck supple. No rigidity. No muscular tenderness.      Comments: Blackish discoloration of the tip of the second toe.  Palpable dorsalis pedis and posterior tibial artery   Skin:     Capillary Refill: Capillary refill takes 2 to 3 seconds.      Coloration: Skin is not jaundiced or pale.      Findings: No bruising or erythema.   Neurological:      Mental Status: She is alert and oriented to  "person, place, and time.   Psychiatric:         Mood and Affect: Mood normal.         Judgment: Judgment normal.         Laboratory:  Recent Labs     06/17/25 2031   WBC 5.9   RBC 3.64*   HEMOGLOBIN 11.0*   HEMATOCRIT 33.1*   MCV 90.9   MCH 30.2   MCHC 33.2   RDW 56.8*   PLATELETCT 289   MPV 11.4         No results for input(s): \"ALTSGPT\", \"ASTSGOT\", \"ALKPHOSPHAT\", \"TBILIRUBIN\", \"DBILIRUBIN\", \"GAMMAGT\", \"AMYLASE\", \"LIPASE\", \"ALB\", \"PREALBUMIN\", \"GLUCOSE\" in the last 72 hours.      No results for input(s): \"NTPROBNP\" in the last 72 hours.      No results for input(s): \"TROPONINT\" in the last 72 hours.    Imaging:  No orders to display     Assessment/Plan:  Justification for Admission Status  I anticipate this patient will require at least two midnights for appropriate medical management, necessitating inpatient admission because patient has dry gangrene of the second toe.  Will require intravenous antibiotics, orthopedic consultation, and likely amputation.    Patient will need a Telemetry bed on MEDICAL service.      * Dry gangrene of the left 4th toe (HCC)- (present on admission)  Assessment & Plan  Patient has dry gangrene.  I will start Unasyn and zyvox.  I will make n.p.o. after midnight   Will need orthopedics consultation to consider amputation.    Hypertensive urgency- (present on admission)  Assessment & Plan  I will start hydralazine as needed for extreme hypertension.   I will resume home amlodipine and furosemide with hold parameters.  I will check an EKG.  I will monitor on telemetry    Cellulitis- (present on admission)  Assessment & Plan  I will start Unasyn and Zyvox, follow on cultures and sensitivities    ESRD (end stage renal disease) ([facility])- (present on admission)  Assessment & Plan  Last dialysis was yesterday.  I will order follow-up CMP.    Anemia due to chronic kidney disease, on chronic dialysis ([facility])- (present on admission)  Assessment & Plan  No evidence of gross bleeding.  Monitor " hemoglobin. I will order a follow-up CBC.  Transfuse for a hemoglobin of less than or equal to 7.     Primary hypertension- (present on admission)  Assessment & Plan  I will resume home amlodipine and furosemide with hold parameters.  I will start hydralazine as needed for extreme hypertension    Type 2 diabetes mellitus with diabetic peripheral angiopathy and gangrene, with long-term current use of insulin (HCC)- (present on admission)  Assessment & Plan  With no significant hyperglycemia  Last glycated hemoglobin was 5.5% %  I will start short acting insulin for now  I will order Accu-Checks, hypoglycemia protocol  Adjust according to blood sugars trend       VTE prophylaxis: SCDs/TEDs    Total time spent is 98 minutes.  This includes time for discussing with the transferring physician, transfer center, the patient and telemetry nursing staff.  Reviewing patient records from the transferring facility.  In addition to face-to-face interview, physical examination, lab review, lab analysis, and placing orders.           [1] No Known Allergies

## 2025-06-19 ENCOUNTER — ANESTHESIA EVENT (OUTPATIENT)
Dept: SURGERY | Facility: MEDICAL CENTER | Age: 50
DRG: 255 | End: 2025-06-19
Payer: MEDICARE

## 2025-06-19 ENCOUNTER — ANESTHESIA (OUTPATIENT)
Dept: SURGERY | Facility: MEDICAL CENTER | Age: 50
DRG: 255 | End: 2025-06-19
Payer: MEDICARE

## 2025-06-19 LAB
ALBUMIN SERPL BCP-MCNC: 2.6 G/DL (ref 3.2–4.9)
ALBUMIN/GLOB SERPL: 0.7 G/DL
ALP SERPL-CCNC: 1014 U/L (ref 30–99)
ALT SERPL-CCNC: 84 U/L (ref 2–50)
ANION GAP SERPL CALC-SCNC: 14 MMOL/L (ref 7–16)
AST SERPL-CCNC: 116 U/L (ref 12–45)
BILIRUB SERPL-MCNC: 1.3 MG/DL (ref 0.1–1.5)
BUN SERPL-MCNC: 17 MG/DL (ref 8–22)
CALCIUM ALBUM COR SERPL-MCNC: 10.1 MG/DL (ref 8.5–10.5)
CALCIUM SERPL-MCNC: 9 MG/DL (ref 8.5–10.5)
CHLORIDE SERPL-SCNC: 99 MMOL/L (ref 96–112)
CO2 SERPL-SCNC: 20 MMOL/L (ref 20–33)
CREAT SERPL-MCNC: 3.06 MG/DL (ref 0.5–1.4)
EKG IMPRESSION: NORMAL
ERYTHROCYTE [DISTWIDTH] IN BLOOD BY AUTOMATED COUNT: 60.2 FL (ref 35.9–50)
GFR SERPLBLD CREATININE-BSD FMLA CKD-EPI: 18 ML/MIN/1.73 M 2
GGT SERPL-CCNC: 576 U/L (ref 7–34)
GLOBULIN SER CALC-MCNC: 3.6 G/DL (ref 1.9–3.5)
GLUCOSE BLD STRIP.AUTO-MCNC: 102 MG/DL (ref 65–99)
GLUCOSE BLD STRIP.AUTO-MCNC: 124 MG/DL (ref 65–99)
GLUCOSE BLD STRIP.AUTO-MCNC: 175 MG/DL (ref 65–99)
GLUCOSE BLD STRIP.AUTO-MCNC: 240 MG/DL (ref 65–99)
GLUCOSE BLD STRIP.AUTO-MCNC: 322 MG/DL (ref 65–99)
GLUCOSE SERPL-MCNC: 162 MG/DL (ref 65–99)
HCT VFR BLD AUTO: 32.8 % (ref 37–47)
HGB BLD-MCNC: 10.6 G/DL (ref 12–16)
MAGNESIUM SERPL-MCNC: 1.9 MG/DL (ref 1.5–2.5)
MCH RBC QN AUTO: 29.9 PG (ref 27–33)
MCHC RBC AUTO-ENTMCNC: 32.3 G/DL (ref 32.2–35.5)
MCV RBC AUTO: 92.7 FL (ref 81.4–97.8)
PATHOLOGY CONSULT NOTE: NORMAL
PLATELET # BLD AUTO: 259 K/UL (ref 164–446)
PMV BLD AUTO: 11.5 FL (ref 9–12.9)
POTASSIUM SERPL-SCNC: 4.2 MMOL/L (ref 3.6–5.5)
PROT SERPL-MCNC: 6.2 G/DL (ref 6–8.2)
RBC # BLD AUTO: 3.54 M/UL (ref 4.2–5.4)
SODIUM SERPL-SCNC: 133 MMOL/L (ref 135–145)
WBC # BLD AUTO: 5.8 K/UL (ref 4.8–10.8)

## 2025-06-19 PROCEDURE — 700105 HCHG RX REV CODE 258: Performed by: INTERNAL MEDICINE

## 2025-06-19 PROCEDURE — 700111 HCHG RX REV CODE 636 W/ 250 OVERRIDE (IP): Mod: JZ | Performed by: HOSPITALIST

## 2025-06-19 PROCEDURE — A9270 NON-COVERED ITEM OR SERVICE: HCPCS | Performed by: INTERNAL MEDICINE

## 2025-06-19 PROCEDURE — 700105 HCHG RX REV CODE 258: Performed by: ANESTHESIOLOGY

## 2025-06-19 PROCEDURE — 160192 HCHG ANESTHESIA COMPLEX: Performed by: STUDENT IN AN ORGANIZED HEALTH CARE EDUCATION/TRAINING PROGRAM

## 2025-06-19 PROCEDURE — 99233 SBSQ HOSP IP/OBS HIGH 50: CPT | Performed by: INTERNAL MEDICINE

## 2025-06-19 PROCEDURE — 93010 ELECTROCARDIOGRAM REPORT: CPT | Performed by: INTERNAL MEDICINE

## 2025-06-19 PROCEDURE — 80053 COMPREHEN METABOLIC PANEL: CPT

## 2025-06-19 PROCEDURE — 88311 DECALCIFY TISSUE: CPT | Performed by: PATHOLOGY

## 2025-06-19 PROCEDURE — 88305 TISSUE EXAM BY PATHOLOGIST: CPT | Performed by: PATHOLOGY

## 2025-06-19 PROCEDURE — 88311 DECALCIFY TISSUE: CPT | Mod: 26 | Performed by: PATHOLOGY

## 2025-06-19 PROCEDURE — 700111 HCHG RX REV CODE 636 W/ 250 OVERRIDE (IP): Performed by: ANESTHESIOLOGY

## 2025-06-19 PROCEDURE — 0Y6S0Z0 DETACHMENT AT LEFT 2ND TOE, COMPLETE, OPEN APPROACH: ICD-10-PCS | Performed by: STUDENT IN AN ORGANIZED HEALTH CARE EDUCATION/TRAINING PROGRAM

## 2025-06-19 PROCEDURE — 160027 HCHG SURGERY MINUTES - 1ST 30 MINS LEVEL 2: Performed by: STUDENT IN AN ORGANIZED HEALTH CARE EDUCATION/TRAINING PROGRAM

## 2025-06-19 PROCEDURE — 700102 HCHG RX REV CODE 250 W/ 637 OVERRIDE(OP): Performed by: HOSPITALIST

## 2025-06-19 PROCEDURE — 160048 HCHG OR STATISTICAL LEVEL 1-5: Performed by: STUDENT IN AN ORGANIZED HEALTH CARE EDUCATION/TRAINING PROGRAM

## 2025-06-19 PROCEDURE — 770001 HCHG ROOM/CARE - MED/SURG/GYN PRIV*

## 2025-06-19 PROCEDURE — 160015 HCHG STAT PREOP MINUTES: Performed by: STUDENT IN AN ORGANIZED HEALTH CARE EDUCATION/TRAINING PROGRAM

## 2025-06-19 PROCEDURE — 94760 N-INVAS EAR/PLS OXIMETRY 1: CPT

## 2025-06-19 PROCEDURE — 700102 HCHG RX REV CODE 250 W/ 637 OVERRIDE(OP): Performed by: INTERNAL MEDICINE

## 2025-06-19 PROCEDURE — 82962 GLUCOSE BLOOD TEST: CPT | Performed by: INTERNAL MEDICINE

## 2025-06-19 PROCEDURE — 700101 HCHG RX REV CODE 250: Performed by: INTERNAL MEDICINE

## 2025-06-19 PROCEDURE — 83735 ASSAY OF MAGNESIUM: CPT

## 2025-06-19 PROCEDURE — A9270 NON-COVERED ITEM OR SERVICE: HCPCS | Performed by: HOSPITALIST

## 2025-06-19 PROCEDURE — 160002 HCHG RECOVERY MINUTES (STAT): Performed by: STUDENT IN AN ORGANIZED HEALTH CARE EDUCATION/TRAINING PROGRAM

## 2025-06-19 PROCEDURE — 700111 HCHG RX REV CODE 636 W/ 250 OVERRIDE (IP): Mod: JZ | Performed by: INTERNAL MEDICINE

## 2025-06-19 PROCEDURE — 28820 AMPUTATION OF TOE: CPT | Mod: 79,T1 | Performed by: STUDENT IN AN ORGANIZED HEALTH CARE EDUCATION/TRAINING PROGRAM

## 2025-06-19 PROCEDURE — 88305 TISSUE EXAM BY PATHOLOGIST: CPT | Mod: 26 | Performed by: PATHOLOGY

## 2025-06-19 PROCEDURE — 160193 HCHG PACU STANDARD - 1ST 60 MINS: Performed by: STUDENT IN AN ORGANIZED HEALTH CARE EDUCATION/TRAINING PROGRAM

## 2025-06-19 PROCEDURE — 85027 COMPLETE CBC AUTOMATED: CPT

## 2025-06-19 PROCEDURE — 700101 HCHG RX REV CODE 250: Performed by: ANESTHESIOLOGY

## 2025-06-19 PROCEDURE — 99222 1ST HOSP IP/OBS MODERATE 55: CPT | Mod: 25 | Performed by: STUDENT IN AN ORGANIZED HEALTH CARE EDUCATION/TRAINING PROGRAM

## 2025-06-19 PROCEDURE — 36415 COLL VENOUS BLD VENIPUNCTURE: CPT

## 2025-06-19 RX ORDER — DIPHENHYDRAMINE HYDROCHLORIDE 50 MG/ML
12.5 INJECTION, SOLUTION INTRAMUSCULAR; INTRAVENOUS
Status: DISCONTINUED | OUTPATIENT
Start: 2025-06-19 | End: 2025-06-19 | Stop reason: HOSPADM

## 2025-06-19 RX ORDER — AMLODIPINE BESYLATE 5 MG/1
5 TABLET ORAL ONCE
Status: COMPLETED | OUTPATIENT
Start: 2025-06-19 | End: 2025-06-19

## 2025-06-19 RX ORDER — HALOPERIDOL 5 MG/ML
1 INJECTION INTRAMUSCULAR
Status: DISCONTINUED | OUTPATIENT
Start: 2025-06-19 | End: 2025-06-19 | Stop reason: HOSPADM

## 2025-06-19 RX ORDER — HYDROMORPHONE HYDROCHLORIDE 1 MG/ML
0.1 INJECTION, SOLUTION INTRAMUSCULAR; INTRAVENOUS; SUBCUTANEOUS
Status: DISCONTINUED | OUTPATIENT
Start: 2025-06-19 | End: 2025-06-19 | Stop reason: HOSPADM

## 2025-06-19 RX ORDER — AMLODIPINE BESYLATE 5 MG/1
10 TABLET ORAL DAILY
Status: DISCONTINUED | OUTPATIENT
Start: 2025-06-20 | End: 2025-06-20 | Stop reason: HOSPADM

## 2025-06-19 RX ORDER — HYDROMORPHONE HYDROCHLORIDE 1 MG/ML
0.2 INJECTION, SOLUTION INTRAMUSCULAR; INTRAVENOUS; SUBCUTANEOUS
Status: DISCONTINUED | OUTPATIENT
Start: 2025-06-19 | End: 2025-06-19 | Stop reason: HOSPADM

## 2025-06-19 RX ORDER — HYDROMORPHONE HYDROCHLORIDE 1 MG/ML
0.4 INJECTION, SOLUTION INTRAMUSCULAR; INTRAVENOUS; SUBCUTANEOUS
Status: DISCONTINUED | OUTPATIENT
Start: 2025-06-19 | End: 2025-06-19 | Stop reason: HOSPADM

## 2025-06-19 RX ORDER — SODIUM CHLORIDE, SODIUM LACTATE, POTASSIUM CHLORIDE, CALCIUM CHLORIDE 600; 310; 30; 20 MG/100ML; MG/100ML; MG/100ML; MG/100ML
INJECTION, SOLUTION INTRAVENOUS CONTINUOUS
Status: DISCONTINUED | OUTPATIENT
Start: 2025-06-19 | End: 2025-06-19 | Stop reason: HOSPADM

## 2025-06-19 RX ORDER — MEPERIDINE HYDROCHLORIDE 25 MG/ML
12.5 INJECTION INTRAMUSCULAR; INTRAVENOUS; SUBCUTANEOUS
Status: DISCONTINUED | OUTPATIENT
Start: 2025-06-19 | End: 2025-06-19 | Stop reason: HOSPADM

## 2025-06-19 RX ORDER — ONDANSETRON 2 MG/ML
INJECTION INTRAMUSCULAR; INTRAVENOUS PRN
Status: DISCONTINUED | OUTPATIENT
Start: 2025-06-19 | End: 2025-06-19 | Stop reason: SURG

## 2025-06-19 RX ORDER — OXYCODONE HCL 5 MG/5 ML
5 SOLUTION, ORAL ORAL
Status: DISCONTINUED | OUTPATIENT
Start: 2025-06-19 | End: 2025-06-19 | Stop reason: HOSPADM

## 2025-06-19 RX ORDER — SODIUM CHLORIDE, SODIUM LACTATE, POTASSIUM CHLORIDE, CALCIUM CHLORIDE 600; 310; 30; 20 MG/100ML; MG/100ML; MG/100ML; MG/100ML
INJECTION, SOLUTION INTRAVENOUS
Status: DISCONTINUED | OUTPATIENT
Start: 2025-06-19 | End: 2025-06-19 | Stop reason: SURG

## 2025-06-19 RX ORDER — OXYCODONE HCL 5 MG/5 ML
10 SOLUTION, ORAL ORAL
Status: DISCONTINUED | OUTPATIENT
Start: 2025-06-19 | End: 2025-06-19 | Stop reason: HOSPADM

## 2025-06-19 RX ORDER — DEXAMETHASONE SODIUM PHOSPHATE 4 MG/ML
INJECTION, SOLUTION INTRA-ARTICULAR; INTRALESIONAL; INTRAMUSCULAR; INTRAVENOUS; SOFT TISSUE PRN
Status: DISCONTINUED | OUTPATIENT
Start: 2025-06-19 | End: 2025-06-19 | Stop reason: SURG

## 2025-06-19 RX ORDER — SODIUM CHLORIDE, SODIUM LACTATE, POTASSIUM CHLORIDE, CALCIUM CHLORIDE 600; 310; 30; 20 MG/100ML; MG/100ML; MG/100ML; MG/100ML
INJECTION, SOLUTION INTRAVENOUS CONTINUOUS
Status: ACTIVE | OUTPATIENT
Start: 2025-06-19 | End: 2025-06-19

## 2025-06-19 RX ORDER — LIDOCAINE HYDROCHLORIDE 20 MG/ML
INJECTION, SOLUTION EPIDURAL; INFILTRATION; INTRACAUDAL; PERINEURAL PRN
Status: DISCONTINUED | OUTPATIENT
Start: 2025-06-19 | End: 2025-06-19 | Stop reason: SURG

## 2025-06-19 RX ORDER — ONDANSETRON 2 MG/ML
4 INJECTION INTRAMUSCULAR; INTRAVENOUS
Status: DISCONTINUED | OUTPATIENT
Start: 2025-06-19 | End: 2025-06-19 | Stop reason: HOSPADM

## 2025-06-19 RX ADMIN — DEXAMETHASONE SODIUM PHOSPHATE 4 MG: 4 INJECTION INTRA-ARTICULAR; INTRALESIONAL; INTRAMUSCULAR; INTRAVENOUS; SOFT TISSUE at 09:25

## 2025-06-19 RX ADMIN — OXYCODONE 5 MG: 5 TABLET ORAL at 20:08

## 2025-06-19 RX ADMIN — GABAPENTIN 100 MG: 100 CAPSULE ORAL at 15:11

## 2025-06-19 RX ADMIN — HYDRALAZINE HYDROCHLORIDE 10 MG: 20 INJECTION INTRAMUSCULAR; INTRAVENOUS at 08:04

## 2025-06-19 RX ADMIN — HEPARIN SODIUM 5000 UNITS: 5000 INJECTION, SOLUTION INTRAVENOUS; SUBCUTANEOUS at 21:14

## 2025-06-19 RX ADMIN — INSULIN LISPRO 2 UNITS: 100 INJECTION, SOLUTION INTRAVENOUS; SUBCUTANEOUS at 16:58

## 2025-06-19 RX ADMIN — HEPARIN SODIUM 5000 UNITS: 5000 INJECTION, SOLUTION INTRAVENOUS; SUBCUTANEOUS at 15:11

## 2025-06-19 RX ADMIN — SODIUM CHLORIDE, POTASSIUM CHLORIDE, SODIUM LACTATE AND CALCIUM CHLORIDE: 600; 310; 30; 20 INJECTION, SOLUTION INTRAVENOUS at 09:21

## 2025-06-19 RX ADMIN — Medication 5000 UNITS: at 06:14

## 2025-06-19 RX ADMIN — AMPICILLIN AND SULBACTAM 3 G: 1; 2 INJECTION, POWDER, FOR SOLUTION INTRAMUSCULAR; INTRAVENOUS at 17:09

## 2025-06-19 RX ADMIN — SEVELAMER CARBONATE 800 MG: 800 TABLET, FILM COATED ORAL at 17:03

## 2025-06-19 RX ADMIN — OXYCODONE 5 MG: 5 TABLET ORAL at 23:39

## 2025-06-19 RX ADMIN — OXYCODONE 5 MG: 5 TABLET ORAL at 10:54

## 2025-06-19 RX ADMIN — GABAPENTIN 100 MG: 100 CAPSULE ORAL at 20:08

## 2025-06-19 RX ADMIN — ONDANSETRON 4 MG: 2 INJECTION INTRAMUSCULAR; INTRAVENOUS at 09:25

## 2025-06-19 RX ADMIN — LIDOCAINE HYDROCHLORIDE 50 MG: 20 INJECTION, SOLUTION EPIDURAL; INFILTRATION; INTRACAUDAL; PERINEURAL at 09:25

## 2025-06-19 RX ADMIN — OXYCODONE 5 MG: 5 TABLET ORAL at 06:17

## 2025-06-19 RX ADMIN — DAKIN'S SOLUTION 0.125% (QUARTER STRENGTH) 473 ML: 0.12 SOLUTION at 06:15

## 2025-06-19 RX ADMIN — OXYCODONE 5 MG: 5 TABLET ORAL at 17:04

## 2025-06-19 RX ADMIN — INSULIN LISPRO 4 UNITS: 100 INJECTION, SOLUTION INTRAVENOUS; SUBCUTANEOUS at 20:09

## 2025-06-19 RX ADMIN — HYDRALAZINE HYDROCHLORIDE 10 MG: 20 INJECTION INTRAMUSCULAR; INTRAVENOUS at 23:30

## 2025-06-19 RX ADMIN — AMLODIPINE BESYLATE 5 MG: 5 TABLET ORAL at 11:40

## 2025-06-19 RX ADMIN — SENNOSIDES AND DOCUSATE SODIUM 2 TABLET: 50; 8.6 TABLET ORAL at 17:03

## 2025-06-19 RX ADMIN — INSULIN LISPRO 1 UNITS: 100 INJECTION, SOLUTION INTRAVENOUS; SUBCUTANEOUS at 11:39

## 2025-06-19 RX ADMIN — SEVELAMER CARBONATE 800 MG: 800 TABLET, FILM COATED ORAL at 11:43

## 2025-06-19 RX ADMIN — PROPOFOL 150 MG: 10 INJECTION, EMULSION INTRAVENOUS at 09:25

## 2025-06-19 RX ADMIN — AMLODIPINE BESYLATE 5 MG: 5 TABLET ORAL at 06:15

## 2025-06-19 ASSESSMENT — PAIN DESCRIPTION - PAIN TYPE
TYPE: CHRONIC PAIN
TYPE: SURGICAL PAIN
TYPE: CHRONIC PAIN
TYPE: ACUTE PAIN
TYPE: SURGICAL PAIN
TYPE: CHRONIC PAIN
TYPE: ACUTE PAIN
TYPE: CHRONIC PAIN
TYPE: SURGICAL PAIN

## 2025-06-19 ASSESSMENT — COGNITIVE AND FUNCTIONAL STATUS - GENERAL
MOBILITY SCORE: 23
CLIMB 3 TO 5 STEPS WITH RAILING: A LITTLE
DAILY ACTIVITIY SCORE: 24
SUGGESTED CMS G CODE MODIFIER MOBILITY: CI
SUGGESTED CMS G CODE MODIFIER DAILY ACTIVITY: CH

## 2025-06-19 ASSESSMENT — ENCOUNTER SYMPTOMS
MYALGIAS: 1
COUGH: 0
DOUBLE VISION: 0
SHORTNESS OF BREATH: 0
BLURRED VISION: 0
ABDOMINAL PAIN: 0
HEADACHES: 0
CONSTIPATION: 0
BLOOD IN STOOL: 0
NAUSEA: 0
FALLS: 0
CHILLS: 0
DIARRHEA: 0
PALPITATIONS: 0
VOMITING: 0
HEARTBURN: 0
NERVOUS/ANXIOUS: 0
FEVER: 0
DIZZINESS: 0

## 2025-06-19 ASSESSMENT — LIFESTYLE VARIABLES: SUBSTANCE_ABUSE: 0

## 2025-06-19 ASSESSMENT — FIBROSIS 4 INDEX: FIB4 SCORE: 2.35

## 2025-06-19 NOTE — PROGRESS NOTES
"Hospital Medicine Daily Progress Note    Date of Service  6/19/2025    Chief Complaint  Aviva Kenney is a 50 y.o. female admitted 6/17/2025 with foot ulcer    Hospital Course  Per chart review:  \"50 years old female with a past medical history of end stage renal disease on dialysis, diabetes, history of fourth toe amputation Mar 2025 with Dr Zuleta presenting with worsening 2nd toe discoloration progressing over 3 weeks. The patient had debridement last week by her wound care team, then on a follow-up visit with a raised concern for osteomyelitis /gangrene. Imaging at the transferring facility shows gas on X-Ray. She has been started on Vancomycin, cefepime and clindamycin.  I evaluated the patient on telemetry floor the patient does not have fever, chills, or pain.\"      Interval Problem Update  6/18: Patient seen at bedside this morning.  We have consulted orthopedic surgery, pending MRI and vascular studies.  Patient also with unclear etiology of elevated liver function test, we have ordered ultrasound of the right upper quadrant as well as hepatitis panel.  We have consulted GI as well.  We appreciate further recommendations.  Continue antibiotics for cellulitis.  There is a possibility the patient might require amputation however pending MRI and vascular studies.  We appreciate further recommendations by orthopedic surgery.  -- I had initially ordered MRI without contrast for the left foot however it seems that the tech at the MRI did a mistake and gave her contrast, I did reach out to nephrology to see if we can continue with an MRI with and without contrast and they said that we could as the patient has not had dialysis so I have placed a new order for MRI with and without contrast of the foot.    6/19: Patient seen at bedside this morning.  Patient n.p.o. as she will undergo left toe amputation by orthopedic surgery.  GI also following the patient, we have ordered MRCP.  We appreciate " further recommendations.  Continue to monitor closely.    I have discussed this patient's plan of care and discharge plan at IDT rounds today with Case Management, Nursing, Nursing leadership, and other members of the IDT team.    Consultants/Specialty  GI, nephrology, and orthopedics    Code Status  Full Code    Disposition  Medically Cleared    Review of Systems  Review of Systems   Constitutional:  Positive for malaise/fatigue. Negative for chills and fever.   HENT:  Negative for hearing loss and nosebleeds.    Eyes:  Negative for blurred vision and double vision.   Respiratory:  Negative for cough and shortness of breath.    Cardiovascular:  Negative for chest pain and palpitations.   Gastrointestinal:  Negative for abdominal pain, heartburn and vomiting.   Genitourinary:  Negative for dysuria and urgency.   Musculoskeletal:  Positive for myalgias. Negative for falls.   Neurological:  Negative for dizziness and headaches.   Psychiatric/Behavioral:  Negative for substance abuse. The patient is not nervous/anxious.    All other systems reviewed and are negative.       Physical Exam  Temp:  [36.1 °C (96.9 °F)-36.9 °C (98.5 °F)] 36.2 °C (97.2 °F)  Pulse:  [62-88] 81  Resp:  [15-19] 18  BP: (118-189)/(54-90) 156/70  SpO2:  [89 %-97 %] 91 %    Physical Exam  Vitals and nursing note reviewed.   Constitutional:       General: She is not in acute distress.     Appearance: She is ill-appearing.   HENT:      Head: Normocephalic and atraumatic.      Right Ear: External ear normal.      Left Ear: External ear normal.      Mouth/Throat:      Pharynx: No oropharyngeal exudate or posterior oropharyngeal erythema.   Eyes:      General:         Right eye: No discharge.         Left eye: No discharge.   Cardiovascular:      Rate and Rhythm: Normal rate and regular rhythm.      Pulses: Normal pulses.      Heart sounds: No murmur heard.     No gallop.   Pulmonary:      Effort: No respiratory distress.   Abdominal:      General:  Bowel sounds are normal. There is no distension.      Palpations: Abdomen is soft.      Tenderness: There is no abdominal tenderness. There is no guarding.   Musculoskeletal:         General: No swelling or tenderness. Normal range of motion.      Cervical back: Normal range of motion and neck supple. No tenderness.   Skin:     General: Skin is warm.      Findings: Erythema and lesion present.   Neurological:      General: No focal deficit present.      Mental Status: She is alert and oriented to person, place, and time. Mental status is at baseline.      Motor: No weakness.   Psychiatric:         Mood and Affect: Mood normal.         Behavior: Behavior normal.         Fluids    Intake/Output Summary (Last 24 hours) at 6/19/2025 1336  Last data filed at 6/19/2025 1252  Gross per 24 hour   Intake 1340 ml   Output 2510 ml   Net -1170 ml        Laboratory  Recent Labs     06/17/25 2031 06/18/25 0118 06/19/25  0034   WBC 5.9 6.2 5.8   RBC 3.64* 3.66* 3.54*   HEMOGLOBIN 11.0* 11.0* 10.6*   HEMATOCRIT 33.1* 33.3* 32.8*   MCV 90.9 91.0 92.7   MCH 30.2 30.1 29.9   MCHC 33.2 33.0 32.3   RDW 56.8* 58.1* 60.2*   PLATELETCT 289 280 259   MPV 11.4 11.6 11.5     Recent Labs     06/17/25 2031 06/18/25  0118 06/19/25  0034   SODIUM 134* 134* 133*   POTASSIUM 4.2 4.4 4.2   CHLORIDE 95* 97 99   CO2 26 23 20   GLUCOSE 122* 150* 162*   BUN 24* 26* 17   CREATININE 3.60* 3.79* 3.06*   CALCIUM 9.6 9.4 9.0                   Imaging  US-RUQ   Final Result      Prior cholecystectomy. No evidence of biliary ductal dilatation.      MR-FOOT-WITH & W/O LEFT   Final Result      1.  No evidence of osteomyelitis.      2.  Prior amputation of the fourth toe.      3.  Edema and enhancement of the soft tissues at the amputation site consistent with cellulitis. No evidence of soft tissue abscess.      US-EXTREMITY ARTERY LOWER BILAT   Final Result      US-TONJA SINGLE LEVEL BILAT   Final Result      [id number]    (Results Pending)         Assessment/Plan  * Dry gangrene of the left 4th toe (HCC)- (present on admission)  Assessment & Plan  Patient to undergo left toe amputation today by orthopedic surgery  We appreciate further recommendations  We will consider consulting vascular surgery after surgery    Elevated LFTs  Assessment & Plan  Unclear etiology  Patient denying abdominal pain.  I have ordered right upper quadrant ultrasound as well as hepatitis panel.    6/19: GI recommending MRCP, which has been ordered.    Hypertensive urgency- (present on admission)  Assessment & Plan  Increased amlodipine to 10 mg  PRN medication    ESRD (end stage renal disease) (HCC)- (present on admission)  Assessment & Plan  Nephrology consulted for dialysis while inpatient    Cellulitis- (present on admission)  Assessment & Plan  Continue antibiotics  monitor    Anemia due to chronic kidney disease, on chronic dialysis (HCC)- (present on admission)  Assessment & Plan  No signs of overt bleeding  monitor    Hyponatremia- (present on admission)  Assessment & Plan  Mild  Monitor  In the setting of ESRD    Primary hypertension- (present on admission)  Assessment & Plan  We have increased amlodipine to 10 mg  Continue PRN medication    Type 2 diabetes mellitus with diabetic peripheral angiopathy and gangrene, with long-term current use of insulin ([facility])- (present on admission)  Assessment & Plan  With no significant hyperglycemia  ISS  monitor         VTE prophylaxis: Heparin    I have performed a physical exam and reviewed and updated ROS and Plan today (6/19/2025). In review of yesterday's note (6/18/2025), there are no changes except as documented above.      I spend at least 52 minutes providing care for this patient.  This included face-to-face interview, physical examination.  Review of lab work including CBC, CMP, magnesium.  Discussing with multidisciplinary team including case management, nursing staff and pharmacy.  Creating plan of care, reviewing  orders.

## 2025-06-19 NOTE — OP REPORT
DATE OF OPERATION: 6/19/2025     PREOPERATIVE DIAGNOSIS: Left second toe necrosis   POSTOPERATIVE DIAGNOSIS: Same    PROCEDURE PERFORMED: left second toe amputation at MTP joint    SURGEON: Oh Pompa M.D.     ASSISTANT: None    ANESTHESIA: General    SPECIMEN: None    ESTIMATED BLOOD LOSS: Minimal mL    IMPLANTS: None      INDICATIONS: The patient is a 50 y.o. female who presented with above.  I discussed the risks and benefits of the procedure which include but are not limited to risks of infection, wound healing complication, neurovascular injury, pain, malunion, non-union, malrotation, and the medical risks of anesthesia including MI, stroke, and death.  Alternatives to surgery were also discussed, including non-operative management, which I did not recommend.  The patient was in agreement with the plan to proceed, and the informed consent was signed and documented.  I met with the patient pre-operatively and marked the operative extremity with their agreement.  We proceeded to the operating room.     DESCRIPTION OF PROCEDURE:  Patient was seen in the preoperative holding area on the day of surgery. The operative site was marked with my initials.  she was taken to the operating room and placed supine on the operative table.  Anesthesia was induced.  The operative extremity was prepped and draped in the normal sterile fashion.  Operative pause was conducted and the correct patient, site, side, procedure, and surgeon's initials on extremity were identified.  Standard circumferential incision was performed of the left second MTP joint.  Knife was used for skin incision Bovie cautery to dissect down to and through the MTP joint.  There was minimal bleeding noted at this site.  No obvious purulence or evidence of infection was noted.  The toe was sent for pathology.  The wound was irrigated with sterile saline closed with 3-0 nylon suture.  Sterile dressings applied she was awoken taken to PACU in stable  condition.    POSTOPERATIVE PLAN:  heel weight bearing in postop shoe left lower extremity.  Mobilize with physical and occupational therapies. .  The patient will follow up in clinic in 2 weeks to check wounds and remove sutures/staples.      ____________________________________   Oh Pompa M.D.   DD: 6/19/2025  9:52 AM

## 2025-06-19 NOTE — CARE PLAN
The patient is Stable - Low risk of patient condition declining or worsening    Shift Goals  Clinical Goals: For surgery  Patient Goals: to have surgery    Progress made toward(s) clinical / shift goals:  \  Problem: Knowledge Deficit - Standard  Goal: Patient and family/care givers will demonstrate understanding of plan of care, disease process/condition, diagnostic tests and medications  Outcome: Progressing  Note: Reviewed POC. Discussed and provided education on medications and treatment, monitoring oxygen needs and pain management. Instructed to be on NPO in preparation for procedure. Continue IV abx     Problem: Skin Integrity  Goal: Risk for impaired skin integrity will decrease  Outcome: Progressing  Note: Maintained skin integrity, site care done/ changed dressing. Monitor for signs and symptoms of infection.     Problem: Fall Risk  Goal: Patient will remain free from falls  Outcome: Progressing     Fall and safety precaution in placed, patient able to ambulate and transfer from bed to chair independently.     Patient is not progressing towards the following goals:

## 2025-06-19 NOTE — PROGRESS NOTES
[facility] Services Progress Notes    Hemodialysis treatment x 3 hours completed as ordered per Dr. Cox.  Treatment performed at bedside started at 1721 and ended at 2021.      Net UF Removed: 2000 mL    Dialysis Input: 500 mL (200 mL NS prime + 300 mL NS rinseback)  Dialysis Output: 2500 mL    Hemodialysis treatment orders and labs reviewed.  Treatment orders reviewed with patient, verbalized understanding, consent for treatment obtained.  Patient and access assessed pre, during and post treatment.  Patient with elevated blood pressures pre and during treatment, SBPs ranging 180s-190s, slightly improved throughout and post treatment down to 130s-160s, remained asymptomatic otherwise.  Patient tolerated treatment well overall without complications.  R chest tunneled HD catheter  with good flow and patency.    See Dialysis Flowsheets under media for details.    Post tx access: Right chest tunneled HD catheter flushed with saline then locked with Heparin 1000 units/mL per designated amount in each lumen (see MAR) then clamped, capped aseptically and labeled properly. Heparin lock to be aspirated prior to next dialysis/CVC use by dialysis RN only. Please do not flush or draw from ports.  CVC dressings clean, dry and intact, dressing changed earlier today (6/08/25) by PCN.   No indication for dressing change at this time.     Please notify Nephrologist/Dialysis for follow-up.     Report given to primary care nurse CONOR De La Rosa RN.

## 2025-06-19 NOTE — ANESTHESIA PROCEDURE NOTES
Airway    Date/Time: 6/19/2025 9:26 AM    Performed by: Tobey Gansert, M.D.  Authorized by: Tobey Gansert, M.D.    Location:  OR  Urgency:  Elective  Indications for Airway Management:  Anesthesia      Spontaneous Ventilation: absent    Sedation Level:  Deep  Preoxygenated: Yes    Final Airway Type:  Supraglottic airway  Final Supraglottic Airway:  Standard LMA    SGA Size:  3  Number of Attempts at Approach:  1

## 2025-06-19 NOTE — ANESTHESIA PREPROCEDURE EVALUATION
Case: [id number] Date/Time: 06/19/25 0915    Procedure: AMPUTATION, TOE, 2ND (Left)    Location: [facility] OR 03 / SURGERY [facility]    Surgeons: Oh Pompa M.D.            Relevant Problems   CARDIAC   (positive) Hypertensive urgency   (positive) Primary hypertension   (positive) Type 2 diabetes mellitus with diabetic peripheral angiopathy and gangrene, with long-term current use of insulin (HCC)         (positive) Acute renal failure superimposed on stage 3b chronic kidney disease (HCC)   (positive) Anemia due to chronic kidney disease, on chronic dialysis (HCC)   (positive) Dialysis patient (HCC)   (positive) ESRD (end stage renal disease) (HCC)   (positive) Pyelonephritis      ENDO   (positive) Type 2 diabetes mellitus with diabetic peripheral angiopathy and gangrene, with long-term current use of insulin (HCC)      Other   (positive) 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    Pertinent diagnostic labs and testing reviewed    Informed Consent:    Anesthetic plan and risks discussed with patient.    Use of blood products discussed with: patient whom consented to blood products.

## 2025-06-19 NOTE — ANESTHESIA POSTPROCEDURE EVALUATION
Patient: Aviva Kenney    Procedure Summary       Date: 06/19/25 Room / Location:  OR 03 / SURGERY [facility] ER    Anesthesia Start: 0921 Anesthesia Stop: 0949    Procedure: AMPUTATION, TOE, 2ND (Left: Second Toe) Diagnosis: (second toe necrosis)    Surgeons: Oh Pompa M.D. Responsible Provider: Tobey Gansert, M.D.    Anesthesia Type: general ASA Status: 3            Final Anesthesia Type: general  Last vitals  BP   Blood Pressure: (!) 158/79    Temp   36.4 °C (97.5 °F)    Pulse   77   Resp   16    SpO2   90 %      Anesthesia Post Evaluation    Patient location during evaluation: PACU  Patient participation: complete - patient participated  Level of consciousness: awake and alert    Airway patency: patent  Anesthetic complications: no  Cardiovascular status: hemodynamically stable  Respiratory status: acceptable  Hydration status: euvolemic    PONV: none          There were no known notable events for this encounter.     Nurse Pain Score: 0 (NPRS)

## 2025-06-19 NOTE — ANESTHESIA TIME REPORT
Anesthesia Start and Stop Event Times       Date Time Event    6/19/2025 0855 Ready for Procedure     0921 Anesthesia Start     0949 Anesthesia Stop          Responsible Staff  06/19/25      Name Role Begin End    Tobey Gansert, M.D. Anesth 0921 0949          Overtime Reason:  no overtime (within assigned shift)    Comments:

## 2025-06-19 NOTE — OR NURSING
0950- Patient arrived from OR via rney.  L foot dressing CDI; Cap refill in L toes <3 seconds. Cold pack applied to the L foot.     Sedation/Resp Status: Non responsive to verbal with OPA in place.   Respirations spontaneous and non-labored.    HR 62SR; VSS on 6L 02 via simple mask.  No pain or nausea as evidence by sleeping.    1002- OPA out for return of spontaneous eye opening/gag reflex - respirations continue spontaneous and non-labored.     1004- Called the patients family and gave an update.    1005- No pain reported. The patient is tolerating sips of water with no nausea. The dressing remains CDI.    1009- Gave report to Aisha LAYNE.    1020- No pain or nausea reported. The dressing remains CDI. The patient meets criteria to transfer back to her room.    1025- The patient was transported back to [facility] via bed by transport.

## 2025-06-19 NOTE — CONSULTS
"6/19/2025        HPI: Aviva Kenney is a 50 y.o. female who presents with multiple medical comorbidities and left second toe necrosis.  She had previous toe amputation several months ago.    Past Medical History[1]    Past Surgical History[2]    Medications  Medications Ordered Prior to Encounter[3]    Allergies  Patient has no known allergies.    ROS  . All other systems were reviewed and found to be negative    Family History   Problem Relation Age of Onset    Breast Cancer Mother     Diabetes Mother     Heart Disease Mother     Hypertension Mother     Hypertension Father     Heart Disease Father     Diabetes Father     Diabetes Brother        Social History[4]    Physical Exam  Vitals  BP (!) 176/72   Pulse 75   Temp 36.8 °C (98.2 °F) (Temporal)   Resp 16   Ht 1.753 m (5' 9\")   Wt 88.3 kg (194 lb 10.7 oz)   SpO2 97%   General: Well Developed, Well Nourished, Age appropriate appearance  HEENT: Normocephalic, atraumatic  Psych: Normal mood and affect  Neck: Supple, nontender, no masses  Lungs: Breathing unlabored, No audible wheezing  Heart: Regular heart rate and rhythm  Abdomen: Soft, NT, ND  Neuro: Sensation grossly intact to BUE and BLE, moving all four extremities  Skin: Intact, no open wounds  Vascular: decreased cap refill. Necrosis second toe  LLE: Superficial dehiscence without any overt signs of infection previous toe amputation site.      Radiographs:  US-RUQ   Final Result      Prior cholecystectomy. No evidence of biliary ductal dilatation.      MR-FOOT-WITH & W/O LEFT   Final Result      1.  No evidence of osteomyelitis.      2.  Prior amputation of the fourth toe.      3.  Edema and enhancement of the soft tissues at the amputation site consistent with cellulitis. No evidence of soft tissue abscess.      US-EXTREMITY ARTERY LOWER BILAT   Final Result      US-TONJA SINGLE LEVEL BILAT   Final Result          Laboratory Values  Recent Labs     06/17/25 2031 06/18/25  0118 " 06/19/25  0034   WBC 5.9 6.2 5.8   RBC 3.64* 3.66* 3.54*   HEMOGLOBIN 11.0* 11.0* 10.6*   HEMATOCRIT 33.1* 33.3* 32.8*   MCV 90.9 91.0 92.7   MCH 30.2 30.1 29.9   MCHC 33.2 33.0 32.3   RDW 56.8* 58.1* 60.2*   PLATELETCT 289 280 259   MPV 11.4 11.6 11.5     Recent Labs     06/17/25 2031 06/18/25  0118 06/19/25  0034   SODIUM 134* 134* 133*   POTASSIUM 4.2 4.4 4.2   CHLORIDE 95* 97 99   CO2 26 23 20   GLUCOSE 122* 150* 162*   BUN 24* 26* 17   CPKTOTAL  --  35  --              Impression: 50-year-old female history as end-stage renal disease on dialysis, diabetes, peripheral vascular disease with second toe necrosis.  Plan for left second toe amputation today.    Plan:We discussed the diagnosis and findings with the patient at length.  We reviewed possible non operative and operative interventions and the risks and benefits of each of these.  she had a chance to ask questions and all of these were answered to her satisfaction. The patient chose to proceed with surgical intervention. Risks and benefits of surgery were discussed which include but are not limited to bleeding, infection, neurovascular damage, malunion, nonunion, instability, limb length discrepancy, DVT, PE, MI, Stroke and death. They understand these risks and wish to proceed.      Oh Pompa MD  Orthopedic Trauma Surgery             [1]   Past Medical History:  Diagnosis Date    Cataract     Diabetes (HCC)     Type 2    Infectious disease 10/2022    Covid    Pre-op evaluation 12/13/2022   [2]   Past Surgical History:  Procedure Laterality Date    WOUND IRRIGATION & DEBRIDEMENT Left 3/22/2025    Procedure: IRRIGATION AND DEBRIDEMENT, WOUND AND WOUND CLOSURE;  Surgeon: Barry Zuleta M.D.;  Location: SURGERY [facility];  Service: Orthopedics    TOE AMPUTATION Left 2/28/2025    Procedure: AMPUTATION, TOE-4TH;  Surgeon: Jamar Coronado M.D.;  Location: [facility];  Service: Orthopedics    ND UPPER GI ENDOSCOPY,DIAGNOSIS N/A  12/07/2022    Procedure: ESOPHAGOGASTRODUODENOSCOPY (EGD) WITH BRUSHINGS AND DILATATION VS OVER THE SCOPE CLIP PLACEMENT;  Surgeon: Bridger Gongora M.D.;  Location: SURGERY [facility];  Service: General    OR UPPER GI ENDOSCOPY,CTRL BLEED N/A 12/07/2022    Procedure: EGD, WITH CLIP PLACEMENT;  Surgeon: Bridger Gongora M.D.;  Location: SURGERY [facility];  Service: General    ABDOMINAL HYSTERECTOMY TOTAL      CATARACT EXTRACTION WITH IOL Left     CHOLECYSTECTOMY      GASTRIC RESECTION      HYSTERECTOMY, TOTAL ABDOMINAL      OVARIAN CYSTECTOMY Bilateral     PRIMARY C SECTION      x 2    TONSILLECTOMY     [3]   No current facility-administered medications on file prior to encounter.     Current Outpatient Medications on File Prior to Encounter   Medication Sig Dispense Refill    amoxicillin-clavulanate (AUGMENTIN) 875-125 MG Tab Take 1 Tablet by mouth 2 times a day. Pt started on 6/7/2025 for 7 day course  PRESCRIPTION COURSE WAS COMPLETED      Acetaminophen (TYLENOL PO) Take 1 Tablet by mouth 2 times a day as needed (For pain). Pt is not sure the strength (OTC)      oxyCODONE immediate release (ROXICODONE) 10 MG immediate release tablet Take 5 mg by mouth every four hours as needed for Severe Pain. Ran out      hydrOXYzine HCl (ATARAX) 25 MG Tab 1/2-1 tab up to tid prn itching (Patient taking differently: Take 25 mg by mouth 2 times a day. 1/2-1 tab up to tid prn itching) 90 Tablet 3    gabapentin (NEURONTIN) 100 MG Cap Take 100 mg by mouth 3 times a day.      Probiotic Product (PROBIOTIC DAILY PO) Take 1 Tablet by mouth every day.      METHOXY PEG-EPOETIN BETA INJ Inject 60 mcg as directed every 14 days. Called Ange Pearson [phone], pt last received this medication was on 6/16/2025      sevelamer carbonate (RENVELA) 800 MG Tab tablet Take 1 Tablet by mouth 3 times a day with meals. 270 Tablet 0    multivitamin Tab Take 1 Tablet by mouth every day.      omeprazole (PRILOSEC) 20 MG delayed-release capsule Take 20  mg by mouth 1 time a day as needed. Indications: Gastroesophageal Reflux Disease      HYDROcodone/acetaminophen (NORCO)  MG Tab Take 1 Tablet by mouth 4 times a day.      amLODIPine (NORVASC) 5 MG Tab Take 1 Tablet by mouth every day. 100 Tablet 3    Cholecalciferol (VITAMIN D-3) 125 MCG (5000 UT) Tab Take 5,000 Units by mouth every day.      ondansetron (ZOFRAN ODT) 8 MG TABLET DISPERSIBLE Take 1 Tablet by mouth every 8 hours as needed for Nausea. 15 Tablet 0    ondansetron (ZOFRAN) 4 MG Tab tablet Take 1 Tablet by mouth every four hours as needed for Nausea/Vomiting. 30 Tablet 1    cyclobenzaprine (FLEXERIL) 10 mg Tab Take 10 mg by mouth 3 times a day as needed for Muscle Spasms.      senna-docusate (PERICOLACE OR SENOKOT S) 8.6-50 MG Tab Take 2 Tablets by mouth every evening. (Patient taking differently: Take 2 Tablets by mouth 1 time a day as needed for Constipation.)      polyethylene glycol/lytes (MIRALAX) Pack Take 1 Packet by mouth 1 time a day as needed (if no bowel movement in last 2 days).      Semaglutide, 1 MG/DOSE, (OZEMPIC, 1 MG/DOSE,) 4 MG/3ML Solution Pen-injector Inject 1 mg under the skin every 7 days. (Patient taking differently: Inject 1 mg under the skin every 7 days. On Sunday) 3 mL 2    furosemide (LASIX) 20 MG Tab Take 1 Tablet by mouth 2 times a day. (Patient taking differently: Take 20 mg by mouth 2 times a day as needed. Indications: Edema) 180 Tablet 3    insulin lispro (HUMALOG) 100 UNIT/ML INJ Inject 10 units under skin 3 times a day before meals (Patient taking differently: Inject 2-6 Units under the skin 3 times a day before meals. Sliding scale) 30 mL 3   [4]   Social History  Socioeconomic History    Marital status:     Highest education level: Some college, no degree   Occupational History    Occupation: disabled for lower back   Tobacco Use    Smoking status: Never    Smokeless tobacco: Never   Vaping Use    Vaping status: Never Used   Substance and Sexual Activity     Alcohol use: Not Currently     Comment: occ    Drug use: Yes     Types: Marijuana, Oral     Comment: occ     Social Drivers of Health     Financial Resource Strain: Medium Risk (6/23/2024)    Overall Financial Resource Strain (CARDIA)     Difficulty of Paying Living Expenses: Somewhat hard   Food Insecurity: Food Insecurity Present (2/27/2025)    Hunger Vital Sign     Worried About Running Out of Food in the Last Year: Sometimes true     Ran Out of Food in the Last Year: Never true   Transportation Needs: No Transportation Needs (2/27/2025)    PRAPARE - Transportation     Lack of Transportation (Medical): No     Lack of Transportation (Non-Medical): No   Physical Activity: Sufficiently Active (6/23/2024)    Exercise Vital Sign     Days of Exercise per Week: 3 days     Minutes of Exercise per Session: 50 min   Stress: Stress Concern Present (6/23/2024)    [employer] - Occupational Stress Questionnaire     Feeling of Stress : To some extent   Social Connections: Moderately Isolated (6/23/2024)    Social Connection and Isolation Panel [NHANES]     Frequency of Communication with Friends and Family: More than three times a week     Frequency of Social Gatherings with Friends and Family: More than three times a week     Attends Taoist Services: Never     Active Member of Clubs or Organizations: No     Attends Club or Organization Meetings: Patient declined     Marital Status:    Intimate Partner Violence: Not At Risk (2/27/2025)    Humiliation, Afraid, Rape, and Kick questionnaire     Fear of Current or Ex-Partner: No     Emotionally Abused: No     Physically Abused: No     Sexually Abused: No   Housing Stability: Low Risk  (2/27/2025)    Housing Stability Vital Sign     Unable to Pay for Housing in the Last Year: No     Number of Times Moved in the Last Year: 0     Homeless in the Last Year: No

## 2025-06-19 NOTE — CARE PLAN
The patient is Stable - Low risk of patient condition declining or worsening    Shift Goals  Clinical Goals: monitor labs and vitals, surgery  Patient Goals: go to surgery    Progress made toward(s) clinical / shift goals:    Problem: Knowledge Deficit - Standard  Goal: Patient and family/care givers will demonstrate understanding of plan of care, disease process/condition, diagnostic tests and medications  Outcome: Progressing     Problem: Safety  Goal: Will remain free from injury  Outcome: Progressing     Problem: Pain Management  Goal: Pain level will decrease to patient's comfort goal  Outcome: Progressing     Problem: Infection  Goal: Will remain free from infection  Outcome: Progressing     Problem: Skin Integrity  Goal: Risk for impaired skin integrity will decrease  Outcome: Progressing       Patient is not progressing towards the following goals:

## 2025-06-19 NOTE — CONSULTS
Gastroenterology Consult Note     Date of Consult: 06/18/2025  Referring Physician: Dom Rodgers     Reason for consult: elevated transaminases and alkaline phosphatase        HPI: This is a 51 yo female with history of DM, ESRD on HD, and recent toe amputation who presented to the hospital yesterday because of worsening discoloration of the toes in the last few weeks. She presented to an outside facility and there was concern for possible subcutaneous gas so she was transferred for possible debridement. On evaluation, she was noted to have elevated transaminases and alkaline phosphatase, , [id number], alk phos 1226, Tb 2.1. She denies history of liver disease. She does not report alcohol use and denies family history of liver disease. She has some heartburn at times but this is not a major issue for her. She reports regular Bms and denies blood in the stools. Acute hepatitis panel is negative and US does not reveal abnormal liver or bile ducts. We are consulted to assist in assessment of the elevated enzymes. She notes that she does have history of prior gastric bypass.     PMHX:Past Medical History[1]       PSurgHx: Past Surgical History[2]     ALLERGIES:Patient has no known allergies.     SocHx:   Social History     Socioeconomic History    Marital status:      Spouse name: Not on file    Number of children: Not on file    Years of education: Not on file    Highest education level: Some college, no degree   Occupational History    Occupation: disabled for lower back   Tobacco Use    Smoking status: Never    Smokeless tobacco: Never   Vaping Use    Vaping status: Never Used   Substance and Sexual Activity    Alcohol use: Not Currently     Comment: occ    Drug use: Yes     Types: Marijuana, Oral     Comment: occ    Sexual activity: Not on file   Other Topics Concern    Not on file   Social History Narrative    Not on file     Social Drivers of Health     Financial  Resource Strain: Medium Risk (6/23/2024)    Overall Financial Resource Strain (CARDIA)     Difficulty of Paying Living Expenses: Somewhat hard   Food Insecurity: Food Insecurity Present (2/27/2025)    Hunger Vital Sign     Worried About Running Out of Food in the Last Year: Sometimes true     Ran Out of Food in the Last Year: Never true   Transportation Needs: No Transportation Needs (2/27/2025)    PRAPARE - Transportation     Lack of Transportation (Medical): No     Lack of Transportation (Non-Medical): No   Physical Activity: Sufficiently Active (6/23/2024)    Exercise Vital Sign     Days of Exercise per Week: 3 days     Minutes of Exercise per Session: 50 min   Stress: Stress Concern Present (6/23/2024)    [employer] - Occupational Stress Questionnaire     Feeling of Stress : To some extent   Social Connections: Moderately Isolated (6/23/2024)    Social Connection and Isolation Panel [NHANES]     Frequency of Communication with Friends and Family: More than three times a week     Frequency of Social Gatherings with Friends and Family: More than three times a week     Attends Orthodox Services: Never     Active Member of Clubs or Organizations: No     Attends Club or Organization Meetings: Patient declined     Marital Status:    Intimate Partner Violence: Not At Risk (2/27/2025)    Humiliation, Afraid, Rape, and Kick questionnaire     Fear of Current or Ex-Partner: No     Emotionally Abused: No     Physically Abused: No     Sexually Abused: No   Housing Stability: Low Risk  (2/27/2025)    Housing Stability Vital Sign     Unable to Pay for Housing in the Last Year: No     Number of Times Moved in the Last Year: 0     Homeless in the Last Year: No        FAMHx:   Family History   Problem Relation Age of Onset    Breast Cancer Mother     Diabetes Mother     Heart Disease Mother     Hypertension Mother     Hypertension Father     Heart Disease Father     Diabetes Father      Diabetes Brother         ROS:  Constitutional: No fevers, chills, no night sweats, no weight changes  HEENT: no vision or hearing changes, no dry mouth, no change in smell  CARDIO: no palpitations, no orthopnea, no chest pain  PULM: no cough, no shortness of breath  NEURO: no Seizures, no memory impairment, no change in sensation  GI: as above  : no dysuria, no hematuria  HEME: no anemia, no easy brusing  MUSCULOSKELETAL: no muscle aches, no back pain, no arthritis  PSYCH: no anxiety or depression  SKIN: change in skin color of toe     PE:  Vitals:    06/18/25 0900 06/18/25 1500 06/18/25 1645 06/18/25 1800   BP: 138/65 (!) 168/90 (!) 189/86 (!) 165/77   Pulse: 84 78 74 83   Resp: 18 18 16    Temp: 37.1 °C (98.7 °F) 36.1 °C (96.9 °F) 36.6 °C (97.9 °F)    TempSrc: Temporal Temporal Temporal    SpO2: 98% 92% 97% 93%   Weight:       Height:         Gen: AAOx3, NAD, lying in bed, obese  HEENT: alopecia, PERRL, no scleral icterus, nares patent, Mucous membranes moist  Neck: supple, no cervical or supraclavicular adenopathy  CVS: regular rhythm, normal rate, no MRG  Pulm: CTAB, no crackles  Abd: soft, Nd, NT, no guarding or rebound, active bowel sounds  Ext: no edema, normal sensation  NEURO: grossly normal, no weakness  Skin: warm, no rash, black discoloration of the 2nd toe  Psych: normal Affect, no anxiety     LABS:  Lab Results   Component Value Date/Time    SODIUM 134 (L) 06/18/2025 01:18 AM    POTASSIUM 4.4 06/18/2025 01:18 AM    CHLORIDE 97 06/18/2025 01:18 AM    CO2 23 06/18/2025 01:18 AM    GLUCOSE 150 (H) 06/18/2025 01:18 AM    BUN 26 (H) 06/18/2025 01:18 AM    CREATININE 3.79 (H) 06/18/2025 01:18 AM    BUNCREATRAT 5.9 (L) 06/05/2025 03:52 AM      Lab Results   Component Value Date/Time    WBC 6.2 06/18/2025 01:18 AM    RBC 3.66 (L) 06/18/2025 01:18 AM    HEMOGLOBIN 11.0 (L) 06/18/2025 01:18 AM    HEMATOCRIT 33.3 (L) 06/18/2025 01:18 AM    MCV 91.0 06/18/2025 01:18 AM    MCH 30.1 06/18/2025 01:18 AM    MCHC  33.0 06/18/2025 01:18 AM    MPV 11.6 06/18/2025 01:18 AM    NEUTSPOLYS 50.40 06/17/2025 08:31 PM    LYMPHOCYTES 35.60 06/17/2025 08:31 PM    MONOCYTES 9.50 06/17/2025 08:31 PM    EOSINOPHILS 3.90 06/17/2025 08:31 PM    BASOPHILS 0.30 06/17/2025 08:31 PM        Lab Results   Component Value Date/Time    PROTHROMBTM 18.0 (H) 03/18/2025 02:09 AM    INR 1.48 (H) 03/18/2025 02:09 AM      Recent Labs     06/17/25 2031 06/18/25  0118   ASTSGOT 203* 165*   ALTSGPT 131* 112*   TBILIRUBIN 2.1* 2.1*   GLOBULIN 3.6* 3.5          Problem List Items Addressed This Visit    None       ASSESSMENT: This is a 51 yo female with elevated transaminases and elevated alk phos. The alk phos is very elevated out of proportion the other liver tests. This could represent bone release of alk phos due to infection or potentially secondary to hyperparathyroidism. Transaminase could also be affected if there is muscle injury or infection. Its not 100% clear these changes are of liver origin. Notably, though, the tbili has increased which would be more suggestive of a cholestatic picture. Possibly infection related vs medication induced     PLAN:   1) check alk phos isoenzymes and GGT  2) consider MRCP  3) monitor trend of LFTs  4) obtain CPK      Thank you for this consult.     Carlos Lobato MD        [1]   Past Medical History:  Diagnosis Date    Cataract     Diabetes (HCC)     Type 2    Infectious disease 10/2022    Covid    Pre-op evaluation 12/13/2022   [2]   Past Surgical History:  Procedure Laterality Date    WOUND IRRIGATION & DEBRIDEMENT Left 3/22/2025    Procedure: IRRIGATION AND DEBRIDEMENT, WOUND AND WOUND CLOSURE;  Surgeon: Barry Zuleta M.D.;  Location: SURGERY [facility];  Service: Orthopedics    TOE AMPUTATION Left 2/28/2025    Procedure: AMPUTATION, TOE-4TH;  Surgeon: Jamar Coronado M.D.;  Location: [facility];  Service: Orthopedics    [place] UPPER GI ENDOSCOPY,DIAGNOSIS N/A 12/07/2022    Procedure:  ESOPHAGOGASTRODUODENOSCOPY (EGD) WITH BRUSHINGS AND DILATATION VS OVER THE SCOPE CLIP PLACEMENT;  Surgeon: Bridger Gongora M.D.;  Location: SURGERY [facility];  Service: General    [place] UPPER GI ENDOSCOPY,CTRL BLEED N/A 12/07/2022    Procedure: EGD, WITH CLIP PLACEMENT;  Surgeon: Bridger Gongora M.D.;  Location: SURGERY [facility];  Service: General    ABDOMINAL HYSTERECTOMY TOTAL      CATARACT EXTRACTION WITH IOL Left     CHOLECYSTECTOMY      GASTRIC RESECTION      HYSTERECTOMY, TOTAL ABDOMINAL      OVARIAN CYSTECTOMY Bilateral     PRIMARY C SECTION      x 2    TONSILLECTOMY

## 2025-06-20 VITALS
OXYGEN SATURATION: 98 % | HEIGHT: 69 IN | WEIGHT: 194.67 LBS | HEART RATE: 80 BPM | TEMPERATURE: 97.5 F | RESPIRATION RATE: 18 BRPM | DIASTOLIC BLOOD PRESSURE: 74 MMHG | BODY MASS INDEX: 28.83 KG/M2 | SYSTOLIC BLOOD PRESSURE: 166 MMHG

## 2025-06-20 PROBLEM — R93.89 IMAGING ABNORMALITY: Status: ACTIVE | Noted: 2025-06-20

## 2025-06-20 LAB
ALBUMIN SERPL BCP-MCNC: 3 G/DL (ref 3.2–4.9)
ALBUMIN/GLOB SERPL: 0.8 G/DL
ALP SERPL-CCNC: 928 U/L (ref 30–99)
ALT SERPL-CCNC: 61 U/L (ref 2–50)
ANION GAP SERPL CALC-SCNC: 12 MMOL/L (ref 7–16)
AST SERPL-CCNC: 50 U/L (ref 12–45)
BILIRUB SERPL-MCNC: 0.9 MG/DL (ref 0.1–1.5)
BUN SERPL-MCNC: 33 MG/DL (ref 8–22)
CALCIUM ALBUM COR SERPL-MCNC: 10.1 MG/DL (ref 8.5–10.5)
CALCIUM SERPL-MCNC: 9.3 MG/DL (ref 8.5–10.5)
CHLORIDE SERPL-SCNC: 96 MMOL/L (ref 96–112)
CO2 SERPL-SCNC: 24 MMOL/L (ref 20–33)
CREAT SERPL-MCNC: 4.61 MG/DL (ref 0.5–1.4)
ERYTHROCYTE [DISTWIDTH] IN BLOOD BY AUTOMATED COUNT: 59.7 FL (ref 35.9–50)
GFR SERPLBLD CREATININE-BSD FMLA CKD-EPI: 11 ML/MIN/1.73 M 2
GLOBULIN SER CALC-MCNC: 3.6 G/DL (ref 1.9–3.5)
GLUCOSE BLD STRIP.AUTO-MCNC: 189 MG/DL (ref 65–99)
GLUCOSE SERPL-MCNC: 235 MG/DL (ref 65–99)
HCT VFR BLD AUTO: 33.6 % (ref 37–47)
HGB BLD-MCNC: 10.9 G/DL (ref 12–16)
MCH RBC QN AUTO: 30.2 PG (ref 27–33)
MCHC RBC AUTO-ENTMCNC: 32.4 G/DL (ref 32.2–35.5)
MCV RBC AUTO: 93.1 FL (ref 81.4–97.8)
PLATELET # BLD AUTO: 323 K/UL (ref 164–446)
PMV BLD AUTO: 11.4 FL (ref 9–12.9)
POTASSIUM SERPL-SCNC: 4.5 MMOL/L (ref 3.6–5.5)
PROT SERPL-MCNC: 6.6 G/DL (ref 6–8.2)
RBC # BLD AUTO: 3.61 M/UL (ref 4.2–5.4)
SODIUM SERPL-SCNC: 132 MMOL/L (ref 135–145)
WBC # BLD AUTO: 8.2 K/UL (ref 4.8–10.8)

## 2025-06-20 PROCEDURE — 36415 COLL VENOUS BLD VENIPUNCTURE: CPT

## 2025-06-20 PROCEDURE — 99233 SBSQ HOSP IP/OBS HIGH 50: CPT | Performed by: INTERNAL MEDICINE

## 2025-06-20 PROCEDURE — A9270 NON-COVERED ITEM OR SERVICE: HCPCS | Performed by: HOSPITALIST

## 2025-06-20 PROCEDURE — 82962 GLUCOSE BLOOD TEST: CPT | Performed by: INTERNAL MEDICINE

## 2025-06-20 PROCEDURE — A9270 NON-COVERED ITEM OR SERVICE: HCPCS | Performed by: INTERNAL MEDICINE

## 2025-06-20 PROCEDURE — 97165 OT EVAL LOW COMPLEX 30 MIN: CPT

## 2025-06-20 PROCEDURE — 94760 N-INVAS EAR/PLS OXIMETRY 1: CPT

## 2025-06-20 PROCEDURE — 700111 HCHG RX REV CODE 636 W/ 250 OVERRIDE (IP): Performed by: INTERNAL MEDICINE

## 2025-06-20 PROCEDURE — 85027 COMPLETE CBC AUTOMATED: CPT

## 2025-06-20 PROCEDURE — 700102 HCHG RX REV CODE 250 W/ 637 OVERRIDE(OP): Performed by: HOSPITALIST

## 2025-06-20 PROCEDURE — 80053 COMPREHEN METABOLIC PANEL: CPT

## 2025-06-20 PROCEDURE — 97535 SELF CARE MNGMENT TRAINING: CPT

## 2025-06-20 PROCEDURE — 700102 HCHG RX REV CODE 250 W/ 637 OVERRIDE(OP): Performed by: INTERNAL MEDICINE

## 2025-06-20 RX ORDER — AMOXICILLIN AND CLAVULANATE POTASSIUM 500; 125 MG/1; MG/1
1 TABLET, FILM COATED ORAL DAILY
Qty: 5 TABLET | Refills: 0 | Status: ACTIVE | OUTPATIENT
Start: 2025-06-20 | End: 2025-06-25

## 2025-06-20 RX ORDER — AMLODIPINE BESYLATE 10 MG/1
10 TABLET ORAL DAILY
Qty: 30 TABLET | Refills: 0 | Status: SHIPPED | OUTPATIENT
Start: 2025-06-21 | End: 2025-06-26 | Stop reason: SDUPTHER

## 2025-06-20 RX ADMIN — SEVELAMER CARBONATE 800 MG: 800 TABLET, FILM COATED ORAL at 07:58

## 2025-06-20 RX ADMIN — OXYCODONE 5 MG: 5 TABLET ORAL at 06:09

## 2025-06-20 RX ADMIN — HEPARIN SODIUM 5000 UNITS: 5000 INJECTION, SOLUTION INTRAVENOUS; SUBCUTANEOUS at 06:06

## 2025-06-20 RX ADMIN — Medication 5000 UNITS: at 06:09

## 2025-06-20 RX ADMIN — AMLODIPINE BESYLATE 10 MG: 5 TABLET ORAL at 06:06

## 2025-06-20 RX ADMIN — INSULIN LISPRO 1 UNITS: 100 INJECTION, SOLUTION INTRAVENOUS; SUBCUTANEOUS at 06:07

## 2025-06-20 ASSESSMENT — SOCIAL DETERMINANTS OF HEALTH (SDOH)
WITHIN THE LAST YEAR, HAVE YOU BEEN AFRAID OF YOUR PARTNER OR EX-PARTNER?: NO
WITHIN THE PAST 12 MONTHS, THE FOOD YOU BOUGHT JUST DIDN'T LAST AND YOU DIDN'T HAVE MONEY TO GET MORE: NEVER TRUE
WITHIN THE LAST YEAR, HAVE TO BEEN RAPED OR FORCED TO HAVE ANY KIND OF SEXUAL ACTIVITY BY YOUR PARTNER OR EX-PARTNER?: NO
WITHIN THE LAST YEAR, HAVE YOU BEEN HUMILIATED OR EMOTIONALLY ABUSED IN OTHER WAYS BY YOUR PARTNER OR EX-PARTNER?: NO
WITHIN THE LAST YEAR, HAVE YOU BEEN KICKED, HIT, SLAPPED, OR OTHERWISE PHYSICALLY HURT BY YOUR PARTNER OR EX-PARTNER?: NO
WITHIN THE PAST 12 MONTHS, YOU WORRIED THAT YOUR FOOD WOULD RUN OUT BEFORE YOU GOT THE MONEY TO BUY MORE: NEVER TRUE
IN THE PAST 12 MONTHS, HAS THE ELECTRIC, GAS, OIL, OR WATER COMPANY THREATENED TO SHUT OFF SERVICE IN YOUR HOME?: NO

## 2025-06-20 ASSESSMENT — ENCOUNTER SYMPTOMS
MYALGIAS: 1
COUGH: 0
FALLS: 0
FEVER: 0
NERVOUS/ANXIOUS: 0
HEADACHES: 0
CHILLS: 0
DOUBLE VISION: 0
DIZZINESS: 0
HEARTBURN: 0
ABDOMINAL PAIN: 0
BLURRED VISION: 0
SHORTNESS OF BREATH: 0
PALPITATIONS: 0
VOMITING: 0

## 2025-06-20 ASSESSMENT — COGNITIVE AND FUNCTIONAL STATUS - GENERAL
DAILY ACTIVITIY SCORE: 24
SUGGESTED CMS G CODE MODIFIER DAILY ACTIVITY: CH

## 2025-06-20 ASSESSMENT — PAIN DESCRIPTION - PAIN TYPE
TYPE: CHRONIC PAIN
TYPE: CHRONIC PAIN

## 2025-06-20 ASSESSMENT — LIFESTYLE VARIABLES: SUBSTANCE_ABUSE: 0

## 2025-06-20 ASSESSMENT — PATIENT HEALTH QUESTIONNAIRE - PHQ9
2. FEELING DOWN, DEPRESSED, IRRITABLE, OR HOPELESS: NOT AT ALL
1. LITTLE INTEREST OR PLEASURE IN DOING THINGS: NOT AT ALL
SUM OF ALL RESPONSES TO PHQ9 QUESTIONS 1 AND 2: 0

## 2025-06-20 ASSESSMENT — ACTIVITIES OF DAILY LIVING (ADL): TOILETING: INDEPENDENT

## 2025-06-20 NOTE — THERAPY
Physical Therapy Contact Note    Patient Name: Aviva Kenney  Age:  50 y.o., Sex:  female  Medical Record #: [id number]  Today's Date: 6/20/2025 06/20/25 1000   Interdisciplinary Plan of Care Collaboration   IDT Collaboration with  Nursing;Occupational Therapist   Collaboration Comments PT order received. Per OT evaluation, pt is in no acute skilled PT needs and is mobilizing well following WB status. Will cancel therapy orders at this time. Anticipate pt to d/c home once medically clear.

## 2025-06-20 NOTE — PROGRESS NOTES
"Received report from Day RN. Assumed care. Patient is alert and oriented x 4, on room air. Reports pain on right hand and back rated as 6-7/10 pain scale. Post site dressing is dry and intact, monitor for signs of bleeding and post op complications.  Patient refused MRI screening and stated \" I don't want another procedure, I want to go home tomorrow\". Fall and safety precaution in placed. Side rails up and bed alarm on. Call bell within reach.   "

## 2025-06-20 NOTE — PROGRESS NOTES
"Hospital Medicine Daily Progress Note    Date of Service  6/20/2025    Chief Complaint  Aviva Kenney is a 50 y.o. female admitted 6/17/2025 with foot ulcer    Hospital Course  Per chart review:  \"50 years old female with a past medical history of end stage renal disease on dialysis, diabetes, history of fourth toe amputation Mar 2025 with Dr Zuleta presenting with worsening 2nd toe discoloration progressing over 3 weeks. The patient had debridement last week by her wound care team, then on a follow-up visit with a raised concern for osteomyelitis /gangrene. Imaging at the transferring facility shows gas on X-Ray. She has been started on Vancomycin, cefepime and clindamycin.  I evaluated the patient on telemetry floor the patient does not have fever, chills, or pain.\"      Interval Problem Update  6/18: Patient seen at bedside this morning.  We have consulted orthopedic surgery, pending MRI and vascular studies.  Patient also with unclear etiology of elevated liver function test, we have ordered ultrasound of the right upper quadrant as well as hepatitis panel.  We have consulted GI as well.  We appreciate further recommendations.  Continue antibiotics for cellulitis.  There is a possibility the patient might require amputation however pending MRI and vascular studies.  We appreciate further recommendations by orthopedic surgery.  -- I had initially ordered MRI without contrast for the left foot however it seems that the tech at the MRI did a mistake and gave her contrast, I did reach out to nephrology to see if we can continue with an MRI with and without contrast and they said that we could as the patient has not had dialysis so I have placed a new order for MRI with and without contrast of the foot.    6/19: Patient seen at bedside this morning.  Patient n.p.o. as she will undergo left toe amputation by orthopedic surgery.  GI also following the patient, we have ordered MRCP.  We appreciate " further recommendations.  Continue to monitor closely.    6/20: Patient seen at bedside this morning.  Patient is contemplating to leave AMA, she does not want me to consult vascular surgery while hospitalized as she is leaving AMA.  We are still awaiting results from alkaline phosphatase isoenzymes.  We appreciate further recommendations by GI.  However I suspect the patient might leave AMA before we have further recommendations.  Continue to monitor closely while hospitalized.    I have discussed this patient's plan of care and discharge plan at IDT rounds today with Case Management, Nursing, Nursing leadership, and other members of the IDT team.    Consultants/Specialty  GI, nephrology, and orthopedics    Code Status  Full Code    Disposition  The patient is not medically cleared for discharge to home or a post-acute facility.        Review of Systems  Review of Systems   Constitutional:  Positive for malaise/fatigue. Negative for chills and fever.   HENT:  Negative for hearing loss and nosebleeds.    Eyes:  Negative for blurred vision and double vision.   Respiratory:  Negative for cough and shortness of breath.    Cardiovascular:  Negative for chest pain and palpitations.   Gastrointestinal:  Negative for abdominal pain, heartburn and vomiting.   Genitourinary:  Negative for dysuria and urgency.   Musculoskeletal:  Positive for myalgias. Negative for falls.   Neurological:  Negative for dizziness and headaches.   Psychiatric/Behavioral:  Negative for substance abuse. The patient is not nervous/anxious.    All other systems reviewed and are negative.       Physical Exam  Temp:  [36 °C (96.8 °F)-36.9 °C (98.4 °F)] 36.4 °C (97.5 °F)  Pulse:  [62-88] 80  Resp:  [15-19] 18  BP: (118-188)/(54-79) 166/74  SpO2:  [89 %-99 %] (P) 98 %    Physical Exam  Vitals and nursing note reviewed.   Constitutional:       General: She is not in acute distress.     Appearance: She is ill-appearing.   HENT:      Head: Normocephalic  and atraumatic.      Right Ear: External ear normal.      Left Ear: External ear normal.      Mouth/Throat:      Pharynx: No oropharyngeal exudate or posterior oropharyngeal erythema.   Eyes:      General:         Right eye: No discharge.         Left eye: No discharge.   Cardiovascular:      Rate and Rhythm: Normal rate and regular rhythm.      Pulses: Normal pulses.      Heart sounds: No murmur heard.     No gallop.   Pulmonary:      Effort: No respiratory distress.   Abdominal:      General: Bowel sounds are normal. There is no distension.      Palpations: Abdomen is soft.      Tenderness: There is no abdominal tenderness. There is no guarding.   Musculoskeletal:         General: No swelling or tenderness. Normal range of motion.      Cervical back: Normal range of motion and neck supple. No tenderness.   Skin:     General: Skin is warm.      Findings: Erythema and lesion present.   Neurological:      General: No focal deficit present.      Mental Status: She is alert and oriented to person, place, and time. Mental status is at baseline.      Motor: No weakness.   Psychiatric:         Mood and Affect: Mood normal.         Behavior: Behavior normal.         Fluids    Intake/Output Summary (Last 24 hours) at 6/20/2025 0934  Last data filed at 6/19/2025 2200  Gross per 24 hour   Intake 670 ml   Output --   Net 670 ml        Laboratory  Recent Labs     06/18/25  0118 06/19/25  0034 06/20/25  0339   WBC 6.2 5.8 8.2   RBC 3.66* 3.54* 3.61*   HEMOGLOBIN 11.0* 10.6* 10.9*   HEMATOCRIT 33.3* 32.8* 33.6*   MCV 91.0 92.7 93.1   MCH 30.1 29.9 30.2   MCHC 33.0 32.3 32.4   RDW 58.1* 60.2* 59.7*   PLATELETCT 280 259 323   MPV 11.6 11.5 11.4     Recent Labs     06/18/25  0118 06/19/25  0034 06/20/25  0339   SODIUM 134* 133* 132*   POTASSIUM 4.4 4.2 4.5   CHLORIDE 97 99 96   CO2 23 20 24   GLUCOSE 150* 162* 235*   BUN 26* 17 33*   CREATININE 3.79* 3.06* 4.61*   CALCIUM 9.4 9.0 9.3                   Imaging  US-RUQ   Final Result       Prior cholecystectomy. No evidence of biliary ductal dilatation.      MR-FOOT-WITH & W/O LEFT   Final Result      1.  No evidence of osteomyelitis.      2.  Prior amputation of the fourth toe.      3.  Edema and enhancement of the soft tissues at the amputation site consistent with cellulitis. No evidence of soft tissue abscess.      US-EXTREMITY ARTERY LOWER BILAT   Final Result      US-TONJA SINGLE LEVEL BILAT   Final Result           Assessment/Plan  * Dry gangrene of the left 4th toe (HCC)- (present on admission)  Assessment & Plan  Patient to undergo left toe amputation today by orthopedic surgery  We appreciate further recommendations    6/20: I discussed findings of peripheral artery disease with the patient and discussed with the patient about consulting vascular surgery, however the patient would like to leave AMA and does not want to consult anybody else and she says that she already has a vascular surgeon as an outpatient.  However I have placed referral to vascular surgery if the patient does decide to leave AMA.    Elevated LFTs  Assessment & Plan  Unclear etiology  Patient denying abdominal pain.  I have ordered right upper quadrant ultrasound as well as hepatitis panel.    6/19: GI recommending MRCP, which has been ordered.    6/20: GI now recommending to hold off on MRCP until alkaline phosphatase isoenzymes come back    Hypertensive urgency- (present on admission)  Assessment & Plan  Increased amlodipine to 10 mg  PRN medication    ESRD (end stage renal disease) (HCC)- (present on admission)  Assessment & Plan  Nephrology consulted for dialysis while inpatient    Cellulitis- (present on admission)  Assessment & Plan  Continue antibiotics  monitor    Anemia due to chronic kidney disease, on chronic dialysis (HCC)- (present on admission)  Assessment & Plan  No signs of overt bleeding  monitor    Hyponatremia- (present on admission)  Assessment & Plan  Mild  Monitor  In the setting of  ESRD    Primary hypertension- (present on admission)  Assessment & Plan  We have increased amlodipine to 10 mg  Continue PRN medication    Type 2 diabetes mellitus with diabetic peripheral angiopathy and gangrene, with long-term current use of insulin (HCC)- (present on admission)  Assessment & Plan  With no significant hyperglycemia  ISS  monitor         VTE prophylaxis: Heparin    I have performed a physical exam and reviewed and updated ROS and Plan today (6/20/2025). In review of yesterday's note (6/19/2025), there are no changes except as documented above.      I spend at least 51 minutes providing care for this patient.  This included face-to-face interview, physical examination.  Review of lab work including CBC, CMP.  Discussing with multidisciplinary team including case management, nursing staff and pharmacy.  Creating plan of care, reviewing orders.

## 2025-06-20 NOTE — CARE PLAN
The patient is Stable - Low risk of patient condition declining or worsening    Shift Goals  Clinical Goals: Monitor Vitals/BP/ post op monitoring  Patient Goals: DC    Progress made toward(s) clinical / shift goals:      Problem: Knowledge Deficit - Standard  Goal: Patient and family/care givers will demonstrate understanding of plan of care, disease process/condition, diagnostic tests and medications  Outcome: Progressing  Note: Reviewed POC. Discussed and provided education on medications and treatment, patient refused MRI screening and MRCP procedure for tomorrow. Check BG every before meals and at bed time     Problem: Pain Management  Goal: Pain level will decrease to patient's comfort goal  Outcome: Progressing  Intervention: Educate and implement non-pharmacologic comfort measures. Examples: relaxation, distration, play therapy, activity therapy, massage, etc.  Note: Patient reports pain on hand and back, administered pain medications as needed.     Problem: Skin Integrity  Goal: Risk for impaired skin integrity will decrease  Outcome: Progressing  Note: Monitor post op complications and bleeding on post op site, maintained dressing dry and intact.       Patient is not progressing towards the following goals:

## 2025-06-20 NOTE — PROGRESS NOTES
Gastroenterology Progress Note     Author: Carlos Lobato M.D.   Date & Time Created: 6/19/2025 9:50 PM    Chief Complaint:  Black toe    Interval History:  Patient was seen after toe amputation in the OR. Still denies abdominal pain. US did not show bile duct dilation or stones. MRCP was ordered by hospitalist    Review of Systems:  Review of Systems   Constitutional:  Negative for chills and fever.   Gastrointestinal:  Negative for abdominal pain, blood in stool, constipation, diarrhea, heartburn, melena, nausea and vomiting.       Physical Exam:  Physical Exam  Constitutional:       Appearance: Normal appearance.   Abdominal:      General: Abdomen is flat. Bowel sounds are normal. There is no distension.      Palpations: Abdomen is soft.      Tenderness: There is no abdominal tenderness.   Neurological:      Mental Status: She is alert.         Labs:      Recent Labs     06/18/25 0118   CPKTOTAL 35     Recent Labs     06/17/25 2031 06/18/25 0118 06/19/25  0034   SODIUM 134* 134* 133*   POTASSIUM 4.2 4.4 4.2   CHLORIDE 95* 97 99   CO2 26 23 20   BUN 24* 26* 17   CREATININE 3.60* 3.79* 3.06*   MAGNESIUM  --  1.9 1.9   PHOSPHORUS  --  3.8  --    CALCIUM 9.6 9.4 9.0     Recent Labs     06/17/25 2031 06/18/25 0118 06/19/25  0034   ALTSGPT 131* 112* 84*   ASTSGOT 203* 165* 116*   ALKPHOSPHAT 1226* 1111* 1014*   TBILIRUBIN 2.1* 2.1* 1.3   GAMMAGT  --  576*  --    GLUCOSE 122* 150* 162*     Recent Labs     06/17/25 2031 06/18/25 0118 06/19/25  0034   RBC 3.64* 3.66* 3.54*   HEMOGLOBIN 11.0* 11.0* 10.6*   HEMATOCRIT 33.1* 33.3* 32.8*   PLATELETCT 289 280 259     Recent Labs     06/17/25 2031 06/18/25 0118 06/19/25  0034   WBC 5.9 6.2 5.8   NEUTSPOLYS 50.40  --   --    LYMPHOCYTES 35.60  --   --    MONOCYTES 9.50  --   --    EOSINOPHILS 3.90  --   --    BASOPHILS 0.30  --   --    ASTSGOT 203* 165* 116*   ALTSGPT 131* 112* 84*   ALKPHOSPHAT 1226* 1111* 1014*   TBILIRUBIN 2.1* 2.1* 1.3     Hemodynamics:  Temp  (24hrs), Av.6 °C (97.8 °F), Min:36.2 °C (97.2 °F), Max:36.9 °C (98.4 °F)  Temperature: 36.9 °C (98.4 °F)  Pulse  Av.8  Min: 62  Max: 88   Blood Pressure: (!) 159/67     Respiratory:    Respiration: 19, Pulse Oximetry: 94 %           Fluids:    Intake/Output Summary (Last 24 hours) at 2025 2150  Last data filed at 2025 1252  Gross per 24 hour   Intake 420 ml   Output 10 ml   Net 410 ml     Weight: 88.3 kg (194 lb 10.7 oz)  GI/Nutrition:  Orders Placed This Encounter   Procedures    Diet Order Diet: Consistent CHO (Diabetic)     Standing Status:   Standing     Number of Occurrences:   1     Diet::   Consistent CHO (Diabetic) [4]     Medical Decision Making, by Problem:  Active Hospital Problems    Diagnosis     *Dry gangrene of the left 4th toe (HCC) [M86.9]     Elevated LFTs [R79.89]     Cellulitis [L03.90]     ESRD (end stage renal disease) (HCC) [N18.6]     Hypertensive urgency [I16.0]     Anemia due to chronic kidney disease, on chronic dialysis (HCC) [N18.6, D63.1, Z99.2]     Hyponatremia [E87.1]     Primary hypertension [I10]     Type 2 diabetes mellitus with diabetic peripheral angiopathy and gangrene, with long-term current use of insulin (HCC) [E11.52, Z79.4]        Plan:  51 yo female with severely elevated alkaline phosphatase, and mildly elevated transaminases. US showed prior cholecystectomy. Non-dilated CBD. MRCP was ordered but likely is not necessary given no GB, no ductal dilation, and no pain. Would still wait for alk phos isoenzymes. LFTs are improving. Monitor labs    Quality-Core Measures

## 2025-06-20 NOTE — DISCHARGE SUMMARY
"     Discharge Summary    CHIEF COMPLAINT ON ADMISSION  No chief complaint on file.      Reason for Admission  Orthopedics (Necrosis of toe)     Admission Date  6/17/2025    CODE STATUS  Full Code    HPI & HOSPITAL COURSE  Per chart review:  \"50 years old female with a past medical history of end stage renal disease on dialysis, diabetes, history of fourth toe amputation Mar 2025 with Dr Zuleta presenting with worsening 2nd toe discoloration progressing over 3 weeks. The patient had debridement last week by her wound care team, then on a follow-up visit with a raised concern for osteomyelitis /gangrene. Imaging at the transferring facility shows gas on X-Ray. She has been started on Vancomycin, cefepime and clindamycin.  I evaluated the patient on telemetry floor the patient does not have fever, chills, or pain.\"     Patient was admitted for further management and care.  Initially on admission there was concern for osteomyelitis, the patient had an MRI with did not report osteomyelitis.  We consulted orthopedic surgery and the patient underwent left second toe amputation at [facility]TP joint.      Still continued treatment for cellulitis and will discharge patient on Augmentin to complete a 7-day course of antibiotics as an outpatient for cellulitis.  Patient also had vascular studies which showed peripheral artery disease, I tried to convince the patient to stay in the hospital so I can reach out to vascular surgery regarding their recommendations however the patient is adamant she wants to leave AGAINST MEDICAL ADVICE and she mentions that she already has a vascular surgeon that she follows as an outpatient.  Regardless I have placed a referral to vascular surgery as an outpatient.    Also GI following the patient due to abnormal liver function test, unclear etiology.  Initially GI recommended MRCP however they wanted to wait for Dr. Long phosphatase isoenzymes to come back which at this point they have not.  I did " discuss with the patient that we do not know what the cause of her liver function test are however at this point she mentions she usually will just follow-up as an outpatient and does not want to stay in the hospital any further.  I recommended her to follow-up with GI as well.    Patient seen at bedside this morning.  We still do not have an answer to the elevated liver function test.  The patient has decided to leave AGAINST MEDICAL ADVICE.    The patient will require close follow-up with PCP, GI, orthopedic surgery, vascular surgery as an outpatient.    Of note I did talk to the patient that she might benefit from a statin and an aspirin however she mentions that she will follow-up with her PCP and did not want me to order medication for this.    Of note, we consulted nephrology for dialysis while hospitalized.  She said that she will follow-up as an outpatient to continue her dialysis.    We also increased her dose of amlodipine.  I suspect the patient might require further management of her blood pressure medications however she has decided to leave AMA.  Will defer to PCP as an outpatient.    Also of note the patient states that she already has help at home and does not want to stay here further to obtain further help.        Therefore, she is discharged in guarded and stable condition against medcial advice.    The patient met 2-midnight criteria for an inpatient stay at the time of discharge.    Discharge Date  06/20/2015    FOLLOW UP ITEMS POST DISCHARGE  The patient will require close follow-up with PCP, GI, , vascular, orthopedic surgery as an outpatient.    DISCHARGE DIAGNOSES  Principal Problem:    Dry gangrene of the left 4th toe (HCC) (POA: Yes)  Active Problems:    Type 2 diabetes mellitus with diabetic peripheral angiopathy and gangrene, with long-term current use of insulin (HCC) (POA: Yes)    Primary hypertension (POA: Yes)    Hyponatremia (POA: Yes)    Anemia due to chronic kidney disease, on  chronic dialysis (HCC) (POA: Yes)    Cellulitis (POA: Yes)    ESRD (end stage renal disease) (HCC) (POA: Yes)    Hypertensive urgency (POA: Yes)    Elevated LFTs (POA: Unknown)    Imaging abnormality (POA: Unknown)  Resolved Problems:    * No resolved hospital problems. *      FOLLOW UP  Future Appointments   Date Time Provider Department Center   6/26/2025  1:40 PM JALEESA Schrader   7/3/2025  3:00 PM Carlos oKng P.A.-C. [facility]   11/26/2025  3:20 PM JALEESA Schrader [place]     Barry Higgins P.A.-C.  [address]  [facility] [id number] [phone]    Schedule an appointment as soon as possible for a visit      Oh Pompa M.D.  [address] [facility] [id number] [phone]    Schedule an appointment as soon as possible for a visit      Vascular    Schedule an appointment as soon as possible for a visit      Carlos Lobato M.D.  [address] [facility]   [facility] [address]  [phone]    Schedule an appointment as soon as possible for a visit        MEDICATIONS ON DISCHARGE     Medication List        START taking these medications        Instructions   amoxicillin-clavulanate 500-125 MG Tabs  Commonly known as: Augmentin  Replaces: amoxicillin-clavulanate 875-125 MG Tabs   Doctor's comments: Daily, on dialysis days ensure dose taken after dialysis. Please disregard previous order of augmentin 875 BID, and cancel that one.  Take 1 Tablet by mouth every day for 5 days.  Dose: 1 Tablet            CHANGE how you take these medications        Instructions   amLODIPine 10 MG Tabs  Start taking on: June 21, 2025  What changed:   medication strength  how much to take  Commonly known as: Norvasc   Take 1 Tablet by mouth every day.  Dose: 10 mg            STOP taking these medications      amoxicillin-clavulanate 875-125 MG Tabs  Commonly known as: Augmentin  Replaced by: amoxicillin-clavulanate 500-125 MG Tabs            ASK your doctor  about these medications        Instructions   cyclobenzaprine 10 MG Tabs  Commonly known as: Flexeril   Take 10 mg by mouth 3 times a day as needed for Muscle Spasms.  Dose: 10 mg     furosemide 20 MG Tabs  Commonly known as: Lasix   Take 1 Tablet by mouth 2 times a day.  Dose: 20 mg     gabapentin 100 MG Caps  Commonly known as: Neurontin   Take 100 mg by mouth 3 times a day.  Dose: 100 mg     HYDROcodone/acetaminophen  MG Tabs  Commonly known as: Norco   Take 1 Tablet by mouth 4 times a day.  Dose: 1 Tablet     hydrOXYzine HCl 25 MG Tabs  Commonly known as: Atarax   1/2-1 tab up to tid prn itching     insulin lispro 100 UNIT/ML  Commonly known as: HumaLOG   Doctor's comments: Pt indicates her insurance didn't cover the pen ; this is a 3 month supply  Inject 10 units under skin 3 times a day before meals     METHOXY PEG-EPOETIN BETA INJ   Inject 60 mcg as directed every 14 days. Called Ange Pearson [phone], pt last received this medication was on 6/16/2025  Dose: 60 mcg     multivitamin Tabs   Take 1 Tablet by mouth every day.  Dose: 1 Tablet     omeprazole 20 MG delayed-release capsule  Commonly known as: PriLOSEC   Take 20 mg by mouth 1 time a day as needed. Indications: Gastroesophageal Reflux Disease  Dose: 20 mg     ondansetron 4 MG Tabs tablet  Commonly known as: Zofran   Take 1 Tablet by mouth every four hours as needed for Nausea/Vomiting.  Dose: 4 mg     ondansetron 8 MG Tbdp  Commonly known as: Zofran ODT   Take 1 Tablet by mouth every 8 hours as needed for Nausea.  Dose: 8 mg     oxyCODONE immediate release 10 MG immediate release tablet  Commonly known as: Roxicodone   Take 5 mg by mouth every four hours as needed for Severe Pain. Ran out  Dose: 5 mg     Ozempic (1 MG/DOSE) 4 MG/3ML Sopn  Generic drug: Semaglutide (1 MG/DOSE)   Inject 1 mg under the skin every 7 days.  Dose: 1 mg     polyethylene glycol/lytes 17 g Pack  Commonly known as: Miralax   Take 1 Packet by mouth 1 time a day as  needed (if no bowel movement in last 2 days).  Dose: 17 g     PROBIOTIC DAILY PO   Take 1 Tablet by mouth every day.  Dose: 1 Tablet     senna-docusate 8.6-50 MG Tabs  Commonly known as: Pericolace Or Senokot S   Take 2 Tablets by mouth every evening.  Dose: 2 Tablet     sevelamer carbonate 800 MG Tabs tablet  Commonly known as: Renvela   Take 1 Tablet by mouth 3 times a day with meals.  Dose: 800 mg     TYLENOL PO   Take 1 Tablet by mouth 2 times a day as needed (For pain). Pt is not sure the strength (OTC)  Dose: 1 Tablet     Vitamin D-3 125 MCG (5000 UT) Tabs   Take 5,000 Units by mouth every day.  Dose: 5,000 Units              Allergies  Allergies[1]    DIET  Orders Placed This Encounter   Procedures    Diet Order Diet: Consistent CHO (Diabetic)     Standing Status:   Standing     Number of Occurrences:   1     Diet::   Consistent CHO (Diabetic) [4]       ACTIVITY  Patient left AMA    CONSULTATIONS  Orthopedic Surgery  GI    PROCEDURES  PROCEDURE PERFORMED: left second toe amputation at [facility]TP joint       US-RUQ   Final Result      Prior cholecystectomy. No evidence of biliary ductal dilatation.      MR-FOOT-WITH & W/O LEFT   Final Result      1.  No evidence of osteomyelitis.      2.  Prior amputation of the fourth toe.      3.  Edema and enhancement of the soft tissues at the amputation site consistent with cellulitis. No evidence of soft tissue abscess.      US-EXTREMITY ARTERY LOWER BILAT   Final Result      US-TONJA SINGLE LEVEL BILAT   Final Result           LABORATORY  Lab Results   Component Value Date    SODIUM 132 (L) 06/20/2025    POTASSIUM 4.5 06/20/2025    CHLORIDE 96 06/20/2025    CO2 24 06/20/2025    GLUCOSE 235 (H) 06/20/2025    BUN 33 (H) 06/20/2025    CREATININE 4.61 (HH) 06/20/2025        Lab Results   Component Value Date    WBC 8.2 06/20/2025    HEMOGLOBIN 10.9 (L) 06/20/2025    HEMATOCRIT 33.6 (L) 06/20/2025    PLATELETCT 323 06/20/2025        Patient left AMA         [1] No Known Allergies

## 2025-06-20 NOTE — THERAPY
Occupational Therapy   Initial Evaluation     Patient Name:  Aviva Kenney  Age:  50 y.o., Sex:  female  Medical Record #:  [id number]  Today's Date:  6/20/2025     Precautions  Surgical: (P) Other (see comments) (Amputation of 2nd toe at MTP of Left foot)  Weight Bearing: (P) Heel Weight Bearing Left Lower Extremity    Assessment  Patient is 50 y.o. female with dry gangrene of 2nd toe/L foot.  S/P amputation of 2nd toe (@ MTP) of L foot 6/19/25.  PMH - ESRD on dialysis, DM, Cataract, HTN, Amputation 4th toe /L foot 3/25.  Pt and  reside in a motor home in [place], [place].   is available to assist as needed.  PLOF Mod I to Indep for ADL's, transfers and ambulation w/ intermittent use of 4WW.  Pt demonstrated Mod I bed mobility, sup sit/stand, Sup FWW (Heel WB LLE), Sup transfers, Mod I U/LBCM.  Therapist reviewed environmental/safety awareness, fall precautions, Heel WB LLE, AE/DME, ADL's and transfers.    Plan    Occupational Therapy Initial Treatment Plan   Duration: (P) Evaluation only    DC Equipment Recommendations: (P) None  Discharge Recommendations: (P) Anticipate that the patient will have no further occupational therapy needs after discharge from the hospital     Subjective    Pt was alert and cooperative w/ tx.     Objective       06/20/25 0832    Services   Is patient using  services for this encounter? No   Initial Contact Note    Initial Contact Note Order Received and Verified, Evaluation Only - Patient Does Not Require Further Acute Occupational Therapy at this Time.  However, May Benefit from Post Acute Therapy for Higher Level Functional Deficits.   Prior Living Situation   Prior Services Home-Independent   Housing / Facility Motor Home   Steps Into Home 6   Steps In Home 0   Rail None   Bathroom Set up Walk In Shower;Shower Chair   Equipment Owned Front-Wheel Walker;4-Wheel Walker;Tub / Shower Seat   Lives with - Patient's Self Care Capacity Spouse    Comments Pt and  reside in a motor home in [place], [place].   is available to assist as needed.  PLOF Mod I to Indep for ADL's, transfers and ambulation w/ intermittent use of 4WW.   Prior Level of ADL Function   Self Feeding Independent   Grooming / Hygiene Independent   Bathing Independent   Dressing Independent   Toileting Independent   Prior Level of IADL Function   Medication Management Independent   Laundry Independent   Kitchen Mobility Independent   Finances Independent   Home Management Independent   Shopping Independent   Prior Level Of Mobility Independent Without Device in Community;Independent With Device in Community;Independent With Steps in Community;Independent Without Device in Home;Independent With Device in Home;Independent With Steps in Home  (Intermittent use of 4WW)   Driving / Transportation Relatives / Others Provide Transportation   Precautions   Surgical Other (see comments)  (Amputation of 2nd toe at MTP of Left foot)   Weight Bearing Heel Weight Bearing Left Lower Extremity   Vitals   Pulse Oximetry 98 %   O2 (LPM) 0   O2 Delivery Device None - Room Air   Pain   Intervention Declines   Non Verbal Descriptors   Non Verbal Scale  Calm;Unlabored Breathing   Cognition    Level of Consciousness Alert   Active ROM Upper Body   Active ROM Upper Body  WDL   Strength Upper Body   Upper Body Strength  WDL   Upper Body Muscle Tone   Upper Body Muscle Tone  WDL   Coordination Upper Body   Coordination WDL   Balance Assessment   Sitting Balance (Static) Good   Sitting Balance (Dynamic) Good   Standing Balance (Static) Fair +   Standing Balance (Dynamic) Fair   Weight Shift Sitting Good   Weight Shift Standing Fair   Bed Mobility    Supine to Sit Modified Independent   Sit to Supine Modified Independent   ADL Assessment   Grooming Independent   Upper Body Dressing Independent   Lower Body Dressing Modified Independent   Toileting Modified Independent   How much help from another person does  the patient currently need...   Putting on and taking off regular lower body clothing? 4   Bathing (including washing, rinsing, and drying)? 4   Toileting, which includes using a toilet, bedpan, or urinal? 4   Putting on and taking off regular upper body clothing? 4   Taking care of personal grooming such as brushing teeth? 4   Eating meals? 4   6 Clicks Daily Activity Score 24   Functional Mobility   Sit to Stand Supervised   Bed, Chair, Wheelchair Transfer Supervised   Toilet Transfers Supervised   Transfer Method Stand Step   Mobility Sup FWW   Education Group   Education Provided Home Safety;Role of Occupational Therapist;Activities of Daily Living;Adaptive Equipment;Use of Call Light;Weight Bearing Precautions   Role of Occupational Therapist Patient Response Patient;Acceptance;Explanation;Verbal Demonstration   Home Safety Patient Response Patient;Acceptance;Explanation;Demonstration;Teach Back;Verbal Demonstration   ADL Patient Response Patient;Acceptance;Explanation;Demonstration;Teach Back;Verbal Demonstration;Action Demonstration   Adaptive Equipment Patient Response Patient;Acceptance;Explanation;Demonstration;Teach Back;Verbal Demonstration;Action Demonstration   Use of Call Light Patient Response Patient;Acceptance;Explanation;Demonstration;Verbal Demonstration   Weight Bearing Precautions Patient Response Patient;Acceptance;Explanation;Demonstration;Teach Back;Verbal Demonstration;Action Demonstration   Occupational Therapy Initial Treatment Plan    Duration Evaluation only   Anticipated Discharge Equipment and Recommendations   DC Equipment Recommendations None   Discharge Recommendations Anticipate that the patient will have no further occupational therapy needs after discharge from the hospital

## 2025-06-20 NOTE — PROGRESS NOTES
Clarified discharge order with Dr. Fernandes and if pt will still be leaving AMA if she discharges. Dr. Fernandes says yes, the discharge is still AMA. Discharge order cancelled. This was discussed with pt. Pt still wishes to leave AMA, but was provided with info on the prescriptions that were sent to her pharmacy and post-op care of her toe amputation.

## 2025-06-20 NOTE — PROGRESS NOTES
Dr. Fernandes spoke to pt this morning about plan of care. Pt wishing to leave AMA despite Dr. Fernandes discussing current condition and the risks of leaving AMA.   Pt states her  is on the way to pick her up. Dr. Fernandes notified over voalte that pt is deciding to leave AMA.

## 2025-06-21 DIAGNOSIS — E11.29 TYPE 2 DIABETES MELLITUS WITH OTHER DIABETIC KIDNEY COMPLICATION, WITH LONG-TERM CURRENT USE OF INSULIN (HCC): ICD-10-CM

## 2025-06-21 DIAGNOSIS — Z79.4 TYPE 2 DIABETES MELLITUS WITH OTHER DIABETIC KIDNEY COMPLICATION, WITH LONG-TERM CURRENT USE OF INSULIN (HCC): ICD-10-CM

## 2025-06-21 LAB
ALP BONE SERPL-CCNC: 219 U/L (ref 0–55)
ALP ISOS SERPL HS-CCNC: 0 U/L
ALP LIVER SERPL-CCNC: 877 U/L (ref 0–94)
ALP SERPL-CCNC: 1096 U/L (ref 40–120)

## 2025-06-23 ENCOUNTER — PATIENT OUTREACH (OUTPATIENT)
Dept: MEDICAL GROUP | Facility: PHYSICIAN GROUP | Age: 50
End: 2025-06-23
Payer: MEDICARE

## 2025-06-23 RX ORDER — SEMAGLUTIDE 1.34 MG/ML
1 INJECTION, SOLUTION SUBCUTANEOUS
Qty: 3 ML | Refills: 2 | Status: SHIPPED | OUTPATIENT
Start: 2025-06-23

## 2025-06-23 NOTE — TELEPHONE ENCOUNTER
Received request via: Patient    Was the patient seen in the last year in this department? Yes    Does the patient have an active prescription (recently filled or refills available) for medication(s) requested? No    [facility]    Does the patient have custodial Plus and need 100-day supply? (This applies to ALL medications) Patient does not have SCP

## 2025-06-23 NOTE — Clinical Note
REFERRAL APPROVAL NOTICE         Sent on June 23, 2025                   Aviva Kenney  [address]  [facility] [address]                   Dear Ms. Kenney,    After a careful review of the medical information and benefit coverage, [facility] has processed your referral. See below for additional details.    If applicable, you must be actively enrolled with your insurance for coverage of the authorized service. If you have any questions regarding your coverage, please contact your insurance directly.    REFERRAL INFORMATION   Referral #:  [id number]  Referred-To Department    Referred-By Provider:  Vascular Surgery    Lyle Ogden M.D.   Department Of Surgery      [address] [facility] [address]  [facility] [address]  [phone] [address], [address]  [facility] [id number] [phone]    Referral Start Date:  06/20/2025  Referral End Date:   06/20/2026             SCHEDULING  If you do not already have an appointment, please call [phone] to make an appointment.     MORE INFORMATION  If you do not already have a [employer] account, sign up at: [email or web address]  You can access your medical information, make appointments, see lab results, billing information, and more.  If you have questions regarding this referral, please contact  the [facility] Referrals department at:             [phone]. Monday - Friday 8:00AM - 5:00PM.     Sincerely,    [facility]

## 2025-06-26 ENCOUNTER — TELEMEDICINE (OUTPATIENT)
Dept: MEDICAL GROUP | Facility: PHYSICIAN GROUP | Age: 50
End: 2025-06-26
Payer: MEDICARE

## 2025-06-26 VITALS
DIASTOLIC BLOOD PRESSURE: 92 MMHG | HEART RATE: 84 BPM | SYSTOLIC BLOOD PRESSURE: 141 MMHG | HEIGHT: 69 IN | BODY MASS INDEX: 26.72 KG/M2 | WEIGHT: 180.4 LBS

## 2025-06-26 DIAGNOSIS — R79.89 ELEVATED LFTS: ICD-10-CM

## 2025-06-26 DIAGNOSIS — I73.9 PAD (PERIPHERAL ARTERY DISEASE) (HCC): Primary | ICD-10-CM

## 2025-06-26 DIAGNOSIS — S98.132A AMPUTATION OF TOE OF LEFT FOOT (HCC): ICD-10-CM

## 2025-06-26 DIAGNOSIS — I10 PRIMARY HYPERTENSION: ICD-10-CM

## 2025-06-26 PROCEDURE — 99214 OFFICE O/P EST MOD 30 MIN: CPT | Mod: 95 | Performed by: PHYSICIAN ASSISTANT

## 2025-06-26 RX ORDER — CARVEDILOL 6.25 MG/1
TABLET ORAL
COMMUNITY
Start: 2025-03-27

## 2025-06-26 RX ORDER — AMLODIPINE BESYLATE 10 MG/1
10 TABLET ORAL DAILY
Qty: 90 TABLET | Refills: 3 | Status: SHIPPED | OUTPATIENT
Start: 2025-06-26

## 2025-06-26 RX ORDER — SUCROFERRIC OXYHYDROXIDE 500 MG/1
1000 TABLET, CHEWABLE ORAL
COMMUNITY

## 2025-06-26 ASSESSMENT — FIBROSIS 4 INDEX: FIB4 SCORE: 0.95

## 2025-06-26 NOTE — PROGRESS NOTES
Telemedicine Visit: Established Patient     This encounter was conducted via Zoom using encrypted video.  Verbal consent was obtained. Patient's identity was verified. The patient was home in [place].     CC:  Chief Complaint   Patient presents with    [facility] Follow-up       HISTORY OF PRESENT ILLNESS: Patient is a 50 y.o. female established patient presenting for hospital f/u. Hospitalized from 6/17/25 - 6/20/25 Had to have amputation of her 2nd toe of her L foot due to suspected osteomyelitis. Previously had L 4th toe amputation.     Pt had elevated LFTs while in hospital. Was referred to GI and has first appt upcoming soon.  Ultrasound of the right upper quadrant was unremarkable.    Continues to get dialysis. AV fistula in place.     Hospitalist increased her amlodipine to 10mg.     Pt found to have PAD with severe calcification. Cannot take ASA due to renal failure and LFTs elevated now so best to avoid statins for now.       Patient Active Problem List    Diagnosis Date Noted    Imaging abnormality 06/20/2025    Elevated LFTs 06/18/2025    Cellulitis 06/17/2025    ESRD (end stage renal disease) ([facility]) 06/17/2025    Hypertensive urgency 06/17/2025    Dialysis patient ([facility]) 04/02/2025    Perinephric hematoma 03/28/2025    Hyponatremia 03/16/2025    Elevated troponin 03/16/2025    Anemia due to chronic kidney disease, on chronic dialysis ([facility]) 03/16/2025    Dry gangrene of the left 4th toe ([facility]) 03/16/2025    Acute encephalopathy 03/16/2025    Diabetic foot infection ([facility]) 02/27/2025    Rash 05/22/2024    Hyperlipidemia 01/05/2024    Vitamin D deficiency 01/05/2024    Screening for colorectal cancer 01/05/2024    Other fatigue 01/05/2024    Encounter for screening mammogram for breast cancer 01/05/2024    Pain of left calf 01/05/2024    Bilateral lower extremity edema 06/05/2023    S/P bariatric surgery 06/05/2023    Itchy skin 06/05/2023    Family history of gout 06/05/2023    Leg  "cramps 06/05/2023    Vaginal dryness 03/06/2023    Stress at home 03/06/2023    Primary hypertension 02/06/2023    Bilateral shoulder pain 02/06/2023    Positive for microalbuminuria 11/21/2022    History of COVID-19 11/16/2022    Chronic bilateral low back pain 10/03/2022    Type 2 diabetes mellitus with diabetic peripheral angiopathy and gangrene, with long-term current use of insulin ([facility]) 10/03/2022    Atypical mole 10/03/2022    History of melanoma 10/03/2022    History of right cataract extraction 10/03/2022    Abnormal liver enzymes 09/01/2022    Acute renal failure superimposed on stage 3b chronic kidney disease ([facility]) 09/01/2022    Hyperkalemia 09/01/2022    Morbid obesity ([facility]) 09/01/2022    Pyelonephritis 09/01/2022      Allergies:Patient has no known allergies.    Current Medications[1]    Social History[2]  Social History     Social History Narrative    Not on file       Family History   Problem Relation Age of Onset    Breast Cancer Mother     Diabetes Mother     Heart Disease Mother     Hypertension Mother     Hypertension Father     Heart Disease Father     Diabetes Father     Diabetes Brother        ROS    Exam:    BP (!) 141/92   Pulse 84   Ht 1.753 m (5' 9\")   Wt 81.8 kg (180 lb 6.4 oz)  Body mass index is 26.64 kg/m².    General:  Well nourished, well developed female in NAD  Head is grossly normal.  Neck: Supple.   Pulmonary: No accessory muscle use  Skin: No apparent lesions  Neuro: Alert and oriented  Psych: normal mood and affect    Please note that this dictation was created using voice recognition software. I have made every reasonable attempt to correct obvious errors, but I expect that there are errors of grammar and possibly content that I did not discover before finalizing the note.      Assessment/Plan:    1. PAD (peripheral artery disease) ([facility]) (Primary)  Discussed starting on antiplatelet and statin but need to check on LFTs prior to starting. ASA contraindicated at this " time    2. Primary hypertension  Continue norvasc 10mg  - amLODIPine (NORVASC) 10 MG Tab; Take 1 Tablet by mouth every day.  Dispense: 90 Tablet; Refill: 3  - CBC WITH DIFFERENTIAL; Future    3. Elevated LFTs  Recheck LFTs in two weeks.   - Comp Metabolic Panel; Future    4. Amputation of toe of left foot (HCC)  F/u with orthopedics         [1]   Current Outpatient Medications   Medication Sig Dispense Refill    carvedilol (COREG) 6.25 MG Tab 0 Refill(s), Signed: 3/27/25 9:00:00 PM MST, Maintenance      Sucroferric Oxyhydroxide (VELPHORO) 500 MG Chew Tab Chew 1,000 mg.      Semaglutide, 1 MG/DOSE, (OZEMPIC, 1 MG/DOSE,) 4 MG/3ML Solution Pen-injector Inject 1 mg under the skin every 7 days. 3 mL 2    amLODIPine (NORVASC) 10 MG Tab Take 1 Tablet by mouth every day. 30 Tablet 0    Acetaminophen (TYLENOL PO) Take 1 Tablet by mouth 2 times a day as needed (For pain). Pt is not sure the strength (OTC)      oxyCODONE immediate release (ROXICODONE) 10 MG immediate release tablet Take 5 mg by mouth every four hours as needed for Severe Pain. Ran out      hydrOXYzine HCl (ATARAX) 25 MG Tab 1/2-1 tab up to tid prn itching 90 Tablet 3    ondansetron (ZOFRAN ODT) 8 MG TABLET DISPERSIBLE Take 1 Tablet by mouth every 8 hours as needed for Nausea. 15 Tablet 0    cyclobenzaprine (FLEXERIL) 10 mg Tab Take 10 mg by mouth 3 times a day as needed for Muscle Spasms.      gabapentin (NEURONTIN) 100 MG Cap Take 100 mg by mouth 3 times a day.      Probiotic Product (PROBIOTIC DAILY PO) Take 1 Tablet by mouth every day.      METHOXY PEG-EPOETIN BETA INJ Inject 60 mcg as directed every 14 days. Called [facility] [phone], pt last received this medication was on 6/16/2025      senna-docusate (PERICOLACE OR SENOKOT S) 8.6-50 MG Tab Take 2 Tablets by mouth every evening.      polyethylene glycol/lytes (MIRALAX) Pack Take 1 Packet by mouth 1 time a day as needed (if no bowel movement in last 2 days).      sevelamer carbonate (RENVELA) 800  MG Tab tablet Take 1 Tablet by mouth 3 times a day with meals. 270 Tablet 0    multivitamin Tab Take 1 Tablet by mouth every day.      omeprazole (PRILOSEC) 20 MG delayed-release capsule Take 20 mg by mouth 1 time a day as needed. Indications: Gastroesophageal Reflux Disease      HYDROcodone/acetaminophen (NORCO)  MG Tab Take 1 Tablet by mouth 4 times a day.      Cholecalciferol (VITAMIN D-3) 125 MCG (5000 UT) Tab Take 5,000 Units by mouth every day.      furosemide (LASIX) 20 MG Tab Take 1 Tablet by mouth 2 times a day. 180 Tablet 3    insulin lispro (HUMALOG) 100 UNIT/ML INJ Inject 10 units under skin 3 times a day before meals 30 mL 3    ondansetron (ZOFRAN) 4 MG Tab tablet Take 1 Tablet by mouth every four hours as needed for Nausea/Vomiting. 30 Tablet 1     No current facility-administered medications for this visit.   [2]   Social History  Tobacco Use    Smoking status: Never    Smokeless tobacco: Never   Vaping Use    Vaping status: Never Used   Substance Use Topics    Alcohol use: Not Currently     Comment: occ    Drug use: Yes     Types: Marijuana, Oral     Comment: occ

## 2025-07-17 ENCOUNTER — APPOINTMENT (OUTPATIENT)
Dept: MEDICAL GROUP | Facility: PHYSICIAN GROUP | Age: 50
End: 2025-07-17
Payer: MEDICARE

## 2025-07-22 ENCOUNTER — APPOINTMENT (OUTPATIENT)
Dept: MEDICAL GROUP | Facility: PHYSICIAN GROUP | Age: 50
End: 2025-07-22
Payer: MEDICARE

## 2025-07-23 NOTE — Clinical Note
REFERRAL APPROVAL NOTICE         Sent on July 23, 2025                   Aviva Lawson Kenney  [address]   [address]  [place] [address]                   Dear MsAj Kenney,    After a careful review of the medical information and benefit coverage, [facility] has processed your referral. See below for additional details.    If applicable, you must be actively enrolled with your insurance for coverage of the authorized service. If you have any questions regarding your coverage, please contact your insurance directly.    REFERRAL INFORMATION   Referral #:  [id number]  Referred-To Department    Referred-By Provider:  Vascular Surgery    [facility]   [facility]      [address]  [facility] [address]  [phone] [address], [address]  [facility] [id number] [phone]    Referral Start Date:  07/23/2025  Referral End Date:   *** No expiration date on the referral.             SCHEDULING  If you do not already have an appointment, please call [phone] to make an appointment.     MORE INFORMATION  If you do not already have a [employer] account, sign up at: [email or web address]  You can access your medical information, make appointments, see lab results, billing information, and more.  If you have questions regarding this referral, please contact  the [facility] Referrals department at:             [phone]. Monday - Friday 8:00AM - 5:00PM.     Sincerely,    [facility]

## 2025-07-29 ENCOUNTER — OFFICE VISIT (OUTPATIENT)
Facility: MEDICAL CENTER | Age: 50
End: 2025-07-29
Payer: MEDICARE

## 2025-07-29 VITALS
DIASTOLIC BLOOD PRESSURE: 74 MMHG | BODY MASS INDEX: 25.52 KG/M2 | HEART RATE: 99 BPM | WEIGHT: 172.29 LBS | OXYGEN SATURATION: 94 % | HEIGHT: 69 IN | SYSTOLIC BLOOD PRESSURE: 128 MMHG | TEMPERATURE: 97.3 F

## 2025-07-29 DIAGNOSIS — T82.898A STEAL SYNDROME AS COMPLICATION OF DIALYSIS ACCESS, INITIAL ENCOUNTER (HCC): Primary | ICD-10-CM

## 2025-07-29 ASSESSMENT — FIBROSIS 4 INDEX: FIB4 SCORE: 0.95

## 2025-07-29 NOTE — H&P
Vascular Surgery            New Patient Consultation    Patient:Aviva Kenney  MRN:[id number]  Primary care physician:Barry Higgins P.A.-C.  Referring Provider: [facility]  Ref Provider     Vascular Consultant: Kamron Carrizales MD    Date: 7/29/2025  _____________________________________________________    Chief Complaint:     Ligation AV fistula     History of Present Illness:   Aviva Kenney  is a 50 y.o. year old female who suffers from end-stage renal disease and is on maintenance hemodialysis.  The patient is currently dialyzing through a right internal jugular vein PermCath.  She had a right upper extremity brachiocephalic AV fistula that she reports was sacrificed due to an arteriovenous steal and a radiocephalic either fistula what appears to be a graft was created.  She is having persistent steal symptoms with some ulcerations on her fingers.  She has been referred over for ligation of the AV fistula and consideration for a peritoneal dialysis catheter.        Past Medical History:   Past Medical History[1]  Past Surgical History:   Past Surgical History[2]  Allergies:   Allergies[3]  Medications:   Encounter Medications[4]  Social History:     Social History     Socioeconomic History    Marital status:      Spouse name: Not on file    Number of children: Not on file    Years of education: Not on file    Highest education level: Some college, no degree   Occupational History    Occupation: disabled for lower back   Tobacco Use    Smoking status: Never    Smokeless tobacco: Never   Vaping Use    Vaping status: Never Used   Substance and Sexual Activity    Alcohol use: Not Currently     Comment: occ    Drug use: Yes     Types: Marijuana, Oral     Comment: occ    Sexual activity: Not on file   Other Topics Concern    Not on file   Social History Narrative    Not on file     Social Drivers of Health     Financial Resource Strain: Medium Risk (6/23/2024)     Overall Financial Resource Strain (CARDIA)     Difficulty of Paying Living Expenses: Somewhat hard   Food Insecurity: No Food Insecurity (6/20/2025)    Hunger Vital Sign     Worried About Running Out of Food in the Last Year: Never true     Ran Out of Food in the Last Year: Never true   Transportation Needs: No Transportation Needs (6/20/2025)    PRAPARE - Transportation     Lack of Transportation (Medical): No     Lack of Transportation (Non-Medical): No   Physical Activity: Sufficiently Active (6/23/2024)    Exercise Vital Sign     Days of Exercise per Week: 3 days     Minutes of Exercise per Session: 50 min   Stress: Stress Concern Present (6/23/2024)    [employer] - Occupational Stress Questionnaire     Feeling of Stress : To some extent   Social Connections: Moderately Isolated (6/23/2024)    Social Connection and Isolation Panel [NHANES]     Frequency of Communication with Friends and Family: More than three times a week     Frequency of Social Gatherings with Friends and Family: More than three times a week     Attends [employer] Services: Never     Active Member of Clubs or Organizations: No     Attends Club or Organization Meetings: Patient declined     Marital Status:    Intimate Partner Violence: Not At Risk (6/20/2025)    Humiliation, Afraid, Rape, and Kick questionnaire     Fear of Current or Ex-Partner: No     Emotionally Abused: No     Physically Abused: No     Sexually Abused: No   Housing Stability: Low Risk  (6/20/2025)    Housing Stability Vital Sign     Unable to Pay for Housing in the Last Year: No     Number of Times Moved in the Last Year: 0     Homeless in the Last Year: No      Tobacco Use History[5]  Social History     Substance and Sexual Activity   Alcohol Use Not Currently    Comment: occ     Social History     Substance and Sexual Activity   Drug Use Yes    Types: Marijuana, Oral    Comment: occ      Family History:     Family History   Problem  Relation Age of Onset    Breast Cancer Mother     Diabetes Mother     Heart Disease Mother     Hypertension Mother     Hypertension Father     Heart Disease Father     Diabetes Father     Diabetes Brother        Review of Systems:   Constitutional:   Reports -pain in her right hand and fingers associated with that steal    Denies Chest pain   Denies SOB   Denies abdominal pain   Denies focal neuro deficits      Exam:   There were no vitals taken for this visit.    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  I discussed the risk benefits alternatives expectations and she agrees to proceed.             _____________________________________________________  Kamron Carrizales MD  [facility]  [phone]  [address], [place] [address]           [1]   Past Medical History:  Diagnosis Date    Cataract     Diabetes (HCC)     Type 2    Infectious disease 10/2022    Covid    Pre-op evaluation 12/13/2022   [2]   Past Surgical History:  Procedure Laterality Date    TOE AMPUTATION Left 6/19/2025    Procedure: AMPUTATION, TOE, 2ND;  Surgeon: Oh Pompa M.D.;  Location: SURGERY [facility];  Service: Orthopedics    WOUND IRRIGATION & DEBRIDEMENT Left 3/22/2025    Procedure: IRRIGATION AND DEBRIDEMENT, WOUND AND WOUND  CLOSURE;  Surgeon: Barry Zuleta M.D.;  Location: SURGERY [facility];  Service: Orthopedics    TOE AMPUTATION Left 2/28/2025    Procedure: AMPUTATION, TOE-4TH;  Surgeon: Jamar Coronado M.D.;  Location: SURGERY [facility];  Service: Orthopedics    MA UPPER GI ENDOSCOPY,DIAGNOSIS N/A 12/07/2022    Procedure: ESOPHAGOGASTRODUODENOSCOPY (EGD) WITH BRUSHINGS AND DILATATION VS OVER THE SCOPE CLIP PLACEMENT;  Surgeon: Bridger Gongora M.D.;  Location: SURGERY [facility];  Service: General    MA UPPER GI ENDOSCOPY,CTRL BLEED N/A 12/07/2022    Procedure: EGD, WITH CLIP PLACEMENT;  Surgeon: Bridger Gongora M.D.;  Location: [facility];  Service: General    ABDOMINAL HYSTERECTOMY TOTAL      CATARACT EXTRACTION WITH IOL Left     CHOLECYSTECTOMY      GASTRIC RESECTION      HYSTERECTOMY, TOTAL ABDOMINAL      OVARIAN CYSTECTOMY Bilateral     PRIMARY C SECTION      x 2    TONSILLECTOMY     [3] No Known Allergies  [4]   Outpatient Encounter Medications as of 7/29/2025   Medication Sig Dispense Refill    carvedilol (COREG) 6.25 MG Tab 0 Refill(s), Signed: 3/27/25 9:00:00 PM MST, Maintenance      Sucroferric Oxyhydroxide (VELPHORO) 500 MG Chew Tab Chew 1,000 mg.      amLODIPine (NORVASC) 10 MG Tab Take 1 Tablet by mouth every day. 90 Tablet 3    Semaglutide, 1 MG/DOSE, (OZEMPIC, 1 MG/DOSE,) 4 MG/3ML Solution Pen-injector Inject 1 mg under the skin every 7 days. 3 mL 2    Acetaminophen (TYLENOL PO) Take 1 Tablet by mouth 2 times a day as needed (For pain). Pt is not sure the strength (OTC)      oxyCODONE immediate release (ROXICODONE) 10 MG immediate release tablet Take 5 mg by mouth every four hours as needed for Severe Pain. Ran out      hydrOXYzine HCl (ATARAX) 25 MG Tab 1/2-1 tab up to tid prn itching 90 Tablet 3    ondansetron (ZOFRAN ODT) 8 MG TABLET DISPERSIBLE Take 1 Tablet by mouth every 8 hours as needed for Nausea. 15 Tablet 0    ondansetron (ZOFRAN) 4 MG Tab tablet Take 1 Tablet by mouth every four hours  as needed for Nausea/Vomiting. 30 Tablet 1    cyclobenzaprine (FLEXERIL) 10 mg Tab Take 10 mg by mouth 3 times a day as needed for Muscle Spasms.      gabapentin (NEURONTIN) 100 MG Cap Take 100 mg by mouth 3 times a day.      Probiotic Product (PROBIOTIC DAILY PO) Take 1 Tablet by mouth every day.      METHOXY PEG-EPOETIN BETA INJ Inject 60 mcg as directed every 14 days. Called Ange Pearson [phone], pt last received this medication was on 6/16/2025      senna-docusate (PERICOLACE OR SENOKOT S) 8.6-50 MG Tab Take 2 Tablets by mouth every evening.      polyethylene glycol/lytes (MIRALAX) Pack Take 1 Packet by mouth 1 time a day as needed (if no bowel movement in last 2 days).      sevelamer carbonate (RENVELA) 800 MG Tab tablet Take 1 Tablet by mouth 3 times a day with meals. 270 Tablet 0    multivitamin Tab Take 1 Tablet by mouth every day.      omeprazole (PRILOSEC) 20 MG delayed-release capsule Take 20 mg by mouth 1 time a day as needed. Indications: Gastroesophageal Reflux Disease      HYDROcodone/acetaminophen (NORCO)  MG Tab Take 1 Tablet by mouth 4 times a day.      Cholecalciferol (VITAMIN D-3) 125 MCG (5000 UT) Tab Take 5,000 Units by mouth every day.      furosemide (LASIX) 20 MG Tab Take 1 Tablet by mouth 2 times a day. 180 Tablet 3    insulin lispro (HUMALOG) 100 UNIT/ML INJ Inject 10 units under skin 3 times a day before meals 30 mL 3     No facility-administered encounter medications on file as of 7/29/2025.   [5]   Social History  Tobacco Use   Smoking Status Never   Smokeless Tobacco Never

## 2025-07-31 ENCOUNTER — APPOINTMENT (OUTPATIENT)
Dept: ADMISSIONS | Facility: MEDICAL CENTER | Age: 50
End: 2025-07-31
Attending: SURGERY
Payer: MEDICARE

## 2025-08-01 ENCOUNTER — ANESTHESIA EVENT (OUTPATIENT)
Dept: SURGERY | Facility: MEDICAL CENTER | Age: 50
End: 2025-08-01
Payer: MEDICARE

## 2025-08-01 ENCOUNTER — HOSPITAL ENCOUNTER (OUTPATIENT)
Facility: MEDICAL CENTER | Age: 50
End: 2025-08-01
Attending: SURGERY | Admitting: SURGERY
Payer: MEDICARE

## 2025-08-01 ENCOUNTER — ANESTHESIA (OUTPATIENT)
Dept: SURGERY | Facility: MEDICAL CENTER | Age: 50
End: 2025-08-01
Payer: MEDICARE

## 2025-08-01 VITALS
BODY MASS INDEX: 25.7 KG/M2 | WEIGHT: 173.5 LBS | SYSTOLIC BLOOD PRESSURE: 160 MMHG | TEMPERATURE: 96.8 F | OXYGEN SATURATION: 97 % | RESPIRATION RATE: 14 BRPM | HEART RATE: 75 BPM | HEIGHT: 69 IN | DIASTOLIC BLOOD PRESSURE: 75 MMHG

## 2025-08-01 LAB
ANION GAP SERPL CALC-SCNC: 10 MMOL/L (ref 7–16)
BUN SERPL-MCNC: 35 MG/DL (ref 8–22)
CALCIUM SERPL-MCNC: 9.7 MG/DL (ref 8.5–10.5)
CHLORIDE SERPL-SCNC: 96 MMOL/L (ref 96–112)
CO2 SERPL-SCNC: 26 MMOL/L (ref 20–33)
CREAT SERPL-MCNC: 4.17 MG/DL (ref 0.5–1.4)
GFR SERPLBLD CREATININE-BSD FMLA CKD-EPI: 12 ML/MIN/1.73 M 2
GLUCOSE BLD STRIP.AUTO-MCNC: 116 MG/DL (ref 65–99)
GLUCOSE SERPL-MCNC: 121 MG/DL (ref 65–99)
POTASSIUM SERPL-SCNC: 4.6 MMOL/L (ref 3.6–5.5)
SODIUM SERPL-SCNC: 132 MMOL/L (ref 135–145)

## 2025-08-01 PROCEDURE — 700111 HCHG RX REV CODE 636 W/ 250 OVERRIDE (IP): Mod: JZ | Performed by: STUDENT IN AN ORGANIZED HEALTH CARE EDUCATION/TRAINING PROGRAM

## 2025-08-01 PROCEDURE — 700101 HCHG RX REV CODE 250: Performed by: STUDENT IN AN ORGANIZED HEALTH CARE EDUCATION/TRAINING PROGRAM

## 2025-08-01 PROCEDURE — 36415 COLL VENOUS BLD VENIPUNCTURE: CPT

## 2025-08-01 PROCEDURE — 110371 HCHG SHELL REV 272: Performed by: SURGERY

## 2025-08-01 PROCEDURE — 700111 HCHG RX REV CODE 636 W/ 250 OVERRIDE (IP): Performed by: SURGERY

## 2025-08-01 PROCEDURE — 82962 GLUCOSE BLOOD TEST: CPT | Performed by: SURGERY

## 2025-08-01 PROCEDURE — 80048 BASIC METABOLIC PNL TOTAL CA: CPT

## 2025-08-01 PROCEDURE — 160025 RECOVERY II MINUTES (STATS): Performed by: SURGERY

## 2025-08-01 PROCEDURE — 160193 HCHG PACU STANDARD - 1ST 60 MINS: Performed by: SURGERY

## 2025-08-01 PROCEDURE — 700111 HCHG RX REV CODE 636 W/ 250 OVERRIDE (IP): Performed by: STUDENT IN AN ORGANIZED HEALTH CARE EDUCATION/TRAINING PROGRAM

## 2025-08-01 PROCEDURE — C1750 CATH, HEMODIALYSIS,LONG-TERM: HCPCS | Performed by: SURGERY

## 2025-08-01 PROCEDURE — 160046 HCHG PACU - 1ST 60 MINS PHASE II: Performed by: SURGERY

## 2025-08-01 PROCEDURE — 160002 HCHG RECOVERY MINUTES (STAT): Performed by: SURGERY

## 2025-08-01 PROCEDURE — 160194 HCHG PACU STANDARD - EA ADDL 30 MINS: Performed by: SURGERY

## 2025-08-01 PROCEDURE — 160039 HCHG SURGERY MINUTES - EA ADDL 1 MIN LEVEL 3: Performed by: SURGERY

## 2025-08-01 PROCEDURE — 160192 HCHG ANESTHESIA COMPLEX: Performed by: SURGERY

## 2025-08-01 PROCEDURE — 160048 HCHG OR STATISTICAL LEVEL 1-5: Performed by: SURGERY

## 2025-08-01 PROCEDURE — 700105 HCHG RX REV CODE 258: Performed by: SURGERY

## 2025-08-01 PROCEDURE — 160015 HCHG STAT PREOP MINUTES: Performed by: SURGERY

## 2025-08-01 PROCEDURE — 160028 HCHG SURGERY MINUTES - 1ST 30 MINS LEVEL 3: Performed by: SURGERY

## 2025-08-01 DEVICE — IMPLANTABLE DEVICE: Type: IMPLANTABLE DEVICE | Site: ABDOMEN | Status: FUNCTIONAL

## 2025-08-01 RX ORDER — HEPARIN SODIUM,PORCINE 1000/ML
VIAL (ML) INJECTION
Status: DISCONTINUED | OUTPATIENT
Start: 2025-08-01 | End: 2025-08-01 | Stop reason: HOSPADM

## 2025-08-01 RX ORDER — ROCURONIUM BROMIDE 10 MG/ML
INJECTION, SOLUTION INTRAVENOUS PRN
Status: DISCONTINUED | OUTPATIENT
Start: 2025-08-01 | End: 2025-08-01 | Stop reason: SURG

## 2025-08-01 RX ORDER — OXYCODONE HCL 5 MG/5 ML
5 SOLUTION, ORAL ORAL
Status: DISCONTINUED | OUTPATIENT
Start: 2025-08-01 | End: 2025-08-01 | Stop reason: HOSPADM

## 2025-08-01 RX ORDER — CEFAZOLIN SODIUM 1 G/3ML
INJECTION, POWDER, FOR SOLUTION INTRAMUSCULAR; INTRAVENOUS PRN
Status: DISCONTINUED | OUTPATIENT
Start: 2025-08-01 | End: 2025-08-01 | Stop reason: SURG

## 2025-08-01 RX ORDER — HYDRALAZINE HYDROCHLORIDE 20 MG/ML
5 INJECTION INTRAMUSCULAR; INTRAVENOUS
Status: DISCONTINUED | OUTPATIENT
Start: 2025-08-01 | End: 2025-08-01 | Stop reason: HOSPADM

## 2025-08-01 RX ORDER — LIDOCAINE HYDROCHLORIDE 20 MG/ML
INJECTION, SOLUTION EPIDURAL; INFILTRATION; INTRACAUDAL; PERINEURAL PRN
Status: DISCONTINUED | OUTPATIENT
Start: 2025-08-01 | End: 2025-08-01 | Stop reason: SURG

## 2025-08-01 RX ORDER — SODIUM CHLORIDE 9 MG/ML
INJECTION, SOLUTION INTRAVENOUS ONCE
Status: COMPLETED | OUTPATIENT
Start: 2025-08-01 | End: 2025-08-01

## 2025-08-01 RX ORDER — DEXAMETHASONE SODIUM PHOSPHATE 4 MG/ML
INJECTION, SOLUTION INTRA-ARTICULAR; INTRALESIONAL; INTRAMUSCULAR; INTRAVENOUS; SOFT TISSUE PRN
Status: DISCONTINUED | OUTPATIENT
Start: 2025-08-01 | End: 2025-08-01 | Stop reason: SURG

## 2025-08-01 RX ORDER — HALOPERIDOL 5 MG/ML
1 INJECTION INTRAMUSCULAR
Status: DISCONTINUED | OUTPATIENT
Start: 2025-08-01 | End: 2025-08-01 | Stop reason: HOSPADM

## 2025-08-01 RX ORDER — OXYCODONE HCL 5 MG/5 ML
10 SOLUTION, ORAL ORAL
Status: DISCONTINUED | OUTPATIENT
Start: 2025-08-01 | End: 2025-08-01 | Stop reason: HOSPADM

## 2025-08-01 RX ORDER — ONDANSETRON 2 MG/ML
4 INJECTION INTRAMUSCULAR; INTRAVENOUS
Status: DISCONTINUED | OUTPATIENT
Start: 2025-08-01 | End: 2025-08-01 | Stop reason: HOSPADM

## 2025-08-01 RX ORDER — ONDANSETRON 2 MG/ML
INJECTION INTRAMUSCULAR; INTRAVENOUS PRN
Status: DISCONTINUED | OUTPATIENT
Start: 2025-08-01 | End: 2025-08-01 | Stop reason: SURG

## 2025-08-01 RX ORDER — ALBUTEROL SULFATE 5 MG/ML
2.5 SOLUTION RESPIRATORY (INHALATION)
Status: DISCONTINUED | OUTPATIENT
Start: 2025-08-01 | End: 2025-08-01 | Stop reason: HOSPADM

## 2025-08-01 RX ORDER — EPHEDRINE SULFATE 50 MG/ML
5 INJECTION, SOLUTION INTRAVENOUS
Status: DISCONTINUED | OUTPATIENT
Start: 2025-08-01 | End: 2025-08-01 | Stop reason: HOSPADM

## 2025-08-01 RX ORDER — BUPIVACAINE HYDROCHLORIDE 5 MG/ML
INJECTION, SOLUTION EPIDURAL; INTRACAUDAL; PERINEURAL
Status: DISCONTINUED | OUTPATIENT
Start: 2025-08-01 | End: 2025-08-01 | Stop reason: HOSPADM

## 2025-08-01 RX ORDER — DIPHENHYDRAMINE HYDROCHLORIDE 50 MG/ML
12.5 INJECTION, SOLUTION INTRAMUSCULAR; INTRAVENOUS
Status: DISCONTINUED | OUTPATIENT
Start: 2025-08-01 | End: 2025-08-01 | Stop reason: HOSPADM

## 2025-08-01 RX ORDER — LABETALOL HYDROCHLORIDE 5 MG/ML
5 INJECTION, SOLUTION INTRAVENOUS
Status: COMPLETED | OUTPATIENT
Start: 2025-08-01 | End: 2025-08-01

## 2025-08-01 RX ADMIN — LABETALOL HYDROCHLORIDE 5 MG: 5 INJECTION INTRAVENOUS at 13:31

## 2025-08-01 RX ADMIN — HYDRALAZINE HYDROCHLORIDE 5 MG: 20 INJECTION, SOLUTION INTRAMUSCULAR; INTRAVENOUS at 13:56

## 2025-08-01 RX ADMIN — PROPOFOL 200 MG: 10 INJECTION, EMULSION INTRAVENOUS at 12:05

## 2025-08-01 RX ADMIN — LABETALOL HYDROCHLORIDE 5 MG: 5 INJECTION INTRAVENOUS at 13:19

## 2025-08-01 RX ADMIN — DEXAMETHASONE SODIUM PHOSPHATE 4 MG: 4 INJECTION INTRA-ARTICULAR; INTRALESIONAL; INTRAMUSCULAR; INTRAVENOUS; SOFT TISSUE at 12:09

## 2025-08-01 RX ADMIN — ROCURONIUM BROMIDE 50 MG: 10 INJECTION INTRAVENOUS at 12:06

## 2025-08-01 RX ADMIN — CEFAZOLIN 2 G: 1 INJECTION, POWDER, FOR SOLUTION INTRAMUSCULAR; INTRAVENOUS at 12:00

## 2025-08-01 RX ADMIN — LIDOCAINE HYDROCHLORIDE 100 MG: 20 INJECTION, SOLUTION EPIDURAL; INFILTRATION; INTRACAUDAL; PERINEURAL at 12:05

## 2025-08-01 RX ADMIN — SODIUM CHLORIDE: 9 INJECTION, SOLUTION INTRAVENOUS at 12:00

## 2025-08-01 RX ADMIN — ONDANSETRON 4 MG: 2 INJECTION INTRAMUSCULAR; INTRAVENOUS at 12:09

## 2025-08-01 RX ADMIN — SUGAMMADEX 200 MG: 100 INJECTION, SOLUTION INTRAVENOUS at 12:44

## 2025-08-01 RX ADMIN — LABETALOL HYDROCHLORIDE 5 MG: 5 INJECTION INTRAVENOUS at 13:48

## 2025-08-01 ASSESSMENT — PAIN DESCRIPTION - PAIN TYPE: TYPE: ACUTE PAIN

## 2025-08-01 ASSESSMENT — FIBROSIS 4 INDEX: FIB4 SCORE: 0.95

## 2025-08-04 ENCOUNTER — TELEPHONE (OUTPATIENT)
Facility: MEDICAL CENTER | Age: 50
End: 2025-08-04
Payer: MEDICARE

## 2025-08-05 ENCOUNTER — APPOINTMENT (OUTPATIENT)
Dept: MEDICAL GROUP | Facility: PHYSICIAN GROUP | Age: 50
End: 2025-08-05
Payer: MEDICARE

## 2025-08-09 ENCOUNTER — OFFICE VISIT (OUTPATIENT)
Dept: URGENT CARE | Facility: PHYSICIAN GROUP | Age: 50
End: 2025-08-09
Payer: MEDICARE

## 2025-08-09 ENCOUNTER — HOSPITAL ENCOUNTER (EMERGENCY)
Facility: MEDICAL CENTER | Age: 50
End: 2025-08-09
Attending: EMERGENCY MEDICINE
Payer: MEDICARE

## 2025-08-09 ENCOUNTER — PHARMACY VISIT (OUTPATIENT)
Dept: PHARMACY | Facility: MEDICAL CENTER | Age: 50
End: 2025-08-09
Payer: COMMERCIAL

## 2025-08-09 ENCOUNTER — APPOINTMENT (OUTPATIENT)
Dept: RADIOLOGY | Facility: IMAGING CENTER | Age: 50
End: 2025-08-09
Attending: NURSE PRACTITIONER
Payer: MEDICARE

## 2025-08-09 VITALS
TEMPERATURE: 97.2 F | HEIGHT: 69 IN | SYSTOLIC BLOOD PRESSURE: 147 MMHG | DIASTOLIC BLOOD PRESSURE: 73 MMHG | RESPIRATION RATE: 16 BRPM | HEART RATE: 79 BPM | BODY MASS INDEX: 25.6 KG/M2 | OXYGEN SATURATION: 95 % | WEIGHT: 172.84 LBS

## 2025-08-09 VITALS
BODY MASS INDEX: 25.56 KG/M2 | HEIGHT: 69 IN | OXYGEN SATURATION: 95 % | RESPIRATION RATE: 20 BRPM | SYSTOLIC BLOOD PRESSURE: 136 MMHG | HEART RATE: 86 BPM | DIASTOLIC BLOOD PRESSURE: 72 MMHG | TEMPERATURE: 97 F | WEIGHT: 172.6 LBS

## 2025-08-09 DIAGNOSIS — L08.9 FINGER INFECTION: ICD-10-CM

## 2025-08-09 DIAGNOSIS — T82.898A: Primary | ICD-10-CM

## 2025-08-09 DIAGNOSIS — L98.491 SKIN ULCER OF FINGER, LIMITED TO BREAKDOWN OF SKIN (HCC): Primary | ICD-10-CM

## 2025-08-09 DIAGNOSIS — F11.20 UNCOMPLICATED OPIOID DEPENDENCE (HCC): ICD-10-CM

## 2025-08-09 LAB
ANION GAP SERPL CALC-SCNC: 11 MMOL/L (ref 7–16)
BUN SERPL-MCNC: 37 MG/DL (ref 8–22)
CALCIUM SERPL-MCNC: 9.7 MG/DL (ref 8.5–10.5)
CHLORIDE SERPL-SCNC: 97 MMOL/L (ref 96–112)
CO2 SERPL-SCNC: 27 MMOL/L (ref 20–33)
CREAT SERPL-MCNC: 5.14 MG/DL (ref 0.5–1.4)
GFR SERPLBLD CREATININE-BSD FMLA CKD-EPI: 10 ML/MIN/1.73 M 2
GLUCOSE SERPL-MCNC: 121 MG/DL (ref 65–99)
POTASSIUM SERPL-SCNC: 5.2 MMOL/L (ref 3.6–5.5)
SODIUM SERPL-SCNC: 135 MMOL/L (ref 135–145)

## 2025-08-09 PROCEDURE — 99284 EMERGENCY DEPT VISIT MOD MDM: CPT

## 2025-08-09 PROCEDURE — 3075F SYST BP GE 130 - 139MM HG: CPT | Performed by: NURSE PRACTITIONER

## 2025-08-09 PROCEDURE — 96374 THER/PROPH/DIAG INJ IV PUSH: CPT

## 2025-08-09 PROCEDURE — 99215 OFFICE O/P EST HI 40 MIN: CPT | Performed by: NURSE PRACTITIONER

## 2025-08-09 PROCEDURE — 73140 X-RAY EXAM OF FINGER(S): CPT | Mod: TC,FY,RT | Performed by: RADIOLOGY

## 2025-08-09 PROCEDURE — 3078F DIAST BP <80 MM HG: CPT | Performed by: NURSE PRACTITIONER

## 2025-08-09 PROCEDURE — 80048 BASIC METABOLIC PNL TOTAL CA: CPT

## 2025-08-09 PROCEDURE — RXMED WILLOW AMBULATORY MEDICATION CHARGE: Performed by: EMERGENCY MEDICINE

## 2025-08-09 PROCEDURE — 700111 HCHG RX REV CODE 636 W/ 250 OVERRIDE (IP): Mod: JZ | Performed by: EMERGENCY MEDICINE

## 2025-08-09 PROCEDURE — 36415 COLL VENOUS BLD VENIPUNCTURE: CPT

## 2025-08-09 RX ORDER — HYDROCODONE BITARTRATE AND ACETAMINOPHEN 10; 325 MG/1; MG/1
1 TABLET ORAL EVERY 6 HOURS PRN
Qty: 28 TABLET | Refills: 0 | Status: SHIPPED | OUTPATIENT
Start: 2025-08-09 | End: 2025-08-16

## 2025-08-09 RX ORDER — MORPHINE SULFATE 4 MG/ML
4 INJECTION INTRAVENOUS ONCE
Status: COMPLETED | OUTPATIENT
Start: 2025-08-09 | End: 2025-08-09

## 2025-08-09 RX ADMIN — MORPHINE SULFATE 4 MG: 4 INJECTION, SOLUTION INTRAMUSCULAR; INTRAVENOUS at 15:55

## 2025-08-09 ASSESSMENT — ENCOUNTER SYMPTOMS
FEVER: 0
ABDOMINAL PAIN: 0
SHORTNESS OF BREATH: 0

## 2025-08-09 ASSESSMENT — FIBROSIS 4 INDEX
FIB4 SCORE: 0.95
FIB4 SCORE: 0.95

## 2025-08-13 DIAGNOSIS — E11.29 TYPE 2 DIABETES MELLITUS WITH OTHER DIABETIC KIDNEY COMPLICATION, WITH LONG-TERM CURRENT USE OF INSULIN (HCC): ICD-10-CM

## 2025-08-13 DIAGNOSIS — Z79.4 TYPE 2 DIABETES MELLITUS WITH OTHER DIABETIC KIDNEY COMPLICATION, WITH LONG-TERM CURRENT USE OF INSULIN (HCC): ICD-10-CM

## 2025-08-14 ENCOUNTER — APPOINTMENT (OUTPATIENT)
Dept: MEDICAL GROUP | Facility: PHYSICIAN GROUP | Age: 50
End: 2025-08-14
Payer: MEDICARE

## 2025-08-14 RX ORDER — SEMAGLUTIDE 1.34 MG/ML
1 INJECTION, SOLUTION SUBCUTANEOUS
Qty: 3 ML | Refills: 2 | Status: SHIPPED | OUTPATIENT
Start: 2025-08-14

## 2025-08-26 ENCOUNTER — OFFICE VISIT (OUTPATIENT)
Facility: MEDICAL CENTER | Age: 50
End: 2025-08-26
Payer: MEDICARE

## 2025-08-26 VITALS
DIASTOLIC BLOOD PRESSURE: 80 MMHG | OXYGEN SATURATION: 98 % | BODY MASS INDEX: 26.07 KG/M2 | HEART RATE: 80 BPM | HEIGHT: 68 IN | SYSTOLIC BLOOD PRESSURE: 142 MMHG | WEIGHT: 172 LBS | TEMPERATURE: 97.6 F

## 2025-08-26 DIAGNOSIS — Z99.2 END STAGE RENAL DISEASE ON DIALYSIS (HCC): Primary | ICD-10-CM

## 2025-08-26 DIAGNOSIS — N18.6 END STAGE RENAL DISEASE ON DIALYSIS (HCC): Primary | ICD-10-CM

## 2025-08-26 PROCEDURE — 3079F DIAST BP 80-89 MM HG: CPT | Performed by: PHYSICIAN ASSISTANT

## 2025-08-26 PROCEDURE — 99024 POSTOP FOLLOW-UP VISIT: CPT | Performed by: PHYSICIAN ASSISTANT

## 2025-08-26 PROCEDURE — 3077F SYST BP >= 140 MM HG: CPT | Performed by: PHYSICIAN ASSISTANT

## 2025-08-26 RX ORDER — MUPIROCIN CALCIUM 20 MG/G
1 CREAM TOPICAL 2 TIMES DAILY
Qty: 15 G | Refills: 0 | Status: CANCELLED | OUTPATIENT
Start: 2025-08-26

## 2025-08-26 ASSESSMENT — FIBROSIS 4 INDEX: FIB4 SCORE: 0.95

## 2025-09-09 ENCOUNTER — APPOINTMENT (OUTPATIENT)
Dept: MEDICAL GROUP | Facility: PHYSICIAN GROUP | Age: 50
End: 2025-09-09
Payer: MEDICARE

## (undated) DEVICE — CHLORAPREP 26 ML APPLICATOR - ORANGE TINT(25/CA)

## (undated) DEVICE — PAD LAP STERILE 18 X 18 - (5/PK 40PK/CA)

## (undated) DEVICE — GLOVE BIOGEL INDICATOR SZ 8 SURGICAL PF LTX - (50/BX 4BX/CA)

## (undated) DEVICE — SLEEVE, VASO, THIGH, MED

## (undated) DEVICE — GOWN SURGEONS LARGE - (32/CA)

## (undated) DEVICE — STAPLER SKIN DISP - (6/BX 10BX/CA) VISISTAT

## (undated) DEVICE — TROCAR Z THREAD11MM OPTICAL - NON BLADED(6/BX)

## (undated) DEVICE — COVER LIGHT HANDLE ALC PLUS DISP (18EA/BX)

## (undated) DEVICE — SUTURE GENERAL

## (undated) DEVICE — SENSOR OXIMETER ADULT SPO2 RD SET (20EA/BX)

## (undated) DEVICE — MINICAP EXTENDED LIFE TRANSFER SET 6IN (6EA/CA)

## (undated) DEVICE — SUTURE ETHILON 2-0 FSLX 30 (36PK/BX)"

## (undated) DEVICE — SODIUM CHL IRRIGATION 0.9% 1000ML (12EA/CA)

## (undated) DEVICE — SUCTION INSTRUMENT YANKAUER BULBOUS TIP W/O VENT (50EA/CA)

## (undated) DEVICE — DRAPE LOWER EXTREMETY - (6/CA)

## (undated) DEVICE — Device

## (undated) DEVICE — PACK UPPER EXTREMITY (2EA/CA)

## (undated) DEVICE — BLADE SURGICAL #15 - (50/BX 3BX/CA)

## (undated) DEVICE — SYRINGE 10 ML CONTROL LL (25EA/BX 4BX/CA)

## (undated) DEVICE — DRAPE 36X28IN RAD CARM BND BG - (25/CA)

## (undated) DEVICE — SUTURE 3-0 ETHILON FS-1 - (36/BX) 30 INCH

## (undated) DEVICE — SUTURE 4-0 SILK 12 X 18 INCH - (36/BX)

## (undated) DEVICE — GLOVE BIOGEL SZ 6 PF LATEX - (50EA/BX 4BX/CA)

## (undated) DEVICE — SET LEADWIRE 5 LEAD BEDSIDE DISPOSABLE ECG (1SET OF 5/EA)

## (undated) DEVICE — SYRINGE ALLIANCE INFLATION (5EA/BX)

## (undated) DEVICE — TOWEL STOP TIMEOUT SAFETY FLAG (40EA/CA)

## (undated) DEVICE — BANDAGE ELASTIC 4 HONEYCOMB - 4"X5YD LF (20/CA)"

## (undated) DEVICE — GLOVE BIOGEL PI ORTHO SZ 7.5 PF LF (40PR/BX)

## (undated) DEVICE — DRAPE LARGE 3 QUARTER - (20/CA)

## (undated) DEVICE — TUBING CLEARLINK DUO-VENT - C-FLO (48EA/CA)

## (undated) DEVICE — LACTATED RINGERS INJ 1000 ML - (14EA/CA 60CA/PF)

## (undated) DEVICE — SODIUM CHL. INJ. 0.9% 500ML (24EA/CA 50CA/PF)

## (undated) DEVICE — SET EXTENSION WITH 2 PORTS (48EA/CA) ***PART #2C8610 IS A SUBSTITUTE*****

## (undated) DEVICE — TROCAR 5X100 NON BLADED Z-TH - READ KII (6/BX)

## (undated) DEVICE — GOWN SURGEONS X-LARGE - DISP. (30/CA)

## (undated) DEVICE — GLOVE BIOGEL SZ 7.5 SURGICAL PF LTX - (50PR/BX 4BX/CA)

## (undated) DEVICE — WRAP CO-FLEX 4IN X 5YD STERIL - SELF-ADHERENT (18/CA)

## (undated) DEVICE — PACK LOWER EXTREMITY - (2/CA)

## (undated) DEVICE — BLOCK BITE ENDOSCOPIC 2809 - (100/BX) INTERMEDIATE

## (undated) DEVICE — GOWN WARMING STANDARD FLEX - (30/CA)

## (undated) DEVICE — ADHESIVE MASTISOL - (48/BX)

## (undated) DEVICE — ELECTRODE DUAL RETURN W/ CORD - (50/PK)

## (undated) DEVICE — BIT DRILL SHORT 4.2MM X 155MM (4TX2=8)

## (undated) DEVICE — NEEDLE INSUFFLATION FOR STEP - (12/BX)

## (undated) DEVICE — PAD PREP 24 X 48 CUFFED - (100/CA)

## (undated) DEVICE — SUTURE ABSORBABLE VIOLET PDS 2-0 CT-1 L18 IN (12EA/BX)

## (undated) DEVICE — SPONGE GAUZESTER 4 X 4 4PLY - (128PK/CA)

## (undated) DEVICE — SUTURE 2-0 ETHILON FS - (36/BX) 18 INCH

## (undated) DEVICE — BANDAGE ELASTIC LATEX STERILE VELCRO 4 X 5 YDS (25EA/CA)

## (undated) DEVICE — SPONGE DRAIN 4 X 4IN 6-PLY - (2/PK25PK/BX12BX/CS)

## (undated) DEVICE — PADDING CAST 6 IN STERILE - 6 X 4 YDS (24/CA)

## (undated) DEVICE — DRESSING TRANSPARENT FILM TEGADERM 4 X 4.75" (50EA/BX)"

## (undated) DEVICE — SUTURE 3-0 VICRYL PLUS SH - 8X 18 INCH (12/BX)

## (undated) DEVICE — KIT SKIN PREP SURG. SOLUTION - DURAPREP (20 KT/CA)

## (undated) DEVICE — SUTURE 0 VICRYL PLUS CT-1 - 8 X 18 INCH (12/BX)

## (undated) DEVICE — STOPCOCK MALE 4-WAY - (50/CA)

## (undated) DEVICE — GLOVE BIOGEL PI INDICATOR SZ 7.5 SURGICAL PF LF -(50/BX 4BX/CA)

## (undated) DEVICE — GLOVE BIOGEL PI ORTHO SZ 6 SURGICAL PF LF (40PR/BX)

## (undated) DEVICE — SUTURE CV

## (undated) DEVICE — CONTAINER SPECIMEN BAG OR - STERILE 4 OZ W/LID (100EA/CA)

## (undated) DEVICE — CANISTER SUCTION 3000ML MECHANICAL FILTER AUTO SHUTOFF MEDI-VAC NONSTERILE LF DISP (40EA/CA)

## (undated) DEVICE — BANDAGE ELASTIC STERILE VELCRO 6 X 5 YDS (25EA/CA)

## (undated) DEVICE — CLOSURE SKIN STRIP 1/2 X 4 IN - (STERI STRIP) (50/BX 4BX/CA)

## (undated) DEVICE — SUTURE 4-0 MONOCRYL PLUS PS-2 - 27 INCH (36/BX)

## (undated) DEVICE — BALLOON RIGIFLEX SINGLE USE ABD 35MM

## (undated) DEVICE — DRESSING TRANSPARENT FILM TEGADERM 2.375 X 2.75" (100EA/BX)"

## (undated) DEVICE — KIT CUSTOM PROCEDURE SINGLE FOR ENDO  (15/CA)

## (undated) DEVICE — SLEEVE VASO DVT COMPRESSION CALF MED - (10PR/CA)

## (undated) DEVICE — CLIP ANCHOR TT 220 CM

## (undated) DEVICE — CATHERTER CLEAR SINGLE USE INJECTION THERAPY NEEDLE 25GA X 4MM  2.3MM X 240CM (5EA/BX)

## (undated) DEVICE — BOVIE BLADE COATED - (50/PK)

## (undated) DEVICE — SUTURE 2-0 VICRYL PLUS CT-1 - 8 X 18 INCH(12/BX)

## (undated) DEVICE — DRAPE LAPAROTOMY T SHEET - (12EA/CA)

## (undated) DEVICE — SUTURE 4-0 ETHILON FS-1 18 N (36PK/BX)

## (undated) DEVICE — GLOVE BIOGEL SZ 8 SURGICAL PF LTX - (50PR/BX 4BX/CA)

## (undated) DEVICE — PADDING CAST 4 IN STERILE - 4 X 4 YDS (24/CA)